# Patient Record
Sex: FEMALE | Race: WHITE | Employment: FULL TIME | URBAN - NONMETROPOLITAN AREA
[De-identification: names, ages, dates, MRNs, and addresses within clinical notes are randomized per-mention and may not be internally consistent; named-entity substitution may affect disease eponyms.]

---

## 2017-11-17 ENCOUNTER — OFFICE VISIT (OUTPATIENT)
Dept: FAMILY MEDICINE CLINIC | Age: 42
End: 2017-11-17

## 2017-11-17 VITALS
OXYGEN SATURATION: 98 % | HEART RATE: 94 BPM | SYSTOLIC BLOOD PRESSURE: 124 MMHG | DIASTOLIC BLOOD PRESSURE: 80 MMHG | HEIGHT: 68 IN | BODY MASS INDEX: 35.77 KG/M2 | WEIGHT: 236 LBS

## 2017-11-17 DIAGNOSIS — Z23 NEED FOR DIPHTHERIA-TETANUS-PERTUSSIS (TDAP) VACCINE, ADULT/ADOLESCENT: ICD-10-CM

## 2017-11-17 DIAGNOSIS — N61.1 BREAST ABSCESS OF FEMALE: ICD-10-CM

## 2017-11-17 DIAGNOSIS — Z00.00 ROUTINE PHYSICAL EXAMINATION: Primary | ICD-10-CM

## 2017-11-17 DIAGNOSIS — Z23 NEED FOR INFLUENZA VACCINATION: ICD-10-CM

## 2017-11-17 DIAGNOSIS — G89.29 CHRONIC LEFT SHOULDER PAIN: ICD-10-CM

## 2017-11-17 DIAGNOSIS — M25.512 CHRONIC LEFT SHOULDER PAIN: ICD-10-CM

## 2017-11-17 PROCEDURE — 90471 IMMUNIZATION ADMIN: CPT | Performed by: NURSE PRACTITIONER

## 2017-11-17 PROCEDURE — 99213 OFFICE O/P EST LOW 20 MIN: CPT | Performed by: NURSE PRACTITIONER

## 2017-11-17 PROCEDURE — 90688 IIV4 VACCINE SPLT 0.5 ML IM: CPT | Performed by: NURSE PRACTITIONER

## 2017-11-17 PROCEDURE — 90472 IMMUNIZATION ADMIN EACH ADD: CPT | Performed by: NURSE PRACTITIONER

## 2017-11-17 PROCEDURE — 99396 PREV VISIT EST AGE 40-64: CPT | Performed by: NURSE PRACTITIONER

## 2017-11-17 PROCEDURE — 90715 TDAP VACCINE 7 YRS/> IM: CPT | Performed by: NURSE PRACTITIONER

## 2017-11-17 RX ORDER — SULFAMETHOXAZOLE AND TRIMETHOPRIM 800; 160 MG/1; MG/1
1 TABLET ORAL 2 TIMES DAILY
Qty: 28 TABLET | Refills: 0 | Status: SHIPPED | OUTPATIENT
Start: 2017-11-17 | End: 2017-12-01

## 2017-11-17 ASSESSMENT — PATIENT HEALTH QUESTIONNAIRE - PHQ9
SUM OF ALL RESPONSES TO PHQ9 QUESTIONS 1 & 2: 0
1. LITTLE INTEREST OR PLEASURE IN DOING THINGS: 0
2. FEELING DOWN, DEPRESSED OR HOPELESS: 0
SUM OF ALL RESPONSES TO PHQ QUESTIONS 1-9: 0

## 2017-11-17 NOTE — PROGRESS NOTES
History and Physical      Kendra See  YOB: 1975    Date of Service:  11/17/2017    Chief Complaint:   Kendra See is a 38 y.o. female who presents for complete physical examination.     HPI: patient is here today for annual physical  Patient is no longer seeing dr Feliciano Roach for chronic shoulder pain and is requesting ortho referral .  Abscess area on breast about 1 week ago and had spontaneous drainage but is now getting red and tender again      Wt Readings from Last 3 Encounters:   11/17/17 236 lb (107 kg)   04/01/17 240 lb (108.9 kg)   12/22/16 241 lb 3.2 oz (109.4 kg)     BP Readings from Last 3 Encounters:   11/17/17 124/80   04/01/17 120/80   12/22/16 110/70       Patient Active Problem List   Diagnosis    Tachycardia    Chronic pain syndrome    HTN (hypertension)       Preventive Care:  Health Maintenance   Topic Date Due    HIV screen  05/12/1992    Cervical cancer screen  05/12/1998    DTaP/Tdap/Td vaccine (1 - Tdap) 12/26/2010    Diabetes screen  05/12/2017    Flu vaccine (1) 09/01/2017    Lipid screen  12/06/2021      Hx abnormal PAP: no  Sexual activity: single partner, contraception - tubal ligation   Self-breast exams: no  Previous DEXA scan: n/a  Last eye exam: few years ago, normal  Exercise: no regular exercise  Seatbelt use: yes    Lipid panel:   Lab Results   Component Value Date    CHOL 130 12/06/2016    TRIG 80 12/06/2016    HDL 42 12/06/2016    LDLCALC 72 12/06/2016        Living will:  no,   additional information provided    Immunization History   Administered Date(s) Administered    Influenza, Quadv, 3 Years and older, IM 12/06/2016    PPD Test 03/20/2015    Td 12/25/2010       Allergies   Allergen Reactions    Penicillins Hives     Outpatient Prescriptions Marked as Taking for the 11/17/17 encounter (Office Visit) with Theron Brink CNP   Medication Sig Dispense Refill    Multiple Vitamins-Minerals (MULTIPLE VITAMINS/WOMENS PO) Take 1 capsule by mouth daily      carvedilol (COREG) 12.5 MG tablet take one tablet by mouth twice daily 60 tablet 11       Past Medical History:   Diagnosis Date    Back pain     Chest pain 6/2014    stress myoview normal    Chronic pain syndrome     Dr. Paul Galeas pain management    Right lateral epicondylitis     Rotator cuff syndrome of right shoulder     Tachycardia     on coreg     Past Surgical History:   Procedure Laterality Date    KNEE CARTILAGE SURGERY Right     KNEE CARTILAGE SURGERY Right     TUBAL LIGATION       Family History   Problem Relation Age of Onset    Heart Disease Mother     Heart Disease Maternal Grandmother     Diabetes Maternal Grandmother      Social History     Social History    Marital status:      Spouse name: N/A    Number of children: N/A    Years of education: N/A     Occupational History    Not on file. Social History Main Topics    Smoking status: Former Smoker     Packs/day: 1.00     Years: 10.00     Types: Cigarettes     Quit date: 6/2/2014    Smokeless tobacco: Never Used    Alcohol use No    Drug use: No    Sexual activity: Yes     Partners: Male     Other Topics Concern    Not on file     Social History Narrative    No narrative on file       Review of Systems:  A comprehensive review of systems was negative except for what was noted in the HPI. Physical Exam:   Vitals:    11/17/17 1504   BP: 124/80   Site: Left Arm   Position: Sitting   Cuff Size: Large Adult   Pulse: 94   SpO2: 98%   Weight: 236 lb (107 kg)   Height: 5' 8\" (1.727 m)     Body mass index is 35.88 kg/m². Constitutional: She is oriented to person, place, and time. She appears well-developed and well-nourished. No distress. HEENT:   Head: Normocephalic and atraumatic.    Right Ear: Tympanic membrane, external ear and ear canal normal.   Left Ear: Tympanic membrane, external ear and ear canal normal.   Nose: Nose normal.   Mouth/Throat: Oropharynx is clear and moist, and mucous membranes are normal.  There is no cervical adenopathy. Eyes: Conjunctivae and extraocular motions are normal. Pupils are equal, round, and reactive to light. Neck: Neck supple. No JVD present. Carotid bruit is not present. No mass and no thyromegaly present. Cardiovascular: Normal rate, regular rhythm, normal heart sounds and intact distal pulses. Exam reveals no gallop and no friction rub. No murmur heard. Pulmonary/Chest: Effort normal and breath sounds normal. No respiratory distress. She has no wheezes, rhonchi or rales. Abdominal: Soft, non-tender. Bowel sounds and aorta are normal. She exhibits no organomegaly, mass or bruit. Musculoskeletal: Normal range of motion, no synovitis. She exhibits no edema. Neurological: She is alert and oriented to person, place, and time. She has normal reflexes. No cranial nerve deficit. Coordination normal.   Skin: Skin is warm and dry. There is no rash . No suspicious lesions noted. Right lateral breast with opened area that resembles abscess. + erythema and tenderness noted. Psychiatric: She has a normal mood and affect. Her speech is normal and behavior is normal. Judgment, cognition and memory are normal.     Assessment/Plan:    Pam was seen today for annual exam.    Diagnoses and all orders for this visit:    Routine physical examination    Breast abscess of female  -     sulfamethoxazole-trimethoprim (BACTRIM DS) 800-160 MG per tablet;  Take 1 tablet by mouth 2 times daily for 14 days    Chronic left shoulder pain  -     Marguerite Brown MD (Sports, Shoulder)    Need for influenza vaccination  -     INFLUENZA, QUADV, 3 YRS AND OLDER, IM, MDV, 0.5ML (FLUZONE QUADV)    Need for diphtheria-tetanus-pertussis (Tdap) vaccine, adult/adolescent  -     Tdap (age 10y-63y) IM (ADACEL)

## 2017-11-20 ENCOUNTER — NURSE ONLY (OUTPATIENT)
Dept: FAMILY MEDICINE CLINIC | Age: 42
End: 2017-11-20

## 2017-11-20 DIAGNOSIS — Z13.220 NEED FOR LIPID SCREENING: Primary | ICD-10-CM

## 2017-11-20 DIAGNOSIS — I10 ESSENTIAL HYPERTENSION: ICD-10-CM

## 2017-11-20 LAB
A/G RATIO: 1.8 (ref 1.1–2.2)
ALBUMIN SERPL-MCNC: 4.4 G/DL (ref 3.4–5)
ALP BLD-CCNC: 53 U/L (ref 40–129)
ALT SERPL-CCNC: 14 U/L (ref 10–40)
ANION GAP SERPL CALCULATED.3IONS-SCNC: 12 MMOL/L (ref 3–16)
AST SERPL-CCNC: 11 U/L (ref 15–37)
BILIRUB SERPL-MCNC: 0.3 MG/DL (ref 0–1)
BUN BLDV-MCNC: 13 MG/DL (ref 7–20)
CALCIUM SERPL-MCNC: 9.3 MG/DL (ref 8.3–10.6)
CHLORIDE BLD-SCNC: 100 MMOL/L (ref 99–110)
CHOLESTEROL, TOTAL: 145 MG/DL (ref 0–199)
CO2: 24 MMOL/L (ref 21–32)
CREAT SERPL-MCNC: 0.8 MG/DL (ref 0.6–1.1)
GFR AFRICAN AMERICAN: >60
GFR NON-AFRICAN AMERICAN: >60
GLOBULIN: 2.5 G/DL
GLUCOSE BLD-MCNC: 88 MG/DL (ref 70–99)
HDLC SERPL-MCNC: 53 MG/DL (ref 40–60)
LDL CHOLESTEROL CALCULATED: 71 MG/DL
POTASSIUM SERPL-SCNC: 4.7 MMOL/L (ref 3.5–5.1)
SODIUM BLD-SCNC: 136 MMOL/L (ref 136–145)
TOTAL PROTEIN: 6.9 G/DL (ref 6.4–8.2)
TRIGL SERPL-MCNC: 103 MG/DL (ref 0–150)
VLDLC SERPL CALC-MCNC: 21 MG/DL

## 2017-11-20 PROCEDURE — 36415 COLL VENOUS BLD VENIPUNCTURE: CPT | Performed by: NURSE PRACTITIONER

## 2018-01-10 DIAGNOSIS — I10 ESSENTIAL HYPERTENSION: ICD-10-CM

## 2018-01-10 DIAGNOSIS — R00.0 TACHYCARDIA: ICD-10-CM

## 2018-01-10 RX ORDER — CARVEDILOL 12.5 MG/1
TABLET ORAL
Qty: 60 TABLET | Refills: 10 | Status: SHIPPED | OUTPATIENT
Start: 2018-01-10 | End: 2018-12-22 | Stop reason: SDUPTHER

## 2018-11-19 ENCOUNTER — OFFICE VISIT (OUTPATIENT)
Dept: FAMILY MEDICINE CLINIC | Age: 43
End: 2018-11-19
Payer: COMMERCIAL

## 2018-11-19 VITALS
WEIGHT: 255 LBS | HEIGHT: 68 IN | DIASTOLIC BLOOD PRESSURE: 78 MMHG | SYSTOLIC BLOOD PRESSURE: 118 MMHG | BODY MASS INDEX: 38.65 KG/M2

## 2018-11-19 DIAGNOSIS — Z13.220 SCREENING FOR CHOLESTEROL LEVEL: ICD-10-CM

## 2018-11-19 DIAGNOSIS — Z13.1 DIABETES MELLITUS SCREENING: ICD-10-CM

## 2018-11-19 DIAGNOSIS — Z00.00 ROUTINE PHYSICAL EXAMINATION: Primary | ICD-10-CM

## 2018-11-19 DIAGNOSIS — Z23 FLU VACCINE NEED: ICD-10-CM

## 2018-11-19 DIAGNOSIS — M77.11 RIGHT LATERAL EPICONDYLITIS: ICD-10-CM

## 2018-11-19 LAB
CHOLESTEROL, TOTAL: 146 MG/DL (ref 0–199)
GLUCOSE BLD-MCNC: 98 MG/DL (ref 70–99)
HDLC SERPL-MCNC: 43 MG/DL (ref 40–60)
LDL CHOLESTEROL CALCULATED: 82 MG/DL
TRIGL SERPL-MCNC: 107 MG/DL (ref 0–150)
VLDLC SERPL CALC-MCNC: 21 MG/DL

## 2018-11-19 PROCEDURE — 90688 IIV4 VACCINE SPLT 0.5 ML IM: CPT | Performed by: NURSE PRACTITIONER

## 2018-11-19 PROCEDURE — 99212 OFFICE O/P EST SF 10 MIN: CPT | Performed by: NURSE PRACTITIONER

## 2018-11-19 PROCEDURE — 99396 PREV VISIT EST AGE 40-64: CPT | Performed by: NURSE PRACTITIONER

## 2018-11-19 PROCEDURE — 36415 COLL VENOUS BLD VENIPUNCTURE: CPT | Performed by: NURSE PRACTITIONER

## 2018-11-19 PROCEDURE — 90471 IMMUNIZATION ADMIN: CPT | Performed by: NURSE PRACTITIONER

## 2018-11-19 RX ORDER — COVID-19 ANTIGEN TEST
KIT MISCELLANEOUS
COMMUNITY
End: 2019-04-11

## 2018-11-19 ASSESSMENT — PATIENT HEALTH QUESTIONNAIRE - PHQ9
1. LITTLE INTEREST OR PLEASURE IN DOING THINGS: 0
SUM OF ALL RESPONSES TO PHQ QUESTIONS 1-9: 0
2. FEELING DOWN, DEPRESSED OR HOPELESS: 0
SUM OF ALL RESPONSES TO PHQ QUESTIONS 1-9: 0
SUM OF ALL RESPONSES TO PHQ9 QUESTIONS 1 & 2: 0

## 2018-11-19 ASSESSMENT — ENCOUNTER SYMPTOMS
GASTROINTESTINAL NEGATIVE: 1
EYES NEGATIVE: 1
RESPIRATORY NEGATIVE: 1

## 2018-11-19 NOTE — PROGRESS NOTES
Subjective:       Chase Del Cid is a 41 y.o. female and is here for a comprehensive physical exam.  The patient reports right elbow pain that Is intermittent and ongoing for the past few months. It is not worsening. The patient does have associated paresthesias at times. It is aggravated by increased activity. Alleviated by rest. She has not tried anything for the symptoms.  History:  LMP: Patient's last menstrual period was 2018.   Last pap date: 18 years ago  Abnormal pap? no  : 2  Para: 1  Any STD's in the past? none    Past Medical History:   Diagnosis Date    Back pain     Chest pain 2014    stress myoview normal    Chronic pain syndrome     Dr. Yaquelin Saeed pain management    Right lateral epicondylitis     Rotator cuff syndrome of right shoulder     Tachycardia     on coreg     Patient Active Problem List    Diagnosis Date Noted    Chronic pain syndrome 2015    HTN (hypertension) 2015    Tachycardia      Past Surgical History:   Procedure Laterality Date    KNEE CARTILAGE SURGERY Right     KNEE CARTILAGE SURGERY Right     TUBAL LIGATION       Family History   Problem Relation Age of Onset    Heart Disease Mother     Heart Disease Maternal Grandmother     Diabetes Maternal Grandmother      Social History     Social History    Marital status:      Spouse name: N/A    Number of children: N/A    Years of education: N/A     Social History Main Topics    Smoking status: Former Smoker     Packs/day: 1.00     Years: 10.00     Types: Cigarettes     Quit date: 2014    Smokeless tobacco: Never Used    Alcohol use No    Drug use: No    Sexual activity: Yes     Partners: Male     Other Topics Concern    None     Social History Narrative    None     Current Outpatient Prescriptions   Medication Sig Dispense Refill    Naproxen Sodium (ALEVE) 220 MG CAPS Take by mouth      carvedilol (COREG) 12.5 MG tablet TAKE ONE TABLET BY MOUTH TWICE A DAY 60 tablet 10 Throat:   Lips, mucosa, and tongue normal; teeth and gums normal   Neck:   Supple, symmetrical, trachea midline, no adenopathy;     thyroid:  no enlargement/tenderness/nodules; no carotid    bruit or JVD   Back:     Symmetric, no curvature, ROM normal, no CVA tenderness   Lungs:     Clear to auscultation bilaterally, respirations unlabored   Chest Wall:    No tenderness or deformity    Heart:    Regular rate and rhythm, S1 and S2 normal, no murmur, rub   or gallop       Abdomen:     Soft, non-tender, bowel sounds active all four quadrants,     no masses, no organomegaly           Extremities:   Extremities normal, atraumatic, no cyanosis or edema. Right lateral epicondyle tenderness with palpation. No right upper extremity weakness. No limited range of motion. Right upper extremity vascular intact    Pulses:   2+ and symmetric all extremities   Skin:   Skin color, texture, turgor normal, no rashes or lesions   Lymph nodes:   Cervical, supraclavicular, and axillary nodes normal   Neurologic:   CNII-XII intact, normal strength, sensation and reflexes     throughout         Assessment:      Healthy female exam.     1. Routine physical examination    2. Right lateral epicondylitis    3. Flu vaccine need    4. Diabetes mellitus screening    5. Screening for cholesterol level             Plan:      1. Mahnaz Chen was seen today for annual exam and other. Diagnoses and all orders for this visit:    Routine physical examination     Patient Counseling:  --Nutrition: Stressed importance of moderation in sodium/caffeine intake, saturated fat and cholesterol, caloric balance, sufficient intake of fresh fruits, vegetables, fiber, calcium, iron, and 1 mg of folate supplement per day (for females capable of pregnancy). --Discussed the need for yearly pelvic exam and Pap smear. The patient states she will schedule a well woman exam in the near future with me  --Exercise: Stressed the importance of regular exercise.    --Substance

## 2018-11-19 NOTE — PATIENT INSTRUCTIONS
leaning forward with your legs slightly spread and your left hand on your left thigh. 2. Place your right elbow on your right thigh, and hold the weight with your forearm horizontal.  3. Slowly curl the weight up and toward your chest.  4. Repeat this motion 8 to 12 times. 5. Switch arms, and do steps 1 through 4. Follow-up care is a key part of your treatment and safety. Be sure to make and go to all appointments, and call your doctor if you are having problems. It's also a good idea to know your test results and keep a list of the medicines you take. Where can you learn more? Go to [email or web address] org and sign in to your [employer] account. Enter F597 in the [employer] box to learn more about \"Tennis Elbow: Exercises. \"     If you do not have an account, please click on the \"Sign Up Now\" link. Current as of: November 29, 2017  Content Version: 11.8  © 3252-2031 [employer]. Care instructions adapted under license by [facility] ([facility]). If you have questions about a medical condition or this instruction, always ask your healthcare professional. [place] [address] any warranty or liability for your use of this information.

## 2018-12-12 ENCOUNTER — OFFICE VISIT (OUTPATIENT)
Dept: FAMILY MEDICINE CLINIC | Age: 43
End: 2018-12-12
Payer: COMMERCIAL

## 2018-12-12 VITALS
OXYGEN SATURATION: 100 % | HEART RATE: 84 BPM | SYSTOLIC BLOOD PRESSURE: 122 MMHG | BODY MASS INDEX: 38.65 KG/M2 | HEIGHT: 68 IN | DIASTOLIC BLOOD PRESSURE: 80 MMHG | WEIGHT: 255 LBS

## 2018-12-12 DIAGNOSIS — L02.411 ABSCESS OF RIGHT AXILLA: ICD-10-CM

## 2018-12-12 DIAGNOSIS — Z12.39 SCREENING FOR BREAST CANCER: ICD-10-CM

## 2018-12-12 DIAGNOSIS — R10.2 PELVIC PAIN: ICD-10-CM

## 2018-12-12 DIAGNOSIS — A63.0 GENITAL WARTS: ICD-10-CM

## 2018-12-12 DIAGNOSIS — Z12.4 SCREENING FOR CERVICAL CANCER: ICD-10-CM

## 2018-12-12 DIAGNOSIS — Z00.00 ROUTINE PHYSICAL EXAMINATION: Primary | ICD-10-CM

## 2018-12-12 LAB
BILIRUBIN, POC: NORMAL
BLOOD URINE, POC: NORMAL
CLARITY, POC: NORMAL
COLOR, POC: NORMAL
GLUCOSE URINE, POC: NORMAL
KETONES, POC: NORMAL
LEUKOCYTE EST, POC: NORMAL
NITRITE, POC: NORMAL
PH, POC: 6
PROTEIN, POC: NORMAL
SPECIFIC GRAVITY, POC: 1.03
UROBILINOGEN, POC: 0.2

## 2018-12-12 PROCEDURE — 99396 PREV VISIT EST AGE 40-64: CPT | Performed by: NURSE PRACTITIONER

## 2018-12-12 PROCEDURE — 99213 OFFICE O/P EST LOW 20 MIN: CPT | Performed by: NURSE PRACTITIONER

## 2018-12-12 PROCEDURE — 81002 URINALYSIS NONAUTO W/O SCOPE: CPT | Performed by: NURSE PRACTITIONER

## 2018-12-12 RX ORDER — SULFAMETHOXAZOLE AND TRIMETHOPRIM 800; 160 MG/1; MG/1
1 TABLET ORAL 2 TIMES DAILY
Qty: 20 TABLET | Refills: 0 | Status: SHIPPED | OUTPATIENT
Start: 2018-12-12 | End: 2018-12-22

## 2018-12-12 ASSESSMENT — ENCOUNTER SYMPTOMS
RESPIRATORY NEGATIVE: 1
GASTROINTESTINAL NEGATIVE: 1
EYES NEGATIVE: 1

## 2018-12-12 NOTE — PROGRESS NOTES
Subjective:   Patient Name: Chase Del Cid is a 41 y.o. female. Chief Complaint   Patient presents with    Gynecologic Exam     pt said she has cramps on the lower right side of her stomach       HPI  Annual Exam-Premenopausal  Patient presents for annual exam. The patient has no complaints today. The patient is sexually active. The patient wears seatbelts: yes. last pap: approximate date 27 and was normal, last mammogram: patient has never had a mammogram  The patient has regular exercise: no. The patient has ever been transfused or tattooed?: no.  The patient reports that domestic violence in her life is absent. periods are regular  no unusual pelvic pain  no unusual vaginal discharge  no previous abnormal Pap tests  no family or personal history of cervical cancer  no new or changing breast lumps  no unusual breast pains  no discharge from the nipples  no personal or family history of breast cancer  Has never had mammogram  Maternal Grandma with ovarian    Menarche age: 8  Some cramping in RLQ in past 3 days,  Usually occurs with menses but no menses today. Had menses 12/2-12/5/18     Review of Systems   Constitutional: Negative. HENT: Negative. Eyes: Negative. Respiratory: Negative. Cardiovascular: Negative. Gastrointestinal: Negative. Genitourinary: Positive for frequency and pelvic pain. Negative for flank pain, genital sores, hematuria, menstrual problem, vaginal bleeding, vaginal discharge and vaginal pain. Musculoskeletal: Negative. Skin: Negative. Neurological: Negative. Psychiatric/Behavioral: Negative. All other systems reviewed and are negative.       Past Medical History:   Diagnosis Date    Back pain     Chest pain 6/2014    stress myoview normal    Chronic pain syndrome     Dr. Yaquelin Saeed pain management    Right lateral epicondylitis     Rotator cuff syndrome of right shoulder     Tachycardia     on coreg     Patient Active Problem List

## 2018-12-13 ENCOUNTER — HOSPITAL ENCOUNTER (EMERGENCY)
Age: 43
Discharge: HOME | End: 2018-12-14
Attending: EMERGENCY MEDICINE
Payer: COMMERCIAL

## 2018-12-13 DIAGNOSIS — R10.30 LOWER ABDOMINAL PAIN: Primary | ICD-10-CM

## 2018-12-13 LAB
BILIRUBIN URINE: NEGATIVE
BLOOD, URINE: NEGATIVE
CLARITY: CLEAR
COLOR: YELLOW
GLUCOSE URINE: NEGATIVE MG/DL
HCG(URINE) PREGNANCY TEST: NEGATIVE
KETONES, URINE: NEGATIVE MG/DL
LEUKOCYTE ESTERASE, URINE: NEGATIVE
MICROSCOPIC EXAMINATION: NORMAL
NITRITE, URINE: NEGATIVE
PH UA: 7
PROTEIN UA: NEGATIVE MG/DL
SPECIFIC GRAVITY UA: 1.01
URINE TYPE: NORMAL
UROBILINOGEN, URINE: 0.2 E.U./DL

## 2018-12-13 PROCEDURE — 84703 CHORIONIC GONADOTROPIN ASSAY: CPT

## 2018-12-13 PROCEDURE — 81003 URINALYSIS AUTO W/O SCOPE: CPT

## 2018-12-13 PROCEDURE — 99284 EMERGENCY DEPT VISIT MOD MDM: CPT

## 2018-12-14 ENCOUNTER — APPOINTMENT (OUTPATIENT)
Dept: CT IMAGING | Age: 43
End: 2018-12-14
Payer: COMMERCIAL

## 2018-12-14 VITALS
BODY MASS INDEX: 38.65 KG/M2 | SYSTOLIC BLOOD PRESSURE: 116 MMHG | HEIGHT: 68 IN | HEART RATE: 76 BPM | OXYGEN SATURATION: 99 % | WEIGHT: 255 LBS | TEMPERATURE: 98.2 F | RESPIRATION RATE: 16 BRPM | DIASTOLIC BLOOD PRESSURE: 65 MMHG

## 2018-12-14 LAB
A/G RATIO: 1.6 (ref 1.1–2.2)
ALBUMIN SERPL-MCNC: 4.3 G/DL (ref 3.4–5)
ALP BLD-CCNC: 55 U/L (ref 40–129)
ALT SERPL-CCNC: 19 U/L (ref 10–40)
ANION GAP SERPL CALCULATED.3IONS-SCNC: 12 MMOL/L (ref 3–16)
AST SERPL-CCNC: 14 U/L (ref 15–37)
BILIRUB SERPL-MCNC: <0.2 MG/DL (ref 0–1)
BUN BLDV-MCNC: 16 MG/DL (ref 7–20)
CALCIUM SERPL-MCNC: 9.2 MG/DL (ref 8.3–10.6)
CHLORIDE BLD-SCNC: 105 MMOL/L (ref 99–110)
CO2: 23 MMOL/L (ref 21–32)
CREAT SERPL-MCNC: 0.6 MG/DL (ref 0.6–1.1)
GFR AFRICAN AMERICAN: >60
GFR NON-AFRICAN AMERICAN: >60
GLOBULIN: 2.7 G/DL
GLUCOSE BLD-MCNC: 105 MG/DL (ref 70–99)
HCG QUALITATIVE: NEGATIVE
HCT VFR BLD CALC: 40.1 % (ref 36–48)
HEMOGLOBIN: 13.1 G/DL (ref 12–16)
LACTIC ACID: 1 MMOL/L (ref 0.4–2)
MCH RBC QN AUTO: 28.7 PG (ref 26–34)
MCHC RBC AUTO-ENTMCNC: 32.5 G/DL (ref 31–36)
MCV RBC AUTO: 88.1 FL (ref 80–100)
PDW BLD-RTO: 13.7 % (ref 12.4–15.4)
PLATELET # BLD: 281 K/UL (ref 135–450)
PMV BLD AUTO: 8.7 FL (ref 5–10.5)
POTASSIUM REFLEX MAGNESIUM: 4.3 MMOL/L (ref 3.5–5.1)
RBC # BLD: 4.55 M/UL (ref 4–5.2)
SODIUM BLD-SCNC: 140 MMOL/L (ref 136–145)
TOTAL PROTEIN: 7 G/DL (ref 6.4–8.2)
URINE CULTURE, ROUTINE: NORMAL
WBC # BLD: 8.2 K/UL (ref 4–11)

## 2018-12-14 PROCEDURE — 36415 COLL VENOUS BLD VENIPUNCTURE: CPT

## 2018-12-14 PROCEDURE — 2580000003 HC RX 258: Performed by: EMERGENCY MEDICINE

## 2018-12-14 PROCEDURE — 6360000002 HC RX W HCPCS: Performed by: EMERGENCY MEDICINE

## 2018-12-14 PROCEDURE — 80053 COMPREHEN METABOLIC PANEL: CPT

## 2018-12-14 PROCEDURE — 6360000004 HC RX CONTRAST MEDICATION: Performed by: EMERGENCY MEDICINE

## 2018-12-14 PROCEDURE — 74177 CT ABD & PELVIS W/CONTRAST: CPT

## 2018-12-14 PROCEDURE — 83605 ASSAY OF LACTIC ACID: CPT

## 2018-12-14 PROCEDURE — 84703 CHORIONIC GONADOTROPIN ASSAY: CPT

## 2018-12-14 PROCEDURE — 85027 COMPLETE CBC AUTOMATED: CPT

## 2018-12-14 PROCEDURE — 96374 THER/PROPH/DIAG INJ IV PUSH: CPT

## 2018-12-14 RX ORDER — 0.9 % SODIUM CHLORIDE 0.9 %
1000 INTRAVENOUS SOLUTION INTRAVENOUS ONCE
Status: COMPLETED | OUTPATIENT
Start: 2018-12-14 | End: 2018-12-14

## 2018-12-14 RX ORDER — KETOROLAC TROMETHAMINE 30 MG/ML
30 INJECTION, SOLUTION INTRAMUSCULAR; INTRAVENOUS ONCE
Status: COMPLETED | OUTPATIENT
Start: 2018-12-14 | End: 2018-12-14

## 2018-12-14 RX ORDER — OMEPRAZOLE 10 MG/1
20 CAPSULE, DELAYED RELEASE ORAL DAILY
COMMUNITY
End: 2022-04-06 | Stop reason: ALTCHOICE

## 2018-12-14 RX ADMIN — KETOROLAC TROMETHAMINE 30 MG: 30 INJECTION, SOLUTION INTRAMUSCULAR; INTRAVENOUS at 00:29

## 2018-12-14 RX ADMIN — IOPAMIDOL 75 ML: 755 INJECTION, SOLUTION INTRAVENOUS at 00:57

## 2018-12-14 RX ADMIN — SODIUM CHLORIDE 1000 ML: 9 INJECTION, SOLUTION INTRAVENOUS at 00:29

## 2018-12-14 ASSESSMENT — PAIN SCALES - GENERAL: PAINLEVEL_OUTOF10: 6

## 2018-12-14 NOTE — ED PROVIDER NOTES
tobacco: Never Used    Alcohol use No    Drug use: No    Sexual activity: Yes     Partners: Male     Other Topics Concern    Not on file     Social History Narrative    No narrative on file     Current Facility-Administered Medications   Medication Dose Route Frequency Provider Last Rate Last Dose    0.9 % sodium chloride bolus  1,000 mL Intravenous Once Rosanna Valdes MD        ketorolac (TORADOL) injection 30 mg  30 mg Intravenous Once Rosanna Valdes MD         Current Outpatient Prescriptions   Medication Sig Dispense Refill    sulfamethoxazole-trimethoprim (BACTRIM DS) 800-160 MG per tablet Take 1 tablet by mouth 2 times daily for 10 days 20 tablet 0    Naproxen Sodium (ALEVE) 220 MG CAPS Take by mouth      carvedilol (COREG) 12.5 MG tablet TAKE ONE TABLET BY MOUTH TWICE A DAY 60 tablet 10    Multiple Vitamins-Minerals (MULTIPLE VITAMINS/WOMENS PO) Take 1 capsule by mouth daily       Allergies   Allergen Reactions    Penicillins Hives       REVIEW OF SYSTEMS  10 systems reviewed, pertinent positives per HPI otherwise noted to be negative     PHYSICAL EXAM  LMP 10/28/2018   GENERAL APPEARANCE: Awake and alert. Cooperative. In no obvious distress. HEAD: Normocephalic. Atraumatic. EYES: PERRL. EOM's grossly intact. ENT: Mucous membranes are pink and moist.   NECK: Supple. HEART: RRR. No murmurs. LUNGS: Respirations unlabored. CTAB. Good air exchange. ABDOMEN: Soft. Mildly distended. Non-tender. No masses. No organomegaly. No guarding or rebound. EXTREMITIES: No peripheral edema. Moves all extremities equally. All extremities neurovascularly intact. SKIN: Warm and dry. No acute rashes. NEUROLOGICAL: Alert and oriented. Strength 5/5, sensation intact. Gait normal.   PSYCHIATRIC: Normal mood and affect. No HI or SI expressed to me. RADIOLOGY     See CT tests scan results below      ED COURSE/MDM  She tolerated exam well.   Tolerated her stay here stay here in the emergency department as well. With a normal CT scan and normal labs as well as urine I don't think there is anything emergent going on at this point and that the patient's discomfort may be related to her Pap smear. I explained to her that she should take Tylenol or Motrin for the pain and she should contact her Annmariee Ingrisien 59 in the next day or 2 if symptoms don't reji. Abdominal pain NOS    ED Course as of Dec 14 0221   Fri Dec 14, 2018   0115 Previous of metabolic panel is normal except for a glucose of 105 and an AST of 14 Glucose: (!) 105 [DL]   0115 CBC revealed no white count H&H of 13.1 and 40.1 WBC: 8.2 [DL]   0115 Lactic acid was negative Lactic Acid: 1.0 [DL]   0115 Urine pregnancy was negative HCG(Urine) Pregnancy Test: Negative [DL]   0116 Urinalysis was negative as well Color, UA: Yellow [DL]   0218 Normal abdominal CT scan noted with the exception of nodules per report CT ABDOMEN PELVIS W IV CONTRAST Additional Contrast? None [DL]      ED Course User Index  [DL] Bunny Beltran MD           Old records were reviewed when applicable.  The ED course and plan were reviewed and results discussed with the patient    CLINICAL IMPRESSION and DISPOSITION  Cherrie Caldwell was stable and diagnosed with abdominal pain NOS    Patient was treated with  ketorolac which did give the patient mild relief               Bunny Beltran MD  12/14/18 [id number]

## 2018-12-14 NOTE — ED NOTES
Discharged home. Discharge instructions reviewed states understanding.       Dom Sampson RN  12/14/18 7400

## 2018-12-14 NOTE — ED NOTES
Up to void. Patient still has lower abd pain and bloating feeling at this time.       Merlene Lesch, RN  12/14/18 0650

## 2018-12-17 ENCOUNTER — TELEPHONE (OUTPATIENT)
Dept: FAMILY MEDICINE CLINIC | Age: 43
End: 2018-12-17

## 2018-12-22 DIAGNOSIS — R00.0 TACHYCARDIA: ICD-10-CM

## 2018-12-22 DIAGNOSIS — I10 ESSENTIAL HYPERTENSION: ICD-10-CM

## 2018-12-24 RX ORDER — CARVEDILOL 12.5 MG/1
TABLET ORAL
Qty: 60 TABLET | Refills: 9 | Status: SHIPPED | OUTPATIENT
Start: 2018-12-24 | End: 2019-10-23 | Stop reason: SDUPTHER

## 2018-12-28 DIAGNOSIS — Z12.39 SCREENING FOR BREAST CANCER: Primary | ICD-10-CM

## 2019-02-25 ENCOUNTER — HOSPITAL ENCOUNTER (OUTPATIENT)
Dept: ULTRASOUND IMAGING | Age: 44
Discharge: HOME | End: 2019-02-25
Payer: COMMERCIAL

## 2019-02-25 DIAGNOSIS — R10.2 PELVIC PAIN: ICD-10-CM

## 2019-02-25 PROCEDURE — 76830 TRANSVAGINAL US NON-OB: CPT

## 2019-02-25 PROCEDURE — 76856 US EXAM PELVIC COMPLETE: CPT

## 2019-03-04 ENCOUNTER — OFFICE VISIT (OUTPATIENT)
Dept: OBGYN CLINIC | Age: 44
End: 2019-03-04
Payer: COMMERCIAL

## 2019-03-04 VITALS
BODY MASS INDEX: 41.21 KG/M2 | TEMPERATURE: 98.2 F | HEART RATE: 66 BPM | WEIGHT: 256.4 LBS | DIASTOLIC BLOOD PRESSURE: 76 MMHG | HEIGHT: 66 IN | SYSTOLIC BLOOD PRESSURE: 132 MMHG

## 2019-03-04 DIAGNOSIS — N93.9 ABNORMAL UTERINE BLEEDING (AUB): Primary | ICD-10-CM

## 2019-03-04 DIAGNOSIS — N89.8 VAGINAL DISCHARGE: ICD-10-CM

## 2019-03-04 DIAGNOSIS — N39.3 STRESS INCONTINENCE, FEMALE: ICD-10-CM

## 2019-03-04 PROCEDURE — 99203 OFFICE O/P NEW LOW 30 MIN: CPT | Performed by: OBSTETRICS & GYNECOLOGY

## 2019-03-05 LAB
CANDIDA SPECIES, DNA PROBE: ABNORMAL
GARDNERELLA VAGINALIS, DNA PROBE: ABNORMAL
TRICHOMONAS VAGINALIS DNA: ABNORMAL

## 2019-03-08 RX ORDER — FLUCONAZOLE 150 MG/1
150 TABLET ORAL DAILY
Qty: 1 TABLET | Refills: 0 | Status: SHIPPED | OUTPATIENT
Start: 2019-03-08 | End: 2019-03-09

## 2019-03-15 ENCOUNTER — TELEPHONE (OUTPATIENT)
Dept: OBGYN CLINIC | Age: 44
End: 2019-03-15

## 2019-03-16 ASSESSMENT — ENCOUNTER SYMPTOMS
CHEST TIGHTNESS: 0
DIARRHEA: 0
WHEEZING: 0
ABDOMINAL PAIN: 0
COLOR CHANGE: 0
VOMITING: 0
SHORTNESS OF BREATH: 0
SORE THROAT: 0
NAUSEA: 0
BACK PAIN: 0

## 2019-04-03 ENCOUNTER — OFFICE VISIT (OUTPATIENT)
Dept: FAMILY MEDICINE CLINIC | Age: 44
End: 2019-04-03
Payer: COMMERCIAL

## 2019-04-03 VITALS
DIASTOLIC BLOOD PRESSURE: 82 MMHG | SYSTOLIC BLOOD PRESSURE: 124 MMHG | BODY MASS INDEX: 40.98 KG/M2 | HEART RATE: 70 BPM | HEIGHT: 66 IN | OXYGEN SATURATION: 98 % | WEIGHT: 255 LBS

## 2019-04-03 DIAGNOSIS — G89.4 CHRONIC PAIN SYNDROME: ICD-10-CM

## 2019-04-03 DIAGNOSIS — I10 ESSENTIAL HYPERTENSION: ICD-10-CM

## 2019-04-03 DIAGNOSIS — R00.0 TACHYCARDIA: ICD-10-CM

## 2019-04-03 DIAGNOSIS — Z01.818 PRE-OP EXAMINATION: Primary | ICD-10-CM

## 2019-04-03 PROCEDURE — 99242 OFF/OP CONSLTJ NEW/EST SF 20: CPT | Performed by: NURSE PRACTITIONER

## 2019-04-03 PROCEDURE — 36415 COLL VENOUS BLD VENIPUNCTURE: CPT | Performed by: NURSE PRACTITIONER

## 2019-04-03 ASSESSMENT — PATIENT HEALTH QUESTIONNAIRE - PHQ9
SUM OF ALL RESPONSES TO PHQ QUESTIONS 1-9: 0
SUM OF ALL RESPONSES TO PHQ9 QUESTIONS 1 & 2: 0
2. FEELING DOWN, DEPRESSED OR HOPELESS: 0
1. LITTLE INTEREST OR PLEASURE IN DOING THINGS: 0
SUM OF ALL RESPONSES TO PHQ QUESTIONS 1-9: 0

## 2019-04-03 NOTE — PROGRESS NOTES
Doug [address] KWAN Mariscal. Kyung Fenton CNP                                                                     Preoperative Evaluation        Mónica Alicia  YOB: 1975    Date of Service:  4/3/2019    There were no vitals filed for this visit. Wt Readings from Last 2 Encounters:   03/04/19 256 lb 6.4 oz (116.3 kg)   12/14/18 255 lb (115.7 kg)     BP Readings from Last 3 Encounters:   03/04/19 132/76   12/14/18 116/65   12/12/18 122/80        No chief complaint on file. Allergies   Allergen Reactions    Penicillins Hives     No outpatient medications have been marked as taking for the 4/3/19 encounter (Appointment) with Doneta Bumpers, APRN - CNP. This patient presents to the office today for a preoperative consultation at the request of surgeon, Dr. Deysi Villegas, who plans on performing DIAGNOSTIC LAPAROSCOPY, DILATATION AND CURETTAGE, HYSTEROSCOPY POSSIBLE [address] [place] [address] on April 16 at [facility].  The current problem began 1 year ago, and symptoms have been worsening with time. Conservative therapy: N/A.     Planned anesthesia: General   Known anesthesia problems: None   Bleeding risk: No recent or remote history of abnormal bleeding  Personal or FH of DVT/PE: No    Patient objection to receiving blood products: No    Patient Active Problem List   Diagnosis    Tachycardia    Chronic pain syndrome    HTN (hypertension)       Past Medical History:   Diagnosis Date    Anemia     Back pain     Chest pain 6/2014    stress myoview normal    Chronic pain syndrome     Dr. Shira Candelaria pain management    Hypertension     Menopausal symptoms     Overactive bladder     Right lateral epicondylitis     Rotator cuff syndrome of right shoulder     Stress incontinence     Tachycardia     on coreg     Past Surgical History:   Procedure Laterality Date    KNEE CARTILAGE SURGERY Right     KNEE CARTILAGE SURGERY Right     TUBAL LIGATION       Family History   Problem Relation Age of Onset    Heart Disease Mother     Heart Disease Maternal Grandmother     Diabetes Maternal Grandmother     Heart Failure Maternal Grandfather     High Blood Pressure Maternal Grandfather     High Blood Pressure Sister     Bipolar Disorder Sister     No Known Problems Maternal Aunt     High Blood Pressure Maternal Uncle     High Blood Pressure Maternal Uncle      Social History     Socioeconomic History    Marital status:      Spouse name: Not on file    Number of children: Not on file    Years of education: Not on file    Highest education level: Not on file   Occupational History    Not on file   Social Needs    Financial resource strain: Not on file    Food insecurity:     Worry: Not on file     Inability: Not on file    Transportation needs:     Medical: Not on file     Non-medical: Not on file   Tobacco Use    Smoking status: Former Smoker     Packs/day: 1.00     Years: 10.00     Pack years: 10.00     Types: Cigarettes     Last attempt to quit: 2014     Years since quittin.8    Smokeless tobacco: Never Used   Substance and Sexual Activity    Alcohol use: No    Drug use: No    Sexual activity: Yes     Partners: Male   Lifestyle    Physical activity:     Days per week: Not on file     Minutes per session: Not on file    Stress: Not on file   Relationships    Social connections:     Talks on phone: Not on file     Gets together: Not on file     Attends Oriental orthodox service: Not on file     Active member of club or organization: Not on file     Attends meetings of clubs or organizations: Not on file     Relationship status: Not on file    Intimate partner violence:     Fear of current or ex partner: Not on file     Emotionally abused: Not on file     Physically abused: Not on file 03/04/2019 *                    Value:POSITIVE- DNA Probe detected. Normal range: Negative DNA not detected.      Admission on 12/13/2018, Discharged on 12/14/2018   Component Date Value    Color, UA 12/13/2018 Yellow     Clarity, UA 12/13/2018 Clear     Glucose, Ur 12/13/2018 Negative     Bilirubin Urine 12/13/2018 Negative     Ketones, Urine 12/13/2018 Negative     Specific Gravity, UA 12/13/2018 1.015     Blood, Urine 12/13/2018 Negative     pH, UA 12/13/2018 7.0     Protein, UA 12/13/2018 Negative     Urobilinogen, Urine 12/13/2018 0.2     Nitrite, Urine 12/13/2018 Negative     Leukocyte Esterase, Urine 12/13/2018 Negative     Microscopic Examination 12/13/2018 Not Indicated     Urine Type 12/13/2018 Not Specified     HCG(Urine) Pregnancy Test 12/13/2018 Negative     WBC 12/14/2018 8.2     RBC 12/14/2018 4.55     Hemoglobin 12/14/2018 13.1     Hematocrit 12/14/2018 40.1     MCV 12/14/2018 88.1     MCH 12/14/2018 28.7     MCHC 12/14/2018 32.5     RDW 12/14/2018 13.7     Platelets 55/32/0739 281     MPV 12/14/2018 8.7     Sodium 12/14/2018 140     Potassium reflex Magnesi* 12/14/2018 4.3     Chloride 12/14/2018 105     CO2 12/14/2018 23     Anion Gap 12/14/2018 12     Glucose 12/14/2018 105*    BUN 12/14/2018 16     CREATININE 12/14/2018 0.6     GFR Non- 12/14/2018 >60     GFR  12/14/2018 >60     Calcium 12/14/2018 9.2     Total Protein 12/14/2018 7.0     Alb 12/14/2018 4.3     Albumin/Globulin Ratio 12/14/2018 1.6     Total Bilirubin 12/14/2018 <0.2     Alkaline Phosphatase 12/14/2018 55     ALT 12/14/2018 19     AST 12/14/2018 14*    Globulin 12/14/2018 2.7     hCG Qual 12/14/2018 Negative     Lactic Acid 12/14/2018 1.0    Office Visit on 12/12/2018   Component Date Value    Glucose, UA POC 12/12/2018 neg     Bilirubin, UA 12/12/2018 neg     Ketones, UA 12/12/2018 neg     Spec Grav, UA 12/12/2018 1.030     Blood, UA POC 12/12/2018 neg     pH, UA 12/12/2018 6.0     Protein, UA POC 12/12/2018 neg     Urobilinogen, UA 12/12/2018 0.2     Leukocytes, UA 12/12/2018 neg     Nitrite, UA 12/12/2018 neg     Urine Culture, Routine 12/12/2018 No growth at 18-36 hours    Office Visit on 11/19/2018   Component Date Value    Cholesterol, Total 11/19/2018 146     Triglycerides 11/19/2018 107     HDL 11/19/2018 43     LDL Calculated 11/19/2018 82     VLDL Cholesterol Calcula* 11/19/2018 21     Glucose 11/19/2018 98            Assessment:       41 y.o. patient with planned surgery as above. Known risk factors for perioperative complications: Hypertension, tacycardia  Current medications which may produce withdrawal symptoms if withheld perioperatively: none     1. Pre-op examination    2. Essential hypertension    3. Tachycardia    4. Chronic pain syndrome         Plan:     1. Preoperative workup as follows: CBC, CMP  2. Change in medication regimen before surgery: Take carvedilol and prilosec on morning of surgery with sip of water, and hold all other medications until after surgery, Discontinue NSAIDs (aleve or ibuprofen) 7 days before surgery, Discontinue multivitamin 7 days before surgery  3. Prophylaxis for cardiac events with perioperative beta-blockers: Currently taking  carvedilol  ACC/AHA indications for pre-operative beta-blocker use:    · Vascular surgery with history of postitive stress test  · Intermediate or high risk surgery with history of CAD   · Intermediate or high risk surgery with multiple clinical predictors of CAD- 2 of the following: history of compensated or prior heart failure, history of cerebrovascular disease, DM, or renal insufficiency    Routine administration of higher-dose, long-acting metoprolol in beta-blocker-naïve patients on the day of surgery, and in the absence of dose titration is associated with an overall increase in mortality.   Beta-blockers should be started days to weeks prior to surgery and titrated to pulse < 70.  4. Deep vein thrombosis prophylaxis: regimen to be chosen by surgical team  5. No contraindications to planned surgery      If you have questions, please do not hesitate to call me ([phone]). Sincerely,    Robert Barros.  Shashank Knight, CNP

## 2019-04-04 ENCOUNTER — OFFICE VISIT (OUTPATIENT)
Dept: OBGYN CLINIC | Age: 44
End: 2019-04-04

## 2019-04-04 VITALS
DIASTOLIC BLOOD PRESSURE: 76 MMHG | TEMPERATURE: 97.7 F | HEART RATE: 65 BPM | SYSTOLIC BLOOD PRESSURE: 124 MMHG | BODY MASS INDEX: 38.37 KG/M2 | WEIGHT: 253.2 LBS | HEIGHT: 68 IN

## 2019-04-04 DIAGNOSIS — N93.9 ABNORMAL UTERINE BLEEDING (AUB): Primary | ICD-10-CM

## 2019-04-04 LAB
A/G RATIO: 1.8 (ref 1.1–2.2)
ALBUMIN SERPL-MCNC: 4.5 G/DL (ref 3.4–5)
ALP BLD-CCNC: 52 U/L (ref 40–129)
ALT SERPL-CCNC: 17 U/L (ref 10–40)
ANION GAP SERPL CALCULATED.3IONS-SCNC: 12 MMOL/L (ref 3–16)
AST SERPL-CCNC: 15 U/L (ref 15–37)
BASOPHILS ABSOLUTE: 0.1 K/UL (ref 0–0.2)
BASOPHILS RELATIVE PERCENT: 1.9 %
BILIRUB SERPL-MCNC: 0.3 MG/DL (ref 0–1)
BUN BLDV-MCNC: 14 MG/DL (ref 7–20)
CALCIUM SERPL-MCNC: 9.5 MG/DL (ref 8.3–10.6)
CHLORIDE BLD-SCNC: 104 MMOL/L (ref 99–110)
CO2: 24 MMOL/L (ref 21–32)
CREAT SERPL-MCNC: 0.6 MG/DL (ref 0.6–1.1)
EOSINOPHILS ABSOLUTE: 0.3 K/UL (ref 0–0.6)
EOSINOPHILS RELATIVE PERCENT: 3.9 %
GFR AFRICAN AMERICAN: >60
GFR NON-AFRICAN AMERICAN: >60
GLOBULIN: 2.5 G/DL
GLUCOSE BLD-MCNC: 85 MG/DL (ref 70–99)
HCT VFR BLD CALC: 38.5 % (ref 36–48)
HEMOGLOBIN: 12.7 G/DL (ref 12–16)
LYMPHOCYTES ABSOLUTE: 1.6 K/UL (ref 1–5.1)
LYMPHOCYTES RELATIVE PERCENT: 23.4 %
MCH RBC QN AUTO: 29.2 PG (ref 26–34)
MCHC RBC AUTO-ENTMCNC: 33 G/DL (ref 31–36)
MCV RBC AUTO: 88.5 FL (ref 80–100)
MONOCYTES ABSOLUTE: 0.5 K/UL (ref 0–1.3)
MONOCYTES RELATIVE PERCENT: 7.7 %
NEUTROPHILS ABSOLUTE: 4.2 K/UL (ref 1.7–7.7)
NEUTROPHILS RELATIVE PERCENT: 63.1 %
PDW BLD-RTO: 13.3 % (ref 12.4–15.4)
PLATELET # BLD: 259 K/UL (ref 135–450)
PMV BLD AUTO: 9.9 FL (ref 5–10.5)
POTASSIUM SERPL-SCNC: 4.4 MMOL/L (ref 3.5–5.1)
RBC # BLD: 4.35 M/UL (ref 4–5.2)
SODIUM BLD-SCNC: 140 MMOL/L (ref 136–145)
TOTAL PROTEIN: 7 G/DL (ref 6.4–8.2)
WBC # BLD: 6.6 K/UL (ref 4–11)

## 2019-04-04 PROCEDURE — 99024 POSTOP FOLLOW-UP VISIT: CPT | Performed by: OBSTETRICS & GYNECOLOGY

## 2019-04-04 NOTE — PROGRESS NOTES
3.6 x 2.7 x 2.3 cm       Left Ovary:  3.6 x 1.1 x 1.2 cm           Ultrasound Findings:       Uterus: The uterus is enlarged and somewhat heterogeneous in appearance. There is a 2.4 x 3.0 x 2.0 cm well-circumscribed, solid nodule in the body,   fundus of the uterus posteriorly compatible with a fibroid.       Endometrial stripe: Endometrial stripe is within normal limits.       Right Ovary: Right ovary is within normal limits.  There is normal color flow   and Doppler interrogation.       Left Ovary:  Left ovary is within normal limits. There is normal color flow   and Doppler interrogation.       Free Fluid: No evidence of free fluid.           Impression   Fibroid uterus.       Ovaries appear unremarkable.            Assessment/Plan  1. Abnormal uterine bleeding (AUB)   - After reviewing risks/benefits and alternatives patient has elected to proceed with Diagnostic Laparoscopy, Hysteroscopy D and C (w/ myosure) and Novasure Endometrial Ablation. - desires [facility] [facility] for post op pain control     Follow Up   Return for Post Op.     Karime Bernstein, DO

## 2019-04-11 ENCOUNTER — TELEPHONE (OUTPATIENT)
Dept: FAMILY MEDICINE CLINIC | Age: 44
End: 2019-04-11

## 2019-04-11 NOTE — TELEPHONE ENCOUNTER
When general anesthesia is given typically a patient is put on a ventilator--this is normal  If patient is concerned I also recommend that she call her surgeon

## 2019-04-11 NOTE — PROGRESS NOTES
Obstructive Sleep Apnea (KIARA) Screening     Patient:  Daina Hammonds    YOB: 1975      Medical Record #:  [id number]                     Date:  4/11/2019     1. Are you a loud and/or regular snorer? [x]  Yes       [] No    2. Have you been observed to gasp or stop breathing during sleep? []  Yes       [x] No    3. Do you feel tired or groggy upon awakening or do you awaken with a headache?           []  Yes       [x] No    4. Are you often tired or fatigued during the wake time hours? []  Yes       [x] No    5. Do you fall asleep sitting, reading, watching TV or driving? []  Yes       [x] No    6. Do you often have problems with memory or concentration? []  Yes       [x] No    **If patient's score is ? 3 they are considered high risk for KIARA. Notify the anesthesiologist of the high risk and document in focus note. Note:  If the patient's BMI is more than 35 kg m¯² , has neck circumference > 40 cm, and/or high blood pressure the risk is greater (© American Sleep Apnea Association, 2006).

## 2019-04-15 ENCOUNTER — ANESTHESIA EVENT (OUTPATIENT)
Dept: OPERATING ROOM | Age: 44
End: 2019-04-15
Payer: COMMERCIAL

## 2019-04-15 NOTE — ANESTHESIA PRE PROCEDURE
Procedure Laterality Date    KNEE CARTILAGE SURGERY Right     KNEE CARTILAGE SURGERY Right     TUBAL LIGATION         Social History:    Social History     Tobacco Use    Smoking status: Former Smoker     Packs/day: 1.00     Years: 10.00     Pack years: 10.00     Types: Cigarettes     Last attempt to quit: 2014     Years since quittin.8    Smokeless tobacco: Never Used   Substance Use Topics    Alcohol use: No                                Counseling given: Not Answered      Vital Signs (Current):   Vitals:    19 1218   Weight: 253 lb (114.8 kg)   Height: 5' 8\" (1.727 m)                                              BP Readings from Last 3 Encounters:   19 124/76   19 124/82   19 132/76       NPO Status:                                                                                 BMI:   Wt Readings from Last 3 Encounters:   19 253 lb 3.2 oz (114.9 kg)   19 255 lb (115.7 kg)   19 256 lb 6.4 oz (116.3 kg)     Body mass index is 38.47 kg/m². CBC:   Lab Results   Component Value Date    WBC 6.6 2019    RBC 4.35 2019    HGB 12.7 2019    HCT 38.5 2019    MCV 88.5 2019    RDW 13.3 2019     2019       CMP:   Lab Results   Component Value Date     2019    K 4.4 2019    K 4.3 2018     2019    CO2 24 2019    BUN 14 2019    CREATININE 0.6 2019    GFRAA >60 2019    GFRAA >60 2011    AGRATIO 1.8 2019    LABGLOM >60 2019    GLUCOSE 85 2019    PROT 7.0 2019    PROT 6.7 2011    CALCIUM 9.5 2019    BILITOT 0.3 2019    ALKPHOS 52 2019    AST 15 2019    ALT 17 2019       POC Tests: No results for input(s): POCGLU, POCNA, POCK, POCCL, POCBUN, POCHEMO, POCHCT in the last 72 hours.     Coags:   Lab Results   Component Value Date    PROTIME 11.3 2014    INR 1.01 2014    APTT 29.0 2014 04/03/2019 02:54 PM     04/03/2019 02:54 PM     RENAL  Lab Results   Component Value Date/Time     04/03/2019 02:54 PM    K 4.4 04/03/2019 02:54 PM    K 4.3 12/14/2018 12:20 AM     04/03/2019 02:54 PM    CO2 24 04/03/2019 02:54 PM    BUN 14 04/03/2019 02:54 PM    CREATININE 0.6 04/03/2019 02:54 PM    GLUCOSE 85 04/03/2019 02:54 PM     COAGS  Lab Results   Component Value Date/Time    PROTIME 11.3 06/02/2014 01:05 AM    INR 1.01 06/02/2014 01:05 AM    APTT 29.0 06/02/2014 01:05 AM         John Devine MD   4/15/2019

## 2019-04-16 ENCOUNTER — ANESTHESIA (OUTPATIENT)
Dept: OPERATING ROOM | Age: 44
End: 2019-04-16
Payer: COMMERCIAL

## 2019-04-16 ENCOUNTER — HOSPITAL ENCOUNTER (OUTPATIENT)
Age: 44
Setting detail: OUTPATIENT SURGERY
Discharge: HOME | End: 2019-04-16
Attending: OBSTETRICS & GYNECOLOGY | Admitting: OBSTETRICS & GYNECOLOGY
Payer: COMMERCIAL

## 2019-04-16 VITALS
TEMPERATURE: 97 F | SYSTOLIC BLOOD PRESSURE: 131 MMHG | WEIGHT: 256 LBS | RESPIRATION RATE: 16 BRPM | HEART RATE: 71 BPM | DIASTOLIC BLOOD PRESSURE: 82 MMHG | OXYGEN SATURATION: 97 % | HEIGHT: 66 IN | BODY MASS INDEX: 41.14 KG/M2

## 2019-04-16 VITALS — DIASTOLIC BLOOD PRESSURE: 76 MMHG | OXYGEN SATURATION: 100 % | SYSTOLIC BLOOD PRESSURE: 110 MMHG | TEMPERATURE: 95.7 F

## 2019-04-16 DIAGNOSIS — R10.2 PELVIC PAIN IN FEMALE: ICD-10-CM

## 2019-04-16 DIAGNOSIS — Z98.890 S/P ENDOMETRIAL ABLATION: Primary | ICD-10-CM

## 2019-04-16 DIAGNOSIS — D25.9 UTERINE LEIOMYOMA, UNSPECIFIED LOCATION: ICD-10-CM

## 2019-04-16 LAB — PREGNANCY, URINE: NEGATIVE

## 2019-04-16 PROCEDURE — 58563 HYSTEROSCOPY ABLATION: CPT | Performed by: OBSTETRICS & GYNECOLOGY

## 2019-04-16 PROCEDURE — 6360000002 HC RX W HCPCS: Performed by: NURSE ANESTHETIST, CERTIFIED REGISTERED

## 2019-04-16 PROCEDURE — 7100000010 HC PHASE II RECOVERY - FIRST 15 MIN: Performed by: OBSTETRICS & GYNECOLOGY

## 2019-04-16 PROCEDURE — 93010 ELECTROCARDIOGRAM REPORT: CPT | Performed by: INTERNAL MEDICINE

## 2019-04-16 PROCEDURE — 6370000000 HC RX 637 (ALT 250 FOR IP): Performed by: ANESTHESIOLOGY

## 2019-04-16 PROCEDURE — 7100000000 HC PACU RECOVERY - FIRST 15 MIN: Performed by: OBSTETRICS & GYNECOLOGY

## 2019-04-16 PROCEDURE — 2500000003 HC RX 250 WO HCPCS: Performed by: NURSE ANESTHETIST, CERTIFIED REGISTERED

## 2019-04-16 PROCEDURE — 2709999900 HC NON-CHARGEABLE SUPPLY: Performed by: OBSTETRICS & GYNECOLOGY

## 2019-04-16 PROCEDURE — 3600000015 HC SURGERY LEVEL 5 ADDTL 15MIN: Performed by: OBSTETRICS & GYNECOLOGY

## 2019-04-16 PROCEDURE — 84703 CHORIONIC GONADOTROPIN ASSAY: CPT

## 2019-04-16 PROCEDURE — 2720000010 HC SURG SUPPLY STERILE: Performed by: OBSTETRICS & GYNECOLOGY

## 2019-04-16 PROCEDURE — 7100000001 HC PACU RECOVERY - ADDTL 15 MIN: Performed by: OBSTETRICS & GYNECOLOGY

## 2019-04-16 PROCEDURE — 2580000003 HC RX 258: Performed by: ANESTHESIOLOGY

## 2019-04-16 PROCEDURE — 88305 TISSUE EXAM BY PATHOLOGIST: CPT

## 2019-04-16 PROCEDURE — 3600000005 HC SURGERY LEVEL 5 BASE: Performed by: OBSTETRICS & GYNECOLOGY

## 2019-04-16 PROCEDURE — 7100000011 HC PHASE II RECOVERY - ADDTL 15 MIN: Performed by: OBSTETRICS & GYNECOLOGY

## 2019-04-16 PROCEDURE — 49320 DIAG LAPARO SEPARATE PROC: CPT | Performed by: OBSTETRICS & GYNECOLOGY

## 2019-04-16 PROCEDURE — 2500000003 HC RX 250 WO HCPCS: Performed by: OBSTETRICS & GYNECOLOGY

## 2019-04-16 PROCEDURE — 6360000002 HC RX W HCPCS: Performed by: ANESTHESIOLOGY

## 2019-04-16 PROCEDURE — 3700000001 HC ADD 15 MINUTES (ANESTHESIA): Performed by: OBSTETRICS & GYNECOLOGY

## 2019-04-16 PROCEDURE — 93005 ELECTROCARDIOGRAM TRACING: CPT | Performed by: ANESTHESIOLOGY

## 2019-04-16 PROCEDURE — 3700000000 HC ANESTHESIA ATTENDED CARE: Performed by: OBSTETRICS & GYNECOLOGY

## 2019-04-16 RX ORDER — BUPIVACAINE HYDROCHLORIDE AND EPINEPHRINE 5; 5 MG/ML; UG/ML
INJECTION, SOLUTION PERINEURAL PRN
Status: DISCONTINUED | OUTPATIENT
Start: 2019-04-16 | End: 2019-04-16 | Stop reason: ALTCHOICE

## 2019-04-16 RX ORDER — HYDROCODONE BITARTRATE AND ACETAMINOPHEN 5; 325 MG/1; MG/1
1 TABLET ORAL EVERY 6 HOURS PRN
Qty: 20 TABLET | Refills: 0 | Status: SHIPPED | OUTPATIENT
Start: 2019-04-16 | End: 2019-04-21

## 2019-04-16 RX ORDER — KETOROLAC TROMETHAMINE 30 MG/ML
INJECTION, SOLUTION INTRAMUSCULAR; INTRAVENOUS PRN
Status: DISCONTINUED | OUTPATIENT
Start: 2019-04-16 | End: 2019-04-16 | Stop reason: SDUPTHER

## 2019-04-16 RX ORDER — ONDANSETRON 2 MG/ML
4 INJECTION INTRAMUSCULAR; INTRAVENOUS PRN
Status: DISCONTINUED | OUTPATIENT
Start: 2019-04-16 | End: 2019-04-16 | Stop reason: HOSPADM

## 2019-04-16 RX ORDER — MORPHINE SULFATE 2 MG/ML
2 INJECTION, SOLUTION INTRAMUSCULAR; INTRAVENOUS EVERY 5 MIN PRN
Status: DISCONTINUED | OUTPATIENT
Start: 2019-04-16 | End: 2019-04-16 | Stop reason: HOSPADM

## 2019-04-16 RX ORDER — SODIUM CHLORIDE 0.9 % (FLUSH) 0.9 %
10 SYRINGE (ML) INJECTION EVERY 12 HOURS SCHEDULED
Status: DISCONTINUED | OUTPATIENT
Start: 2019-04-16 | End: 2019-04-16 | Stop reason: HOSPADM

## 2019-04-16 RX ORDER — SODIUM CHLORIDE, SODIUM LACTATE, POTASSIUM CHLORIDE, CALCIUM CHLORIDE 600; 310; 30; 20 MG/100ML; MG/100ML; MG/100ML; MG/100ML
INJECTION, SOLUTION INTRAVENOUS CONTINUOUS
Status: DISCONTINUED | OUTPATIENT
Start: 2019-04-16 | End: 2019-04-16 | Stop reason: HOSPADM

## 2019-04-16 RX ORDER — ONDANSETRON 2 MG/ML
INJECTION INTRAMUSCULAR; INTRAVENOUS PRN
Status: DISCONTINUED | OUTPATIENT
Start: 2019-04-16 | End: 2019-04-16 | Stop reason: SDUPTHER

## 2019-04-16 RX ORDER — MIDAZOLAM HYDROCHLORIDE 1 MG/ML
INJECTION INTRAMUSCULAR; INTRAVENOUS PRN
Status: DISCONTINUED | OUTPATIENT
Start: 2019-04-16 | End: 2019-04-16 | Stop reason: SDUPTHER

## 2019-04-16 RX ORDER — OXYCODONE HYDROCHLORIDE AND ACETAMINOPHEN 5; 325 MG/1; MG/1
1 TABLET ORAL PRN
Status: COMPLETED | OUTPATIENT
Start: 2019-04-16 | End: 2019-04-16

## 2019-04-16 RX ORDER — PROPOFOL 10 MG/ML
INJECTION, EMULSION INTRAVENOUS PRN
Status: DISCONTINUED | OUTPATIENT
Start: 2019-04-16 | End: 2019-04-16 | Stop reason: SDUPTHER

## 2019-04-16 RX ORDER — ROCURONIUM BROMIDE 10 MG/ML
INJECTION, SOLUTION INTRAVENOUS PRN
Status: DISCONTINUED | OUTPATIENT
Start: 2019-04-16 | End: 2019-04-16 | Stop reason: SDUPTHER

## 2019-04-16 RX ORDER — LIDOCAINE HYDROCHLORIDE 20 MG/ML
INJECTION, SOLUTION INFILTRATION; PERINEURAL PRN
Status: DISCONTINUED | OUTPATIENT
Start: 2019-04-16 | End: 2019-04-16 | Stop reason: SDUPTHER

## 2019-04-16 RX ORDER — MEPERIDINE HYDROCHLORIDE 50 MG/ML
12.5 INJECTION INTRAMUSCULAR; INTRAVENOUS; SUBCUTANEOUS EVERY 5 MIN PRN
Status: DISCONTINUED | OUTPATIENT
Start: 2019-04-16 | End: 2019-04-16 | Stop reason: HOSPADM

## 2019-04-16 RX ORDER — MORPHINE SULFATE 2 MG/ML
1 INJECTION, SOLUTION INTRAMUSCULAR; INTRAVENOUS EVERY 5 MIN PRN
Status: DISCONTINUED | OUTPATIENT
Start: 2019-04-16 | End: 2019-04-16 | Stop reason: HOSPADM

## 2019-04-16 RX ORDER — OXYCODONE HYDROCHLORIDE AND ACETAMINOPHEN 5; 325 MG/1; MG/1
2 TABLET ORAL PRN
Status: COMPLETED | OUTPATIENT
Start: 2019-04-16 | End: 2019-04-16

## 2019-04-16 RX ORDER — IBUPROFEN 800 MG/1
800 TABLET ORAL EVERY 8 HOURS PRN
Qty: 30 TABLET | Refills: 0 | Status: SHIPPED | OUTPATIENT
Start: 2019-04-16 | End: 2019-04-29 | Stop reason: SDUPTHER

## 2019-04-16 RX ORDER — HYDRALAZINE HYDROCHLORIDE 20 MG/ML
5 INJECTION INTRAMUSCULAR; INTRAVENOUS EVERY 10 MIN PRN
Status: DISCONTINUED | OUTPATIENT
Start: 2019-04-16 | End: 2019-04-16 | Stop reason: HOSPADM

## 2019-04-16 RX ORDER — LABETALOL HYDROCHLORIDE 5 MG/ML
5 INJECTION, SOLUTION INTRAVENOUS EVERY 10 MIN PRN
Status: DISCONTINUED | OUTPATIENT
Start: 2019-04-16 | End: 2019-04-16 | Stop reason: HOSPADM

## 2019-04-16 RX ORDER — DIPHENHYDRAMINE HYDROCHLORIDE 50 MG/ML
12.5 INJECTION INTRAMUSCULAR; INTRAVENOUS
Status: DISCONTINUED | OUTPATIENT
Start: 2019-04-16 | End: 2019-04-16 | Stop reason: HOSPADM

## 2019-04-16 RX ORDER — SODIUM CHLORIDE 0.9 % (FLUSH) 0.9 %
10 SYRINGE (ML) INJECTION PRN
Status: DISCONTINUED | OUTPATIENT
Start: 2019-04-16 | End: 2019-04-16 | Stop reason: HOSPADM

## 2019-04-16 RX ORDER — DEXAMETHASONE SODIUM PHOSPHATE 4 MG/ML
INJECTION, SOLUTION INTRA-ARTICULAR; INTRALESIONAL; INTRAMUSCULAR; INTRAVENOUS; SOFT TISSUE PRN
Status: DISCONTINUED | OUTPATIENT
Start: 2019-04-16 | End: 2019-04-16 | Stop reason: SDUPTHER

## 2019-04-16 RX ORDER — LIDOCAINE HYDROCHLORIDE 10 MG/ML
0.3 INJECTION, SOLUTION EPIDURAL; INFILTRATION; INTRACAUDAL; PERINEURAL
Status: DISCONTINUED | OUTPATIENT
Start: 2019-04-16 | End: 2019-04-16 | Stop reason: HOSPADM

## 2019-04-16 RX ORDER — PROMETHAZINE HYDROCHLORIDE 25 MG/ML
6.25 INJECTION, SOLUTION INTRAMUSCULAR; INTRAVENOUS
Status: DISCONTINUED | OUTPATIENT
Start: 2019-04-16 | End: 2019-04-16 | Stop reason: HOSPADM

## 2019-04-16 RX ORDER — FENTANYL CITRATE 50 UG/ML
INJECTION, SOLUTION INTRAMUSCULAR; INTRAVENOUS PRN
Status: DISCONTINUED | OUTPATIENT
Start: 2019-04-16 | End: 2019-04-16 | Stop reason: SDUPTHER

## 2019-04-16 RX ADMIN — DEXAMETHASONE SODIUM PHOSPHATE 10 MG: 4 INJECTION, SOLUTION INTRAMUSCULAR; INTRAVENOUS at 07:47

## 2019-04-16 RX ADMIN — HYDROMORPHONE HYDROCHLORIDE 0.5 MG: 1 INJECTION, SOLUTION INTRAMUSCULAR; INTRAVENOUS; SUBCUTANEOUS at 09:00

## 2019-04-16 RX ADMIN — ONDANSETRON 4 MG: 2 INJECTION INTRAMUSCULAR; INTRAVENOUS at 08:29

## 2019-04-16 RX ADMIN — MIDAZOLAM HYDROCHLORIDE 2 MG: 2 INJECTION, SOLUTION INTRAMUSCULAR; INTRAVENOUS at 07:23

## 2019-04-16 RX ADMIN — OXYCODONE HYDROCHLORIDE AND ACETAMINOPHEN 1 TABLET: 5; 325 TABLET ORAL at 09:53

## 2019-04-16 RX ADMIN — SODIUM CHLORIDE, POTASSIUM CHLORIDE, SODIUM LACTATE AND CALCIUM CHLORIDE: 600; 310; 30; 20 INJECTION, SOLUTION INTRAVENOUS at 08:46

## 2019-04-16 RX ADMIN — ROCURONIUM BROMIDE 50 MG: 10 SOLUTION INTRAVENOUS at 07:31

## 2019-04-16 RX ADMIN — SODIUM CHLORIDE, POTASSIUM CHLORIDE, SODIUM LACTATE AND CALCIUM CHLORIDE: 600; 310; 30; 20 INJECTION, SOLUTION INTRAVENOUS at 06:37

## 2019-04-16 RX ADMIN — SODIUM CHLORIDE, POTASSIUM CHLORIDE, SODIUM LACTATE AND CALCIUM CHLORIDE: 600; 310; 30; 20 INJECTION, SOLUTION INTRAVENOUS at 07:13

## 2019-04-16 RX ADMIN — LIDOCAINE HYDROCHLORIDE 60 MG: 20 INJECTION, SOLUTION INFILTRATION; PERINEURAL at 07:31

## 2019-04-16 RX ADMIN — FENTANYL CITRATE 100 MCG: 50 INJECTION INTRAMUSCULAR; INTRAVENOUS at 07:31

## 2019-04-16 RX ADMIN — PROPOFOL 200 MG: 10 INJECTION, EMULSION INTRAVENOUS at 07:31

## 2019-04-16 RX ADMIN — SUGAMMADEX 200 MG: 100 INJECTION, SOLUTION INTRAVENOUS at 08:31

## 2019-04-16 RX ADMIN — ONDANSETRON 4 MG: 2 INJECTION INTRAMUSCULAR; INTRAVENOUS at 07:47

## 2019-04-16 RX ADMIN — KETOROLAC TROMETHAMINE 30 MG: 30 INJECTION, SOLUTION INTRAMUSCULAR; INTRAVENOUS at 08:29

## 2019-04-16 ASSESSMENT — PAIN - FUNCTIONAL ASSESSMENT
PAIN_FUNCTIONAL_ASSESSMENT: ACTIVITIES ARE NOT PREVENTED
PAIN_FUNCTIONAL_ASSESSMENT: 0-10

## 2019-04-16 ASSESSMENT — PULMONARY FUNCTION TESTS
PIF_VALUE: 25
PIF_VALUE: 24
PIF_VALUE: 21
PIF_VALUE: 21
PIF_VALUE: 23
PIF_VALUE: 24
PIF_VALUE: 25
PIF_VALUE: 23
PIF_VALUE: 23
PIF_VALUE: 24
PIF_VALUE: 21
PIF_VALUE: 23
PIF_VALUE: 31
PIF_VALUE: 21
PIF_VALUE: 21
PIF_VALUE: 3
PIF_VALUE: 3
PIF_VALUE: 24
PIF_VALUE: 24
PIF_VALUE: 21
PIF_VALUE: 21
PIF_VALUE: 25
PIF_VALUE: 23
PIF_VALUE: 24
PIF_VALUE: 2
PIF_VALUE: 24
PIF_VALUE: 21
PIF_VALUE: 21
PIF_VALUE: 1
PIF_VALUE: 22
PIF_VALUE: 24
PIF_VALUE: 24
PIF_VALUE: 21
PIF_VALUE: 24
PIF_VALUE: 21
PIF_VALUE: 23
PIF_VALUE: 24
PIF_VALUE: 21
PIF_VALUE: 25
PIF_VALUE: 23
PIF_VALUE: 23
PIF_VALUE: 24
PIF_VALUE: 1
PIF_VALUE: 23
PIF_VALUE: 25
PIF_VALUE: 29
PIF_VALUE: 1
PIF_VALUE: 24
PIF_VALUE: 1
PIF_VALUE: 24
PIF_VALUE: 32
PIF_VALUE: 22
PIF_VALUE: 2
PIF_VALUE: 1
PIF_VALUE: 1
PIF_VALUE: 25
PIF_VALUE: 24
PIF_VALUE: 32
PIF_VALUE: 1
PIF_VALUE: 24
PIF_VALUE: 21
PIF_VALUE: 1
PIF_VALUE: 25
PIF_VALUE: 26
PIF_VALUE: 23
PIF_VALUE: 21
PIF_VALUE: 30
PIF_VALUE: 21
PIF_VALUE: 23
PIF_VALUE: 21
PIF_VALUE: 23
PIF_VALUE: 23
PIF_VALUE: 20
PIF_VALUE: 27
PIF_VALUE: 25
PIF_VALUE: 24

## 2019-04-16 ASSESSMENT — PAIN SCALES - GENERAL
PAINLEVEL_OUTOF10: 4
PAINLEVEL_OUTOF10: 3
PAINLEVEL_OUTOF10: 7
PAINLEVEL_OUTOF10: 0
PAINLEVEL_OUTOF10: 4
PAINLEVEL_OUTOF10: 4

## 2019-04-16 ASSESSMENT — PAIN DESCRIPTION - LOCATION
LOCATION: ABDOMEN

## 2019-04-16 ASSESSMENT — PAIN DESCRIPTION - PAIN TYPE
TYPE: SURGICAL PAIN
TYPE: ACUTE PAIN;SURGICAL PAIN
TYPE: SURGICAL PAIN;ACUTE PAIN

## 2019-04-16 ASSESSMENT — PAIN DESCRIPTION - FREQUENCY
FREQUENCY: INTERMITTENT
FREQUENCY: INTERMITTENT

## 2019-04-16 ASSESSMENT — PAIN DESCRIPTION - ONSET
ONSET: AWAKENED FROM SLEEP
ONSET: GRADUAL
ONSET: GRADUAL

## 2019-04-16 ASSESSMENT — PAIN DESCRIPTION - DESCRIPTORS
DESCRIPTORS: CONSTANT;CRAMPING;ACHING
DESCRIPTORS: CRAMPING

## 2019-04-16 ASSESSMENT — PAIN DESCRIPTION - ORIENTATION
ORIENTATION: LOWER
ORIENTATION: LOWER
ORIENTATION: RIGHT

## 2019-04-16 ASSESSMENT — PAIN DESCRIPTION - PROGRESSION
CLINICAL_PROGRESSION: NOT CHANGED
CLINICAL_PROGRESSION: GRADUALLY IMPROVING

## 2019-04-16 NOTE — ANESTHESIA POSTPROCEDURE EVALUATION
Department of Anesthesiology  Postprocedure Note    Patient: Adrien Layne  MRN: [id number]  YOB: 1975  Date of evaluation: 4/16/2019  Time:  3:17 PM     Procedure Summary     Date:  04/16/19 Room / Location:  [place] [address] / [facility]    Anesthesia Start:  0723 Anesthesia Stop:  [facility]    Procedure:  DIAGNOSTIC LAPAROSCOPY, DILATATION AND CURETTAGE, HYSTEROSCOPY, WITH MYOSURE AND NOVASURE ENDOMETRIAL ABLATION (N/A Abdomen) Diagnosis:       Pelvic pain in female      Uterine leiomyoma, unspecified location      (PELVIC PAIN; FIBROID UTERUS)    Surgeon:  Graciela Stanley DO Responsible Provider:  Naty Puri MD    Anesthesia Type:  general ASA Status:  3          Anesthesia Type: general    Ada Phase I: Ada Score: 9    Ada Phase II: Ada Score: 10    Last vitals: Reviewed and per EMR flowsheets.        Anesthesia Post Evaluation    Comments: Postoperative Anesthesia Note    Name:    Adrien Layne  MRN:      [id number]    Patient Vitals in the past 12 hrs:  04/16/19 0928, BP:131/82, Temp:97 °F (36.1 °C), Temp src:Temporal, Pulse:71, Resp:16, SpO2:97 %  04/16/19 0915, BP:119/70, Temp:97 °F (36.1 °C), Temp src:Temporal, Pulse:71, Resp:18, SpO2:97 %  04/16/19 0910, BP:119/70, Pulse:69, Resp:12, SpO2:98 %  04/16/19 0905, BP:125/67, Pulse:68, Resp:12, SpO2:99 %  04/16/19 0900, BP:127/78, Pulse:68, Resp:12, SpO2:96 %  04/16/19 0850, BP:131/77, Pulse:74, Resp:12, SpO2:99 %  04/16/19 0845, BP:130/79, Pulse:75, Resp:12, SpO2:98 %  04/16/19 0844, BP:130/79, Temp:97.7 °F (36.5 °C), Temp src:Temporal, Pulse:74, Resp:12, SpO2:98 %  04/16/19 0617, BP:(!) 134/95, Temp:97.5 °F (36.4 °C), Temp src:Temporal, Pulse:81, Resp:12, SpO2:98 %, Height:5' 5.5\" (1.664 m), Weight:256 lb (116.1 kg)     LABS:    CBC  Lab Results       Component                Value               Date/Time                  WBC                      6.6                 04/03/2019 02:54 PM        HGB                      12.7

## 2019-04-16 NOTE — PROGRESS NOTES
To pacu,opens eyes to name. Alert. Dr. Hector Jackeline stopped by. Doing meds to beds. Report received. Sepideh pad is dry.

## 2019-04-16 NOTE — PROGRESS NOTES
Pt to room 1 in [facility] for discharge. Pt alert and oriented. VSS. Pt given drink and crackers. Family at bedside.

## 2019-04-16 NOTE — H&P
Patient seen and evaluated prior to surgery. Patient reports no changes in medical condition. There have been no changes in the patient's medications or assessment. Preoperative tests and diagnostics have been reviewed. Surgery remains indicated as noted in History and Physical (H&P). Prophylactic antibiotics, use of beta-blockers and VTE prophylaxis have been addressed/ordered as indicated. Please see H&P and orders.       Lorenzo Mckeon, DO

## 2019-04-17 LAB
EKG ATRIAL RATE: 68 BPM
EKG DIAGNOSIS: NORMAL
EKG P AXIS: 65 DEGREES
EKG P-R INTERVAL: 170 MS
EKG Q-T INTERVAL: 394 MS
EKG QRS DURATION: 100 MS
EKG QTC CALCULATION (BAZETT): 418 MS
EKG R AXIS: -13 DEGREES
EKG T AXIS: 30 DEGREES
EKG VENTRICULAR RATE: 68 BPM

## 2019-04-18 NOTE — OP NOTE
[facility]                 Zeeshan Baltazarchasidy                                 OPERATIVE REPORT    PATIENT NAME: James Stephens                   :        1975  MED REC NO:   [id number]                          ROOM:  ACCOUNT NO:   [de-identified]                           ADMIT DATE: 2019  PROVIDER:     Rudy Hodges DO    DATE OF PROCEDURE:  2019    PREOPERATIVE DIAGNOSES:  Pelvic pain, fibroid uterus and abnormal  uterine bleeding. POSTOPERATIVE DIAGNOSES:  Pelvic pain, fibroid uterus and abnormal  uterine bleeding with uterine fibroids. OPERATION PERFORMED:  Diagnostic laparoscopy, dilation and curettage,  hysteroscopy, possible MyoSure with NovaSure endometrial ablation. ANESTHESIA:  General.    SURGEON:  Jesica Verma MD    ASSISTANT:  Student doctor Claudia Nielsen, OMS-IV. ESTIMATED BLOOD LOSS:  Less than 50. IV FLUIDS GIVEN:  1000 mL. MYOSURE DEFICIT:  100 mL. COMPLICATIONS:  None. FINDINGS:  Include,  1. A normal appearing external genitalia. 2.  Uterus sounding to 12 cm. 3.  Proliferative endometrium with patent bilateral tubal ostia. 4.  Uterine polyps x 2.  5.  Cervical polyp x 1.  6.  Uterine length 6.5, cavity width 4.4, power 157 elizalde, time 1.01.  7.  Normal appearing liver edge and bowel. 8.  Enlarged fibroid uterus with normal appearing ovary bilaterally. 9.  Bilateral tubes, status post tubal ligation. 10. Moderate pelvic congestion. INDICATIONS FOR PROCEDURE:  The patient presented to the office,  complaining of persistent abdominal uterine bleeding and pelvic pain. Risks, benefits, and alternatives to surgical evaluation with  hysteroscopy, dilation and curettage and NovaSure endometrial ablation  with diagnostic laparoscopy were reviewed. She verbalized  understanding. Consents were signed.     OPERATIVE PROCEDURE:  The patient was taken to the operating room where  general anesthesia was administered and found to be adequate. She was  placed on the operating table in the dorsal lithotomy position with  Yello-fins stirrups. She was prepped and draped in the normal sterile  fashion. Universal time-out was conducted and all parties agreed to  pertinent and accurate information. A weighted speculum was then placed   in the posterior fornix of the vagina exposing the anterior lip of the  cervix and then grasped with a single-tooth tenaculum. The uterus was  then sounded to approximately 12 cm. Cervical os was then progressively  dilated with Familia dilators to allow for easy passage of MyoSure  Hysteroscope, which was placed through the cervical os to the uterine  fundus using normal saline as distending medium. Findings were noted  above. MyoSure LITE was then exchanged with the outflow channel and  global sampling of the endometrium and polypectomies were performed. MyoSure was then removed and set aside. Bleeding noted to be stable. A  #2 sharp curette was then placed through the cervical os to the uterine  fundus in a clockwise fashion. Endometrial curettings were obtained and  sent to Pathology until a uterine cry was appreciated on all sides. At  this time, NovaSure device was then prepped and placed through the  cervix to the uterine fundus. Device was calibrated and then turned on. Ablation time was approximately 1 minute and 1 second. At completion of  ablation, device was removed from the uterus and vagina and examined. Charred tissue noted on the NovaSure fan. MyoSure device was then  replaced through the cervical os and pictures were taken of the post  ablated endometrium. Scope was then removed and set aside. Single-tooth tenaculum was then attached to the uterine acorn. Legs were brought in the low lithotomy position.   Gloves were changed and  attention was turned to the patient's abdomen where an infraumbilical  incision was made under local anesthetic with 0.5% Marcaine with  epinephrine. A 5 mm trocar, sheath and laparoscope were then advanced  to the abdomen under direct visualization. CO2 was turned on and 2 L  were allowed to insufflate the abdomen. Primary survey of the abdomen  was performed and the findings were noted above. A left lower quadrant  incision was made approximately 5 mm in length under local anesthetic  allowing for a 5 mm trocar and sheath to advance to the abdomen under  direct visualization. Colon was gently removed from pelvis, exposing posterior cul de sac. Pictures were then taken of bilateral ovarian fossa and the posterior cul-de-sac which was noted  to be free of endometriosis, scarring or lesions. Uterus was noted to  be enlarged and fibrotic. There is also a  moderate amount of pelvic congestion. Surgery was completed and CO2 was allowed  to escape. Trocars were removed under direct visualization. Incisions  were closed with deep dermal sutures of 3-0 Vicryl. Sterile bandages  were applied thereafter. The patient was taken out of Trendelenburg and  lithotomy position. She was awoken from anesthesia and taken to her  postoperative bed in stable condition.     COLIN HENDRIX DO    D: 04/18/2019 10:14:05       T: 04/18/2019 15:24:29     [email or web address]  Job#: [id number]     Doc#: [id number]    CC:

## 2019-04-19 ENCOUNTER — TELEPHONE (OUTPATIENT)
Dept: OBGYN CLINIC | Age: 44
End: 2019-04-19

## 2019-04-19 NOTE — TELEPHONE ENCOUNTER
Patient is 3 days post operative after Diagnostic laparoscopy, dilation and curettage,  hysteroscopy, possible MyoSure with NovaSure endometrial ablation on April 16, 2019. Patient states: Doing well postoperatively. Fever: absent  Nausea/Vomiting: no nausea and no vomiting   Appetite: ok  Bladder Function: no problems with bladder  Bowel Movement since surgery? Yes, Passing gas? Yes   Vaginal Bleeding: scant staining  Pain Medication: Norco (prescribed)  Patient reports: reports still feeling crampy and sore. states the ibuprofen is helping to relieve. will continue to monitor and recall if no improvement or any worsening. Post operative visit scheduled: April 29, 2019. Routing to Dr Sharmila Seay as Chaya Vergara.

## 2019-04-29 ENCOUNTER — OFFICE VISIT (OUTPATIENT)
Dept: OBGYN CLINIC | Age: 44
End: 2019-04-29

## 2019-04-29 VITALS
TEMPERATURE: 98.6 F | BODY MASS INDEX: 41.2 KG/M2 | HEART RATE: 80 BPM | DIASTOLIC BLOOD PRESSURE: 80 MMHG | WEIGHT: 251.4 LBS | SYSTOLIC BLOOD PRESSURE: 112 MMHG

## 2019-04-29 DIAGNOSIS — N93.9 ABNORMAL UTERINE BLEEDING (AUB): Primary | ICD-10-CM

## 2019-04-29 PROCEDURE — 99024 POSTOP FOLLOW-UP VISIT: CPT | Performed by: OBSTETRICS & GYNECOLOGY

## 2019-04-29 RX ORDER — IBUPROFEN 800 MG/1
800 TABLET ORAL EVERY 8 HOURS PRN
Qty: 30 TABLET | Refills: 1 | Status: SHIPPED | OUTPATIENT
Start: 2019-04-29 | End: 2019-05-23 | Stop reason: SDUPTHER

## 2019-04-29 NOTE — PROGRESS NOTES
Outpatient Postoperative Visit     CC:   Chief Complaint   Patient presents with    Post-Op Check       Subjective:  41 y.o. Aaliyah Johnson here for evaluation s/p Dx Lap, Hysto D and C, Myosure Polypectomy and Novasure Endometrial Ablation on 19. Pt seen and examined. Doing well. No concerns complaints. Pain well controlled. +Flatus. Mild cramping. Review of Systems - The following ROS was otherwise negative, except as noted in the HPI: constitutional, respiratory, cardiovascular, gastrointestinal, genitourinary    Objective:  /80 (Site: Left Upper Arm, Position: Sitting, Cuff Size: Large Adult)   Pulse 80   Temp 98.6 °F (37 °C) (Oral)   Wt 251 lb 6.4 oz (114 kg)   LMP 2019   Breastfeeding? No   BMI 41.20 kg/m²   General: Alert, well appearing, no acute distress  Abdomen: Soft, appropriately tender to palpation, non-distended  Incision: Appears c/d/i, no significant drainage or erythema  Extremities: No redness or tenderness in LE, neg Mitch's sign, SCDs in place bilaterally     Path:   FINAL DIAGNOSIS:    Endometrium, curetting:     - Proliferative endometrium with scattered benign lymphoid aggregates,       negative for hyperplasia, atypia, or malignancy    Assessment/Plan:  41 y.o.  s/p Dx Lap, Hysto D and C, Myosure Polypectomy and Novasure Endometrial Ablation on 19.       1. Abnormal uterine bleeding (AUB)  - doing well post operatively     Follow Up  Return if symptoms worsen or fail to improve.     Yoselyn Cabello, DO

## 2019-05-23 DIAGNOSIS — N93.9 ABNORMAL UTERINE BLEEDING (AUB): ICD-10-CM

## 2019-05-23 NOTE — TELEPHONE ENCOUNTER
41 y.o.  s/p Dx Lap, Hysto D and C, Myosure Polypectomy and Novasure Endometrial Ablation on 19.     - doing well post operatively

## 2019-05-24 RX ORDER — IBUPROFEN 800 MG/1
TABLET ORAL
Qty: 30 TABLET | Refills: 0 | Status: SHIPPED | OUTPATIENT
Start: 2019-05-24 | End: 2019-08-21

## 2019-06-03 ENCOUNTER — TELEPHONE (OUTPATIENT)
Dept: FAMILY MEDICINE CLINIC | Age: 44
End: 2019-06-03

## 2019-07-16 ENCOUNTER — TELEPHONE (OUTPATIENT)
Dept: FAMILY MEDICINE CLINIC | Age: 44
End: 2019-07-16

## 2019-08-21 ENCOUNTER — HOSPITAL ENCOUNTER (EMERGENCY)
Age: 44
Discharge: HOME | End: 2019-08-21
Attending: EMERGENCY MEDICINE
Payer: COMMERCIAL

## 2019-08-21 ENCOUNTER — APPOINTMENT (OUTPATIENT)
Dept: GENERAL RADIOLOGY | Age: 44
End: 2019-08-21
Payer: COMMERCIAL

## 2019-08-21 VITALS
DIASTOLIC BLOOD PRESSURE: 88 MMHG | BODY MASS INDEX: 39.4 KG/M2 | TEMPERATURE: 97.9 F | HEART RATE: 75 BPM | RESPIRATION RATE: 16 BRPM | SYSTOLIC BLOOD PRESSURE: 137 MMHG | HEIGHT: 68 IN | WEIGHT: 260 LBS | OXYGEN SATURATION: 100 %

## 2019-08-21 DIAGNOSIS — R05.9 COUGH: Primary | ICD-10-CM

## 2019-08-21 PROCEDURE — 71045 X-RAY EXAM CHEST 1 VIEW: CPT

## 2019-08-21 PROCEDURE — 6370000000 HC RX 637 (ALT 250 FOR IP): Performed by: EMERGENCY MEDICINE

## 2019-08-21 PROCEDURE — 99283 EMERGENCY DEPT VISIT LOW MDM: CPT

## 2019-08-21 PROCEDURE — 96372 THER/PROPH/DIAG INJ SC/IM: CPT

## 2019-08-21 PROCEDURE — 6360000002 HC RX W HCPCS: Performed by: EMERGENCY MEDICINE

## 2019-08-21 RX ORDER — AZITHROMYCIN 250 MG/1
TABLET, FILM COATED ORAL
Qty: 4 TABLET | Refills: 0 | Status: SHIPPED | OUTPATIENT
Start: 2019-08-21 | End: 2020-02-26

## 2019-08-21 RX ORDER — BROMPHENIRAMINE MALEATE, PSEUDOEPHEDRINE HYDROCHLORIDE, AND DEXTROMETHORPHAN HYDROBROMIDE 2; 30; 10 MG/5ML; MG/5ML; MG/5ML
5 SYRUP ORAL 4 TIMES DAILY PRN
Qty: 150 ML | Refills: 0 | Status: SHIPPED | OUTPATIENT
Start: 2019-08-21 | End: 2020-02-26

## 2019-08-21 RX ORDER — KETOROLAC TROMETHAMINE 30 MG/ML
30 INJECTION, SOLUTION INTRAMUSCULAR; INTRAVENOUS ONCE
Status: COMPLETED | OUTPATIENT
Start: 2019-08-21 | End: 2019-08-21

## 2019-08-21 RX ORDER — AZITHROMYCIN 250 MG/1
500 TABLET, FILM COATED ORAL ONCE
Status: COMPLETED | OUTPATIENT
Start: 2019-08-21 | End: 2019-08-21

## 2019-08-21 RX ADMIN — AZITHROMYCIN 500 MG: 250 TABLET, FILM COATED ORAL at 17:07

## 2019-08-21 RX ADMIN — KETOROLAC TROMETHAMINE 30 MG: 30 INJECTION, SOLUTION INTRAMUSCULAR at 15:50

## 2019-08-21 ASSESSMENT — PAIN SCALES - GENERAL
PAINLEVEL_OUTOF10: 2
PAINLEVEL_OUTOF10: 5
PAINLEVEL_OUTOF10: 5

## 2019-08-21 ASSESSMENT — PAIN DESCRIPTION - PAIN TYPE: TYPE: ACUTE PAIN

## 2019-08-21 ASSESSMENT — PAIN DESCRIPTION - DESCRIPTORS: DESCRIPTORS: ACHING

## 2019-12-14 ENCOUNTER — APPOINTMENT (OUTPATIENT)
Dept: GENERAL RADIOLOGY | Age: 44
End: 2019-12-14
Payer: COMMERCIAL

## 2019-12-14 ENCOUNTER — HOSPITAL ENCOUNTER (EMERGENCY)
Age: 44
Discharge: HOME | End: 2019-12-14
Attending: EMERGENCY MEDICINE
Payer: COMMERCIAL

## 2019-12-14 VITALS
RESPIRATION RATE: 14 BRPM | BODY MASS INDEX: 37.89 KG/M2 | HEART RATE: 81 BPM | HEIGHT: 68 IN | WEIGHT: 250 LBS | SYSTOLIC BLOOD PRESSURE: 144 MMHG | DIASTOLIC BLOOD PRESSURE: 98 MMHG | OXYGEN SATURATION: 100 % | TEMPERATURE: 98 F

## 2019-12-14 DIAGNOSIS — J98.8 VIRAL RESPIRATORY ILLNESS: Primary | ICD-10-CM

## 2019-12-14 DIAGNOSIS — B97.89 VIRAL RESPIRATORY ILLNESS: Primary | ICD-10-CM

## 2019-12-14 LAB
ANION GAP SERPL CALCULATED.3IONS-SCNC: 10 MMOL/L (ref 3–16)
BASOPHILS ABSOLUTE: 0.1 K/UL (ref 0–0.2)
BASOPHILS RELATIVE PERCENT: 0.9 %
BILIRUBIN URINE: NEGATIVE
BLOOD, URINE: NEGATIVE
BUN BLDV-MCNC: 8 MG/DL (ref 7–20)
CALCIUM SERPL-MCNC: 9.1 MG/DL (ref 8.3–10.6)
CHLORIDE BLD-SCNC: 105 MMOL/L (ref 99–110)
CLARITY: CLEAR
CO2: 26 MMOL/L (ref 21–32)
COLOR: YELLOW
CREAT SERPL-MCNC: <0.5 MG/DL (ref 0.6–1.1)
EOSINOPHILS ABSOLUTE: 0.4 K/UL (ref 0–0.6)
EOSINOPHILS RELATIVE PERCENT: 4 %
GFR AFRICAN AMERICAN: >60
GFR NON-AFRICAN AMERICAN: >60
GLUCOSE BLD-MCNC: 97 MG/DL (ref 70–99)
GLUCOSE URINE: NEGATIVE MG/DL
HCG QUALITATIVE: NEGATIVE
HCT VFR BLD CALC: 40.8 % (ref 36–48)
HEMOGLOBIN: 13.7 G/DL (ref 12–16)
KETONES, URINE: NEGATIVE MG/DL
LEUKOCYTE ESTERASE, URINE: NEGATIVE
LYMPHOCYTES ABSOLUTE: 1.4 K/UL (ref 1–5.1)
LYMPHOCYTES RELATIVE PERCENT: 14.2 %
MCH RBC QN AUTO: 29.8 PG (ref 26–34)
MCHC RBC AUTO-ENTMCNC: 33.5 G/DL (ref 31–36)
MCV RBC AUTO: 89.1 FL (ref 80–100)
MICROSCOPIC EXAMINATION: NORMAL
MONOCYTES ABSOLUTE: 0.9 K/UL (ref 0–1.3)
MONOCYTES RELATIVE PERCENT: 9.2 %
NEUTROPHILS ABSOLUTE: 7 K/UL (ref 1.7–7.7)
NEUTROPHILS RELATIVE PERCENT: 71.7 %
NITRITE, URINE: NEGATIVE
PDW BLD-RTO: 13.3 % (ref 12.4–15.4)
PH UA: 6 (ref 5–8)
PLATELET # BLD: 242 K/UL (ref 135–450)
PMV BLD AUTO: 8.3 FL (ref 5–10.5)
POTASSIUM REFLEX MAGNESIUM: 4.3 MMOL/L (ref 3.5–5.1)
PROTEIN UA: NEGATIVE MG/DL
RAPID INFLUENZA  B AGN: NEGATIVE
RAPID INFLUENZA A AGN: NEGATIVE
RBC # BLD: 4.58 M/UL (ref 4–5.2)
SODIUM BLD-SCNC: 141 MMOL/L (ref 136–145)
SPECIFIC GRAVITY UA: 1.01 (ref 1–1.03)
URINE TYPE: NORMAL
UROBILINOGEN, URINE: 0.2 E.U./DL
WBC # BLD: 9.8 K/UL (ref 4–11)

## 2019-12-14 PROCEDURE — 36415 COLL VENOUS BLD VENIPUNCTURE: CPT

## 2019-12-14 PROCEDURE — 81003 URINALYSIS AUTO W/O SCOPE: CPT

## 2019-12-14 PROCEDURE — 87804 INFLUENZA ASSAY W/OPTIC: CPT

## 2019-12-14 PROCEDURE — 84703 CHORIONIC GONADOTROPIN ASSAY: CPT

## 2019-12-14 PROCEDURE — 80048 BASIC METABOLIC PNL TOTAL CA: CPT

## 2019-12-14 PROCEDURE — 85025 COMPLETE CBC W/AUTO DIFF WBC: CPT

## 2019-12-14 PROCEDURE — 99283 EMERGENCY DEPT VISIT LOW MDM: CPT

## 2019-12-14 PROCEDURE — 71046 X-RAY EXAM CHEST 2 VIEWS: CPT

## 2019-12-14 ASSESSMENT — ENCOUNTER SYMPTOMS
COUGH: 1
NAUSEA: 0
SHORTNESS OF BREATH: 0
DIARRHEA: 0
BACK PAIN: 0
PHOTOPHOBIA: 0
VOMITING: 0
RHINORRHEA: 1
ABDOMINAL DISTENTION: 0
WHEEZING: 0

## 2019-12-16 RX ORDER — GUAIFENESIN 600 MG/1
1200 TABLET, EXTENDED RELEASE ORAL 2 TIMES DAILY PRN
COMMUNITY
Start: 2019-12-16 | End: 2020-04-08

## 2019-12-16 RX ORDER — BENZONATATE 100 MG/1
100 CAPSULE ORAL 3 TIMES DAILY PRN
Qty: 30 CAPSULE | Refills: 0 | Status: SHIPPED | OUTPATIENT
Start: 2019-12-16 | End: 2019-12-23

## 2019-12-16 RX ORDER — DEXTROMETHORPHAN HYDROBROMIDE AND PROMETHAZINE HYDROCHLORIDE 15; 6.25 MG/5ML; MG/5ML
5 SYRUP ORAL 4 TIMES DAILY PRN
Qty: 180 ML | Refills: 0 | Status: SHIPPED | OUTPATIENT
Start: 2019-12-16 | End: 2019-12-16 | Stop reason: RX

## 2019-12-29 DIAGNOSIS — I10 ESSENTIAL HYPERTENSION: ICD-10-CM

## 2019-12-29 DIAGNOSIS — R00.0 TACHYCARDIA: ICD-10-CM

## 2019-12-30 RX ORDER — CARVEDILOL 12.5 MG/1
TABLET ORAL
Qty: 60 TABLET | Refills: 0 | Status: SHIPPED | OUTPATIENT
Start: 2019-12-30 | End: 2020-01-30

## 2020-01-30 RX ORDER — CARVEDILOL 12.5 MG/1
TABLET ORAL
Qty: 60 TABLET | Refills: 0 | Status: SHIPPED | OUTPATIENT
Start: 2020-01-30 | End: 2020-02-21

## 2020-02-21 RX ORDER — CARVEDILOL 12.5 MG/1
TABLET ORAL
Qty: 60 TABLET | Refills: 0 | Status: SHIPPED | OUTPATIENT
Start: 2020-02-21 | End: 2020-02-26 | Stop reason: DRUGHIGH

## 2020-02-25 NOTE — PROGRESS NOTES
none.      Review of Systems   Constitutional: Positive for fatigue. HENT: Negative. Eyes: Negative. Respiratory: Negative. Cardiovascular: Negative. Gastrointestinal: Negative. Endocrine: Negative. Genitourinary: Negative. Musculoskeletal: Negative. Skin: Negative. Allergic/Immunologic: Negative. Neurological: Positive for light-headedness. Hematological: Negative. Psychiatric/Behavioral: Negative. All other systems reviewed and are negative.        Past Medical History:   Diagnosis Date    Anemia     Back pain     Chest pain 6/2014    stress myoview normal    Chronic pain syndrome 2017    Dr. Danyelle Malhotra pain management    Fibroid uterus     fibroid uterus ad abnormal uterine bleeding with uterine fibroids    Hypertension     Menopausal symptoms     Overactive bladder     Pelvic pain     Right lateral epicondylitis     Rotator cuff syndrome of right shoulder     Stress incontinence     Tachycardia     on coreg     Family History   Problem Relation Age of Onset    Heart Disease Mother     Heart Disease Maternal Grandmother     Diabetes Maternal Grandmother     Heart Failure Maternal Grandfather     High Blood Pressure Maternal Grandfather     High Blood Pressure Sister     Bipolar Disorder Sister     No Known Problems Maternal Aunt     High Blood Pressure Maternal Uncle     High Blood Pressure Maternal Uncle      Past Surgical History:   Procedure Laterality Date    DILATION AND CURETTAGE      DILATION AND CURETTAGE OF UTERUS N/A 4/16/2019    DIAGNOSTIC LAPAROSCOPY, DILATATION AND CURETTAGE, HYSTEROSCOPY, WITH MYOSURE AND NOVASURE ENDOMETRIAL ABLATION performed by Jeff Ingram DO at [facility] 2009    LAPAROSCOPY      TONSILLECTOMY AND ADENOIDECTOMY      as a child    TUBAL LIGATION       Social History     Socioeconomic History    Marital status:      Spouse name: Not on file    Number release capsule Take 10 mg by mouth daily       Multiple Vitamins-Minerals (MULTIPLE VITAMINS/WOMENS PO) Take 1 capsule by mouth daily       No current facility-administered medications for this visit. No changes in past medical history, past surgical history, social history, orfamily history were noted during the patient encounter unless specifically listed above. All updates of past medical history, past surgical history, social history, or family history were reviewed personally by me duringthe office visit. All problems listed in the assessment are stable unless noted otherwise. Medication profile reviewed personally by me during the office visit. Medication side effects and possible impairments frommedications were discussed as applicable. Objective:       Physical Exam  Vitals signs and nursing note reviewed. Constitutional:       General: She is not in acute distress. Appearance: Normal appearance. She is well-developed. HENT:      Head: Normocephalic and atraumatic. Right Ear: Ear canal and external ear normal. A middle ear effusion (serous) is present. Left Ear: Tympanic membrane, ear canal and external ear normal.      Nose: Nose normal.      Mouth/Throat:      Pharynx: Uvula midline. No oropharyngeal exudate. Eyes:      General: Lids are normal.      Conjunctiva/sclera: Conjunctivae normal.      Pupils: Pupils are equal, round, and reactive to light. Neck:      Musculoskeletal: Normal range of motion and neck supple. Thyroid: No thyromegaly. Vascular: No carotid bruit or JVD. Cardiovascular:      Rate and Rhythm: Normal rate and regular rhythm. Pulses: Normal pulses. Radial pulses are 2+ on the right side and 2+ on the left side. Dorsalis pedis pulses are 2+ on the right side and 2+ on the left side. Posterior tibial pulses are 2+ on the right side and 2+ on the left side. Heart sounds: Normal heart sounds. No murmur.  No friction rub. No gallop. Pulmonary:      Effort: Pulmonary effort is normal.      Breath sounds: Normal breath sounds. Abdominal:      General: Bowel sounds are normal.      Palpations: Abdomen is soft. There is no mass. Tenderness: There is no abdominal tenderness. Musculoskeletal: Normal range of motion. Lymphadenopathy:      Head:      Right side of head: No submandibular adenopathy. Left side of head: No submandibular adenopathy. Cervical: No cervical adenopathy. Skin:     General: Skin is warm and dry. Findings: No lesion or rash. Neurological:      Mental Status: She is alert and oriented to person, place, and time. Gait: Gait normal.   Psychiatric:         Speech: Speech normal.         Behavior: Behavior normal.         Thought Content: Thought content normal.         Judgment: Judgment normal.         /60 (Site: Right Upper Arm, Position: Sitting, Cuff Size: Large Adult)   Pulse 79   Ht 5' 8\" (1.727 m)   Wt 254 lb (115.2 kg)   SpO2 98%   BMI 38.62 kg/m²   Body mass index is 38.62 kg/m². BP Readings from Last 2 Encounters:   02/26/20 104/60   12/14/19 (!) 144/98       Wt Readings from Last 3 Encounters:   02/26/20 254 lb (115.2 kg)   12/14/19 250 lb (113.4 kg)   08/21/19 260 lb (117.9 kg)       Lab Review   No visits with results within 2 Month(s) from this visit.    Latest known visit with results is:   Admission on 12/14/2019, Discharged on 12/14/2019   Component Date Value    Rapid Influenza A Ag 12/14/2019 Negative     Rapid Influenza B Ag 12/14/2019 Negative     WBC 12/14/2019 9.8     RBC 12/14/2019 4.58     Hemoglobin 12/14/2019 13.7     Hematocrit 12/14/2019 40.8     MCV 12/14/2019 89.1     MCH 12/14/2019 29.8     MCHC 12/14/2019 33.5     RDW 12/14/2019 13.3     Platelets 44/37/8619 242     MPV 12/14/2019 8.3     Neutrophils % 12/14/2019 71.7     Lymphocytes % 12/14/2019 14.2     Monocytes % 12/14/2019 9.2     Eosinophils % 12/14/2019 pressure as well as her pulse daily and drop those off for review in 2 weeks. She will follow-up with me in 6 weeks  If the patient has any palpitations or any other changes she is to call the office before that time    Tachycardia  -     carvedilol (COREG) 6.25 MG tablet; Take 1 tablet by mouth 2 times daily    Tinnitus of both ears--New  The patient states she has had a new onset of tinnitus that varies from her right ear to her left ear. It is been ongoing for the last couple months. It is intermittent but the patient is wanting to go ahead and see ENT for evaluation because she had an episode earlier today where she actually lost the hearing in her right ear  -     [address] [place], , Otolaryngology, [facility]    Lightheaded  See note above    Fatigue, unspecified type  See note above    Patient has been instructed call the office immediately with new symptoms, change in symptoms or worseningof symptoms. If this is not feasible, patient is instructed to report to the emergency room. Medication profile reviewed. Medication side effects and possible impairments from medications were discussed as applicable. Allergies were reviewed. Health maintenance was reviewed and updated as appropriate.

## 2020-02-26 ENCOUNTER — OFFICE VISIT (OUTPATIENT)
Dept: FAMILY MEDICINE CLINIC | Age: 45
End: 2020-02-26
Payer: COMMERCIAL

## 2020-02-26 VITALS
HEART RATE: 79 BPM | HEIGHT: 68 IN | BODY MASS INDEX: 38.49 KG/M2 | WEIGHT: 254 LBS | SYSTOLIC BLOOD PRESSURE: 104 MMHG | DIASTOLIC BLOOD PRESSURE: 60 MMHG | OXYGEN SATURATION: 98 %

## 2020-02-26 PROCEDURE — 99214 OFFICE O/P EST MOD 30 MIN: CPT | Performed by: NURSE PRACTITIONER

## 2020-02-26 RX ORDER — CARVEDILOL 6.25 MG/1
6.25 TABLET ORAL 2 TIMES DAILY
Qty: 60 TABLET | Refills: 1 | Status: SHIPPED | OUTPATIENT
Start: 2020-02-26 | End: 2020-04-08 | Stop reason: SDUPTHER

## 2020-02-26 ASSESSMENT — PATIENT HEALTH QUESTIONNAIRE - PHQ9
1. LITTLE INTEREST OR PLEASURE IN DOING THINGS: 0
SUM OF ALL RESPONSES TO PHQ QUESTIONS 1-9: 0
SUM OF ALL RESPONSES TO PHQ QUESTIONS 1-9: 0
2. FEELING DOWN, DEPRESSED OR HOPELESS: 0
SUM OF ALL RESPONSES TO PHQ9 QUESTIONS 1 & 2: 0

## 2020-02-26 ASSESSMENT — ENCOUNTER SYMPTOMS
ALLERGIC/IMMUNOLOGIC NEGATIVE: 1
GASTROINTESTINAL NEGATIVE: 1
RESPIRATORY NEGATIVE: 1
EYES NEGATIVE: 1

## 2020-03-25 ENCOUNTER — NURSE TRIAGE (OUTPATIENT)
Dept: OTHER | Facility: CLINIC | Age: 45
End: 2020-03-25

## 2020-03-25 ENCOUNTER — OFFICE VISIT (OUTPATIENT)
Dept: PRIMARY CARE CLINIC | Age: 45
End: 2020-03-25
Payer: COMMERCIAL

## 2020-03-25 VITALS — HEART RATE: 86 BPM | TEMPERATURE: 98.2 F | OXYGEN SATURATION: 99 %

## 2020-03-25 PROCEDURE — 99212 OFFICE O/P EST SF 10 MIN: CPT | Performed by: NURSE PRACTITIONER

## 2020-03-25 NOTE — TELEPHONE ENCOUNTER
Spoke w/ patient. Based on symptoms advised flu clinic for testing and additional directions. Pt advised if symptoms become severe to report to ED.

## 2020-03-25 NOTE — PATIENT INSTRUCTIONS
petting, snuggling, being kissed or licked, and sharing food. If you must care for your pet or be around animals while you are sick, wash your hands before and after you interact with pets and wear a facemask. Call ahead before visiting your doctor  If you have a medical appointment, call the healthcare provider and tell them that you have or may have COVID-19. This will help the healthcare providers office take steps to keep other people from getting infected or exposed. Wear a facemask  You should wear a facemask when you are around other people (e.g., sharing a room or vehicle) or pets and before you enter a healthcare providers office. If you are not able to wear a facemask (for example, because it causes trouble breathing), then people who live with you should not stay in the same room with you, or they should wear a facemask if they enter your room. Cover your coughs and sneezes  Cover your mouth and nose with a tissue when you cough or sneeze. Throw used tissues in a lined trash can. Immediately wash your hands with soap and water for at least 20 seconds or, if soap and water are not available, clean your hands with an alcohol-based hand  that contains at least 60% alcohol. Clean your hands often  Wash your hands often with soap and water for at least 20 seconds, especially after blowing your nose, coughing, or sneezing; going to the bathroom; and before eating or preparing food. If soap and water are not readily available, use an alcohol-based hand  with at least 60% alcohol, covering all surfaces of your hands and rubbing them together until they feel dry. Soap and water are the best option if hands are visibly dirty. Avoid touching your eyes, nose, and mouth with unwashed hands. Avoid sharing personal household items  You should not share dishes, drinking glasses, cups, eating utensils, towels, or bedding with other people or pets in your home.  After using these items, they should

## 2020-03-25 NOTE — TELEPHONE ENCOUNTER
Reason for Disposition   [1] MODERATE weakness (i.e., interferes with work, school, normal activities) AND [2] persists > 3 days    Protocols used: WEAKNESS (GENERALIZED) AND FATIGUE-ADULT-    Caller states she is feeling exhausted, sore throat, mild SOB / cough. Feels like something is in her throat - does not have her tonsils. Recommendation is to see a physician in the next 24 hours. Works at [employer] - wants to know if she should return to work. States her employer wants her to call our line and advise if she should return to work. Advised to call her employer to review her symptoms. Employer needs to decide if she needs to work. Caller refused  24/7 free service. States she will call her physician.

## 2020-03-31 ENCOUNTER — TELEPHONE (OUTPATIENT)
Dept: PRIMARY CARE CLINIC | Age: 45
End: 2020-03-31

## 2020-03-31 NOTE — TELEPHONE ENCOUNTER
Reached out to patient for follow up from visit to flu clinic on 3/25/20. Unable to LM no VM available.

## 2020-04-08 ENCOUNTER — VIRTUAL VISIT (OUTPATIENT)
Dept: FAMILY MEDICINE CLINIC | Age: 45
End: 2020-04-08
Payer: COMMERCIAL

## 2020-04-08 PROCEDURE — 99213 OFFICE O/P EST LOW 20 MIN: CPT | Performed by: NURSE PRACTITIONER

## 2020-04-08 RX ORDER — CARVEDILOL 6.25 MG/1
6.25 TABLET ORAL 2 TIMES DAILY
Qty: 180 TABLET | Refills: 2 | Status: SHIPPED | OUTPATIENT
Start: 2020-04-08 | End: 2020-04-21

## 2020-04-08 ASSESSMENT — ENCOUNTER SYMPTOMS
ALLERGIC/IMMUNOLOGIC NEGATIVE: 1
RESPIRATORY NEGATIVE: 1
CHEST TIGHTNESS: 0
GASTROINTESTINAL NEGATIVE: 1
EYES NEGATIVE: 1
SHORTNESS OF BREATH: 0

## 2020-04-08 NOTE — PROGRESS NOTES
Jennifer Barnett is a 45 y.o. female evaluated via telephone on 4/8/2020.       Consent:  She and/or health care decision maker is aware that that she may receive a bill for this telephone service, depending on her insurance coverage, and has provided verbal consent to proceed: Yes      Rosio Hill

## 2020-04-08 NOTE — PROGRESS NOTES
2020    TELEHEALTH EVALUATION -- Audio/Visual (During RJBYX-76 public health emergency)    Chief Complaint   Patient presents with    Hypertension     follow up from dose change , pt has been checking her bp at home pt gave me a couple of her reading entered in chart pt said she is feeling much better         HPI:  Jettie Closs (:  1975) has requested an audio/video evaluation for the following concern(s):    Hypertension:  Home blood pressure monitoring: Yes - 128-131/80-82. She is adherent to a low sodium diet. Patient denies chest pain, shortness of breath, headache, lightheadedness, blurred vision, peripheral edema, dry cough and fatigue. Antihypertensive medication side effects: no medication side effects noted. Use of agents associated with hypertension: none. Pulse range has been 67-79                 Sodium (mmol/L)   Date Value   2019 141    BUN (mg/dL)   Date Value   2019 8    Glucose (mg/dL)   Date Value   2019 97      Potassium reflex Magnesium (mmol/L)   Date Value   2019 4.3    CREATININE (mg/dL)   Date Value   2019 <0.5 (L)         Essential hypertension  The patient does complain of symptoms of fatigue and lightheadedness with position changes since being on the carvedilol. Since the patient's blood pressure is well controlled as well as her pulse the patient's carvedilol will be decreased from 12.5 mg twice a day to the following  -     carvedilol (COREG) 6.25 MG tablet; Take 1 tablet by mouth 2 times daily  Patient will monitor her blood pressure as well as her pulse daily and drop those off for review in 2 weeks. She will follow-up with me in 6 weeks  If the patient has any palpitations or any other changes she is to call the office before that time    Review of Systems   Constitutional: Negative. Negative for fatigue. HENT: Negative. Eyes: Negative. Respiratory: Negative. Negative for chest tightness and shortness of breath. too long to take her evening dose she will notice palpitations. - carvedilol (COREG) 6.25 MG tablet; Take 1 tablet by mouth 2 times daily  Dispense: 180 tablet; Refill: 2      Return in about 6 months (around 10/8/2020) for [facility]. Siomara Chung is a 45 y.o. female being evaluated by a Virtual Visit (video visit) encounter to address concerns as mentioned above. A caregiver was present when appropriate. Due to this being a TeleHealth encounter (During ZRDDY-84 public health emergency), evaluation of the following organ systems was limited: Vitals/Constitutional/EENT/Resp/CV/GI//MS/Neuro/Skin/Heme-Lymph-Imm. Pursuant to the emergency declaration under the [address] [place] [address], [address] [place] [address] authority and the [employer] and [employer] Act, this Virtual Visit was conducted with patient's (and/or legal guardian's) consent, to reduce the patient's risk of exposure to COVID-19 and provide necessary medical care. The patient (and/or legal guardian) has also been advised to contact this office for worsening conditions or problems, and seek emergency medical treatment and/or call 911 if deemed necessary. Services were provided through a video synchronous discussion virtually to substitute for in-person clinic visit. Patient and provider were located at their individual homes. --ALLY Donaldson - CNP on 4/8/2020 at 1:01 PM    An electronic signature was used to authenticate this note.

## 2020-05-13 ENCOUNTER — TELEPHONE (OUTPATIENT)
Dept: OBGYN CLINIC | Age: 45
End: 2020-05-13

## 2020-05-14 ENCOUNTER — OFFICE VISIT (OUTPATIENT)
Dept: OBGYN CLINIC | Age: 45
End: 2020-05-14
Payer: COMMERCIAL

## 2020-05-14 VITALS
SYSTOLIC BLOOD PRESSURE: 126 MMHG | HEART RATE: 78 BPM | DIASTOLIC BLOOD PRESSURE: 80 MMHG | WEIGHT: 255.8 LBS | BODY MASS INDEX: 38.89 KG/M2 | TEMPERATURE: 98.5 F

## 2020-05-14 PROCEDURE — 76856 US EXAM PELVIC COMPLETE: CPT | Performed by: OBSTETRICS & GYNECOLOGY

## 2020-05-14 PROCEDURE — 99214 OFFICE O/P EST MOD 30 MIN: CPT | Performed by: OBSTETRICS & GYNECOLOGY

## 2020-05-14 ASSESSMENT — ENCOUNTER SYMPTOMS
WHEEZING: 0
ABDOMINAL PAIN: 0
CONSTIPATION: 0
COLOR CHANGE: 0
BLOOD IN STOOL: 0
SHORTNESS OF BREATH: 0
BACK PAIN: 0

## 2020-05-14 NOTE — PROGRESS NOTES
Maternal Grandfather     High Blood Pressure Maternal Grandfather     High Blood Pressure Sister     Bipolar Disorder Sister     No Known Problems Maternal Aunt     High Blood Pressure Maternal Uncle     High Blood Pressure Maternal Uncle    Mat GM had Ovarian Cancer - diagnosed late 50s/early 62s   Denies Breast CA and Colon CA     Social History:  Social History     Substance and Sexual Activity   Alcohol Use No     Social History     Substance and Sexual Activity   Drug Use No     Social History     Tobacco Use   Smoking Status Former Smoker    Packs/day: 1.00    Years: 10.00    Pack years: 10.00    Types: Cigarettes    Last attempt to quit: 2014    Years since quittin.9   Smokeless Tobacco Never Used       Physical Exam:  /80 (Site: Left Upper Arm, Position: Sitting, Cuff Size: Large Adult)   Pulse 78   Temp 98.5 °F (36.9 °C) (Oral)   Wt 255 lb 12.8 oz (116 kg)   Breastfeeding No   BMI 38.89 kg/m²   General: Alert, well appearing, no acute distress  Head: Normocephalic, atraumatic  Mouth: Mucous membranes moist, pharynx normal without lesions  Thyroid: No thyromegaly or masses present   Breasts: Symmetric, non-tender to palpation, no skin changes, palpable axillary lymph nodes or masses noted  Lungs: Respiratory effort within normal limits  CV: Regular rate  Abdomen: Soft, nontender, nondistended   Pelvic:   External:  normal appearing genitalia without masses, tenderness or lesions. Skin tags noted to left vulva. Vagina: moist, pink mucosa, without abnormal discharge or lesions  Cervix: no masses or lesions visualized, no cervical motion tenderness. Yellow to white discharge noted with mild odor, swabs collected. Uterus: mobile, midline, no masses palpated except for swollen region on anterior right uterus corresponding to site of reported pain.    Adnexa: mobile, non-tender to palpation, without masses  Rectovaginal: deferred  Extremities: No redness or tenderness, neg Mitch's symptoms at this time (n/v/d). Reviewed options if imaging is negative including 1) Expectant Mng with continued NSAID use, 2) Hysterectomy with retention of ovaries, 3) GnRH therapy, 4) Uterine artery embolization, etc... At this point, pt favors hysterectomy.      - Briefly discussed HRT for hot flashes - does not have contraindications for hormones. Is a secondary issue at this point and we will re-address once plan is made for pelvic pain. - encouraged to get mammogram, ordered    Follow Up  - Will call patient with results   -Return if symptoms worsen or fail to improve. Approximately 45 minutes spent in room counseling patient on condition and coordination of care with over 50% in direct face to face counseling.       Rissa Gauthier, DO

## 2020-05-15 RX ORDER — IBUPROFEN 800 MG/1
800 TABLET ORAL 2 TIMES DAILY PRN
Qty: 180 TABLET | Refills: 0 | OUTPATIENT
Start: 2020-05-15

## 2020-05-15 RX ORDER — FLUCONAZOLE 150 MG/1
150 TABLET ORAL ONCE
Qty: 1 TABLET | Refills: 1 | Status: SHIPPED | OUTPATIENT
Start: 2020-05-15 | End: 2020-05-15

## 2020-05-15 RX ORDER — IBUPROFEN 800 MG/1
800 TABLET ORAL EVERY 8 HOURS PRN
Qty: 120 TABLET | Refills: 0 | Status: ON HOLD
Start: 2020-05-15 | End: 2020-06-05 | Stop reason: HOSPADM

## 2020-05-19 ENCOUNTER — TELEPHONE (OUTPATIENT)
Dept: OBGYN CLINIC | Age: 45
End: 2020-05-19

## 2020-05-20 ENCOUNTER — HOSPITAL ENCOUNTER (OUTPATIENT)
Age: 45
Discharge: HOME | End: 2020-05-20
Payer: COMMERCIAL

## 2020-05-20 ENCOUNTER — HOSPITAL ENCOUNTER (OUTPATIENT)
Dept: CT IMAGING | Age: 45
Discharge: HOME | End: 2020-05-20
Payer: COMMERCIAL

## 2020-05-20 LAB
BUN BLDV-MCNC: 13 MG/DL (ref 7–20)
CREAT SERPL-MCNC: 0.6 MG/DL (ref 0.6–1.1)
GFR AFRICAN AMERICAN: >60
GFR NON-AFRICAN AMERICAN: >60

## 2020-05-20 PROCEDURE — 84520 ASSAY OF UREA NITROGEN: CPT

## 2020-05-20 PROCEDURE — 74177 CT ABD & PELVIS W/CONTRAST: CPT

## 2020-05-20 PROCEDURE — 36415 COLL VENOUS BLD VENIPUNCTURE: CPT

## 2020-05-20 PROCEDURE — 82565 ASSAY OF CREATININE: CPT

## 2020-05-20 PROCEDURE — 6360000004 HC RX CONTRAST MEDICATION: Performed by: OBSTETRICS & GYNECOLOGY

## 2020-05-20 RX ADMIN — IOHEXOL 50 ML: 240 INJECTION, SOLUTION INTRATHECAL; INTRAVASCULAR; INTRAVENOUS; ORAL at 14:08

## 2020-05-20 RX ADMIN — IOPAMIDOL 75 ML: 755 INJECTION, SOLUTION INTRAVENOUS at 14:08

## 2020-05-22 ENCOUNTER — TELEPHONE (OUTPATIENT)
Dept: FAMILY MEDICINE CLINIC | Age: 45
End: 2020-05-22

## 2020-05-22 ENCOUNTER — TELEPHONE (OUTPATIENT)
Dept: OBGYN CLINIC | Age: 45
End: 2020-05-22

## 2020-05-22 NOTE — TELEPHONE ENCOUNTER
Patient is needing a pre op appointment    Surgery date: June, 2020    Surgery Type/ Body Part: Hysterectomy    Surgeon Name : Dr Williemae Gilford.  Lynnette Canales    Location for [address] [place] [address]    EKG Needed: No

## 2020-05-22 NOTE — TELEPHONE ENCOUNTER
Attempted to call patient cell # regarding scheduling surgery. No answer, voicemail box not set up. No answer on home #. Can offer 6/4/20 in Main OR. Needs covid screening.

## 2020-05-26 ENCOUNTER — TELEPHONE (OUTPATIENT)
Dept: OBGYN CLINIC | Age: 45
End: 2020-05-26

## 2020-05-27 ENCOUNTER — OFFICE VISIT (OUTPATIENT)
Dept: OBGYN CLINIC | Age: 45
End: 2020-05-27

## 2020-05-27 ENCOUNTER — OFFICE VISIT (OUTPATIENT)
Dept: FAMILY MEDICINE CLINIC | Age: 45
End: 2020-05-27
Payer: COMMERCIAL

## 2020-05-27 VITALS
BODY MASS INDEX: 38.34 KG/M2 | TEMPERATURE: 98 F | OXYGEN SATURATION: 96 % | WEIGHT: 253 LBS | SYSTOLIC BLOOD PRESSURE: 110 MMHG | DIASTOLIC BLOOD PRESSURE: 74 MMHG | HEART RATE: 61 BPM | HEIGHT: 68 IN

## 2020-05-27 VITALS
BODY MASS INDEX: 38.74 KG/M2 | SYSTOLIC BLOOD PRESSURE: 136 MMHG | DIASTOLIC BLOOD PRESSURE: 72 MMHG | HEART RATE: 75 BPM | TEMPERATURE: 98 F | WEIGHT: 254.8 LBS

## 2020-05-27 PROCEDURE — 93000 ELECTROCARDIOGRAM COMPLETE: CPT | Performed by: NURSE PRACTITIONER

## 2020-05-27 PROCEDURE — 99214 OFFICE O/P EST MOD 30 MIN: CPT | Performed by: NURSE PRACTITIONER

## 2020-05-27 PROCEDURE — 99024 POSTOP FOLLOW-UP VISIT: CPT | Performed by: OBSTETRICS & GYNECOLOGY

## 2020-05-27 RX ORDER — POLYETHYLENE GLYCOL 3350, SODIUM SULFATE ANHYDROUS, SODIUM BICARBONATE, SODIUM CHLORIDE, POTASSIUM CHLORIDE 236; 22.74; 6.74; 5.86; 2.97 G/4L; G/4L; G/4L; G/4L; G/4L
4 POWDER, FOR SOLUTION ORAL ONCE
Qty: 4000 ML | Refills: 0 | Status: SHIPPED | OUTPATIENT
Start: 2020-05-27 | End: 2020-05-27

## 2020-05-27 NOTE — PROGRESS NOTES
anesthesia problems: None   Bleeding risk: No recent or remote history of abnormal bleeding  Personal or FH of DVT/PE: No    Patient objection to receiving blood products: No    Patient Active Problem List   Diagnosis    Tachycardia    Chronic pain syndrome    HTN (hypertension)    S/P endometrial ablation    Fibroid uterus       Past Medical History:   Diagnosis Date    Anemia     Back pain     Chest pain 6/2014    stress myoview normal    Chronic pain syndrome 2017    Dr. Malini Jefferson pain management    Fibroid uterus     fibroid uterus ad abnormal uterine bleeding with uterine fibroids    Hypertension     Menopausal symptoms     Overactive bladder     Pelvic pain     Right lateral epicondylitis     Rotator cuff syndrome of right shoulder     Stress incontinence     Tachycardia     on coreg     Past Surgical History:   Procedure Laterality Date    DILATION AND CURETTAGE      DILATION AND CURETTAGE OF UTERUS N/A 4/16/2019    DIAGNOSTIC LAPAROSCOPY, DILATATION AND CURETTAGE, HYSTEROSCOPY, WITH MYOSURE AND NOVASURE ENDOMETRIAL ABLATION performed by Martin Leon DO [facility] 2009    LAPAROSCOPY      TONSILLECTOMY AND ADENOIDECTOMY      as a child    TUBAL LIGATION       Family History   Problem Relation Age of Onset    Heart Disease Mother     Heart Disease Maternal Grandmother     Diabetes Maternal Grandmother     Heart Failure Maternal Grandfather     High Blood Pressure Maternal Grandfather     High Blood Pressure Sister     Bipolar Disorder Sister     No Known Problems Maternal Aunt     High Blood Pressure Maternal Uncle     High Blood Pressure Maternal Uncle      Social History     Socioeconomic History    Marital status:      Spouse name: Not on file    Number of children: Not on file    Years of education: Not on file    Highest education level: Not on file   Occupational History    Not on file   Social Needs of motion. Neck supple. No JVD present. Carotid bruit is not present. No mass and no thyromegaly present. Cardiovascular: Normal rate, regular rhythm, normal heart sounds and intact distal pulses. Exam reveals no gallop and no friction rub. No murmur heard. Pulmonary/Chest: Effort normal and breath sounds normal. No respiratory distress. She has no wheezes. She has no rales. Abdominal: Soft. Normal aorta and bowel sounds are normal. She exhibits no distension and no mass. There is no hepatosplenomegaly. No tenderness. Musculoskeletal: She exhibits no edema and no tenderness. Neurological: She is alert and oriented to person, place, and time. She has normal strength. No cranial nerve deficit or sensory deficit. Coordination and gait normal.   Skin: Skin is warm and dry. No rash noted. No erythema. Psychiatric: She has a normal mood and affect. Her behavior is normal.     EKG Interpretation:  normal sinus rhythm, unchanged from previous tracings. Lab Review   Hospital Outpatient Visit on 05/20/2020   Component Date Value    BUN 05/20/2020 13     CREATININE 05/20/2020 0.6     GFR Non- 05/20/2020 >60     GFR  05/20/2020 >60    Office Visit on 05/14/2020   Component Date Value    Trichomonas Vaginalis DNA 05/14/2020                      Value:Negative- DNA not detected. Normal range: Negative DNA not detected.  GARDNERELLA VAGINALIS, D* 05/14/2020                      Value:Negative- DNA not detected. Normal range: Negative DNA not detected.  SERAFIN SPECIES, DNA PRO* 05/14/2020 *                    Value:POSITIVE- DNA Probe detected. Normal range: Negative DNA not detected.      Admission on 12/14/2019, Discharged on 12/14/2019   Component Date Value    Rapid Influenza A Ag 12/14/2019 Negative     Rapid Influenza B Ag 12/14/2019 Negative     WBC 12/14/2019 9.8     RBC 12/14/2019 4.58     Hemoglobin 12/14/2019 13.7     Hematocrit 12/14/2019 40.8     and omeprazole on morning of surgery with sip of water, and hold all other medications until after surgery, Discontinue NSAIDs (ibuprofen) 7 days before surgery, Discontinue multiple vitamins 7 days before surgery  3. Prophylaxis for cardiac events with perioperative beta-blockers: Currently taking  carvedilol  ACC/AHA indications for pre-operative beta-blocker use:    · Vascular surgery with history of postitive stress test  · Intermediate or high risk surgery with history of CAD   · Intermediate or high risk surgery with multiple clinical predictors of CAD- 2 of the following: history of compensated or prior heart failure, history of cerebrovascular disease, DM, or renal insufficiency    Routine administration of higher-dose, long-acting metoprolol in beta-blocker-naïve patients on the day of surgery, and in the absence of dose titration is associated with an overall increase in mortality. Beta-blockers should be started days to weeks prior to surgery and titrated to pulse < 70.  4. Deep vein thrombosis prophylaxis: regimen to be chosen by surgical team  5. No contraindications to planned surgery      If you have questions, please do not hesitate to call me ([phone]). Sincerely,    Prabhjot Skinner.  Jacquelin Dickson, CNP

## 2020-05-27 NOTE — PROGRESS NOTES
evidence of   obstruction, intraperitoneal free air, or abscess. 2. Mild prominence of mural thickness of distal esophagus.  Finding raises   possibility of changes of mild esophagitis. 3. Stable 1.5 cm right adrenal gland lesion.  Finding likely related to   presence of adenoma. 4. Indeterminate 1.7 cm nodule within the left breast.  Follow-up mammography   is recommended for more complete evaluation. Assessment/Plan   Diagnosis Orders   1. Pre-op testing  polyethylene glycol (GOLYTELY) 236 g solution   2. Intramural leiomyoma of uterus     3. Abnormal uterine bleeding (AUB)     4. Pelvic pain     5. History of endometrial ablation     6.  History of tubal ligation          Follow Up   Post Op apt in 2 weeks     Ion Corbin DO

## 2020-05-28 ENCOUNTER — OFFICE VISIT (OUTPATIENT)
Dept: PRIMARY CARE CLINIC | Age: 45
End: 2020-05-28

## 2020-05-28 ENCOUNTER — TELEPHONE (OUTPATIENT)
Dept: OBGYN CLINIC | Age: 45
End: 2020-05-28

## 2020-05-28 NOTE — TELEPHONE ENCOUNTER
Spoke to patient regarding upcoming surgery. Aware due to COVID-19 restrictions we are not able to schedule overnight cases. This information received last evening after her case was previously scheduled. Patient given option to cancel and reschedule to a later date when available or proceed as outpatient case. Patient elects to proceed with case as outpatient. Patient aware if any complications there may be a chance she will have to stay overnight in the hospital. Patient states she is OK to proceed as long as physician is comfortable also. States she has completed her pre-op physical, COVID screening and has set up time off work. Surgery department notified.     Routing to physician as Edd Desai

## 2020-05-30 LAB
SARS-COV-2: NOT DETECTED
SOURCE: NORMAL

## 2020-06-03 ENCOUNTER — ANESTHESIA EVENT (OUTPATIENT)
Dept: OPERATING ROOM | Age: 45
End: 2020-06-03
Payer: COMMERCIAL

## 2020-06-03 NOTE — ANESTHESIA PRE PROCEDURE
on coreg     Past Surgical History:     DILATION AND CURETTAGE      DILATION AND CURETTAGE OF UTERUS N/A 2019    DIAGNOSTIC LAPAROSCOPY, DILATATION AND CURETTAGE, HYSTEROSCOPY, WITH MYOSURE AND NOVASURE ENDOMETRIAL ABLATION performed by Renard Carolina DO at [facility] Right     LAPAROSCOPY      TONSILLECTOMY AND ADENOIDECTOMY      as a child    TUBAL LIGATION       Social History:     Smoking status: Former Smoker     Packs/day: 1.00     Years: 10.00     Pack years: 10.00     Types: Cigarettes     Last attempt to quit: 2014     Years since quittin.0    Smokeless tobacco: Never Used   Substance Use Topics    Alcohol use: No     Vital Signs (Current):           BP: 131/89 Pulse: 75   Resp: 16 SpO2: 97   Temp: 98.2 °F (36.8 °C)   Height: 5' 8\" (1.727 m)  (20) Weight: 251 lb (113.9 kg)  (20)   BMI: 38.2           BP Readings from Last 3 Encounters:   20 136/72   20 110/74   20 126/80     NPO Status: >8 hrs                         BMI:   Wt Readings from Last 3 Encounters:   20 254 lb 12.8 oz (115.6 kg)   20 253 lb (114.8 kg)   20 255 lb 12.8 oz (116 kg)     Body mass index is 38.62 kg/m². CBC: 2020 06:34  Sodium 138   Potassium 4.1   Chloride 105   CO2 23   BUN 6 (L)   Creatinine <0.5 (L)   Glucose 93   Calcium 8.8     CBC: 2020 06:34  WBC 7.3   RBC 4.79   Hemoglobin Quant 14.2   Hematocrit 41.9   Platelet Count 473     HCG (If Applicable): negative    COVID-19 Screening (If Applicable):     COVID19 Not Detected 2020     Anesthesia Evaluation  Patient summary reviewed and Nursing notes reviewed  Airway: Mallampati: II  TM distance: >3 FB   Neck ROM: full  Mouth opening: > = 3 FB Dental:          Pulmonary:       (-) COPD, asthma, sleep apnea and not a current smoker                           Cardiovascular:  Exercise tolerance: good (>4 METS),   (+) hypertension:,     (-) past MI,

## 2020-06-04 ENCOUNTER — ANESTHESIA (OUTPATIENT)
Dept: OPERATING ROOM | Age: 45
End: 2020-06-04
Payer: COMMERCIAL

## 2020-06-04 ENCOUNTER — HOSPITAL ENCOUNTER (OUTPATIENT)
Age: 45
Setting detail: OBSERVATION
Discharge: HOME | End: 2020-06-05
Attending: OBSTETRICS & GYNECOLOGY | Admitting: OBSTETRICS & GYNECOLOGY
Payer: COMMERCIAL

## 2020-06-04 VITALS
DIASTOLIC BLOOD PRESSURE: 71 MMHG | RESPIRATION RATE: 15 BRPM | OXYGEN SATURATION: 100 % | SYSTOLIC BLOOD PRESSURE: 111 MMHG | TEMPERATURE: 98.2 F

## 2020-06-04 PROBLEM — Z90.710 S/P LAPAROSCOPIC ASSISTED VAGINAL HYSTERECTOMY (LAVH): Status: ACTIVE | Noted: 2020-06-04

## 2020-06-04 PROBLEM — D25.9 UTERINE FIBROID: Status: ACTIVE | Noted: 2020-06-04

## 2020-06-04 LAB
ABO/RH: NORMAL
ANION GAP SERPL CALCULATED.3IONS-SCNC: 10 MMOL/L (ref 3–16)
ANTIBODY SCREEN: NORMAL
BASOPHILS ABSOLUTE: 0.1 K/UL (ref 0–0.2)
BASOPHILS RELATIVE PERCENT: 1.2 %
BUN BLDV-MCNC: 6 MG/DL (ref 7–20)
CALCIUM SERPL-MCNC: 8.8 MG/DL (ref 8.3–10.6)
CHLORIDE BLD-SCNC: 105 MMOL/L (ref 99–110)
CO2: 23 MMOL/L (ref 21–32)
CREAT SERPL-MCNC: <0.5 MG/DL (ref 0.6–1.1)
EOSINOPHILS ABSOLUTE: 0.2 K/UL (ref 0–0.6)
EOSINOPHILS RELATIVE PERCENT: 2.2 %
GFR AFRICAN AMERICAN: >60
GFR NON-AFRICAN AMERICAN: >60
GLUCOSE BLD-MCNC: 93 MG/DL (ref 70–99)
HCT VFR BLD CALC: 41.7 % (ref 36–48)
HCT VFR BLD CALC: 41.9 % (ref 36–48)
HEMOGLOBIN: 14 G/DL (ref 12–16)
HEMOGLOBIN: 14.2 G/DL (ref 12–16)
LYMPHOCYTES ABSOLUTE: 1.9 K/UL (ref 1–5.1)
LYMPHOCYTES RELATIVE PERCENT: 25.8 %
MCH RBC QN AUTO: 29.7 PG (ref 26–34)
MCHC RBC AUTO-ENTMCNC: 33.9 G/DL (ref 31–36)
MCV RBC AUTO: 87.5 FL (ref 80–100)
MONOCYTES ABSOLUTE: 0.7 K/UL (ref 0–1.3)
MONOCYTES RELATIVE PERCENT: 9.2 %
NEUTROPHILS ABSOLUTE: 4.5 K/UL (ref 1.7–7.7)
NEUTROPHILS RELATIVE PERCENT: 61.6 %
PDW BLD-RTO: 13.6 % (ref 12.4–15.4)
PLATELET # BLD: 234 K/UL (ref 135–450)
PMV BLD AUTO: 9 FL (ref 5–10.5)
POTASSIUM REFLEX MAGNESIUM: 4.1 MMOL/L (ref 3.5–5.1)
PREGNANCY, URINE: NEGATIVE
RBC # BLD: 4.79 M/UL (ref 4–5.2)
SODIUM BLD-SCNC: 138 MMOL/L (ref 136–145)
WBC # BLD: 7.3 K/UL (ref 4–11)

## 2020-06-04 PROCEDURE — 7100000000 HC PACU RECOVERY - FIRST 15 MIN: Performed by: OBSTETRICS & GYNECOLOGY

## 2020-06-04 PROCEDURE — 3600000015 HC SURGERY LEVEL 5 ADDTL 15MIN: Performed by: OBSTETRICS & GYNECOLOGY

## 2020-06-04 PROCEDURE — 86900 BLOOD TYPING SEROLOGIC ABO: CPT

## 2020-06-04 PROCEDURE — 84703 CHORIONIC GONADOTROPIN ASSAY: CPT

## 2020-06-04 PROCEDURE — 86850 RBC ANTIBODY SCREEN: CPT

## 2020-06-04 PROCEDURE — 58571 TLH W/T/O 250 G OR LESS: CPT | Performed by: OBSTETRICS & GYNECOLOGY

## 2020-06-04 PROCEDURE — 2580000003 HC RX 258: Performed by: ANESTHESIOLOGY

## 2020-06-04 PROCEDURE — 6360000002 HC RX W HCPCS: Performed by: NURSE ANESTHETIST, CERTIFIED REGISTERED

## 2020-06-04 PROCEDURE — 2720000010 HC SURG SUPPLY STERILE: Performed by: OBSTETRICS & GYNECOLOGY

## 2020-06-04 PROCEDURE — 3700000001 HC ADD 15 MINUTES (ANESTHESIA): Performed by: OBSTETRICS & GYNECOLOGY

## 2020-06-04 PROCEDURE — 85018 HEMOGLOBIN: CPT

## 2020-06-04 PROCEDURE — 86901 BLOOD TYPING SEROLOGIC RH(D): CPT

## 2020-06-04 PROCEDURE — 7100000001 HC PACU RECOVERY - ADDTL 15 MIN: Performed by: OBSTETRICS & GYNECOLOGY

## 2020-06-04 PROCEDURE — 6360000002 HC RX W HCPCS: Performed by: OBSTETRICS & GYNECOLOGY

## 2020-06-04 PROCEDURE — 3600000005 HC SURGERY LEVEL 5 BASE: Performed by: OBSTETRICS & GYNECOLOGY

## 2020-06-04 PROCEDURE — 85025 COMPLETE CBC W/AUTO DIFF WBC: CPT

## 2020-06-04 PROCEDURE — 2500000003 HC RX 250 WO HCPCS: Performed by: OBSTETRICS & GYNECOLOGY

## 2020-06-04 PROCEDURE — 80048 BASIC METABOLIC PNL TOTAL CA: CPT

## 2020-06-04 PROCEDURE — 2709999900 HC NON-CHARGEABLE SUPPLY: Performed by: OBSTETRICS & GYNECOLOGY

## 2020-06-04 PROCEDURE — 6370000000 HC RX 637 (ALT 250 FOR IP): Performed by: OBSTETRICS & GYNECOLOGY

## 2020-06-04 PROCEDURE — 36415 COLL VENOUS BLD VENIPUNCTURE: CPT

## 2020-06-04 PROCEDURE — 6370000000 HC RX 637 (ALT 250 FOR IP): Performed by: ANESTHESIOLOGY

## 2020-06-04 PROCEDURE — 3700000000 HC ANESTHESIA ATTENDED CARE: Performed by: OBSTETRICS & GYNECOLOGY

## 2020-06-04 PROCEDURE — 85014 HEMATOCRIT: CPT

## 2020-06-04 PROCEDURE — 88307 TISSUE EXAM BY PATHOLOGIST: CPT

## 2020-06-04 PROCEDURE — 6360000002 HC RX W HCPCS: Performed by: ANESTHESIOLOGY

## 2020-06-04 PROCEDURE — 2500000003 HC RX 250 WO HCPCS: Performed by: NURSE ANESTHETIST, CERTIFIED REGISTERED

## 2020-06-04 PROCEDURE — G0378 HOSPITAL OBSERVATION PER HR: HCPCS

## 2020-06-04 RX ORDER — BUPIVACAINE HYDROCHLORIDE AND EPINEPHRINE 5; 5 MG/ML; UG/ML
INJECTION, SOLUTION PERINEURAL PRN
Status: DISCONTINUED | OUTPATIENT
Start: 2020-06-04 | End: 2020-06-04 | Stop reason: HOSPADM

## 2020-06-04 RX ORDER — PROMETHAZINE HYDROCHLORIDE 25 MG/1
12.5 TABLET ORAL EVERY 6 HOURS PRN
Status: DISCONTINUED | OUTPATIENT
Start: 2020-06-04 | End: 2020-06-05 | Stop reason: HOSPADM

## 2020-06-04 RX ORDER — OXYCODONE HYDROCHLORIDE AND ACETAMINOPHEN 5; 325 MG/1; MG/1
2 TABLET ORAL PRN
Status: DISCONTINUED | OUTPATIENT
Start: 2020-06-04 | End: 2020-06-04 | Stop reason: HOSPADM

## 2020-06-04 RX ORDER — PROMETHAZINE HYDROCHLORIDE 25 MG/ML
6.25 INJECTION, SOLUTION INTRAMUSCULAR; INTRAVENOUS
Status: DISCONTINUED | OUTPATIENT
Start: 2020-06-04 | End: 2020-06-04 | Stop reason: HOSPADM

## 2020-06-04 RX ORDER — ONDANSETRON 2 MG/ML
INJECTION INTRAMUSCULAR; INTRAVENOUS PRN
Status: DISCONTINUED | OUTPATIENT
Start: 2020-06-04 | End: 2020-06-04 | Stop reason: SDUPTHER

## 2020-06-04 RX ORDER — SODIUM CHLORIDE 0.9 % (FLUSH) 0.9 %
10 SYRINGE (ML) INJECTION EVERY 12 HOURS SCHEDULED
Status: DISCONTINUED | OUTPATIENT
Start: 2020-06-04 | End: 2020-06-05 | Stop reason: HOSPADM

## 2020-06-04 RX ORDER — SCOLOPAMINE TRANSDERMAL SYSTEM 1 MG/1
1 PATCH, EXTENDED RELEASE TRANSDERMAL
Status: DISCONTINUED | OUTPATIENT
Start: 2020-06-04 | End: 2020-06-05 | Stop reason: HOSPADM

## 2020-06-04 RX ORDER — APREPITANT 40 MG/1
40 CAPSULE ORAL ONCE
Status: COMPLETED | OUTPATIENT
Start: 2020-06-04 | End: 2020-06-04

## 2020-06-04 RX ORDER — SIMETHICONE 80 MG
80 TABLET,CHEWABLE ORAL EVERY 6 HOURS PRN
Status: DISCONTINUED | OUTPATIENT
Start: 2020-06-04 | End: 2020-06-05 | Stop reason: HOSPADM

## 2020-06-04 RX ORDER — DOCUSATE SODIUM 100 MG/1
100 CAPSULE, LIQUID FILLED ORAL 2 TIMES DAILY
Status: DISCONTINUED | OUTPATIENT
Start: 2020-06-04 | End: 2020-06-05 | Stop reason: HOSPADM

## 2020-06-04 RX ORDER — SODIUM CHLORIDE, SODIUM LACTATE, POTASSIUM CHLORIDE, CALCIUM CHLORIDE 600; 310; 30; 20 MG/100ML; MG/100ML; MG/100ML; MG/100ML
INJECTION, SOLUTION INTRAVENOUS CONTINUOUS
Status: DISCONTINUED | OUTPATIENT
Start: 2020-06-04 | End: 2020-06-05 | Stop reason: HOSPADM

## 2020-06-04 RX ORDER — HYDROMORPHONE HCL 110MG/55ML
PATIENT CONTROLLED ANALGESIA SYRINGE INTRAVENOUS PRN
Status: DISCONTINUED | OUTPATIENT
Start: 2020-06-04 | End: 2020-06-04 | Stop reason: SDUPTHER

## 2020-06-04 RX ORDER — SODIUM CHLORIDE 0.9 % (FLUSH) 0.9 %
10 SYRINGE (ML) INJECTION PRN
Status: DISCONTINUED | OUTPATIENT
Start: 2020-06-04 | End: 2020-06-05 | Stop reason: HOSPADM

## 2020-06-04 RX ORDER — DEXAMETHASONE SODIUM PHOSPHATE 10 MG/ML
INJECTION INTRAMUSCULAR; INTRAVENOUS PRN
Status: DISCONTINUED | OUTPATIENT
Start: 2020-06-04 | End: 2020-06-04 | Stop reason: SDUPTHER

## 2020-06-04 RX ORDER — ACETAMINOPHEN 325 MG/1
650 TABLET ORAL EVERY 4 HOURS PRN
Status: DISCONTINUED | OUTPATIENT
Start: 2020-06-04 | End: 2020-06-05 | Stop reason: HOSPADM

## 2020-06-04 RX ORDER — OXYCODONE HYDROCHLORIDE AND ACETAMINOPHEN 5; 325 MG/1; MG/1
1 TABLET ORAL EVERY 4 HOURS PRN
Status: DISCONTINUED | OUTPATIENT
Start: 2020-06-04 | End: 2020-06-05 | Stop reason: HOSPADM

## 2020-06-04 RX ORDER — KETOROLAC TROMETHAMINE 30 MG/ML
30 INJECTION, SOLUTION INTRAMUSCULAR; INTRAVENOUS EVERY 6 HOURS
Status: DISCONTINUED | OUTPATIENT
Start: 2020-06-04 | End: 2020-06-05 | Stop reason: HOSPADM

## 2020-06-04 RX ORDER — BUPIVACAINE HYDROCHLORIDE AND EPINEPHRINE 5; 5 MG/ML; UG/ML
INJECTION, SOLUTION EPIDURAL; INTRACAUDAL; PERINEURAL PRN
Status: DISCONTINUED | OUTPATIENT
Start: 2020-06-04 | End: 2020-06-04 | Stop reason: HOSPADM

## 2020-06-04 RX ORDER — CARVEDILOL 6.25 MG/1
6.25 TABLET ORAL 2 TIMES DAILY WITH MEALS
Status: DISCONTINUED | OUTPATIENT
Start: 2020-06-04 | End: 2020-06-05 | Stop reason: HOSPADM

## 2020-06-04 RX ORDER — PROPOFOL 10 MG/ML
INJECTION, EMULSION INTRAVENOUS PRN
Status: DISCONTINUED | OUTPATIENT
Start: 2020-06-04 | End: 2020-06-04 | Stop reason: SDUPTHER

## 2020-06-04 RX ORDER — IBUPROFEN 400 MG/1
400 TABLET ORAL EVERY 6 HOURS PRN
Status: DISCONTINUED | OUTPATIENT
Start: 2020-06-04 | End: 2020-06-05 | Stop reason: HOSPADM

## 2020-06-04 RX ORDER — KETOROLAC TROMETHAMINE 30 MG/ML
INJECTION, SOLUTION INTRAMUSCULAR; INTRAVENOUS PRN
Status: DISCONTINUED | OUTPATIENT
Start: 2020-06-04 | End: 2020-06-04 | Stop reason: SDUPTHER

## 2020-06-04 RX ORDER — OXYCODONE HYDROCHLORIDE AND ACETAMINOPHEN 5; 325 MG/1; MG/1
1 TABLET ORAL PRN
Status: DISCONTINUED | OUTPATIENT
Start: 2020-06-04 | End: 2020-06-04 | Stop reason: HOSPADM

## 2020-06-04 RX ORDER — FENTANYL CITRATE 50 UG/ML
INJECTION, SOLUTION INTRAMUSCULAR; INTRAVENOUS PRN
Status: DISCONTINUED | OUTPATIENT
Start: 2020-06-04 | End: 2020-06-04 | Stop reason: SDUPTHER

## 2020-06-04 RX ORDER — ONDANSETRON 2 MG/ML
4 INJECTION INTRAMUSCULAR; INTRAVENOUS
Status: DISCONTINUED | OUTPATIENT
Start: 2020-06-04 | End: 2020-06-04 | Stop reason: HOSPADM

## 2020-06-04 RX ORDER — GENTAMICIN SULFATE 40 MG/ML
INJECTION, SOLUTION INTRAMUSCULAR; INTRAVENOUS PRN
Status: DISCONTINUED | OUTPATIENT
Start: 2020-06-04 | End: 2020-06-04 | Stop reason: SDUPTHER

## 2020-06-04 RX ORDER — ATROPINE SULFATE 0.4 MG/ML
AMPUL (ML) INJECTION PRN
Status: DISCONTINUED | OUTPATIENT
Start: 2020-06-04 | End: 2020-06-04 | Stop reason: SDUPTHER

## 2020-06-04 RX ORDER — ONDANSETRON 2 MG/ML
4 INJECTION INTRAMUSCULAR; INTRAVENOUS EVERY 6 HOURS PRN
Status: DISCONTINUED | OUTPATIENT
Start: 2020-06-04 | End: 2020-06-05 | Stop reason: HOSPADM

## 2020-06-04 RX ORDER — MIDAZOLAM HYDROCHLORIDE 1 MG/ML
INJECTION INTRAMUSCULAR; INTRAVENOUS PRN
Status: DISCONTINUED | OUTPATIENT
Start: 2020-06-04 | End: 2020-06-04 | Stop reason: SDUPTHER

## 2020-06-04 RX ORDER — FENTANYL CITRATE 50 UG/ML
25 INJECTION, SOLUTION INTRAMUSCULAR; INTRAVENOUS EVERY 5 MIN PRN
Status: DISCONTINUED | OUTPATIENT
Start: 2020-06-04 | End: 2020-06-04 | Stop reason: HOSPADM

## 2020-06-04 RX ORDER — HYDRALAZINE HYDROCHLORIDE 20 MG/ML
5 INJECTION INTRAMUSCULAR; INTRAVENOUS EVERY 10 MIN PRN
Status: DISCONTINUED | OUTPATIENT
Start: 2020-06-04 | End: 2020-06-04 | Stop reason: HOSPADM

## 2020-06-04 RX ORDER — VECURONIUM BROMIDE 1 MG/ML
INJECTION, POWDER, LYOPHILIZED, FOR SOLUTION INTRAVENOUS PRN
Status: DISCONTINUED | OUTPATIENT
Start: 2020-06-04 | End: 2020-06-04 | Stop reason: SDUPTHER

## 2020-06-04 RX ADMIN — PROPOFOL 300 MG: 10 INJECTION, EMULSION INTRAVENOUS at 07:42

## 2020-06-04 RX ADMIN — VECURONIUM BROMIDE 2 MG: 1 INJECTION, POWDER, LYOPHILIZED, FOR SOLUTION INTRAVENOUS at 08:55

## 2020-06-04 RX ADMIN — VECURONIUM BROMIDE 2 MG: 1 INJECTION, POWDER, LYOPHILIZED, FOR SOLUTION INTRAVENOUS at 09:37

## 2020-06-04 RX ADMIN — ATROPINE SULFATE 0.2 MG: 0.4 INJECTION, SOLUTION INTRAMUSCULAR; INTRAVENOUS; SUBCUTANEOUS at 08:15

## 2020-06-04 RX ADMIN — CARVEDILOL 6.25 MG: 6.25 TABLET, FILM COATED ORAL at 15:54

## 2020-06-04 RX ADMIN — DOCUSATE SODIUM 100 MG: 100 CAPSULE, LIQUID FILLED ORAL at 20:40

## 2020-06-04 RX ADMIN — SODIUM CHLORIDE, POTASSIUM CHLORIDE, SODIUM LACTATE AND CALCIUM CHLORIDE: 600; 310; 30; 20 INJECTION, SOLUTION INTRAVENOUS at 07:35

## 2020-06-04 RX ADMIN — HYDROMORPHONE HYDROCHLORIDE 0.5 MG: 1 INJECTION, SOLUTION INTRAMUSCULAR; INTRAVENOUS; SUBCUTANEOUS at 10:59

## 2020-06-04 RX ADMIN — KETOROLAC TROMETHAMINE 30 MG: 30 INJECTION, SOLUTION INTRAMUSCULAR; INTRAVENOUS at 08:29

## 2020-06-04 RX ADMIN — FENTANYL CITRATE 250 MCG: 50 INJECTION, SOLUTION INTRAMUSCULAR; INTRAVENOUS at 07:42

## 2020-06-04 RX ADMIN — HYDROMORPHONE HYDROCHLORIDE 0.5 MG: 2 INJECTION INTRAMUSCULAR; INTRAVENOUS; SUBCUTANEOUS at 09:59

## 2020-06-04 RX ADMIN — APREPITANT 40 MG: 40 CAPSULE ORAL at 07:29

## 2020-06-04 RX ADMIN — METRONIDAZOLE 500 MG: 500 INJECTION, SOLUTION INTRAVENOUS at 07:58

## 2020-06-04 RX ADMIN — MIDAZOLAM HYDROCHLORIDE 2 MG: 2 INJECTION, SOLUTION INTRAMUSCULAR; INTRAVENOUS at 07:35

## 2020-06-04 RX ADMIN — DEXAMETHASONE SODIUM PHOSPHATE 10 MG: 10 INJECTION INTRAMUSCULAR; INTRAVENOUS at 08:15

## 2020-06-04 RX ADMIN — KETOROLAC TROMETHAMINE 30 MG: 30 INJECTION, SOLUTION INTRAMUSCULAR at 13:26

## 2020-06-04 RX ADMIN — ONDANSETRON 4 MG: 2 INJECTION INTRAMUSCULAR; INTRAVENOUS at 08:15

## 2020-06-04 RX ADMIN — OXYCODONE HYDROCHLORIDE AND ACETAMINOPHEN 1 TABLET: 5; 325 TABLET ORAL at 14:41

## 2020-06-04 RX ADMIN — GENTAMICIN SULFATE 172.8 MG: 40 INJECTION, SOLUTION INTRAMUSCULAR; INTRAVENOUS at 07:35

## 2020-06-04 RX ADMIN — KETOROLAC TROMETHAMINE 30 MG: 30 INJECTION, SOLUTION INTRAMUSCULAR at 18:39

## 2020-06-04 RX ADMIN — SODIUM CHLORIDE, POTASSIUM CHLORIDE, SODIUM LACTATE AND CALCIUM CHLORIDE: 600; 310; 30; 20 INJECTION, SOLUTION INTRAVENOUS at 09:36

## 2020-06-04 RX ADMIN — OXYCODONE HYDROCHLORIDE AND ACETAMINOPHEN 1 TABLET: 5; 325 TABLET ORAL at 20:40

## 2020-06-04 RX ADMIN — ATROPINE SULFATE 0.2 MG: 0.4 INJECTION, SOLUTION INTRAMUSCULAR; INTRAVENOUS; SUBCUTANEOUS at 08:22

## 2020-06-04 RX ADMIN — VECURONIUM BROMIDE 10 MG: 1 INJECTION, POWDER, LYOPHILIZED, FOR SOLUTION INTRAVENOUS at 07:42

## 2020-06-04 RX ADMIN — HYDROMORPHONE HYDROCHLORIDE 0.5 MG: 2 INJECTION INTRAMUSCULAR; INTRAVENOUS; SUBCUTANEOUS at 10:11

## 2020-06-04 RX ADMIN — DOCUSATE SODIUM 100 MG: 100 CAPSULE, LIQUID FILLED ORAL at 13:26

## 2020-06-04 ASSESSMENT — PULMONARY FUNCTION TESTS
PIF_VALUE: 34
PIF_VALUE: 34
PIF_VALUE: 24
PIF_VALUE: 24
PIF_VALUE: 23
PIF_VALUE: 34
PIF_VALUE: 36
PIF_VALUE: 32
PIF_VALUE: 35
PIF_VALUE: 22
PIF_VALUE: 35
PIF_VALUE: 35
PIF_VALUE: 22
PIF_VALUE: 22
PIF_VALUE: 35
PIF_VALUE: 34
PIF_VALUE: 35
PIF_VALUE: 22
PIF_VALUE: 35
PIF_VALUE: 26
PIF_VALUE: 21
PIF_VALUE: 35
PIF_VALUE: 37
PIF_VALUE: 27
PIF_VALUE: 18
PIF_VALUE: 34
PIF_VALUE: 26
PIF_VALUE: 34
PIF_VALUE: 28
PIF_VALUE: 36
PIF_VALUE: 24
PIF_VALUE: 32
PIF_VALUE: 33
PIF_VALUE: 35
PIF_VALUE: 28
PIF_VALUE: 29
PIF_VALUE: 35
PIF_VALUE: 34
PIF_VALUE: 35
PIF_VALUE: 22
PIF_VALUE: 26
PIF_VALUE: 33
PIF_VALUE: 33
PIF_VALUE: 21
PIF_VALUE: 34
PIF_VALUE: 1
PIF_VALUE: 22
PIF_VALUE: 36
PIF_VALUE: 35
PIF_VALUE: 35
PIF_VALUE: 26
PIF_VALUE: 35
PIF_VALUE: 35
PIF_VALUE: 28
PIF_VALUE: 25
PIF_VALUE: 35
PIF_VALUE: 22
PIF_VALUE: 35
PIF_VALUE: 35
PIF_VALUE: 26
PIF_VALUE: 22
PIF_VALUE: 23
PIF_VALUE: 4
PIF_VALUE: 35
PIF_VALUE: 35
PIF_VALUE: 21
PIF_VALUE: 28
PIF_VALUE: 28
PIF_VALUE: 3
PIF_VALUE: 35
PIF_VALUE: 34
PIF_VALUE: 27
PIF_VALUE: 36
PIF_VALUE: 26
PIF_VALUE: 5
PIF_VALUE: 24
PIF_VALUE: 15
PIF_VALUE: 35
PIF_VALUE: 26
PIF_VALUE: 24
PIF_VALUE: 32
PIF_VALUE: 36
PIF_VALUE: 34
PIF_VALUE: 32
PIF_VALUE: 22
PIF_VALUE: 35
PIF_VALUE: 35
PIF_VALUE: 4
PIF_VALUE: 35
PIF_VALUE: 34
PIF_VALUE: 36
PIF_VALUE: 35
PIF_VALUE: 27
PIF_VALUE: 22
PIF_VALUE: 22
PIF_VALUE: 36
PIF_VALUE: 36
PIF_VALUE: 34
PIF_VALUE: 36
PIF_VALUE: 35
PIF_VALUE: 34
PIF_VALUE: 26
PIF_VALUE: 35
PIF_VALUE: 5
PIF_VALUE: 24
PIF_VALUE: 34
PIF_VALUE: 34
PIF_VALUE: 27
PIF_VALUE: 35
PIF_VALUE: 24
PIF_VALUE: 26
PIF_VALUE: 21
PIF_VALUE: 34
PIF_VALUE: 34
PIF_VALUE: 35
PIF_VALUE: 35
PIF_VALUE: 36
PIF_VALUE: 22
PIF_VALUE: 22
PIF_VALUE: 29
PIF_VALUE: 23
PIF_VALUE: 35
PIF_VALUE: 34
PIF_VALUE: 34
PIF_VALUE: 24
PIF_VALUE: 27
PIF_VALUE: 35
PIF_VALUE: 26
PIF_VALUE: 35
PIF_VALUE: 32
PIF_VALUE: 22
PIF_VALUE: 34
PIF_VALUE: 36
PIF_VALUE: 7
PIF_VALUE: 34
PIF_VALUE: 33
PIF_VALUE: 34
PIF_VALUE: 35
PIF_VALUE: 34
PIF_VALUE: 36
PIF_VALUE: 26
PIF_VALUE: 36
PIF_VALUE: 35
PIF_VALUE: 29
PIF_VALUE: 35
PIF_VALUE: 30
PIF_VALUE: 22
PIF_VALUE: 22
PIF_VALUE: 35
PIF_VALUE: 36
PIF_VALUE: 22
PIF_VALUE: 24
PIF_VALUE: 34
PIF_VALUE: 29
PIF_VALUE: 35
PIF_VALUE: 27
PIF_VALUE: 34
PIF_VALUE: 22
PIF_VALUE: 33
PIF_VALUE: 25
PIF_VALUE: 15
PIF_VALUE: 34
PIF_VALUE: 36
PIF_VALUE: 34
PIF_VALUE: 35
PIF_VALUE: 26
PIF_VALUE: 35
PIF_VALUE: 35
PIF_VALUE: 34
PIF_VALUE: 34
PIF_VALUE: 28
PIF_VALUE: 24
PIF_VALUE: 22
PIF_VALUE: 22
PIF_VALUE: 25
PIF_VALUE: 26
PIF_VALUE: 26
PIF_VALUE: 34

## 2020-06-04 ASSESSMENT — LIFESTYLE VARIABLES: SMOKING_STATUS: 0

## 2020-06-04 ASSESSMENT — PAIN DESCRIPTION - PAIN TYPE
TYPE: SURGICAL PAIN

## 2020-06-04 ASSESSMENT — PAIN DESCRIPTION - DESCRIPTORS
DESCRIPTORS: ACHING
DESCRIPTORS: CRAMPING
DESCRIPTORS: ACHING

## 2020-06-04 ASSESSMENT — PAIN SCALES - GENERAL
PAINLEVEL_OUTOF10: 7
PAINLEVEL_OUTOF10: 4
PAINLEVEL_OUTOF10: 4
PAINLEVEL_OUTOF10: 6
PAINLEVEL_OUTOF10: 6

## 2020-06-04 ASSESSMENT — PAIN - FUNCTIONAL ASSESSMENT: PAIN_FUNCTIONAL_ASSESSMENT: 0-10

## 2020-06-04 ASSESSMENT — PAIN DESCRIPTION - FREQUENCY: FREQUENCY: INTERMITTENT

## 2020-06-04 ASSESSMENT — PAIN DESCRIPTION - LOCATION
LOCATION: ABDOMEN

## 2020-06-04 NOTE — BRIEF OP NOTE
Brief Postoperative Note      Patient: Brent Potts  YOB: 1975  MRN: [id number]    Date of Procedure: 6/4/2020    Pre-Op Diagnosis: PELVIC PAIN, UTERINE FIBROID    Post-Op Diagnosis: Same, Adenomyosis        Procedure(s):  LAPAROSCOPIC ASSISTED VAGINAL HYSTERECTOMY, BILATERAL SALPINGECTOMY, CYSTOSCOPY    Surgeon(s):  DO Edita Cox DO    Assistant:  Surgical Assistant: Priyanka Morgan    Anesthesia: General    Estimated Blood Loss (mL): 400mL     IVF: 2000mL     UO: 400mL clear yellow urine s/p procedure     Complications: None    Specimens:   ID Type Source Tests Collected by Time Destination   A : CERVIX, UTERUS, WITH BILATERAL FALLOPIAN TUBES Tissue Tissue SURGICAL PATHOLOGY Jose Roberto Vazquez DO 6/4/2020 1008      Implants:  * No implants in log *      Drains:   NG/OG/NJ/NE Tube Nasogastric 16 fr Left nostril (Active)       Urethral Catheter Latex 16 fr (Active)     Findings:   1) Norm tiffany ext genitalia, urethra, vagina and cervix   2) Enlarged, retroverted uterus with adenomyosis   3) BL tubes s/p ligation   4) Norm tiffany BL ovaries   5) Norm peristalsis of ureters before and after procedure   6) Norm tiffany urethra, bladder mucosa and BL ureteral orifices. 7) Efflux of clear yellow urine noted from BL ureteral orifices.        See Op Report for dictation     Electronically signed by Jose Roberto Vazquez DO on 6/4/2020 at 10:35 AM

## 2020-06-04 NOTE — OP NOTE
incision was carried down sharply. The vaginal mucosa was then dissected away from the cervix using a combination of both blunt and sharp dissection with zapata scissors. Anterior vesicouterine peritoneum was identified and entered sharply with zapata scissors. A curved phyllis was then placed in the anterior cul-de-sac, after noting absence of adhesions. The uterus was elevated and the posterior peritoneum was identified and entered sharply. The posterior cul-de-sac was explored and found to be free from adhesions. A long weighted speculum was placed in the posterior incision. The left uterosacral ligament was then clamped with a curved Greg clamp, transected with Zapata scissors, and suture ligated with 0 Vicryl suture. Suture tagged for later use. This was repeated with the right uterosacral ligament. Left cardinal ligament was then identified, grasped with Greg clamp, cut and suture ligated with O-Vicryl suture. The right cardinal ligament was then identified, grasped with Greg clamp, cut and suture ligated with O-Vicryl suture effectively freeing the uterus. The uterus and cervix were then removed from the vagina and sent to pathology. Damp lap sponge was then placed in the posterior cul de sac. The vaginal vault as then irrigated with warm normal saline. Hemostasis of pedicles were noted. Anterior peritoneum was then grapsed with a long Mavis clamp and peritoneum was reapproximated in a pursestring fashion incorporating bilateral Uterosacral ligaments. Sponge was removed and cuff was tied down. Sponge counts noted to be correct. The vaginal cuff was then closed in a running, locked, fashion with 0 Vicryl suture. Bilateral uterosacral ligaments were incorporated into the cuff closure. The cuff was then irrigated and noted to be hemostatic. Silvadene cream placed within vagina for aided healing. The urine in Freitas catheter noted to be clear.  Gloves were changed and legs were brought into low lithotomy

## 2020-06-04 NOTE — ANESTHESIA POSTPROCEDURE EVALUATION
Department of Anesthesiology  Postprocedure Note    Patient: Lilia Vasquez  MRN: [id number]  YOB: 1975  Date of evaluation: 6/4/2020    Procedure Summary     Date:  06/04/20 Room / Location:  [address]    Anesthesia Start:  3212 Anesthesia Stop:  3992    Procedure:  LAPAROSCOPIC ASSISTED VAGINAL HYSTERECTOMY, BILATERAL SALPINGECTOMY, POSSIBLE CYSTOSCOPY (N/A ) Diagnosis:       Pelvic pain in female      Intramural uterine fibroid      (PELVIC PAIN, UTERINE FIBROID)    Surgeon:  Megan Dickinson DO Responsible Provider:  June Castrejon MD    Anesthesia Type:  general ASA Status:  3        Anesthesia Type: general    Ada Phase I: Ada Score: 10    Ada Phase II:      Last vitals: Reviewed and per EMR flowsheets.      Anesthesia Post Evaluation   Anesthetic Problems: no   Cardiovascular System Stable: yes  Respiratory Function: Airway Patent yes  ETT no  Ventilator no  Level of consciousness: awake, alert and oriented  Post-op pain: adequate analgesia  Hydration Adequate: yes  Nausea/Vomiting:no  Other Issues:     Gigi Vargas MD

## 2020-06-05 ENCOUNTER — TELEPHONE (OUTPATIENT)
Dept: OBGYN CLINIC | Age: 45
End: 2020-06-05

## 2020-06-05 VITALS
HEIGHT: 68 IN | OXYGEN SATURATION: 97 % | BODY MASS INDEX: 38.04 KG/M2 | DIASTOLIC BLOOD PRESSURE: 93 MMHG | TEMPERATURE: 97.9 F | RESPIRATION RATE: 14 BRPM | HEART RATE: 72 BPM | SYSTOLIC BLOOD PRESSURE: 148 MMHG | WEIGHT: 251 LBS

## 2020-06-05 LAB
HCT VFR BLD CALC: 39.9 % (ref 36–48)
HEMOGLOBIN: 13.3 G/DL (ref 12–16)

## 2020-06-05 PROCEDURE — 96376 TX/PRO/DX INJ SAME DRUG ADON: CPT

## 2020-06-05 PROCEDURE — 6370000000 HC RX 637 (ALT 250 FOR IP): Performed by: OBSTETRICS & GYNECOLOGY

## 2020-06-05 PROCEDURE — 36415 COLL VENOUS BLD VENIPUNCTURE: CPT

## 2020-06-05 PROCEDURE — 6360000002 HC RX W HCPCS: Performed by: OBSTETRICS & GYNECOLOGY

## 2020-06-05 PROCEDURE — G0378 HOSPITAL OBSERVATION PER HR: HCPCS

## 2020-06-05 PROCEDURE — 85014 HEMATOCRIT: CPT

## 2020-06-05 PROCEDURE — 96374 THER/PROPH/DIAG INJ IV PUSH: CPT

## 2020-06-05 PROCEDURE — 85018 HEMOGLOBIN: CPT

## 2020-06-05 PROCEDURE — 2580000003 HC RX 258: Performed by: OBSTETRICS & GYNECOLOGY

## 2020-06-05 RX ORDER — ONDANSETRON 4 MG/1
4 TABLET, FILM COATED ORAL EVERY 8 HOURS PRN
Qty: 20 TABLET | Refills: 0 | Status: SHIPPED | OUTPATIENT
Start: 2020-06-05 | End: 2021-07-15

## 2020-06-05 RX ORDER — OXYCODONE HYDROCHLORIDE AND ACETAMINOPHEN 5; 325 MG/1; MG/1
1 TABLET ORAL EVERY 6 HOURS PRN
Qty: 28 TABLET | Refills: 0 | Status: SHIPPED | OUTPATIENT
Start: 2020-06-05 | End: 2020-06-12

## 2020-06-05 RX ORDER — IBUPROFEN 400 MG/1
800 TABLET ORAL EVERY 8 HOURS PRN
Qty: 120 TABLET | Refills: 0 | Status: SHIPPED | OUTPATIENT
Start: 2020-06-05 | End: 2020-07-01 | Stop reason: SDUPTHER

## 2020-06-05 RX ADMIN — KETOROLAC TROMETHAMINE 30 MG: 30 INJECTION, SOLUTION INTRAMUSCULAR at 00:13

## 2020-06-05 RX ADMIN — CARVEDILOL 6.25 MG: 6.25 TABLET, FILM COATED ORAL at 08:25

## 2020-06-05 RX ADMIN — OXYCODONE HYDROCHLORIDE AND ACETAMINOPHEN 1 TABLET: 5; 325 TABLET ORAL at 09:59

## 2020-06-05 RX ADMIN — KETOROLAC TROMETHAMINE 30 MG: 30 INJECTION, SOLUTION INTRAMUSCULAR at 05:46

## 2020-06-05 RX ADMIN — Medication 10 ML: at 08:26

## 2020-06-05 RX ADMIN — DOCUSATE SODIUM 100 MG: 100 CAPSULE, LIQUID FILLED ORAL at 08:25

## 2020-06-05 RX ADMIN — OXYCODONE HYDROCHLORIDE AND ACETAMINOPHEN 1 TABLET: 5; 325 TABLET ORAL at 04:57

## 2020-06-05 ASSESSMENT — PAIN SCALES - GENERAL
PAINLEVEL_OUTOF10: 5
PAINLEVEL_OUTOF10: 5
PAINLEVEL_OUTOF10: 6
PAINLEVEL_OUTOF10: 7
PAINLEVEL_OUTOF10: 5
PAINLEVEL_OUTOF10: 5

## 2020-06-05 ASSESSMENT — PAIN DESCRIPTION - PAIN TYPE: TYPE: SURGICAL PAIN

## 2020-06-05 ASSESSMENT — PAIN DESCRIPTION - LOCATION: LOCATION: ABDOMEN

## 2020-06-05 NOTE — PROGRESS NOTES
Pt's verbal and written discharge instructions given, all questions answered. IV removed. Follow-up appointments have been discussed. New medications reviewed. Pt left in stable condition via wheelchair via a private car with family.

## 2020-06-05 NOTE — PLAN OF CARE
Pt a/o, rates pain appropriately using 0-10 pain scale; pt calls out as needed for pain intervention; will continue to monitor and administer intervention as ordered and requested. Pt calls out as needed. Observed steady gait. Bed locked and in lowest position. Will continue to monitor.

## 2020-06-05 NOTE — TELEPHONE ENCOUNTER
Received patients FMLA paperwork today via fax. Patient asked that it be completed and faxed to the number on the front on the form. Scanned copy of orignals and scanned into patients chart. Placing forms in physicians folder at nurses station.

## 2020-06-14 ENCOUNTER — TELEPHONE (OUTPATIENT)
Dept: OBGYN CLINIC | Age: 45
End: 2020-06-14

## 2020-06-15 ENCOUNTER — OFFICE VISIT (OUTPATIENT)
Dept: OBGYN CLINIC | Age: 45
End: 2020-06-15
Payer: COMMERCIAL

## 2020-06-15 VITALS
TEMPERATURE: 99 F | SYSTOLIC BLOOD PRESSURE: 134 MMHG | HEART RATE: 96 BPM | DIASTOLIC BLOOD PRESSURE: 74 MMHG | WEIGHT: 252.2 LBS | BODY MASS INDEX: 38.35 KG/M2

## 2020-06-15 PROCEDURE — 81003 URINALYSIS AUTO W/O SCOPE: CPT | Performed by: OBSTETRICS & GYNECOLOGY

## 2020-06-15 PROCEDURE — 99024 POSTOP FOLLOW-UP VISIT: CPT | Performed by: OBSTETRICS & GYNECOLOGY

## 2020-06-15 RX ORDER — OXYCODONE HYDROCHLORIDE AND ACETAMINOPHEN 5; 325 MG/1; MG/1
1 TABLET ORAL EVERY 6 HOURS PRN
Qty: 28 TABLET | Refills: 0 | Status: SHIPPED | OUTPATIENT
Start: 2020-06-15 | End: 2020-06-22

## 2020-06-15 RX ORDER — OXYCODONE HYDROCHLORIDE AND ACETAMINOPHEN 5; 325 MG/1; MG/1
1 TABLET ORAL EVERY 6 HOURS PRN
Qty: 28 TABLET | Refills: 0 | Status: SHIPPED | OUTPATIENT
Start: 2020-06-15 | End: 2020-06-15

## 2020-06-15 RX ORDER — METRONIDAZOLE 500 MG/1
500 TABLET ORAL 2 TIMES DAILY
Qty: 14 TABLET | Refills: 0 | Status: SHIPPED | OUTPATIENT
Start: 2020-06-15 | End: 2020-06-22

## 2020-06-15 NOTE — PROGRESS NOTES
Outpatient Postoperative Visit     CC:   Chief Complaint   Patient presents with    Post-Op Check       Subjective:  39 y.o. Tanvir Hinkle here for evaluation s/p LAVH BS, Cysto on 6/4/20. Pt seen and examined. Doing well. No concerns complaints except for some soreness and pain with her mild increase in activity. Pain is moderately controlled, does need refill on percocet - states she has been trying to space them out but this has led to an increase in discomfort. +Flatus. + BM. (-) N/V/D. Denies fevers / chills. Denies vaginal bleeding or abnormal discharge. Review of Systems - The following ROS was otherwise negative, except as noted in the HPI: constitutional, respiratory, cardiovascular, gastrointestinal, genitourinary    Objective:  /74 (Site: Right Upper Arm, Position: Sitting, Cuff Size: Medium Adult)   Pulse 96   Temp 99 °F (37.2 °C) (Oral)   Wt 252 lb 3.2 oz (114.4 kg)   LMP 04/01/2019   Breastfeeding No   BMI 38.35 kg/m²   General: Alert, well appearing, no acute distress, does appear to be sore and slow with movement   Abdomen: Soft, appropriately tender to palpation, non-distended  Incision: Appears c/d/i, no significant drainage or erythema, bandages and steri-strips removed, all well healed. Pelvic exam: VULVA: normal appearing vulva with no masses, tenderness or lesions, VAGINA: normal appearing vagina with normal color and discharge, no lesions, vaginal discharge - creamy and thin, swabs collected. CERVIX: surgically absent, UTERUS: surgically absent, vaginal cuff well healed, ADNEXA: normal adnexa in size, nontender and no masses. Extremities: No redness or tenderness in LE, neg Mitch's sign     Path:   FINAL DIAGNOSIS:    Uterus and bilateral fallopian tubes, simple hysterectomy with bilateral  salpingectomy:  -  Intramural and submucosal leiomyomata. -  Scant inactive endometrium, consistent with prior ablation therapy. -  Unremarkable cervix.   -  Unremarkable bilateral fallopian tubes. -  No evidence of atypia or malignancy.  MUTGE/MUTGE    Assessment/Plan:   Diagnosis Orders   1. Postoperative visit     2. Post-op pain  oxyCODONE-acetaminophen (PERCOCET) 5-325 MG per tablet    VAGINAL PATHOGENS PROBE *A    metroNIDAZOLE (FLAGYL) 500 MG tablet    Urinalysis    Culture, Urine    DISCONTINUED: oxyCODONE-acetaminophen (PERCOCET) 5-325 MG per tablet   3. Intramural leiomyoma of uterus     4. S/P laparoscopic assisted vaginal hysterectomy (LAVH)       - BD affirm / UA / Urine Ctx pending   - will prophylacticly treat for BV  - refill percocet  - recommend scheduled NSAIDs for next week   - return precautions reviewed     Follow Up  Return in about 2 weeks (around 6/29/2020) for Recheck.     Debi Walton, DO

## 2020-06-16 LAB
BILIRUBIN URINE: NEGATIVE
BLOOD, URINE: NEGATIVE
CANDIDA SPECIES, DNA PROBE: NORMAL
CLARITY: CLEAR
COLOR: YELLOW
GARDNERELLA VAGINALIS, DNA PROBE: NORMAL
GLUCOSE URINE: NEGATIVE MG/DL
KETONES, URINE: NEGATIVE MG/DL
LEUKOCYTE ESTERASE, URINE: NEGATIVE
MICROSCOPIC EXAMINATION: NORMAL
NITRITE, URINE: NEGATIVE
PH UA: 6 (ref 5–8)
PROTEIN UA: NEGATIVE MG/DL
SPECIFIC GRAVITY UA: 1.02 (ref 1–1.03)
TRICHOMONAS VAGINALIS DNA: NORMAL
URINE TYPE: NORMAL
UROBILINOGEN, URINE: 0.2 E.U./DL

## 2020-06-17 LAB — URINE CULTURE, ROUTINE: NORMAL

## 2020-07-01 ENCOUNTER — OFFICE VISIT (OUTPATIENT)
Dept: OBGYN CLINIC | Age: 45
End: 2020-07-01

## 2020-07-01 VITALS
HEART RATE: 89 BPM | TEMPERATURE: 98.2 F | BODY MASS INDEX: 38.92 KG/M2 | WEIGHT: 256 LBS | SYSTOLIC BLOOD PRESSURE: 116 MMHG | DIASTOLIC BLOOD PRESSURE: 74 MMHG

## 2020-07-01 PROCEDURE — 99024 POSTOP FOLLOW-UP VISIT: CPT | Performed by: OBSTETRICS & GYNECOLOGY

## 2020-07-01 RX ORDER — IBUPROFEN 800 MG/1
800 TABLET ORAL EVERY 8 HOURS PRN
Qty: 30 TABLET | Refills: 3 | Status: SHIPPED | OUTPATIENT
Start: 2020-07-01 | End: 2021-07-15

## 2020-07-01 NOTE — PROGRESS NOTES
Outpatient Postoperative Visit     CC:   Chief Complaint   Patient presents with    Post-Op Check       Subjective:  39 y.o. James Quiroz here for evaluation s/p LAVH BS, Cysto on 6/4/20. Pt seen and examined. Doing well. No concerns complaints except for some soreness and pressure with her mild increase in activity. Pain is moderately controlled. Is not using ibuprofen regularly, still needs percocet. +Flatus. + BM. (-) N/V/D. Denies fevers / chills. Denies vaginal bleeding or abnormal discharge. Review of Systems - The following ROS was otherwise negative, except as noted in the HPI: constitutional, respiratory, cardiovascular, gastrointestinal, genitourinary    Objective:  /74 (Site: Left Upper Arm, Position: Sitting, Cuff Size: Large Adult)   Pulse 89   Temp 98.2 °F (36.8 °C) (Oral)   Wt 256 lb (116.1 kg)   LMP 04/01/2019   BMI 38.92 kg/m²   General: Alert, well appearing, no acute distress, does appear to be sore and slow with movement   Abdomen: Soft, appropriately tender to palpation, non-distended  Incision: Appears c/d/i, no significant drainage or erythema, well healed. Pelvic exam: deferred today   Extremities: No redness or tenderness in LE, neg Mitch's sign     Path:   FINAL DIAGNOSIS:    Uterus and bilateral fallopian tubes, simple hysterectomy with bilateral  salpingectomy:  -  Intramural and submucosal leiomyomata. -  Scant inactive endometrium, consistent with prior ablation therapy. -  Unremarkable cervix. -  Unremarkable bilateral fallopian tubes. -  No evidence of atypia or malignancy.  MUTGE/MUTGE    Assessment/Plan:   Diagnosis Orders   1. Postoperative visit     2. Intramural leiomyoma of uterus     3. S/P laparoscopic assisted vaginal hysterectomy (LAVH)     4.  Post-op pain  ibuprofen (ADVIL;MOTRIN) 800 MG tablet     - recommend scheduled NSAIDs for next week   - encouraged belly band  - FU prior to starting work   - return precautions reviewed     Follow Up  No follow-ups on file.     Dallas Morgan DO

## 2020-07-16 ENCOUNTER — TELEPHONE (OUTPATIENT)
Dept: OBGYN CLINIC | Age: 45
End: 2020-07-16

## 2020-07-16 NOTE — TELEPHONE ENCOUNTER
Duplicate messages created for the same reason on the same day. I'm closing this one and copying and pasting Mikayla's note into the other encounter.

## 2020-07-16 NOTE — TELEPHONE ENCOUNTER
Received disability paperwork from [facility] for the patient. They are requesting medical records along with a form that needs to be completed. Placing forms in FMLA folder at nurses station.

## 2020-07-16 NOTE — TELEPHONE ENCOUNTER
I need Dr. Marvin Yip to say how much longer patient can remain off work before I can fill the forms out. Her next appointment is on July 22nd.

## 2020-07-16 NOTE — TELEPHONE ENCOUNTER
Per Jose Aflredo Russell - Received disability paperwork from [facility] for the patient. They are requesting medical records along with a form that needs to be completed. Placing forms in FMLA folder at nurses station.

## 2020-07-21 ENCOUNTER — TELEPHONE (OUTPATIENT)
Dept: OBGYN CLINIC | Age: 45
End: 2020-07-21

## 2020-07-22 ENCOUNTER — OFFICE VISIT (OUTPATIENT)
Dept: OBGYN CLINIC | Age: 45
End: 2020-07-22

## 2020-07-22 VITALS
BODY MASS INDEX: 39.68 KG/M2 | WEIGHT: 261 LBS | DIASTOLIC BLOOD PRESSURE: 84 MMHG | SYSTOLIC BLOOD PRESSURE: 126 MMHG | TEMPERATURE: 97.2 F | HEART RATE: 72 BPM

## 2020-07-22 PROCEDURE — 99024 POSTOP FOLLOW-UP VISIT: CPT | Performed by: OBSTETRICS & GYNECOLOGY

## 2020-07-22 NOTE — PROGRESS NOTES
Outpatient Postoperative Visit     CC:   Chief Complaint   Patient presents with    Follow-up       Subjective:  39 y.o. Soledad Fish here for evaluation s/p LAVH BS, Cysto on 6/4/20. Pt seen and examined. Doing well. No concerns complaints. Pain control has improved since last visit.  +Flatus. + BM. (-) N/V/D. Denies fevers / chills. Denies vaginal bleeding or abnormal discharge. Review of Systems - The following ROS was otherwise negative, except as noted in the HPI: constitutional, respiratory, cardiovascular, gastrointestinal, genitourinary    Objective:  /84 (Site: Right Upper Arm, Position: Sitting, Cuff Size: Large Adult)   Pulse 72   Temp 97.2 °F (36.2 °C) (Oral)   Wt 261 lb (118.4 kg)   LMP 04/01/2019   BMI 39.68 kg/m²   General: Alert, well appearing, no acute distress, does appear to be sore and slow with movement   Abdomen: Soft, appropriately tender to palpation, non-distended  Incision: Appears c/d/i, no significant drainage or erythema, well healed. Pelvic exam: VULVA: normal appearing vulva with no masses, tenderness or lesions, VAGINA: normal appearing vagina with normal color and discharge, no lesions, CERVIX: surgically absent, UTERUS: surgically absent, vaginal cuff well healed, ADNEXA: normal adnexa in size, nontender and no masses, exam chaperoned by female MA. Extremities: No redness or tenderness in LE, neg Mitch's sign     Path:   FINAL DIAGNOSIS:    Uterus and bilateral fallopian tubes, simple hysterectomy with bilateral  salpingectomy:  -  Intramural and submucosal leiomyomata. -  Scant inactive endometrium, consistent with prior ablation therapy. -  Unremarkable cervix. -  Unremarkable bilateral fallopian tubes. -  No evidence of atypia or malignancy.  MUTGE/MUTGE    Assessment/Plan:   Diagnosis Orders   1. Postoperative visit     2. S/P laparoscopic assisted vaginal hysterectomy (LAVH)     3.  Intramural leiomyoma of uterus       - pt doing well today, released to work    - return precautions reviewed     Follow Up  Return in about 1 year (around 7/22/2021) for Annual or sooner if needed.     Shanthi Cruz, DO

## 2021-01-20 ENCOUNTER — TELEPHONE (OUTPATIENT)
Dept: FAMILY MEDICINE CLINIC | Age: 46
End: 2021-01-20

## 2021-01-20 RX ORDER — SULFAMETHOXAZOLE AND TRIMETHOPRIM 800; 160 MG/1; MG/1
1 TABLET ORAL 2 TIMES DAILY
Qty: 20 TABLET | Refills: 0 | Status: SHIPPED | OUTPATIENT
Start: 2021-01-20 | End: 2021-01-30

## 2021-01-20 NOTE — TELEPHONE ENCOUNTER
Pt called stating that she noticed that a boil on her left breast was trying to come up last week and today it came up, burst, and it hurts. Pt wanting to know if PCP would be able to see her or if the antibiotic PCP called in for the other breast last time could be called in to [facility].  Call back [phone]

## 2021-04-17 ENCOUNTER — APPOINTMENT (OUTPATIENT)
Dept: GENERAL RADIOLOGY | Age: 46
End: 2021-04-17
Payer: COMMERCIAL

## 2021-04-17 ENCOUNTER — HOSPITAL ENCOUNTER (EMERGENCY)
Age: 46
Discharge: HOME | End: 2021-04-17
Attending: STUDENT IN AN ORGANIZED HEALTH CARE EDUCATION/TRAINING PROGRAM
Payer: COMMERCIAL

## 2021-04-17 VITALS
WEIGHT: 245 LBS | RESPIRATION RATE: 17 BRPM | HEIGHT: 68 IN | TEMPERATURE: 97.7 F | BODY MASS INDEX: 37.13 KG/M2 | SYSTOLIC BLOOD PRESSURE: 133 MMHG | DIASTOLIC BLOOD PRESSURE: 90 MMHG | OXYGEN SATURATION: 95 % | HEART RATE: 85 BPM

## 2021-04-17 DIAGNOSIS — R07.9 CHEST PAIN, UNSPECIFIED TYPE: Primary | ICD-10-CM

## 2021-04-17 LAB
ANION GAP SERPL CALCULATED.3IONS-SCNC: 13 MMOL/L (ref 3–16)
BASOPHILS ABSOLUTE: 0.1 K/UL (ref 0–0.2)
BASOPHILS RELATIVE PERCENT: 1 %
BUN BLDV-MCNC: 14 MG/DL (ref 7–20)
CALCIUM SERPL-MCNC: 9 MG/DL (ref 8.3–10.6)
CHLORIDE BLD-SCNC: 104 MMOL/L (ref 99–110)
CO2: 19 MMOL/L (ref 21–32)
CREAT SERPL-MCNC: 0.6 MG/DL (ref 0.6–1.1)
EOSINOPHILS ABSOLUTE: 0.2 K/UL (ref 0–0.6)
EOSINOPHILS RELATIVE PERCENT: 2.1 %
GFR AFRICAN AMERICAN: >60
GFR NON-AFRICAN AMERICAN: >60
GLUCOSE BLD-MCNC: 137 MG/DL (ref 70–99)
HCT VFR BLD CALC: 41 % (ref 36–48)
HEMOGLOBIN: 14.1 G/DL (ref 12–16)
LYMPHOCYTES ABSOLUTE: 1.5 K/UL (ref 1–5.1)
LYMPHOCYTES RELATIVE PERCENT: 20.9 %
MCH RBC QN AUTO: 30.2 PG (ref 26–34)
MCHC RBC AUTO-ENTMCNC: 34.5 G/DL (ref 31–36)
MCV RBC AUTO: 87.6 FL (ref 80–100)
MONOCYTES ABSOLUTE: 0.6 K/UL (ref 0–1.3)
MONOCYTES RELATIVE PERCENT: 7.7 %
NEUTROPHILS ABSOLUTE: 4.9 K/UL (ref 1.7–7.7)
NEUTROPHILS RELATIVE PERCENT: 68.3 %
PDW BLD-RTO: 13.9 % (ref 12.4–15.4)
PLATELET # BLD: 228 K/UL (ref 135–450)
PMV BLD AUTO: 9.4 FL (ref 5–10.5)
POTASSIUM REFLEX MAGNESIUM: 4 MMOL/L (ref 3.5–5.1)
RBC # BLD: 4.68 M/UL (ref 4–5.2)
SODIUM BLD-SCNC: 136 MMOL/L (ref 136–145)
TROPONIN: <0.01 NG/ML
TROPONIN: <0.01 NG/ML
WBC # BLD: 7.2 K/UL (ref 4–11)

## 2021-04-17 PROCEDURE — 84484 ASSAY OF TROPONIN QUANT: CPT

## 2021-04-17 PROCEDURE — 93005 ELECTROCARDIOGRAM TRACING: CPT | Performed by: STUDENT IN AN ORGANIZED HEALTH CARE EDUCATION/TRAINING PROGRAM

## 2021-04-17 PROCEDURE — 85025 COMPLETE CBC W/AUTO DIFF WBC: CPT

## 2021-04-17 PROCEDURE — 6370000000 HC RX 637 (ALT 250 FOR IP): Performed by: STUDENT IN AN ORGANIZED HEALTH CARE EDUCATION/TRAINING PROGRAM

## 2021-04-17 PROCEDURE — 71046 X-RAY EXAM CHEST 2 VIEWS: CPT

## 2021-04-17 PROCEDURE — 80048 BASIC METABOLIC PNL TOTAL CA: CPT

## 2021-04-17 PROCEDURE — 99283 EMERGENCY DEPT VISIT LOW MDM: CPT

## 2021-04-17 PROCEDURE — 36415 COLL VENOUS BLD VENIPUNCTURE: CPT

## 2021-04-17 RX ORDER — NITROGLYCERIN 0.4 MG/1
0.4 TABLET SUBLINGUAL EVERY 5 MIN PRN
Status: DISCONTINUED | OUTPATIENT
Start: 2021-04-17 | End: 2021-04-17 | Stop reason: HOSPADM

## 2021-04-17 RX ADMIN — NITROGLYCERIN 0.4 MG: 0.4 TABLET SUBLINGUAL at 09:55

## 2021-04-17 ASSESSMENT — ENCOUNTER SYMPTOMS
SHORTNESS OF BREATH: 1
SORE THROAT: 0
VOMITING: 0
DIARRHEA: 0
COUGH: 0
EYE REDNESS: 0
BACK PAIN: 0
EYE PAIN: 0
ABDOMINAL PAIN: 0
NAUSEA: 0

## 2021-04-17 ASSESSMENT — PAIN SCALES - GENERAL: PAINLEVEL_OUTOF10: 6

## 2021-04-17 ASSESSMENT — PAIN DESCRIPTION - PAIN TYPE: TYPE: ACUTE PAIN

## 2021-04-17 NOTE — ED PROVIDER NOTES
[address] [facility]          ATTENDING PHYSICIAN NOTE       Date of evaluation: 4/17/2021    Chief Complaint     Chest Pain    History of Present Illness     Dandy Tinsley is a 39 y.o. female who presents with chest pain. Patient has a history of hypertension, chronic pain and previous chest pain with negative stress testing. 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 Patient received full dose aspirin from EMS during transport but did not receive nitroglycerin, on arrival vital signs are reassuring, she is mildly hypertensive but without fever or hypoxia. Patient denies recent infectious symptoms, states that she had several family members with hypertension but no known early or sudden cardiac disease/death. Review of Systems     Review of Systems   Constitutional: Negative for chills, fatigue and fever. HENT: Negative for congestion and sore throat. Eyes: Negative for pain and redness. Respiratory: Positive for shortness of breath. Negative for cough. Cardiovascular: Positive for chest pain. Negative for palpitations and leg swelling. Gastrointestinal: Negative for abdominal pain, diarrhea, nausea and vomiting. Genitourinary: Negative for dysuria and hematuria. Musculoskeletal: Negative for back pain, neck pain and neck stiffness. Skin: Negative for rash and wound. Neurological: Positive for light-headedness. Negative for syncope, weakness and headaches. Hematological: Does not bruise/bleed easily. Psychiatric/Behavioral: Negative for confusion.        Past Medical, Surgical, Family, and Social History     She has a past medical history of Anemia, Back pain, Chest pain, Chronic pain syndrome, Fibroid uterus, GERD (gastroesophageal reflux disease), Hypertension, Menopausal symptoms, Overactive bladder, Pelvic pain, Right lateral epicondylitis, Rotator cuff syndrome of right shoulder, Stress incontinence, and Tachycardia. She has a past surgical history that includes Tubal ligation; Knee cartilage surgery (Right, 2009); Tonsillectomy and Adenoidectomy; Dilation and curettage of uterus (N/A, 4/16/2019); hysteroscopy; Dilation & curettage; laparoscopy; Tonsillectomy; and Hysterectomy, vaginal (N/A, 6/4/2020). Her family history includes Bipolar Disorder in her sister; Diabetes in her maternal grandmother; Heart Disease in her maternal grandmother and mother; Heart Failure in her maternal grandfather; High Blood Pressure in her maternal grandfather, maternal uncle, maternal uncle, and sister; No Known Problems in her maternal aunt. She reports that she quit smoking about 6 years ago. Her smoking use included cigarettes. She has a 10.00 pack-year smoking history. She has never used smokeless tobacco. She reports that she does not drink alcohol or use drugs. Medications     Previous Medications    CARVEDILOL (COREG) 6.25 MG TABLET    TAKE ONE TABLET BY MOUTH TWICE A DAY    IBUPROFEN (ADVIL;MOTRIN) 800 MG TABLET    Take 1 tablet by mouth every 8 hours as needed for Pain    MULTIPLE VITAMINS-MINERALS (MULTIPLE VITAMINS/WOMENS PO)    Take 1 capsule by mouth daily    OMEPRAZOLE (PRILOSEC) 10 MG DELAYED RELEASE CAPSULE    Take 10 mg by mouth daily     ONDANSETRON (ZOFRAN) 4 MG TABLET    Take 1 tablet by mouth every 8 hours as needed for Nausea       Allergies     She is allergic to penicillins. Physical Exam     INITIAL VITALS: BP: (!) 148/90, Temp: 97.7 °F (36.5 °C), Pulse: 85, Resp: 17, SpO2: 97 %   Physical Exam  Constitutional:       General: She is not in acute distress. Appearance: Normal appearance. She is obese. She is not ill-appearing, toxic-appearing or diaphoretic. HENT:      Head: Normocephalic and atraumatic. Eyes:      General: No scleral icterus. Right eye: No discharge. Left eye: No discharge. Conjunctiva/sclera: Conjunctivae normal.   Neck:      Musculoskeletal: Normal range of motion and neck supple. No neck rigidity. Cardiovascular:      Rate and Rhythm: Normal rate and regular rhythm. Pulses: Normal pulses. Pulmonary:      Effort: Pulmonary effort is normal. No respiratory distress. Chest:      Chest wall: No tenderness. Abdominal:      General: There is no distension. Palpations: Abdomen is soft. Tenderness: There is no abdominal tenderness. There is no guarding. Musculoskeletal: Normal range of motion. General: No swelling. Right lower leg: No edema. Left lower leg: No edema. Skin:     General: Skin is warm and dry. Capillary Refill: Capillary refill takes less than 2 seconds. Findings: No rash. Neurological:      General: No focal deficit present. Mental Status: She is alert and oriented to person, place, and time. Mental status is at baseline. Psychiatric:         Mood and Affect: Mood normal.       Diagnostic Results     EKG   EKG Interpretation    Interpreted by emergency department physician    Rhythm: normal sinus   Rate: normal  Axis: left  Ectopy: none  Conduction: normal  ST Segments: nonspecific changes  T Waves: non specific changes    Clinical Impression: non-specific EKG    Mila Bauer      RADIOLOGY:  XR CHEST (2 VW)   Final Result      1. No acute disease.                 LABS:   Results for orders placed or performed during the hospital encounter of 04/17/21   CBC Auto Differential   Result Value Ref Range    WBC 7.2 4.0 - 11.0 K/uL    RBC 4.68 4.00 - 5.20 M/uL    Hemoglobin 14.1 12.0 - 16.0 g/dL    Hematocrit 41.0 36.0 - 48.0 %    MCV 87.6 80.0 - 100.0 fL    MCH 30.2 26.0 - 34.0 pg    MCHC 34.5 31.0 - 36.0 g/dL    RDW 13.9 12.4 - 15.4 %    Platelets 963 888 - 214 K/uL    MPV 9.4 5.0 - 10.5 fL    Neutrophils % 68.3 %    Lymphocytes % 20.9 %    Monocytes % 7.7 %    Eosinophils % 2.1 %    Basophils % 1.0 %    Neutrophils Absolute 4.9 1.7 - 7.7 K/uL    Lymphocytes Absolute 1.5 1.0 - 5.1 K/uL    Monocytes Absolute 0.6 0.0 - 1.3 K/uL    Eosinophils Absolute 0.2 0.0 - 0.6 K/uL    Basophils Absolute 0.1 0.0 - 0.2 K/uL   Basic Metabolic Panel w/ Reflex to MG   Result Value Ref Range    Sodium 136 136 - 145 mmol/L    Potassium reflex Magnesium 4.0 3.5 - 5.1 mmol/L    Chloride 104 99 - 110 mmol/L    CO2 19 (L) 21 - 32 mmol/L    Anion Gap 13 3 - 16    Glucose 137 (H) 70 - 99 mg/dL    BUN 14 7 - 20 mg/dL    CREATININE 0.6 0.6 - 1.1 mg/dL    GFR Non-African American >60 >60    GFR African American >60 >60    Calcium 9.0 8.3 - 10.6 mg/dL   Troponin   Result Value Ref Range    Troponin <0.01 <0.01 ng/mL   Troponin   Result Value Ref Range    Troponin <0.01 <0.01 ng/mL       RECENT VITALS:  BP: (!) 133/90,Temp: 97.7 °F (36.5 °C), Pulse: 85, Resp: 17, SpO2: 95 %     Procedures       ED Course     Nursing Notes, Past Medical Hx, Past Surgical Hx, Social Hx,Allergies, and Family Hx were reviewed. patient was given the following medications:  Orders Placed This Encounter   Medications    nitroGLYCERIN (NITROSTAT) SL tablet 0.4 mg       CONSULTS:  None    MEDICAL DECISIONMAKING / ASSESSMENT / Josefa Lima is a 39 y.o. female who presents with acute onset chest pain while exerting herself at work. On presentation patient in no acute distress but continues to complain of chest pain, vital signs are reassuring. Received full dose aspirin from EMS. We will plan to provide nitroglycerin to see if this improves pain given patient's description of onset/symptoms, no reproducibility with palpation or movement. Patient does have a low risk heart and Edac score however prior to troponin values, has had a previous stress test that was reported as negative although this was in 2014. We will obtain cardiac labs and continue to closely monitor. Patient's laboratory work was reassuring, chest x-ray without focal consolidation or effusion to suggest infection, no pneumothorax noted, troponin negative x2, EKG without concern for ST elevation or depression to suggest ischemia or infarction. On repeat evaluation patient states that her chest pain has completely resolved. Discussed with patient that given her low heart and Edac score and her age she is very low risk. Given this, her reassuring laboratory work and resolution of symptoms I believe that she is safe for discharge home with outpatient PCP follow-up   Patient is in agreement. Patient elvin return the emergency department immediately if she experiences return of chest pain, shortness of breath, lightheadedness or syncope, or any other concerning changes in her symptoms. Patient agrees with plan, all questions answered. Clinical Impression     1.  Chest pain, unspecified type        Disposition     PATIENT REFERRED TO:  [facility] Emergency Department  [address] [facility]  Go to   If symptoms worsen    ALLY Weathers - [place]  [address]  [phone]    Schedule an appointment as soon as possible for a visit         DISCHARGE MEDICATIONS:  New Prescriptions    No medications on file       DISPOSITION Decision To Discharge 04/17/2021 02:15:18 PM       Simona Aparicio MD  04/17/21 8669

## 2021-04-17 NOTE — ED NOTES
Pt DC from ED ambulatory with her mother in law. She verbalized understanding to DC instructions. All questions answered to patient's satisfaction.       Mayito Billings RN  04/17/21 6689

## 2021-04-17 NOTE — ED TRIAGE NOTES
Pt presents to the ED c/o chest pain that has been ongoing since this morning approx 0800. 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 She states that her left arm and fingers also feel numb and she is experiencing difficulty taking a deep breath. She has a history of palpitations and is currently taking Coreg 6.25. No history of blood clots. Will continue to monitor.

## 2021-04-18 LAB
EKG ATRIAL RATE: 86 BPM
EKG DIAGNOSIS: NORMAL
EKG P AXIS: 52 DEGREES
EKG P-R INTERVAL: 148 MS
EKG Q-T INTERVAL: 354 MS
EKG QRS DURATION: 86 MS
EKG QTC CALCULATION (BAZETT): 423 MS
EKG R AXIS: -28 DEGREES
EKG T AXIS: 41 DEGREES
EKG VENTRICULAR RATE: 86 BPM

## 2021-05-14 DIAGNOSIS — I10 ESSENTIAL HYPERTENSION: ICD-10-CM

## 2021-05-14 DIAGNOSIS — R00.0 TACHYCARDIA: ICD-10-CM

## 2021-05-14 RX ORDER — CARVEDILOL 6.25 MG/1
TABLET ORAL
Qty: 180 TABLET | Refills: 1 | Status: SHIPPED | OUTPATIENT
Start: 2021-05-14 | End: 2021-10-05 | Stop reason: SDUPTHER

## 2021-05-28 ENCOUNTER — HOSPITAL ENCOUNTER (EMERGENCY)
Age: 46
Discharge: HOME | End: 2021-05-28
Payer: COMMERCIAL

## 2021-05-28 VITALS
RESPIRATION RATE: 18 BRPM | HEIGHT: 68 IN | TEMPERATURE: 98.5 F | DIASTOLIC BLOOD PRESSURE: 98 MMHG | HEART RATE: 79 BPM | SYSTOLIC BLOOD PRESSURE: 136 MMHG | WEIGHT: 240 LBS | OXYGEN SATURATION: 100 % | BODY MASS INDEX: 36.37 KG/M2

## 2021-05-28 DIAGNOSIS — T50.905A ADVERSE EFFECT OF DRUG, INITIAL ENCOUNTER: Primary | ICD-10-CM

## 2021-05-28 PROCEDURE — 6370000000 HC RX 637 (ALT 250 FOR IP): Performed by: PHYSICIAN ASSISTANT

## 2021-05-28 PROCEDURE — 99283 EMERGENCY DEPT VISIT LOW MDM: CPT

## 2021-05-28 RX ORDER — FAMOTIDINE 20 MG/1
20 TABLET, FILM COATED ORAL ONCE
Status: COMPLETED | OUTPATIENT
Start: 2021-05-28 | End: 2021-05-28

## 2021-05-28 RX ORDER — PREDNISONE 20 MG/1
40 TABLET ORAL ONCE
Status: COMPLETED | OUTPATIENT
Start: 2021-05-28 | End: 2021-05-28

## 2021-05-28 RX ORDER — DIPHENHYDRAMINE HCL 25 MG
25 TABLET ORAL ONCE
Status: COMPLETED | OUTPATIENT
Start: 2021-05-28 | End: 2021-05-28

## 2021-05-28 RX ADMIN — DIPHENHYDRAMINE HCL 25 MG: 25 TABLET ORAL at 14:37

## 2021-05-28 RX ADMIN — FAMOTIDINE 20 MG: 20 TABLET, FILM COATED ORAL at 14:37

## 2021-05-28 RX ADMIN — PREDNISONE 40 MG: 20 TABLET ORAL at 14:37

## 2021-05-28 ASSESSMENT — ENCOUNTER SYMPTOMS
TROUBLE SWALLOWING: 0
WHEEZING: 0
STRIDOR: 0
SHORTNESS OF BREATH: 0
COLOR CHANGE: 0
ABDOMINAL PAIN: 0
EYES NEGATIVE: 1
VOICE CHANGE: 0
CHEST TIGHTNESS: 0

## 2021-05-28 NOTE — ED PROVIDER NOTES
[address] [facility]  ED  EMERGENCY DEPARTMENT ENCOUNTER        Pt Name: Harris Martin  MRN: [id number]  [place] 1975  Date of evaluation: 5/28/2021  Provider: Jerrye Ahumada, PA-C  PCP: ALLY Salcedo - CNP  ED Attending: Billy Holland MD      This patient was not seen by the attending provider      History provided by the patient    CHIEF COMPLAINT:     Chief Complaint   Patient presents with    Medication Reaction     received [employer] covid vaccine around 1330 and began feeling \"tightness\" under chin and like tongue was swelling, began about 20 minutes ago. speaking clearly. HISTORY OF PRESENT ILLNESS:      Harris Martin is a 42 y.o. female who arrives to the ED by private vehicle. Patient is here less than an hour after receiving her [employer] Covid vaccine. She reports getting the vaccine at her local [facility]. She sat for 15 minutes following the vaccine and had no symptoms. As she was getting out to her car the patient reports she began experiencing a tightness and tingling to her chin. She felt like her tongue might swell. She came immediately to the emergency department. Upon arrival patient has no progression of symptoms. She is not experiencing any intraoral swelling, trouble swallowing or breathing. She reported being afraid that she could develop a reaction and is here for evaluation and treatment. Nursing Notes were reviewed     REVIEW OF SYSTEMS:     Review of Systems   Constitutional: Negative for activity change, appetite change, chills and fever. HENT: Negative for trouble swallowing and voice change. Eyes: Negative. Respiratory: Negative for chest tightness, shortness of breath, wheezing and stridor. Cardiovascular: Negative for chest pain. Gastrointestinal: Negative for abdominal pain. Genitourinary: Negative. Musculoskeletal: Negative for gait problem, joint swelling and neck pain.    Skin: Negative for color change and rash.   Neurological: Negative for headaches. All other systems reviewed and are negative. Except as noted above in the ROS, all other systems were reviewed and negative.          PAST MEDICAL HISTORY:     Past Medical History:   Diagnosis Date    Anemia     Back pain     Chest pain 6/2014    stress myoview normal    Chronic pain syndrome 2017    Dr. Georgia Gann pain management    Fibroid uterus     fibroid uterus ad abnormal uterine bleeding with uterine fibroids    GERD (gastroesophageal reflux disease)     Hypertension     Menopausal symptoms     Overactive bladder     Pelvic pain     Right lateral epicondylitis     Rotator cuff syndrome of right shoulder     Stress incontinence     Tachycardia     on coreg         SURGICAL HISTORY:      Past Surgical History:   Procedure Laterality Date    DILATION AND CURETTAGE      DILATION AND CURETTAGE OF UTERUS N/A 4/16/2019    DIAGNOSTIC LAPAROSCOPY, DILATATION AND CURETTAGE, HYSTEROSCOPY, WITH MYOSURE AND NOVASURE ENDOMETRIAL ABLATION performed by Lauryn Robins DO at [address] [place] [address] N/A 6/4/2020    LAPAROSCOPIC ASSISTED VAGINAL HYSTERECTOMY, BILATERAL SALPINGECTOMY, POSSIBLE CYSTOSCOPY performed by Lauryn Robins DO at [place] [address]    LAPAROSCOPY      TONSILLECTOMY      TONSILLECTOMY AND ADENOIDECTOMY      as a child    TUBAL LIGATION           CURRENT MEDICATIONS:       Discharge Medication List as of 5/28/2021  3:34 PM      CONTINUE these medications which have NOT CHANGED    Details   carvedilol (COREG) 6.25 MG tablet TAKE ONE TABLET BY MOUTH TWICE A DAY, Disp-180 tablet, R-1Normal      ibuprofen (ADVIL;MOTRIN) 800 MG tablet Take 1 tablet by mouth every 8 hours as needed for Pain, Disp-30 tablet, R-3Normal      ondansetron (ZOFRAN) 4 MG tablet Take 1 tablet by mouth every 8 hours as needed for Nausea, Disp-20 tablet, R-0Print      omeprazole (PRILOSEC) 10 MG delayed release capsule Take 10 mg by mouth daily Historical Med      Multiple Vitamins-Minerals (MULTIPLE VITAMINS/WOMENS PO) Take 1 capsule by mouth dailyHistorical Med               ALLERGIES:    Penicillins    FAMILY HISTORY:       Family History   Problem Relation Age of Onset    Heart Disease Mother     Heart Disease Maternal Grandmother     Diabetes Maternal Grandmother     Heart Failure Maternal Grandfather     High Blood Pressure Maternal Grandfather     High Blood Pressure Sister     Bipolar Disorder Sister     No Known Problems Maternal Aunt     High Blood Pressure Maternal Uncle     High Blood Pressure Maternal Uncle           SOCIAL HISTORY:       Social History     Socioeconomic History    Marital status:      Spouse name: None    Number of children: None    Years of education: None    Highest education level: None   Occupational History    None   Tobacco Use    Smoking status: Former Smoker     Packs/day: 1.00     Years: 10.00     Pack years: 10.00     Types: Cigarettes     Quit date: 2014     Years since quittin.9    Smokeless tobacco: Never Used   Vaping Use    Vaping Use: Never used   Substance and Sexual Activity    Alcohol use: No    Drug use: No    Sexual activity: Yes     Partners: Male   Other Topics Concern    None   Social History Narrative    None     Social Determinants of Health     Financial Resource Strain:     Difficulty of Paying Living Expenses:    Food Insecurity:     Worried About Running Out of Food in the Last Year:     Ran Out of Food in the Last Year:    Transportation Needs:     Lack of Transportation (Medical):      Lack of Transportation (Non-Medical):    Physical Activity:     Days of Exercise per Week:     Minutes of Exercise per Session:    Stress:     Feeling of Stress :    Social Connections:     Frequency of Communication with Friends and Family:     Frequency of Social Gatherings with Friends and Family:     Attends Jehovah's witness Services:     Active Member of Clubs or Organizations:     Attends Club or Organization Meetings:     Marital Status:    Intimate Partner Violence:     Fear of Current or Ex-Partner:     Emotionally Abused:     Physically Abused:     Sexually Abused:        SCREENINGS:             PHYSICAL EXAM:       ED Triage Vitals [05/28/21 1416]   BP Temp Temp Source Pulse Resp SpO2 Height Weight   (!) 163/93 98.5 °F (36.9 °C) Oral 80 17 100 % 5' 8\" (1.727 m) 240 lb (108.9 kg)       Physical Exam    CONSTITUTIONAL: Awake and alert. Cooperative. Well-developed. Well-nourished. Non-toxic. No acute distress. HENT: Normocephalic. Atraumatic. External ears normal, without discharge. No nasal discharge. Oropharynx clear. Mucous membranes moist.  No intraoral swelling or edema. Uvula midline. No drooling or trismus. Voice clear. EYES: Conjunctiva non-injected. No scleral icterus. PERRL. EOM's grossly intact. NECK: Supple. Normal ROM. CARDIOVASCULAR: RRR. No Murmer. Intact distal pulses. PULMONARY/CHEST WALL: Effort normal. No tachypnea. Lungs clear to ausculation. No wheezing or stridor. ABDOMEN: Normal BS. Soft. Nondistended. No tenderness to palpate. No guarding. /ANORECTAL: Not assessed  MUSKULOSKELETAL: Normal ROM. No acute deformities. No edema. No tenderness to palpate. SKIN: Warm and dry. No rash. NEUROLOGICAL: Alert and oriented x 3. GCS 15. CN II-XII grossly intact. Strength is 5/5 in all extremities and sensation is intact. Normal gait. PSYCHIATRIC: Normal affect        DIAGNOSTICRESULTS:     None      PROCEDURES:   N/A    CRITICAL CARE TIME:       Due to the immediate potential for life-threatening deterioration due to initial concern for allergic reaction, I spent 4 minutes providing critical care. This time is excluding time spent performing procedures.       CONSULTS:  None      EMERGENCY DEPARTMENT COURSE and DIFFERENTIAL DIAGNOSIS/MDM:   Vitals:    Vitals:    05/28/21 1416 05/28/21 1528   BP: (!) 163/93 (!) 136/98   Pulse: 80 79   Resp: 17 18   Temp: 98.5 °F (36.9 °C)    TempSrc: Oral    SpO2: 100% 100%   Weight: 240 lb (108.9 kg)    Height: 5' 8\" (1.727 m)        Patient was given the following medications:  Medications   diphenhydrAMINE (BENADRYL) tablet 25 mg (25 mg Oral Given 5/28/21 1437)   famotidine (PEPCID) tablet 20 mg (20 mg Oral Given 5/28/21 1437)   predniSONE (DELTASONE) tablet 40 mg (40 mg Oral Given 5/28/21 1437)         I have evaluated this patient in the ED. Old records were reviewed. She arrives after receiving the [address] vaccine concerned that she may be having a reaction. She described feeling a fullness and tingling to her chin and thought her tongue might swell. Patient has had no progression of symptoms since initial onset which was about 20 minutes following the vaccine being administered. She was given oral doses of Benadryl, Pepcid and prednisone in the ED. In less than 1 hour patient reported complete resolution of symptoms and is anxious to go home. I do not see an indication for further monitoring at this point. It has been 2 hours since the vaccine was administered and she is back to baseline. Going to have her follow-up with primary care on an as-needed basis. I estimate there is LOW risk for AIRWAY COMPROMISE, ANAPHYLAXIS, EPIGLOTTITIS, CELLULITIS or NECROTIZING FASCIITIS, thus I consider the discharge disposition reasonable. Also, there is no evidence or peritonitis, sepsis, or toxicity. Dandy Tinsley and I have discussed the diagnosis and risks, and we agree with discharging home to follow-up with their primary doctor. We also discussed returning to the Emergency Department immediately if new or worsening symptoms occur. We have discussed the symptoms which are most concerning (e.g., trouble breathing, fever, changing or worsening pain, vomiting) that necessitate immediate return. FINAL IMPRESSION:      1.  Adverse effect of drug, initial encounter          DISPOSITION/PLAN:   DISPOSITION Decision To Discharge      PATIENT REFERRED TO:  ALLY Sanders CNP  [facility] [address] [facility]  [phone]      As needed      DISCHARGE MEDICATIONS:  Discharge Medication List as of 5/28/2021  3:34 PM                     (Please note thatportions of this note were completed with a voice recognition program.  Efforts were made to edit the dictations, but occasionally words are mis-transcribed.)    Sloan Simpson PA-C (electronicallysigned)              Blanca Garcia, [place]  05/28/21 4660

## 2021-07-15 ENCOUNTER — OFFICE VISIT (OUTPATIENT)
Dept: FAMILY MEDICINE CLINIC | Age: 46
End: 2021-07-15
Payer: COMMERCIAL

## 2021-07-15 VITALS
HEIGHT: 68 IN | OXYGEN SATURATION: 98 % | HEART RATE: 81 BPM | TEMPERATURE: 98 F | WEIGHT: 253 LBS | SYSTOLIC BLOOD PRESSURE: 120 MMHG | BODY MASS INDEX: 38.34 KG/M2 | DIASTOLIC BLOOD PRESSURE: 70 MMHG

## 2021-07-15 DIAGNOSIS — Z13.1 ENCOUNTER FOR SCREENING FOR DIABETES MELLITUS: ICD-10-CM

## 2021-07-15 DIAGNOSIS — Z11.4 SCREENING FOR HIV WITHOUT PRESENCE OF RISK FACTORS: ICD-10-CM

## 2021-07-15 DIAGNOSIS — Z12.31 OTHER SCREENING MAMMOGRAM: ICD-10-CM

## 2021-07-15 DIAGNOSIS — L73.2 AXILLARY HIDRADENITIS SUPPURATIVA: Primary | ICD-10-CM

## 2021-07-15 DIAGNOSIS — Z72.89 OTHER PROBLEMS RELATED TO LIFESTYLE: ICD-10-CM

## 2021-07-15 DIAGNOSIS — Z12.31 ENCOUNTER FOR SCREENING MAMMOGRAM FOR BREAST CANCER: ICD-10-CM

## 2021-07-15 PROCEDURE — 99213 OFFICE O/P EST LOW 20 MIN: CPT | Performed by: NURSE PRACTITIONER

## 2021-07-15 RX ORDER — DOXYCYCLINE HYCLATE 100 MG/1
100 CAPSULE ORAL 2 TIMES DAILY
Qty: 28 CAPSULE | Refills: 0 | Status: SHIPPED | OUTPATIENT
Start: 2021-07-15 | End: 2021-07-29

## 2021-07-15 ASSESSMENT — PATIENT HEALTH QUESTIONNAIRE - PHQ9
2. FEELING DOWN, DEPRESSED OR HOPELESS: 0
SUM OF ALL RESPONSES TO PHQ9 QUESTIONS 1 & 2: 0
1. LITTLE INTEREST OR PLEASURE IN DOING THINGS: 0
SUM OF ALL RESPONSES TO PHQ QUESTIONS 1-9: 0

## 2021-07-15 NOTE — PATIENT INSTRUCTIONS
Patient Education        Hidradenitis Suppurativa: Care Instructions  Your Care Instructions     Hidradenitis suppurativa (say \"ain-juzf-ve-NY-tus sup-rex-uh-TY-vuh\") is a skin condition that causes lumps on the skin that look like pimples or boils. The lumps are usually painful and can break open and drain blood and bad-smelling pus. The condition can come and go for many years. Treatment for this condition may include antibiotics and other medicines. You may need surgery to remove the lumps. Home care includes wearing loose-fitting clothes and washing the area gently. You can help prevent lumps from coming back by staying at a healthy weight and not smoking. Doctors don't know exactly how this condition starts. But they do know that something irritates and inflames the hair follicles, causing them to swell and form lumps. This skin condition can't be spread from person to person (isn't contagious). Follow-up care is a key part of your treatment and safety. Be sure to make and go to all appointments, and call your doctor if you are having problems. It's also a good idea to know your test results and keep a list of the medicines you take. How can you care for yourself at home? Skin care    · Wash the area every day with mild soap. Use your hands rather than a washcloth or sponge when you wash that part of your body.     · Leave the affected areas uncovered when you can. If you have lumps that are draining, you can cover them with a bandage or other dressing. Put petroleum jelly (such as Vaseline) on the dressing to help keep it from sticking.     · Wear-loose fitting clothes that don't rub against the area. Avoid activities that cause skin to rub together.     · If you have pain, try a warm compress. Soak a towel or washcloth in warm water, wring it out, and place it on the affected skin for about 10 minutes. Medicines    · Be safe with medicines. Take your medicines exactly as prescribed.  Call your doctor if you think you are having a problem with your medicine. You will get more details on the specific medicines your doctor prescribes.     · If your doctor prescribed antibiotics, take them as directed. Do not stop taking them just because you feel better. You need to take the full course of antibiotics. Lifestyle choices    · If you smoke, think about quitting. Smoking can make the condition worse. If you need help quitting, talk to your doctor about stop-smoking programs and medicines. These can increase your chances of quitting for good.     · Stay at a healthy weight, or lose weight, by eating healthy foods and being physically active. Being overweight could make this condition worse. When should you call for help? Call your doctor now or seek immediate medical care if:    · You have symptoms of infection, such as:  ? Increased pain, swelling, warmth, or redness. ? Red streaks leading from the area. ? Pus draining from the area. ? A fever. Watch closely for changes in your health, and be sure to contact your doctor if:    · You do not get better as expected. Where can you learn more? Go to https://[employer]. org and sign in to your [employer] account. Enter E859 in the [employer] box to learn more about \"Hidradenitis Suppurativa: Care Instructions. \"     If you do not have an account, please click on the \"Sign Up Now\" link. Current as of: March 3, 2021               Content Version: 12.9  © 1414-4204 [employer]wise, Incorporated. Care instructions adapted under license by [facility] ([facility]). If you have questions about a medical condition or this instruction, always ask your healthcare professional. [address] any warranty or liability for your use of this information.

## 2021-07-20 ASSESSMENT — ENCOUNTER SYMPTOMS
ALLERGIC/IMMUNOLOGIC NEGATIVE: 1
EYES NEGATIVE: 1
GASTROINTESTINAL NEGATIVE: 1
ABDOMINAL PAIN: 0
RESPIRATORY NEGATIVE: 1
NAUSEA: 0
ANAL BLEEDING: 0
SHORTNESS OF BREATH: 0
BLOOD IN STOOL: 0

## 2021-07-23 ENCOUNTER — HOSPITAL ENCOUNTER (OUTPATIENT)
Dept: WOMENS IMAGING | Age: 46
Discharge: HOME | End: 2021-07-23
Payer: COMMERCIAL

## 2021-07-23 DIAGNOSIS — Z12.31 ENCOUNTER FOR SCREENING MAMMOGRAM FOR BREAST CANCER: ICD-10-CM

## 2021-07-23 LAB
ESTIMATED AVERAGE GLUCOSE: 105.4 MG/DL
HBA1C MFR BLD: 5.3 %
HEPATITIS C ANTIBODY INTERPRETATION: NORMAL
HIV AG/AB: NORMAL
HIV ANTIGEN: NORMAL
HIV-1 ANTIBODY: NORMAL
HIV-2 AB: NORMAL
TSH REFLEX: 1.21 UIU/ML (ref 0.27–4.2)

## 2021-07-23 PROCEDURE — 77067 SCR MAMMO BI INCL CAD: CPT

## 2021-07-28 ENCOUNTER — OFFICE VISIT (OUTPATIENT)
Dept: FAMILY MEDICINE CLINIC | Age: 46
End: 2021-07-28
Payer: COMMERCIAL

## 2021-07-28 VITALS
HEART RATE: 73 BPM | BODY MASS INDEX: 38.49 KG/M2 | SYSTOLIC BLOOD PRESSURE: 132 MMHG | HEIGHT: 68 IN | DIASTOLIC BLOOD PRESSURE: 70 MMHG | OXYGEN SATURATION: 98 % | WEIGHT: 254 LBS

## 2021-07-28 DIAGNOSIS — L73.2 AXILLARY HIDRADENITIS SUPPURATIVA: Primary | ICD-10-CM

## 2021-07-28 PROCEDURE — 99212 OFFICE O/P EST SF 10 MIN: CPT | Performed by: NURSE PRACTITIONER

## 2021-07-28 ASSESSMENT — ENCOUNTER SYMPTOMS
ABDOMINAL PAIN: 0
EYES NEGATIVE: 1
ANAL BLEEDING: 0
NAUSEA: 0
BLOOD IN STOOL: 0
ALLERGIC/IMMUNOLOGIC NEGATIVE: 1
GASTROINTESTINAL NEGATIVE: 1
SHORTNESS OF BREATH: 0
RESPIRATORY NEGATIVE: 1

## 2021-07-28 NOTE — PROGRESS NOTES
[address] [facility]  [address] [facility] [address]  Dept: [phone]  Dept Fax: [phone]  Loc: [phone]    Greer Lesches is a 42 y.o. female who presents today for her medical conditions/complaints as noted below. Greer Lesches is c/o of Cyst (right breast area  2 week follow up)       Subjective:     Chief Complaint   Patient presents with    Cyst     right breast area  2 week follow up       HPI  The patient is here for 2-week follow-up regarding a cyst underneath her right axilla. Patient was placed on doxycycline for 14 days to see if this helps. She was diagnosed with axillary hidradenitis. Patient does admit to having some in the pelvic region years ago. The patient states that the current inflamed area has significantly improved and has no drainage. She does have some slight tenderness in the area. She denies any fever, chills, nausea or vomiting    Past Medical History:   Diagnosis Date    Anemia     Back pain     Chest pain 6/2014    stress myoview normal    Chronic pain syndrome 2017    Dr. Valentine Lubin pain management    Fibroid uterus     fibroid uterus ad abnormal uterine bleeding with uterine fibroids    GERD (gastroesophageal reflux disease)     Hypertension     Menopausal symptoms     Overactive bladder     Pelvic pain     Right lateral epicondylitis     Rotator cuff syndrome of right shoulder     Stress incontinence     Tachycardia     on coreg         Review of Systems   Constitutional: Negative. Negative for appetite change, fatigue and unexpected weight change. HENT: Negative. Eyes: Negative. Respiratory: Negative. Negative for shortness of breath. Cardiovascular: Negative. Negative for chest pain, palpitations and leg swelling. Gastrointestinal: Negative. Negative for abdominal pain, anal bleeding, blood in stool and nausea. Endocrine: Negative. Genitourinary: Negative. Negative for hematuria. Musculoskeletal: Negative. Skin: Negative. Negative for rash. Allergic/Immunologic: Negative. Neurological: Negative. Negative for dizziness, syncope, light-headedness and numbness. Hematological: Negative. Does not bruise/bleed easily. Psychiatric/Behavioral: Negative. All other systems reviewed and are negative.        Past Medical History:   Diagnosis Date    Anemia     Back pain     Chest pain 6/2014    stress myoview normal    Chronic pain syndrome 2017    Dr. Cortes [facility] pain management    Fibroid uterus     fibroid uterus ad abnormal uterine bleeding with uterine fibroids    GERD (gastroesophageal reflux disease)     Hypertension     Menopausal symptoms     Overactive bladder     Pelvic pain     Right lateral epicondylitis     Rotator cuff syndrome of right shoulder     Stress incontinence     Tachycardia     on coreg     Family History   Problem Relation Age of Onset    Heart Disease Mother     Heart Disease Maternal Grandmother     Diabetes Maternal Grandmother     Ovarian Cancer Maternal Grandmother     Heart Failure Maternal Grandfather     High Blood Pressure Maternal Grandfather     High Blood Pressure Sister     Bipolar Disorder Sister     No Known Problems Maternal Aunt     High Blood Pressure Maternal Uncle     High Blood Pressure Maternal Uncle      Past Surgical History:   Procedure Laterality Date    DILATION AND CURETTAGE      DILATION AND CURETTAGE OF UTERUS N/A 4/16/2019    DIAGNOSTIC LAPAROSCOPY, DILATATION AND CURETTAGE, HYSTEROSCOPY, WITH MYOSURE AND NOVASURE ENDOMETRIAL ABLATION performed by Syham Bowles DO at [address] [place] [address], VAGINAL N/A 6/4/2020    LAPAROSCOPIC ASSISTED VAGINAL HYSTERECTOMY, BILATERAL SALPINGECTOMY, POSSIBLE CYSTOSCOPY performed by Shyam Bowles DO at [place] [address]    LAPAROSCOPY      TONSILLECTOMY      TONSILLECTOMY AND ADENOIDECTOMY as a child    TUBAL LIGATION       Social History     Socioeconomic History    Marital status:      Spouse name: Not on file    Number of children: Not on file    Years of education: Not on file    Highest education level: Not on file   Occupational History    Not on file   Tobacco Use    Smoking status: Former Smoker     Packs/day: 1.00     Years: 10.00     Pack years: 10.00     Types: Cigarettes     Quit date: 2014     Years since quittin.1    Smokeless tobacco: Never Used   Vaping Use    Vaping Use: Never used   Substance and Sexual Activity    Alcohol use: No    Drug use: No    Sexual activity: Yes     Partners: Male   Other Topics Concern    Not on file   Social History Narrative    Not on file     Social Determinants of Health     Financial Resource Strain:     Difficulty of Paying Living Expenses:    Food Insecurity:     Worried About Running Out of Food in the Last Year:     [address] in the Last Year:    Transportation Needs:     Lack of Transportation (Medical):      Lack of Transportation (Non-Medical):    Physical Activity:     Days of Exercise per Week:     Minutes of Exercise per Session:    Stress:     Feeling of Stress :    Social Connections:     Frequency of Communication with Friends and Family:     Frequency of Social Gatherings with Friends and Family:     Attends Congregation Services:     Active Member of Clubs or Organizations:     Attends Club or Organization Meetings:     Marital Status:    Intimate Partner Violence:     Fear of Current or Ex-Partner:     Emotionally Abused:     Physically Abused:     Sexually Abused:      Current Outpatient Medications   Medication Sig Dispense Refill    Multiple Vitamins-Calcium (ONE-A-DAY WOMENS FORMULA PO) Take 1 tablet by mouth daily      doxycycline hyclate (VIBRAMYCIN) 100 MG capsule Take 1 capsule by mouth 2 times daily for 14 days 28 capsule 0    carvedilol (COREG) 6.25 MG tablet TAKE ONE TABLET BY MOUTH TWICE A  tablet 1    omeprazole (PRILOSEC) 10 MG delayed release capsule Take 20 mg by mouth daily        No current facility-administered medications for this visit. No changes in past medical history, past surgical history, social history, orfamily history were noted during the patient encounter unless specifically listed above. All updates of past medical history, past surgical history, social history, or family history were reviewed personally by me duringthe office visit. All problems listed in the assessment are stable unless noted otherwise. Medication profile reviewed personally by me during the office visit. Medication side effects and possible impairments frommedications were discussed as applicable. Objective:     Physical Exam  Constitutional:       General: She is not in acute distress. Appearance: Normal appearance. She is well-developed. She is not toxic-appearing. HENT:      Head: Normocephalic and atraumatic. Right Ear: Hearing, tympanic membrane and ear canal normal.      Left Ear: Hearing, tympanic membrane and ear canal normal.      Nose: Nose normal.      Mouth/Throat:      Pharynx: Uvula midline. Eyes:      General: Lids are normal.      Conjunctiva/sclera: Conjunctivae normal.   Cardiovascular:      Rate and Rhythm: Normal rate and regular rhythm. Pulses: Normal pulses. Heart sounds: Normal heart sounds. Pulmonary:      Effort: Pulmonary effort is normal. No accessory muscle usage or respiratory distress. Breath sounds: Normal breath sounds. Abdominal:      Palpations: Abdomen is soft. Tenderness: There is no abdominal tenderness. Musculoskeletal:      Cervical back: Neck supple. Lymphadenopathy:      Head:      Right side of head: No submental or submandibular adenopathy. Left side of head: No submental or submandibular adenopathy. Cervical: No cervical adenopathy.    Skin:     General: Skin is warm and dry.      Findings: No lesion or rash. Neurological:      Mental Status: She is alert and oriented to person, place, and time. Psychiatric:         Speech: Speech normal.         Behavior: Behavior normal. Behavior is cooperative. /70 (Site: Left Upper Arm, Position: Sitting, Cuff Size: Large Adult)   Pulse 73   Ht 5' 8\" (1.727 m)   Wt 254 lb (115.2 kg)   LMP 04/01/2019   SpO2 98%   BMI 38.62 kg/m²   Body mass index is 38.62 kg/m². BP Readings from Last 2 Encounters:   07/28/21 132/70   07/15/21 120/70       Wt Readings from Last 3 Encounters:   07/28/21 254 lb (115.2 kg)   07/15/21 253 lb (114.8 kg)   05/28/21 240 lb (108.9 kg)       Lab Review   Office Visit on 07/15/2021   Component Date Value    Hep C Ab Interp 07/22/2021 Non-reactive     HIV Ag/Ab 07/22/2021 Non-Reactive     HIV-1 Antibody 07/22/2021 Non-Reactive     HIV ANTIGEN 07/22/2021 Non-Reactive     HIV-2 Ab 07/22/2021 Non-Reactive     Hemoglobin A1C 07/22/2021 5.3     eAG 07/22/2021 105.4     TSH 07/22/2021 1.21        No results found for this visit on 07/28/21. Assessment:       1. Axillary hidradenitis suppurativa        No results found for this visit on 07/28/21. Plan:       Pamela Valdivia was seen today for cyst.    Diagnoses and all orders for this visit:    Axillary hidradenitis suppurativa    The patient's current inflamed area is resolving. She should finish antibiotic as prescribed. If the patient has a reoccurrence then I do recommend that she see dermatology because there are different treatment options that are now available to treat this condition. Patient has been instructed call the office immediately with new symptoms, change in symptoms or worseningof symptoms. If this is not feasible, patient is instructed to report to the emergency room. Medication profile reviewed. Medication side effects and possible impairments from medications were discussed as applicable. Allergies were reviewed. Health maintenance was reviewed and updated as appropriate. No follow-ups on file. (Comment: Please note this report has been produced using a combination of typing and speech recognition software and may contain errors related to that system including errors in grammar, punctuation, and spelling, as well as words and phrases that may be inappropriate.  If there are any questions or concerns please feel free to contact the dictating provider for clarification.)

## 2021-07-29 ENCOUNTER — HOSPITAL ENCOUNTER (OUTPATIENT)
Dept: WOMENS IMAGING | Age: 46
Discharge: HOME | End: 2021-07-29
Payer: COMMERCIAL

## 2021-07-29 DIAGNOSIS — R92.8 ABNORMAL MAMMOGRAM: ICD-10-CM

## 2021-07-29 PROCEDURE — G0279 TOMOSYNTHESIS, MAMMO: HCPCS

## 2021-07-29 PROCEDURE — 76642 ULTRASOUND BREAST LIMITED: CPT

## 2021-07-29 NOTE — PROGRESS NOTES
[address]   [address] [place] [address], [address] [place] [address]  Lon[address]   Phone: [phone]         ULTRASOUND BIOPSY EDUCATION    NAME:  Kelechi Cortez OF BIRTH:  1975   MEDICAL RECORD NUMBER:  [id number]   TODAY'S DATE:  7/29/2021    Referring Physician: Dr. Bhavana Martinez    Procedure: U/S Core Bx    Left Breast    Date of biopsy: 8/12/21     Patient taking blood thinners: no    Medicine allergies: yes - PCN    Special Instructions: n/a    Biopsy order form faxed to referring MD.          What is an Ultrasound Guided Breast Biopsy? Ultrasound guided breast biopsy is a test that uses ultrasound to find an area of your breast where a tissue sample will be taken. The sample is then looked at under a microscope to check for signs of breast cancer. Why is it done? An Ultrasound biopsy is usually done to check for cancer in a lump or cyst found during a mammogram or ultrasound. Preparing for the test?     * Take your medications as prescribed    You may eat and drink fluids before the test    Take a shower the evening or morning before the biopsy. What happens before the test?  Images are taken to find the exact site to be biopsied.   Your skin is washed with an alcohol prep.   You will be given an injection of medication to numb your breast.   What happens during the test?     Once your breast is numb, a small cut (incision) is made.   Using the imaging, the doctor will guide the needle into the biopsy area.   A sample of breast tissue is taken through the needle.   A small \"Clip\" or Marker is inserted into your breast to oseas the biopsy site.   The needle is removed and pressure put on the needle site to stop any bleeding.   A bandage will be placed over the site.   A post mammogram picture will be taken to document the clip placement. How long does the test take? Approximately 60 minutes.   Most of the time is spent finding the area for the biopsy. What are the risks?   Bleeding: You may have some bleeding which can cause bruising, swelling,    or a bleeding under your skin.   Infection:  Signs of infection are redness, swelling, heat, or increasing pain    at the biopsy Site.   Sample size not adequate: This would require repeating the biopsy. What happens after the test?    The nurse will review your post biopsy instructions which include:         Placing an ice pack in your bra.    Wearing a firm fitting bra.    You may use Tylenol (Acetaminiphen) for discomfort. Lab results take about 2-3 business days. The Nurse Navigator or your doctor will call you with the results. Ultrasound Breast Biopsy:     (Please arrive 15 minutes early)                                                 [x] Blood thinner history reviewed with patient    [x] Take all your other medications on your normal routine schedule. [x] You may eat and drink as normal before your biopsy. [x] You may drive yourself. [x] No heavy lifting or exercise for 48 hours after the biopsy. [x] Printed Pre Ultrasound Biopsy Instructions were provided, reviewed with the patient and all questions were answered. The [facility]'s Information:   Should you experience any significant changes in your health or have questions about your care, please contact:  Sanjiv Salgado or Vish Garner, Breast Navigator's at [facility], [facility]:  [phone]  Monday-Friday. If you need help with your care outside these hours and cannot wait until we are again available, contact your Physician or go to the hospital emergency room. [x] Patient/POA/Caregiver verbalized understanding of instructions.        Electronically signed by Sanjiv Salgado RN on 7/29/2021 at 10:31 AM

## 2021-08-12 ENCOUNTER — HOSPITAL ENCOUNTER (OUTPATIENT)
Dept: WOMENS IMAGING | Age: 46
Discharge: HOME | End: 2021-08-12
Payer: COMMERCIAL

## 2021-08-12 DIAGNOSIS — R92.8 ABNORMAL MAMMOGRAM: ICD-10-CM

## 2021-08-12 PROCEDURE — 77065 DX MAMMO INCL CAD UNI: CPT

## 2021-08-12 PROCEDURE — 88305 TISSUE EXAM BY PATHOLOGIST: CPT

## 2021-08-12 PROCEDURE — 88341 IMHCHEM/IMCYTCHM EA ADD ANTB: CPT

## 2021-08-12 PROCEDURE — 88342 IMHCHEM/IMCYTCHM 1ST ANTB: CPT

## 2021-08-12 PROCEDURE — 2720000010 US BREAST BIOPSY W LOC DEVICE 1ST LESION LEFT

## 2021-08-12 NOTE — LETTER
[address]   Phone: [phone]    [facility] [address]         August 12, 2021     Patient: Fredrick Reddy   YOB: 1975   Date of Visit: 8/12/2021       To Whom It May Concern: It is my medical opinion that Esdras Ogden may return to work on 8/14/21 with the following restrictions: lifting/carrying not to exceed 5 lbs. This restriction is lifted on 8/15/21 at 1pm.     If you have any questions or concerns, please don't hesitate to call.     Sincerely,        José Miguel Moeller RN

## 2021-08-16 ENCOUNTER — TELEPHONE (OUTPATIENT)
Dept: WOMENS IMAGING | Age: 46
End: 2021-08-16

## 2021-08-16 NOTE — TELEPHONE ENCOUNTER
Pathology results complete from breast biopsy. Radiologist confirms concordance. Breast Navigator reviewed results of breast biopsy with patient. Results are negative for any malignancy on the pathology report. Radiologist is recommending a left breast ultrasound in 6 months. Patient verbalized understanding. Path report faxed to referring physician.      Rolan Haji RN

## 2021-10-05 DIAGNOSIS — R00.0 TACHYCARDIA: ICD-10-CM

## 2021-10-05 DIAGNOSIS — I10 ESSENTIAL HYPERTENSION: ICD-10-CM

## 2021-10-05 RX ORDER — CARVEDILOL 6.25 MG/1
TABLET ORAL
Qty: 180 TABLET | Refills: 1 | Status: SHIPPED | OUTPATIENT
Start: 2021-10-05 | End: 2021-11-15
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
## 2021-12-29 ENCOUNTER — TELEPHONE (OUTPATIENT)
Dept: FAMILY MEDICINE CLINIC | Age: 46
End: 2021-12-29

## 2021-12-29 DIAGNOSIS — R68.89 FLU-LIKE SYMPTOMS: ICD-10-CM

## 2021-12-29 DIAGNOSIS — Z11.52 ENCOUNTER FOR SCREENING FOR COVID-19: Primary | ICD-10-CM

## 2021-12-29 LAB — SARS-COV-2: DETECTED

## 2021-12-29 NOTE — TELEPHONE ENCOUNTER
Pt called stating that she's been sick since Saturday. Symptoms sore throat, chest congestion, can't sleep, night sweats/chills, body aches. Pt went somewhere on [address] [facility] and had a rapid covid test done Monday, it was negative. Pt wanting to know if she should get tested again or if there are other recommendations. Pt uses [facility].  Call back [phone]

## 2021-12-30 ENCOUNTER — TELEMEDICINE (OUTPATIENT)
Dept: FAMILY MEDICINE CLINIC | Age: 46
End: 2021-12-30
Payer: COMMERCIAL

## 2021-12-30 DIAGNOSIS — U07.1 COVID-19: Primary | ICD-10-CM

## 2021-12-30 PROCEDURE — 99213 OFFICE O/P EST LOW 20 MIN: CPT | Performed by: NURSE PRACTITIONER

## 2021-12-30 RX ORDER — ALBUTEROL SULFATE 90 UG/1
2 AEROSOL, METERED RESPIRATORY (INHALATION) 4 TIMES DAILY PRN
Qty: 18 G | Refills: 0 | Status: SHIPPED | OUTPATIENT
Start: 2021-12-30 | End: 2022-04-06 | Stop reason: ALTCHOICE

## 2021-12-30 RX ORDER — DEXTROMETHORPHAN HYDROBROMIDE AND PROMETHAZINE HYDROCHLORIDE 15; 6.25 MG/5ML; MG/5ML
5 SYRUP ORAL 4 TIMES DAILY PRN
Qty: 240 ML | Refills: 0 | Status: SHIPPED | OUTPATIENT
Start: 2021-12-30 | End: 2022-01-06

## 2021-12-30 ASSESSMENT — ENCOUNTER SYMPTOMS
BACK PAIN: 0
SHORTNESS OF BREATH: 1
VOICE CHANGE: 1
RECTAL PAIN: 0
SWOLLEN GLANDS: 0
FACIAL SWELLING: 0
ABDOMINAL PAIN: 0
CHOKING: 0
SORE THROAT: 1
RHINORRHEA: 0
STRIDOR: 0
BLOOD IN STOOL: 0
SINUS PAIN: 1
NAUSEA: 1
DIARRHEA: 0
ABDOMINAL DISTENTION: 0
ANAL BLEEDING: 0
CHEST TIGHTNESS: 1
COUGH: 1
TROUBLE SWALLOWING: 0
VOMITING: 0
APNEA: 0
WHEEZING: 1
CONSTIPATION: 0
SINUS PRESSURE: 1

## 2021-12-30 NOTE — PROGRESS NOTES
2021    TELEHEALTH EVALUATION -- Audio/Visual (During [facility]ZAR-70 public health emergency)    HPI:    Taniya Flores (:  1975) has requested an audio/video evaluation for the following concern(s):    URI   This is a new problem. The current episode started in the past 7 days (Since 2021; tested negative for COVID on 2021 and now positive for COVID on 2021). The problem has been gradually worsening. Associated symptoms include congestion (Nasal), coughing (Productive ), headaches, nausea (Mild ), a plugged ear sensation (Bilateral ), sinus pain, a sore throat and wheezing. Pertinent negatives include no abdominal pain, chest pain, diarrhea, dysuria, ear pain, joint pain, joint swelling, neck pain, rash, rhinorrhea, sneezing, swollen glands or vomiting. Review of Systems   Constitutional: Positive for activity change (R/t fatigue ), appetite change (Lack of appetite ), chills, fatigue and fever. Negative for diaphoresis and unexpected weight change. HENT: Positive for congestion (Nasal), postnasal drip, sinus pressure, sinus pain, sore throat and voice change (Hoarse ). Negative for dental problem, drooling, ear discharge, ear pain, facial swelling, hearing loss, mouth sores, nosebleeds, rhinorrhea, sneezing, tinnitus and trouble swallowing. Respiratory: Positive for cough (Productive ), chest tightness, shortness of breath (Related to nasal congestion) and wheezing. Negative for apnea, choking and stridor. Cardiovascular: Negative. Negative for chest pain. Gastrointestinal: Positive for nausea (Mild ). Negative for abdominal distention, abdominal pain, anal bleeding, blood in stool, constipation, diarrhea, rectal pain and vomiting. Genitourinary: Negative. Negative for dysuria. Musculoskeletal: Positive for myalgias (Generalized ). Negative for arthralgias, back pain, gait problem, joint pain, joint swelling, neck pain and neck stiffness. Skin: Negative.   Negative for rash. Neurological: Positive for weakness (Generalized fatigue and malaise) and headaches. Negative for dizziness, tremors, seizures, syncope, facial asymmetry, speech difficulty, light-headedness and numbness. Prior to Visit Medications    Medication Sig Taking?  Authorizing Provider   carvedilol (COREG) 6.25 MG tablet TAKE ONE TABLET BY MOUTH TWICE A DAY Yes ALLY Beckwith CNP   Multiple Vitamins-Calcium (ONE-A-DAY WOMENS FORMULA PO) Take 1 tablet by mouth daily Yes Historical Provider, MD   omeprazole (PRILOSEC) 10 MG delayed release capsule Take 20 mg by mouth daily  Yes Historical Provider, MD   albuterol sulfate HFA (VENTOLIN HFA) 108 (90 Base) MCG/ACT inhaler Inhale 2 puffs into the lungs 4 times daily as needed for Wheezing or Shortness of Breath Yes ALLY Ferreira CNP   promethazine-dextromethorphan (PROMETHAZINE-DM) 6.25-15 MG/5ML syrup Take 5 mLs by mouth 4 times daily as needed for Cough Yes ALLY Ferreira CNP       Social History     Tobacco Use    Smoking status: Former Smoker     Packs/day: 1.00     Years: 10.00     Pack years: 10.00     Types: Cigarettes     Quit date: 2014     Years since quittin.5    Smokeless tobacco: Never Used   Vaping Use    Vaping Use: Never used   Substance Use Topics    Alcohol use: No    Drug use: No        Allergies   Allergen Reactions    Penicillins Anaphylaxis and Hives   ,   Past Medical History:   Diagnosis Date    Anemia     Back pain     Chest pain 2014    stress myoview normal    Chronic pain syndrome     Dr. Hunter Wynne pain management    Fibroid uterus     fibroid uterus ad abnormal uterine bleeding with uterine fibroids    GERD (gastroesophageal reflux disease)     Hypertension     Menopausal symptoms     Overactive bladder     Pelvic pain     Right lateral epicondylitis     Rotator cuff syndrome of right shoulder     Stress incontinence     Tachycardia     on coreg   ,   Past Surgical History:   Procedure Laterality Date    DILATION AND CURETTAGE      DILATION AND CURETTAGE OF UTERUS N/A 2019    DIAGNOSTIC LAPAROSCOPY, DILATATION AND CURETTAGE, HYSTEROSCOPY, WITH MYOSURE AND NOVASURE ENDOMETRIAL ABLATION performed by Yogesh Hayes DO at [address] [place] [address], VAGINAL N/A 2020    LAPAROSCOPIC ASSISTED VAGINAL HYSTERECTOMY, BILATERAL SALPINGECTOMY, POSSIBLE CYSTOSCOPY performed by Yogesh Hayes DO at [facility] Right 2009    LAPAROSCOPY      TONSILLECTOMY      TONSILLECTOMY AND ADENOIDECTOMY      as a child    TUBAL LIGATION      US BREAST NEEDLE BIOPSY LEFT Left 2021    US BREAST NEEDLE BIOPSY LEFT 2021 Roxana Britt MD [facility]   ,   Social History     Tobacco Use    Smoking status: Former Smoker     Packs/day: 1.00     Years: 10.00     Pack years: 10.00     Types: Cigarettes     Quit date: 2014     Years since quittin.5    Smokeless tobacco: Never Used   Vaping Use    Vaping Use: Never used   Substance Use Topics    Alcohol use: No    Drug use: No   ,   Family History   Problem Relation Age of Onset    Heart Disease Mother     Heart Disease Maternal Grandmother     Diabetes Maternal Grandmother     Ovarian Cancer Maternal Grandmother     Heart Failure Maternal Grandfather     High Blood Pressure Maternal Grandfather     High Blood Pressure Sister     Bipolar Disorder Sister     No Known Problems Maternal Aunt     High Blood Pressure Maternal Uncle     High Blood Pressure Maternal Uncle    ,   Immunization History   Administered Date(s) Administered    COVID-19, J&J, PF, 0.5 mL 2021    Influenza, Quadv, IM, (6 mo and older Fluzone, Flulaval, Fluarix and 3 yrs and older Afluria) 2016, 2017, 2018    PPD Test 2015    Td, unspecified formulation 2010    Tdap (Boostrix, Adacel) 2017   ,   Health Maintenance Topic Date Due    COVID-19 Vaccine (2 - Booster for [employer] series) 07/23/2021    Flu vaccine (1) 09/01/2021    Lipid screen  11/19/2023    Diabetes screen  07/22/2024    DTaP/Tdap/Td vaccine (2 - Td or Tdap) 11/17/2027    Hepatitis C screen  Completed    HIV screen  Completed    Hepatitis A vaccine  Aged Out    Hepatitis B vaccine  Aged Out    Hib vaccine  Aged Out    Meningococcal (ACWY) vaccine  Aged Out    Pneumococcal 0-64 years Vaccine  Aged Out       PHYSICAL EXAMINATION:  [ INSTRUCTIONS:  \"[x]\" Indicates a positive item  \"[]\" Indicates a negative item      Constitutional: [x] Appears well-developed and well-nourished [x] No apparent distress      [] Abnormal-   Mental status  [x] Alert and awake  [x] Oriented to person/place/time [x]Able to follow commands      Eyes:  EOM    [x]  Normal  [] Abnormal-  Sclera  [x]  Normal  [] Abnormal -         Discharge [x]  None visible  [] Abnormal -    HENT:   [x] Normocephalic, atraumatic. [] Abnormal   [x] Mouth/Throat: Mucous membranes are moist.     External Ears [x] Normal  [] Abnormal-     Neck: [x] No visualized mass     Pulmonary/Chest: [x] Respiratory effort normal.  [x] No visualized signs of difficulty breathing or respiratory distress        [] Abnormal-      Musculoskeletal:   [x] Normal gait with no signs of ataxia         [x] Normal range of motion of neck        [] Abnormal-       Neurological:        [x] No Facial Asymmetry (Cranial nerve 7 motor function) (limited exam to video visit)          [x] No gaze palsy        [] Abnormal-         Skin:        [x] No significant exanthematous lesions or discoloration noted on facial skin         [] Abnormal-            Psychiatric:       [x] Normal Affect [x] No Hallucinations        [] Abnormal-     Other pertinent observable physical exam findings- Able to talk in complete sentences in no distress     ASSESSMENT/PLAN:  [facility] was seen today for uri.     Patient educated on COVID-19 and the need to quarantine. She is to quarantine for 10 days AND until majority of symptoms resolve AND until fever free for 24 hours. Antibody infusion order sent to [place] - she was educated on potential side effects and she verbalized understanding and would like to go ahead and have the infusion. Patient informed of symptomatic treatment including Tylenol, salt water gargles, rest, increased fluids, Dayquil, Nyquil, cool mist humidifier. If symptoms worsen - she is to go to the ER. Diagnoses and all orders for this visit:    COVID-19  -     albuterol sulfate HFA (VENTOLIN HFA) 108 (90 Base) MCG/ACT inhaler; Inhale 2 puffs into the lungs 4 times daily as needed for Wheezing or Shortness of Breath  -     promethazine-dextromethorphan (PROMETHAZINE-DM) 6.25-15 MG/5ML syrup; Take 5 mLs by mouth 4 times daily as needed for Cough      Return if symptoms worsen or fail to improve. Windy Wellington is a 42 y.o. female being evaluated by a Virtual Visit (video visit) encounter to address concerns as mentioned above. A caregiver was present when appropriate. Due to this being a TeleHealth encounter (During [facility] public health emergency), evaluation of the following organ systems was limited: Vitals/Constitutional/EENT/Resp/CV/GI//MS/Neuro/Skin/Heme-Lymph-Imm. Pursuant to the emergency declaration under the [address] [place] [address], [address] [place] [address] and the [employer] Act, this Virtual Visit was conducted with patient's (and/or legal guardian's) consent, to reduce the patient's risk of exposure to COVID-19 and provide necessary medical care. The patient (and/or legal guardian) has also been advised to contact this office for worsening conditions or problems, and seek emergency medical treatment and/or call 911 if deemed necessary.      Patient identification was verified at the start of the visit: Yes    Total time spent on this encounter: Not billed by time    Services were provided through a video synchronous discussion virtually to substitute for in-person clinic visit. Patient and provider were located at their individual homes. --ALLY Pino CNP on 12/30/2021 at 3:04 PM    An electronic signature was used to authenticate this note. Patient should call the office immediately with new or ongoing signs or symptoms or worsening, or proceed to the emergency room. All entries in chief complaint and history of present illness are reviewed and validated by me. No changes in past medical history, past surgical history, social history, or family history were noted during the patient encounter unless specifically listed above. All updates of past medical history, past surgical history, social history, or family history were reviewed personally by me during the office visit. All problems listed in the assessment are stable unless noted otherwise. Medication profile reviewed personally by me during the office visit. Medication side effects and possible impairments from medications were discussed as applicable. Every effort has been made to assure accurate transcription by this voice recognition software. However, mistakes in transcription may still occur    You are being started on a new medication. All medications have the potential for adverse effects. All medications effect each person differently. Please read and review provided information related to medication. If the medication that you have been prescribed has the potential to cause sedation, do not drive or operate car, truck, or heavy machinery until you know how the medication will effect you. If you experience any adverse effects from the medication, please call the office or report to the emergency department.

## 2021-12-30 NOTE — PATIENT INSTRUCTIONS
Patient Education        Learning About Monoclonal Antibody Treatment for COVID-19  How are monoclonal antibodies used to treat COVID-19? Monoclonal antibodies are medicines that act like natural antibodies. Antibodies are proteins that your immune system makes. They help defend against a threat, like bacteria and viruses. Monoclonal antibodies for COVID-19 target the virus that causes the infection. When the virus enters your body, it invades healthy cells. There, it starts to make more virus. This can make you sick. These antibodies may block the virus from getting into your cells. Then it can't make more virus. This treatment is used for people who aren't in the hospital for COVID-19 but are at high risk for getting more serious symptoms. It can also be used to prevent COVID-19 in some people who are at high risk for getting very sick and have been in close contact with someone who has COVID-19. This treatment is given through a needle in a vein (IV). Or it may be given as an injection. Your doctor can help you learn if this treatment might be right for you. Current as of: October 6, 2021               Content Version: 13.1  © 2006-2021 [employer]. Care instructions adapted under license by [facility] ([facility]). If you have questions about a medical condition or this instruction, always ask your healthcare professional. [address] any warranty or liability for your use of this information.

## 2022-01-03 ENCOUNTER — PATIENT MESSAGE (OUTPATIENT)
Dept: FAMILY MEDICINE CLINIC | Age: 47
End: 2022-01-03

## 2022-01-03 ENCOUNTER — TELEPHONE (OUTPATIENT)
Dept: ADMINISTRATIVE | Age: 47
End: 2022-01-03

## 2022-01-03 ENCOUNTER — TELEPHONE (OUTPATIENT)
Dept: FAMILY MEDICINE CLINIC | Age: 47
End: 2022-01-03

## 2022-01-03 NOTE — LETTER
[address] [facility]  [address] [place] [address] [address] [place] [address]  Phone: [phone]  Fax: [phone]    ALLY Smith CNP        January 3, 2022     Patient: Dick Dent   YOB: 1975   Date of Visit: 1/3/2022       To Whom It May Concern: It is my medical opinion that Reva Silva remain off work from 12/26/21 to 1/10/22 due to a positive Covid test.     If you have any questions or concerns, please don't hesitate to call.     Sincerely,          ALLY Smith CNP

## 2022-01-03 NOTE — TELEPHONE ENCOUNTER
Pt states that she was wanting to get the antibodies infusion. And was wanting to see if the order had been sent. When she seen you she thought that it was sent.

## 2022-01-04 ENCOUNTER — HOSPITAL ENCOUNTER (OUTPATIENT)
Dept: NURSING | Age: 47
Setting detail: INFUSION SERIES
Discharge: HOME | End: 2022-01-04
Payer: COMMERCIAL

## 2022-01-04 VITALS
TEMPERATURE: 97.6 F | HEART RATE: 76 BPM | RESPIRATION RATE: 18 BRPM | DIASTOLIC BLOOD PRESSURE: 97 MMHG | SYSTOLIC BLOOD PRESSURE: 143 MMHG | OXYGEN SATURATION: 100 % | WEIGHT: 240 LBS | HEIGHT: 68 IN | BODY MASS INDEX: 36.37 KG/M2

## 2022-01-04 PROCEDURE — G0463 HOSPITAL OUTPT CLINIC VISIT: HCPCS

## 2022-01-04 PROCEDURE — 6360000002 HC RX W HCPCS: Performed by: NURSE PRACTITIONER

## 2022-01-04 PROCEDURE — 2580000003 HC RX 258: Performed by: NURSE PRACTITIONER

## 2022-01-04 PROCEDURE — 2500000003 HC RX 250 WO HCPCS: Performed by: NURSE PRACTITIONER

## 2022-01-04 PROCEDURE — 99203 OFFICE O/P NEW LOW 30 MIN: CPT

## 2022-01-04 PROCEDURE — M0245 HC IV INFUSION BAMLANIVIMAB & ETESEVIMAB W/MONITORING: HCPCS

## 2022-01-04 RX ADMIN — SODIUM CHLORIDE: 9 INJECTION, SOLUTION INTRAVENOUS at 14:03

## 2022-01-04 ASSESSMENT — PAIN DESCRIPTION - DESCRIPTORS
DESCRIPTORS: ACHING
DESCRIPTORS: CONSTANT;SORE

## 2022-01-04 ASSESSMENT — PAIN - FUNCTIONAL ASSESSMENT
PAIN_FUNCTIONAL_ASSESSMENT: 0-10
PAIN_FUNCTIONAL_ASSESSMENT: 0-10

## 2022-01-05 RX ORDER — ALBUTEROL SULFATE 90 UG/1
2 AEROSOL, METERED RESPIRATORY (INHALATION) EVERY 6 HOURS PRN
Qty: 18 G | Refills: 3 | Status: SHIPPED | OUTPATIENT
Start: 2022-01-05 | End: 2022-04-06 | Stop reason: ALTCHOICE

## 2022-01-05 NOTE — TELEPHONE ENCOUNTER
According to the denial the patient must try generic proair or generic Proventil first.  A prescription for generic Proventil has been sent to the pharmacy.   Please call the patient and notify her and also let her know to call if she has any trouble getting this prescription

## 2022-03-13 ENCOUNTER — APPOINTMENT (OUTPATIENT)
Dept: GENERAL RADIOLOGY | Age: 47
End: 2022-03-13
Payer: COMMERCIAL

## 2022-03-13 ENCOUNTER — HOSPITAL ENCOUNTER (EMERGENCY)
Age: 47
Discharge: HOME | End: 2022-03-13
Attending: EMERGENCY MEDICINE
Payer: COMMERCIAL

## 2022-03-13 VITALS
OXYGEN SATURATION: 99 % | SYSTOLIC BLOOD PRESSURE: 167 MMHG | DIASTOLIC BLOOD PRESSURE: 92 MMHG | RESPIRATION RATE: 16 BRPM | HEART RATE: 79 BPM | TEMPERATURE: 98.2 F | BODY MASS INDEX: 36.37 KG/M2 | HEIGHT: 68 IN | WEIGHT: 240 LBS

## 2022-03-13 DIAGNOSIS — S80.02XA CONTUSION OF LEFT KNEE, INITIAL ENCOUNTER: Primary | ICD-10-CM

## 2022-03-13 PROCEDURE — 96372 THER/PROPH/DIAG INJ SC/IM: CPT

## 2022-03-13 PROCEDURE — 90715 TDAP VACCINE 7 YRS/> IM: CPT | Performed by: EMERGENCY MEDICINE

## 2022-03-13 PROCEDURE — 6360000002 HC RX W HCPCS: Performed by: EMERGENCY MEDICINE

## 2022-03-13 PROCEDURE — 6370000000 HC RX 637 (ALT 250 FOR IP): Performed by: EMERGENCY MEDICINE

## 2022-03-13 PROCEDURE — 90471 IMMUNIZATION ADMIN: CPT | Performed by: EMERGENCY MEDICINE

## 2022-03-13 PROCEDURE — 99283 EMERGENCY DEPT VISIT LOW MDM: CPT

## 2022-03-13 PROCEDURE — 73560 X-RAY EXAM OF KNEE 1 OR 2: CPT

## 2022-03-13 RX ORDER — ACETAMINOPHEN 500 MG
1000 TABLET ORAL ONCE
Status: COMPLETED | OUTPATIENT
Start: 2022-03-13 | End: 2022-03-13

## 2022-03-13 RX ADMIN — TETANUS TOXOID, REDUCED DIPHTHERIA TOXOID AND ACELLULAR PERTUSSIS VACCINE, ADSORBED 0.5 ML: 5; 2.5; 8; 8; 2.5 SUSPENSION INTRAMUSCULAR at 05:33

## 2022-03-13 RX ADMIN — ACETAMINOPHEN 1000 MG: 500 TABLET ORAL at 05:33

## 2022-03-13 ASSESSMENT — PAIN - FUNCTIONAL ASSESSMENT: PAIN_FUNCTIONAL_ASSESSMENT: 0-10

## 2022-03-13 ASSESSMENT — PAIN DESCRIPTION - LOCATION: LOCATION: KNEE

## 2022-03-13 ASSESSMENT — PAIN SCALES - GENERAL: PAINLEVEL_OUTOF10: 6

## 2022-03-13 ASSESSMENT — PAIN DESCRIPTION - FREQUENCY: FREQUENCY: CONTINUOUS

## 2022-03-13 ASSESSMENT — PAIN DESCRIPTION - PAIN TYPE: TYPE: ACUTE PAIN

## 2022-03-13 ASSESSMENT — PAIN DESCRIPTION - DESCRIPTORS: DESCRIPTORS: PRESSURE

## 2022-03-13 ASSESSMENT — PAIN DESCRIPTION - ORIENTATION: ORIENTATION: LEFT

## 2022-03-13 NOTE — ED PROVIDER NOTES
Emergency Department Attending Note    Gloria Coates MD    Date of ED VIsit: 3/13/2022    CHIEF COMPLAINT  Knee Injury (fall on Thursday landing on left knee)      HISTORY OF PRESENT ILLNESS  Fabiana Coleman is a 42 y.o. female  With Vital signs of LMP 04/01/2019  who presents to the ED with a complaint of left knee pain. Patient seen and evaluated in room 6. Patient states that she was walking on Thursday carrying her grandchild when she apparently tripped and started to fall and then wrapped her retrogram child in her arms and had took the brunt of the fall on the left knee. She has had pain in the left knee since then there is been swelling as well she is elevated and iced it and the swelling has not gone down and the pain has not reduced and is what prompted her to come into the emergency department for evaluation. She walked into the room on its own albeit with a limp. Distally she is neurovascularly intact she can lift her leg straight off the bed without any problems. .  No other complaints, modifying factors or associated symptoms.   No other sites of injury    Patients Past medical history reviewed and listed below  Past Medical History:   Diagnosis Date    Anemia     Back pain     Chest pain 6/2014    stress myoview normal    Chronic pain syndrome 2017    Dr. Bea Longo pain management    Fibroid uterus     fibroid uterus ad abnormal uterine bleeding with uterine fibroids    GERD (gastroesophageal reflux disease)     Hypertension     Menopausal symptoms     Overactive bladder     Pelvic pain     Right lateral epicondylitis     Rotator cuff syndrome of right shoulder     Stress incontinence     Tachycardia     on coreg     Past Surgical History:   Procedure Laterality Date    DILATION AND CURETTAGE      DILATION AND CURETTAGE OF UTERUS N/A 4/16/2019    DIAGNOSTIC LAPAROSCOPY, DILATATION AND CURETTAGE, HYSTEROSCOPY, WITH MYOSURE AND NOVASURE ENDOMETRIAL ABLATION performed by Tootie NIXON Satish Garsia DO at [address], VAGINAL N/A 2020    LAPAROSCOPIC ASSISTED VAGINAL HYSTERECTOMY, BILATERAL SALPINGECTOMY, POSSIBLE CYSTOSCOPY performed by Luanne Harding DO at [facility] Right 2009    LAPAROSCOPY      TONSILLECTOMY      TONSILLECTOMY AND ADENOIDECTOMY      as a child    TUBAL LIGATION      US BREAST NEEDLE BIOPSY LEFT Left 2021    US BREAST NEEDLE BIOPSY LEFT 2021 Paulo Garcia MD [address] [facility]       I have reviewed the following from the nursing documentation.     Family History   Problem Relation Age of Onset    Heart Disease Mother     Heart Disease Maternal Grandmother     Diabetes Maternal Grandmother     Ovarian Cancer Maternal Grandmother     Heart Failure Maternal Grandfather     High Blood Pressure Maternal Grandfather     High Blood Pressure Sister     Bipolar Disorder Sister     No Known Problems Maternal Aunt     High Blood Pressure Maternal Uncle     High Blood Pressure Maternal Uncle      Social History     Socioeconomic History    Marital status:      Spouse name: Not on file    Number of children: Not on file    Years of education: Not on file    Highest education level: Not on file   Occupational History    Not on file   Tobacco Use    Smoking status: Former Smoker     Packs/day: 1.00     Years: 10.00     Pack years: 10.00     Types: Cigarettes     Quit date: 2014     Years since quittin.7    Smokeless tobacco: Never Used   Vaping Use    Vaping Use: Never used   Substance and Sexual Activity    Alcohol use: No    Drug use: No    Sexual activity: Yes     Partners: Male   Other Topics Concern    Not on file   Social History Narrative    Not on file     Social Determinants of Health     Financial Resource Strain:     Difficulty of Paying Living Expenses: Not on file   Food Insecurity:     Worried About Running Out of Food in the Last Year: Not on file    [address] in the Last Year: Not on file   Transportation Needs:     Lack of Transportation (Medical): Not on file    Lack of Transportation (Non-Medical): Not on file   Physical Activity:     Days of Exercise per Week: Not on file    Minutes of Exercise per Session: Not on file   Stress:     Feeling of Stress : Not on file   Social Connections:     Frequency of Communication with Friends and Family: Not on file    Frequency of Social Gatherings with Friends and Family: Not on file    Attends [employer] Services: Not on file    Active Member of [address] [place] [address] [employer] or Organizations: Not on file    Attends Club or Organization Meetings: Not on file    Marital Status: Not on file   Intimate Partner Violence:     Fear of Current or Ex-Partner: Not on file    Emotionally Abused: Not on file    Physically Abused: Not on file    Sexually Abused: Not on file   Housing Stability:     Unable to Pay for Housing in the Last Year: Not on file    Number of [place] in the Last Year: Not on file    Unstable Housing in the Last Year: Not on file     No current facility-administered medications for this encounter.      Current Outpatient Medications   Medication Sig Dispense Refill    albuterol sulfate HFA (PROVENTIL HFA) 108 (90 Base) MCG/ACT inhaler Inhale 2 puffs into the lungs every 6 hours as needed for Wheezing 18 g 3    albuterol sulfate HFA (VENTOLIN HFA) 108 (90 Base) MCG/ACT inhaler Inhale 2 puffs into the lungs 4 times daily as needed for Wheezing or Shortness of Breath 18 g 0    carvedilol (COREG) 6.25 MG tablet TAKE ONE TABLET BY MOUTH TWICE A  tablet 0    Multiple Vitamins-Calcium (ONE-A-DAY WOMENS FORMULA PO) Take 1 tablet by mouth daily      omeprazole (PRILOSEC) 10 MG delayed release capsule Take 20 mg by mouth daily        Allergies   Allergen Reactions    Penicillins Anaphylaxis and Hives       REVIEW OF SYSTEMS  10 systems reviewed, pertinent positives per HPI otherwise noted to be negative     PHYSICAL EXAM  LMP 04/01/2019   GENERAL APPEARANCE: Awake and alert. Cooperative. In mild to moderate distress. HEAD: Normocephalic. Atraumatic. EYES: PERRL. EOM's grossly intact. ENT: Mucous membranes are pink and moist.   NECK: Supple. HEART: RRR. No murmurs. LUNGS: Respirations unlabored. CTAB. Good air exchange. ABDOMEN: Soft. Non-distended. Non-tender. No masses. No organomegaly. No guarding or rebound. EXTREMITIES: No peripheral edema. Moves all extremities equally. All extremities neurovascularly intact. Patient has an abrasion over the left knee. She has pain in the knee when I tap on her shin  SKIN: Warm and dry. No acute rashes. NEUROLOGICAL: Alert and oriented. She is neurologically intact distally on that left leg. Strength 5/5, sensation intact. Gait normal.   PSYCHIATRIC: Normal mood and affect. No HI or SI expressed to me. RADIOLOGY    If acquired see below     EKG:     If acquired see below       ED COURSE/MDM    With no fractures I think the patient probably has soft tissue injury or contusion of the knee. She will be placed in an Ace wrap and given a knee immobilizer for comfort and advised to take that off as the pain gets better. She was advised also continue with ice treatment and Tylenol or Motrin for pain    ED Course as of 03/13/22 0550   Sun Mar 13, 2022   0547 FINDINGS:  There is no evidence of acute fracture or dislocation. There is normal bone  mineralization. There is normal alignment. There is no joint effusion. There is no focal soft tissue swelling.     IMPRESSION:  No acute osseous abnormality. [DL]      ED Course User Index  [DL] Annalisa Alaniz MD       The ED course and plan were reviewed and results discussed with the patient    The patient understood and agreed with the Discharge/transfer planning.     CLINICAL IMPRESSION and DISPOSITION    Ousmane Urbano was stable and diagnosed with knee contusion    Patient was treated with Ace wrap Tylenol, tetanus, knee immobilizer       Sofia Torres MD  03/13/22 5792

## 2022-04-05 DIAGNOSIS — I10 ESSENTIAL HYPERTENSION: ICD-10-CM

## 2022-04-05 DIAGNOSIS — R00.0 TACHYCARDIA: ICD-10-CM

## 2022-04-06 ENCOUNTER — OFFICE VISIT (OUTPATIENT)
Dept: FAMILY MEDICINE CLINIC | Age: 47
End: 2022-04-06
Payer: COMMERCIAL

## 2022-04-06 VITALS
OXYGEN SATURATION: 98 % | WEIGHT: 254 LBS | HEART RATE: 70 BPM | BODY MASS INDEX: 38.49 KG/M2 | HEIGHT: 68 IN | DIASTOLIC BLOOD PRESSURE: 88 MMHG | SYSTOLIC BLOOD PRESSURE: 132 MMHG

## 2022-04-06 DIAGNOSIS — K21.9 GASTROESOPHAGEAL REFLUX DISEASE WITHOUT ESOPHAGITIS: ICD-10-CM

## 2022-04-06 DIAGNOSIS — M25.572 PAIN AND SWELLING OF LEFT ANKLE: ICD-10-CM

## 2022-04-06 DIAGNOSIS — I10 PRIMARY HYPERTENSION: Primary | ICD-10-CM

## 2022-04-06 DIAGNOSIS — Z13.21 ENCOUNTER FOR VITAMIN DEFICIENCY SCREENING: ICD-10-CM

## 2022-04-06 DIAGNOSIS — R00.0 TACHYCARDIA: ICD-10-CM

## 2022-04-06 DIAGNOSIS — M25.472 PAIN AND SWELLING OF LEFT ANKLE: ICD-10-CM

## 2022-04-06 DIAGNOSIS — Z13.220 SCREENING CHOLESTEROL LEVEL: ICD-10-CM

## 2022-04-06 DIAGNOSIS — M25.562 CHRONIC PAIN OF LEFT KNEE: ICD-10-CM

## 2022-04-06 DIAGNOSIS — G89.29 CHRONIC PAIN OF LEFT KNEE: ICD-10-CM

## 2022-04-06 LAB
A/G RATIO: 1.9 (ref 1.1–2.2)
ALBUMIN SERPL-MCNC: 4.3 G/DL (ref 3.4–5)
ALP BLD-CCNC: 55 U/L (ref 40–129)
ALT SERPL-CCNC: 22 U/L (ref 10–40)
ANION GAP SERPL CALCULATED.3IONS-SCNC: 12 MMOL/L (ref 3–16)
AST SERPL-CCNC: 14 U/L (ref 15–37)
BANDED NEUTROPHILS RELATIVE PERCENT: 3 % (ref 0–7)
BASOPHILS ABSOLUTE: 0 K/UL (ref 0–0.2)
BASOPHILS RELATIVE PERCENT: 0 %
BILIRUB SERPL-MCNC: 0.3 MG/DL (ref 0–1)
BUN BLDV-MCNC: 15 MG/DL (ref 7–20)
CALCIUM SERPL-MCNC: 9 MG/DL (ref 8.3–10.6)
CHLORIDE BLD-SCNC: 103 MMOL/L (ref 99–110)
CHOLESTEROL, TOTAL: 177 MG/DL (ref 0–199)
CO2: 22 MMOL/L (ref 21–32)
CREAT SERPL-MCNC: 0.7 MG/DL (ref 0.6–1.1)
EOSINOPHILS ABSOLUTE: 0.1 K/UL (ref 0–0.6)
EOSINOPHILS RELATIVE PERCENT: 1 %
GFR AFRICAN AMERICAN: >60
GFR NON-AFRICAN AMERICAN: >60
GLUCOSE BLD-MCNC: 91 MG/DL (ref 70–99)
HCT VFR BLD CALC: 42.4 % (ref 36–48)
HDLC SERPL-MCNC: 40 MG/DL (ref 40–60)
HEMOGLOBIN: 14.2 G/DL (ref 12–16)
LDL CHOLESTEROL CALCULATED: 109 MG/DL
LYMPHOCYTES ABSOLUTE: 2.3 K/UL (ref 1–5.1)
LYMPHOCYTES RELATIVE PERCENT: 28 %
MCH RBC QN AUTO: 29.1 PG (ref 26–34)
MCHC RBC AUTO-ENTMCNC: 33.5 G/DL (ref 31–36)
MCV RBC AUTO: 86.7 FL (ref 80–100)
METAMYELOCYTES RELATIVE PERCENT: 3 %
MONOCYTES ABSOLUTE: 0.8 K/UL (ref 0–1.3)
MONOCYTES RELATIVE PERCENT: 10 %
MYELOCYTE PERCENT: 3 %
NEUTROPHILS ABSOLUTE: 4.9 K/UL (ref 1.7–7.7)
NEUTROPHILS RELATIVE PERCENT: 52 %
PDW BLD-RTO: 14.1 % (ref 12.4–15.4)
PLATELET # BLD: 224 K/UL (ref 135–450)
PMV BLD AUTO: 8.8 FL (ref 5–10.5)
POTASSIUM SERPL-SCNC: 4.6 MMOL/L (ref 3.5–5.1)
RBC # BLD: 4.89 M/UL (ref 4–5.2)
SLIDE REVIEW: ABNORMAL
SODIUM BLD-SCNC: 137 MMOL/L (ref 136–145)
TOTAL PROTEIN: 6.6 G/DL (ref 6.4–8.2)
TRIGL SERPL-MCNC: 138 MG/DL (ref 0–150)
VITAMIN D 25-HYDROXY: 37.9 NG/ML
VLDLC SERPL CALC-MCNC: 28 MG/DL
WBC # BLD: 8.1 K/UL (ref 4–11)

## 2022-04-06 PROCEDURE — 99214 OFFICE O/P EST MOD 30 MIN: CPT | Performed by: NURSE PRACTITIONER

## 2022-04-06 RX ORDER — CARVEDILOL 6.25 MG/1
TABLET ORAL
Qty: 180 TABLET | Refills: 1 | Status: SHIPPED | OUTPATIENT
Start: 2022-04-06 | End: 2022-09-26

## 2022-04-06 RX ORDER — OMEPRAZOLE 40 MG/1
40 CAPSULE, DELAYED RELEASE ORAL
Qty: 30 CAPSULE | Refills: 1 | Status: SHIPPED | OUTPATIENT
Start: 2022-04-06 | End: 2022-05-26 | Stop reason: ALTCHOICE

## 2022-04-06 RX ORDER — CARVEDILOL 6.25 MG/1
TABLET ORAL
Qty: 180 TABLET | Refills: 0 | OUTPATIENT
Start: 2022-04-06

## 2022-04-06 RX ORDER — HYDROCHLOROTHIAZIDE 12.5 MG/1
12.5 CAPSULE, GELATIN COATED ORAL EVERY MORNING
Qty: 30 CAPSULE | Refills: 1 | Status: SHIPPED | OUTPATIENT
Start: 2022-04-06 | End: 2022-06-01

## 2022-04-06 SDOH — ECONOMIC STABILITY: FOOD INSECURITY: WITHIN THE PAST 12 MONTHS, THE FOOD YOU BOUGHT JUST DIDN'T LAST AND YOU DIDN'T HAVE MONEY TO GET MORE.: NEVER TRUE

## 2022-04-06 SDOH — ECONOMIC STABILITY: FOOD INSECURITY: WITHIN THE PAST 12 MONTHS, YOU WORRIED THAT YOUR FOOD WOULD RUN OUT BEFORE YOU GOT MONEY TO BUY MORE.: NEVER TRUE

## 2022-04-06 ASSESSMENT — ENCOUNTER SYMPTOMS
ABDOMINAL PAIN: 0
BLOOD IN STOOL: 0
NAUSEA: 0
RESPIRATORY NEGATIVE: 1
ALLERGIC/IMMUNOLOGIC NEGATIVE: 1
EYES NEGATIVE: 1
ANAL BLEEDING: 0
SHORTNESS OF BREATH: 0
GASTROINTESTINAL NEGATIVE: 1

## 2022-04-06 ASSESSMENT — SOCIAL DETERMINANTS OF HEALTH (SDOH): HOW HARD IS IT FOR YOU TO PAY FOR THE VERY BASICS LIKE FOOD, HOUSING, MEDICAL CARE, AND HEATING?: NOT HARD AT ALL

## 2022-04-06 NOTE — PROGRESS NOTES
[address] [facility] [place]  [address] [facility] [address]  Dept: [phone]  Dept Fax: [phone]  Loc: [phone]    Mila Morales is a 42 y.o. female who presents today for her medical conditions/complaints as noted below. Mila Morales is c/o of Hypertension (Patient has not been monitoring her BP. Denies edema, SOB or chest pains)       Subjective:     Chief Complaint   Patient presents with    Hypertension     Patient has not been monitoring her BP. Denies edema, SOB or chest pains       HPI  Hypertension:    The patient is here for routine follow up on HTN. Patient denies chest pain, shortness of breath, headache, lightheadedness, blurred vision, , palpitations, dry cough  + fatigue and intermittent dizziness and mild swelling of ankles at end of day   She is adherent to a low sodium diet. Patient denies antihypertensive medication side effects of: fatigue, dry cough, swelling in ankles, weakness, orthostatic lightheadedness, myalgias, rash, nausea, abdominal discomfort, headaches, insomnia, weight gain, palpitations, slow heart rate, excessive urination, depression, and wheezing. She is not checking her BP outside of the office. Sodium (mmol/L)   Date Value   04/17/2021 136    BUN (mg/dL)   Date Value   04/17/2021 14    Glucose (mg/dL)   Date Value   04/17/2021 137 (H)      Potassium reflex Magnesium (mmol/L)   Date Value   04/17/2021 4.0    CREATININE (mg/dL)   Date Value   04/17/2021 0.6         BP Readings from Last 3 Encounters:   04/06/22 132/88   03/13/22 (!) 167/92   01/04/22 (!) 143/97       GERD  Nneka is here for follow up of heartburn. Symptoms have been present for awhile and is a chronic condition. Currently treated with proton pump inhibitor: omeprazole 20 mg bid OTC, which has been effective.    Patient denies dysphagia, cough, hoarseness, chest pain, unintentional weight loss, N/V, bloating, early satiety, abdominal pain or melena, hematochezia, hematemesis, and coffee ground emesis. .    Acid was coming up into throat when taking the 20 mg once daily. Since changing to 20 mg bid she has not had any symptoms. The patient had a fall in March 2022 and went to the emergency room. She was diagnosed with contusion of the left knee and left ankle strain. Patient continues to have pain and swelling of her left ankle. She continues to have pain in her left knee with occasional giving out. Patient is requesting a referral to orthopedist    Past Medical History:   Diagnosis Date    Anemia     Back pain     Chest pain 6/2014    stress myoview normal    Chronic pain syndrome 2017    Dr. Mohamud Horton pain management    Fibroid uterus     fibroid uterus ad abnormal uterine bleeding with uterine fibroids    GERD (gastroesophageal reflux disease)     Hypertension     Menopausal symptoms     Overactive bladder     Pelvic pain     Right lateral epicondylitis     Rotator cuff syndrome of right shoulder     Stress incontinence     Tachycardia     on coreg         Review of Systems   Constitutional: Negative. Negative for appetite change, fatigue and unexpected weight change. HENT: Negative. Eyes: Negative. Respiratory: Negative. Negative for shortness of breath. Cardiovascular: Negative. Negative for chest pain, palpitations and leg swelling. Gastrointestinal: Negative. Negative for abdominal pain, anal bleeding, blood in stool and nausea. Endocrine: Negative. Genitourinary: Negative. Negative for hematuria. Musculoskeletal: Negative. Skin: Negative. Negative for rash. Allergic/Immunologic: Negative. Neurological: Negative. Negative for dizziness, syncope, light-headedness and numbness. Hematological: Negative. Does not bruise/bleed easily. Psychiatric/Behavioral: Negative. All other systems reviewed and are negative.        Past Medical History:   Diagnosis Date    Anemia     Back pain     Chest pain 6/2014    stress myoview normal    Chronic pain syndrome 2017    Dr. Bishop Giang pain management    Fibroid uterus     fibroid uterus ad abnormal uterine bleeding with uterine fibroids    GERD (gastroesophageal reflux disease)     Hypertension     Menopausal symptoms     Overactive bladder     Pelvic pain     Right lateral epicondylitis     Rotator cuff syndrome of right shoulder     Stress incontinence     Tachycardia     on coreg     Family History   Problem Relation Age of Onset    Heart Disease Mother     Heart Disease Maternal Grandmother     Diabetes Maternal Grandmother     Ovarian Cancer Maternal Grandmother     Heart Failure Maternal Grandfather     High Blood Pressure Maternal Grandfather     High Blood Pressure Sister     Bipolar Disorder Sister     No Known Problems Maternal Aunt     High Blood Pressure Maternal Uncle     High Blood Pressure Maternal Uncle      Past Surgical History:   Procedure Laterality Date    DILATION AND CURETTAGE      DILATION AND CURETTAGE OF UTERUS N/A 4/16/2019    DIAGNOSTIC LAPAROSCOPY, DILATATION AND CURETTAGE, HYSTEROSCOPY, WITH MYOSURE AND NOVASURE ENDOMETRIAL ABLATION performed by Nabil Hilario DO at [address] [place] [address], VAGINAL N/A 6/4/2020    LAPAROSCOPIC ASSISTED VAGINAL HYSTERECTOMY, BILATERAL SALPINGECTOMY, POSSIBLE CYSTOSCOPY performed by Nabil H[facility]io DO at [facility] Right 2009    LAPAROSCOPY      TONSILLECTOMY      TONSILLECTOMY AND ADENOIDECTOMY      as a child    TUBAL LIGATION      US BREAST NEEDLE BIOPSY LEFT Left 8/12/2021    US BREAST NEEDLE BIOPSY LEFT 8/12/2021 Fidelia Loya MD [facility]     Social History     Socioeconomic History    Marital status:      Spouse name: Not on file    Number of children: Not on file    Years of education: Not on file    Highest education level: Not on file   Occupational History    Not on file   Tobacco Use    Smoking status: Former Smoker     Packs/day: 1.00     Years: 10.00     Pack years: 10.00     Types: Cigarettes     Quit date: 2014     Years since quittin.8    Smokeless tobacco: Never Used   Vaping Use    Vaping Use: Never used   Substance and Sexual Activity    Alcohol use: No    Drug use: No    Sexual activity: Yes     Partners: Male   Other Topics Concern    Not on file   Social History Narrative    Not on file     Social Determinants of Health     Financial Resource Strain: Low Risk     Difficulty of Paying Living Expenses: Not hard at all   Food Insecurity: No Food Insecurity    Worried About [address] in the Last Year: Never true    [address] in the Last Year: Never true   Transportation Needs:     Lack of Transportation (Medical): Not on file    Lack of Transportation (Non-Medical):  Not on file   Physical Activity:     Days of Exercise per Week: Not on file    Minutes of Exercise per Session: Not on file   Stress:     Feeling of Stress : Not on file   Social Connections:     Frequency of Communication with Friends and Family: Not on file    Frequency of Social Gatherings with Friends and Family: Not on file    Attends Gnosticism Services: Not on file    Active Member of [address] [place] [address] or Organizations: Not on file    Attends Club or Organization Meetings: Not on file    Marital Status: Not on file   Intimate Partner Violence:     Fear of Current or Ex-Partner: Not on file    Emotionally Abused: Not on file    Physically Abused: Not on file    Sexually Abused: Not on file   Housing Stability:     Unable to Pay for Housing in the Last Year: Not on file    Number of [place] in the Last Year: Not on file    Unstable Housing in the Last Year: Not on file     Current Outpatient Medications   Medication Sig Dispense Refill    carvedilol (COREG) 6.25 MG tablet TAKE ONE TABLET BY MOUTH TWICE A  tablet 0    Multiple Vitamins-Calcium (ONE-A-DAY WOMENS FORMULA PO) Take 1 tablet by mouth daily      omeprazole (PRILOSEC) 10 MG delayed release capsule Take 20 mg by mouth daily       albuterol sulfate HFA (PROVENTIL HFA) 108 (90 Base) MCG/ACT inhaler Inhale 2 puffs into the lungs every 6 hours as needed for Wheezing (Patient not taking: Reported on 4/6/2022) 18 g 3    albuterol sulfate HFA (VENTOLIN HFA) 108 (90 Base) MCG/ACT inhaler Inhale 2 puffs into the lungs 4 times daily as needed for Wheezing or Shortness of Breath (Patient not taking: Reported on 4/6/2022) 18 g 0     No current facility-administered medications for this visit. No changes in past medical history, past surgical history, social history, orfamily history were noted during the patient encounter unless specifically listed above. All updates of past medical history, past surgical history, social history, or family history were reviewed personally by me duringthe office visit. All problems listed in the assessment are stable unless noted otherwise. Medication profile reviewed personally by me during the office visit. Medication side effects and possible impairments frommedications were discussed as applicable. Objective:     Physical Exam  Vitals and nursing note reviewed. Constitutional:       General: She is not in acute distress. Appearance: Normal appearance. She is well-developed. She is obese. HENT:      Head: Normocephalic and atraumatic. Right Ear: Tympanic membrane, ear canal and external ear normal.      Left Ear: Tympanic membrane, ear canal and external ear normal.      Nose: Nose normal.      Mouth/Throat:      Pharynx: Uvula midline. No oropharyngeal exudate. Eyes:      General: Lids are normal.      Conjunctiva/sclera: Conjunctivae normal.      Pupils: Pupils are equal, round, and reactive to light. Neck:      Thyroid: No thyromegaly. Vascular: No carotid bruit or JVD.    Cardiovascular: Rate and Rhythm: Normal rate and regular rhythm. Pulses: Normal pulses. Radial pulses are 2+ on the right side and 2+ on the left side. Dorsalis pedis pulses are 2+ on the right side and 2+ on the left side. Posterior tibial pulses are 2+ on the right side and 2+ on the left side. Heart sounds: Normal heart sounds. No murmur heard. No friction rub. No gallop. Pulmonary:      Effort: Pulmonary effort is normal.      Breath sounds: Normal breath sounds. Abdominal:      General: Bowel sounds are normal.      Palpations: Abdomen is soft. There is no mass. Tenderness: There is no abdominal tenderness. Musculoskeletal:      Cervical back: Normal range of motion and neck supple. Left knee: No swelling or erythema. Tenderness present. Left ankle: Swelling present. Tenderness present. Decreased range of motion. Lymphadenopathy:      Head:      Right side of head: No submandibular adenopathy. Left side of head: No submandibular adenopathy. Cervical: No cervical adenopathy. Skin:     General: Skin is warm and dry. Findings: No lesion or rash. Neurological:      Mental Status: She is alert and oriented to person, place, and time. Gait: Gait normal.   Psychiatric:         Speech: Speech normal.         Behavior: Behavior normal.         Thought Content: Thought content normal.         Judgment: Judgment normal.         /88 (Site: Left Upper Arm, Position: Sitting, Cuff Size: Large Adult)   Pulse 70   Ht 5' 8\" (1.727 m)   Wt 254 lb (115.2 kg)   LMP 04/01/2019   SpO2 98%   BMI 38.62 kg/m²   Body mass index is 38.62 kg/m². BP Readings from Last 2 Encounters:   04/06/22 132/88   03/13/22 (!) 167/92       Wt Readings from Last 3 Encounters:   04/06/22 254 lb (115.2 kg)   03/13/22 240 lb (108.9 kg)   01/04/22 240 lb (108.9 kg)       Lab Review   No visits with results within 2 Month(s) from this visit.    Latest known visit with results is:   Abstract on 12/29/2021   Component Date Value    SARS-CoV-2 12/29/2021 Detected*       No results found for this visit on 04/06/22. Assessment:       1. Primary hypertension    2. Tachycardia    3. Gastroesophageal reflux disease without esophagitis    4. Pain and swelling of left ankle    5. Chronic pain of left knee    6. Encounter for vitamin deficiency screening    7. Screening cholesterol level        No results found for this visit on 04/06/22. Plan:       [place] [facility] [facility] today for hypertension. Diagnoses and all orders for this visit:    Primary hypertension  -     CBC with Auto Differential  -     Comprehensive Metabolic Panel  Patient is not doing well on current medication regimen  Continue the following:  -     carvedilol (COREG) 6.25 MG tablet; TAKE ONE TABLET BY MOUTH TWICE A DAY  Start/Add the following medication:  -     hydroCHLOROthiazide (MICROZIDE) 12.5 MG capsule; Take 1 capsule by mouth every morning  You should monitor your blood pressure closely at home, then call or send a "[employer] message in 1-2 weeks to report the results. Tachycardia  Condition appears stable with current medication regimen. No reported or noted side effects of medication. Will continue to monitor at routine intervals as appropriate. Continue current medications as follows:  -     carvedilol (COREG) 6.25 MG tablet; TAKE ONE TABLET BY MOUTH TWICE A DAY    Gastroesophageal reflux disease without esophagitis. Patient not at goal on current medication regimen. Will increase medication to the following:  -     omeprazole (PRILOSEC) 40 MG delayed release capsule;  Take 1 capsule by mouth every morning (before breakfast)    Pain and swelling of left ankle  -     Brandee Orellana MD, Orthopedic Surgery (Foot, Ankle), [place]    Chronic pain of left knee  -     Brandee Orellana MD, Orthopedic Surgery (Foot, Ankle), [place]    Encounter for vitamin deficiency screening  - Vitamin D 25 Hydroxy    Screening cholesterol level  -     Lipid Panel    she will follow-up in office in 4 to 6 weeks to see how her blood pressure and her acid reflux is doing. She will also have a BMP at that time since I am starting her on hydrochlorothiazide    Patient has been instructed call the office immediately with new symptoms, change in symptoms or worseningof symptoms. If this is not feasible, patient is instructed to report to the emergency room. Medication profile reviewed. Medication side effects and possible impairments from medications were discussed as applicable. Allergies were reviewed. Health maintenance was reviewed and updated as appropriate. (Comment: Please note this report has been produced using a combination of typing and speech recognition software and may contain errors related to that system including errors in grammar, punctuation, and spelling, as well as words and phrases that may be inappropriate.  If there are any questions or concerns please feel free to contact the dictating provider for clarification.)

## 2022-04-08 DIAGNOSIS — R79.89 ABNORMAL CBC: Primary | ICD-10-CM

## 2022-04-19 ENCOUNTER — OFFICE VISIT (OUTPATIENT)
Dept: ORTHOPEDIC SURGERY | Age: 47
End: 2022-04-19
Payer: COMMERCIAL

## 2022-04-19 VITALS — HEIGHT: 68 IN | BODY MASS INDEX: 38.49 KG/M2 | WEIGHT: 254 LBS

## 2022-04-19 DIAGNOSIS — S93.402A SPRAIN OF LEFT ANKLE, UNSPECIFIED LIGAMENT, INITIAL ENCOUNTER: Primary | ICD-10-CM

## 2022-04-19 DIAGNOSIS — S80.02XA CONTUSION OF LEFT KNEE, INITIAL ENCOUNTER: ICD-10-CM

## 2022-04-19 PROCEDURE — 99203 OFFICE O/P NEW LOW 30 MIN: CPT | Performed by: ORTHOPAEDIC SURGERY

## 2022-04-19 NOTE — PROGRESS NOTES
[facility]  Outpatient Progress Note  Sujatha Moulton MD    Patient Name: Elena Velazquez MRN: [id number]   Age: 42 y.o. YOB: 1975   Sex: female      [address] Complaint   Patient presents with    Ankle Pain     lt ankle  pain fall tripped on concrete, staying swollen 3 weeks ago no imaging doi 3/23/22    Knee Pain     lt knee cannot kneel  swelling fall  seen [facility] er xrays 3/ 13/2022        HISTORY OF PRESENT ILLNESS   Elena Velazquez is a 42 y.o. female referred by Dr. Rufino Fernandez for orthopedic consultation regarding left knee and left ankle injury. The patient tripped and fell about a month ago while carrying her grandchild and fell onto her left knee and twisted the ankle and has had pain since. She was seen in the emergency department x-rays of the knee were negative. No x-rays were taken of the ankle.     Pain Assessment  Location of Pain: Knee  Location Modifiers: Left  Severity of Pain: 6  Quality of Pain: Aching  Duration of Pain: Persistent  Frequency of Pain: Intermittent  Date Pain First Started: 03/13/22  Aggravating Factors: Standing,Walking,Exercise,Bending,Stairs  Limiting Behavior: Yes  Relieving Factors: Rest,Nsaids  Result of Injury: Yes    PAST MEDICAL HISTORY      Past Medical History:   Diagnosis Date    Anemia     Back pain     Chest pain 6/2014    stress myoview normal    Chronic pain syndrome 2017    Dr. Clayton Santana pain management    Fibroid uterus     fibroid uterus ad abnormal uterine bleeding with uterine fibroids    GERD (gastroesophageal reflux disease)     Hypertension     Menopausal symptoms     Overactive bladder     Pelvic pain     Right lateral epicondylitis     Rotator cuff syndrome of right shoulder     Stress incontinence     Tachycardia     on coreg       PAST SURGICAL HISTORY     Past Surgical History:   Procedure Laterality Date    DILATION AND CURETTAGE      DILATION AND CURETTAGE OF children: Not on file    Years of education: Not on file    Highest education level: Not on file   Occupational History    Not on file   Tobacco Use    Smoking status: Former Smoker     Packs/day: 1.00     Years: 10.00     Pack years: 10.00     Types: Cigarettes     Quit date: 2014     Years since quittin.8    Smokeless tobacco: Never Used   Vaping Use    Vaping Use: Never used   Substance and Sexual Activity    Alcohol use: No    Drug use: No    Sexual activity: Yes     Partners: Male   Other Topics Concern    Not on file   Social History Narrative    Not on file     Social Determinants of Health     Financial Resource Strain: Low Risk     Difficulty of Paying Living Expenses: Not hard at all   Food Insecurity: No Food Insecurity    Worried About [address] in the Last Year: Never true    [address] [employer] in the Last Year: Never true   Transportation Needs:     Lack of Transportation (Medical): Not on file    Lack of Transportation (Non-Medical):  Not on file   Physical Activity:     Days of Exercise per Week: Not on file    Minutes of Exercise per Session: Not on file   Stress:     Feeling of Stress : Not on file   Social Connections:     Frequency of Communication with Friends and Family: Not on file    Frequency of Social Gatherings with Friends and Family: Not on file    Attends Hindu Services: Not on file    Active Member of [address] [place] [address] or Organizations: Not on file    Attends Club or Organization Meetings: Not on file    Marital Status: Not on file   Intimate Partner Violence:     Fear of Current or Ex-Partner: Not on file    Emotionally Abused: Not on file    Physically Abused: Not on file    Sexually Abused: Not on file   Housing Stability:     Unable to Pay for Housing in the Last Year: Not on file    Number of [place] in the Last Year: Not on file    Unstable Housing in the Last Year: Not on file       [address]: no fever, chills, night sweats, anorexia, malaise, fatigue, or weight change  Hematologic:  no unexplained bleeding or bruising  HEENT:   no nasal congestion, rhinorrhea, sore throat, or facial pain  Respiratory:  no cough, dyspnea, or chest pain  Cardiovascular:  no angina, FELIX, PND, orthopnea, dependent edema, or palpitations  Gastrointestinal:  no nausea, vomiting, diarrhea, constipation, or abdominal pain  Genitourinary:  no urinary urgency, frequency, dysuria, or hematuria  Musculoskeletal: see HPI  Endocrine:  no heat or cold intolerance and no polyphagia, polydipsia, or polyuria  Skin:  no skin eruptions or changing lesions  Neurologic:  no focal weakness, numbness/tingling, tremor, or severe headache. See HPI. See HPI for pertinent positives. PHYSICAL EXAM   Vital Signs:   Vitals:    04/19/22 1358   Weight: 254 lb (115.2 kg)   Height: 5' 8\" (1.727 m)       General appearance: healthy, alert, no distress  Skin: Skin color, texture, turgor normal. No rashes or lesions  HEENT: atraumatic, normocephalic. PERRL  Respiratory: Unlabored breathing  Lymphatic: No adenopathy   Neuro: Alert and oriented, normal distal sensation, normal bilateral DTRs  Vascular: Normal distal capillary and distal pulses  Muskuloskeletal Exam: Left ankle examination has no swelling erythema warmth ecchymosis or edema. Mild nonspecific tenderness about the lateral ligament complex is noted. Range of motion is without restriction. There is no ligament instability. Left knee examination reveals a resolving hematoma in the proximal medial tibia which is slightly tender to palpation. No effusion joint line tenderness crepitation or restriction in motion is noted in the knee. The knee is stable ligamentous exam    RADIOLOGY   X-rays obtained and reviewed in office:  Views left knee x-rays taken in the emergency department 4 weeks ago were negative for fracture.   Three-view radiographs of the left ankle taken in the office today are normal        IMPRESSION     1. Sprain of left ankle, unspecified ligament, initial encounter    2. Contusion of left knee, initial encounter         PLAN   I had a lengthy discussion with patient today regarding diagnosis and treatment options and recommendations. Would anticipate both injuries to continue to heal with conservative measures. FOLLOWUP     Return if symptoms worsen or fail to improve. Orders Placed This Encounter   Procedures    XR ANKLE LEFT (MIN 3 VIEWS)     Standing Status:   Future     Number of Occurrences:   1     Standing Expiration Date:   4/18/2023     Order Specific Question:   Reason for exam:     Answer:   pain      No orders of the defined types were placed in this encounter.       Patient was instructed on appropriate use of braces, participation in home exercise programs, healthy lifestyle choices and weight loss as appropriate     Opal Lyn MD

## 2022-04-19 NOTE — PATIENT INSTRUCTIONS
Patient Education        Ankle Sprain: Rehab Exercises  Introduction  Here are some examples of exercises for you to try. The exercises may be suggested for a condition or for rehabilitation. Start each exercise slowly. Ease off the exercises if you start to have pain. You will be told when to start these exercises and which ones will work bestfor you. How to do the exercises  'Alphabet' exercise    1. Trace the alphabet with your toe. This helps your ankle move in all directions. Side-to-side knee swing exercise    1. Sit in a chair with your foot flat on the floor. 2. Slowly move your knee from side to side. Keep your foot pressed flat. 3. Continue this exercise for 2 to 3 minutes. Towel curl    1. While sitting, place your foot on a towel on the floor. Scrunch the towel toward you with your toes. 2. Then use your toes to push the towel away from you. 3. To make this exercise more challenging you can put something on the other end of the towel. A can of soup is about the right weight for this. Towel stretch    1. Sit with your legs extended and knees straight. 2. Place a towel around your foot just under the toes. 3. Hold each end of the towel in each hand, with your hands above your knees. 4. Pull back with the towel so that your foot stretches toward you. 5. Hold the position for at least 15 to 30 seconds. 6. Repeat 2 to 4 times a session. Do up to 5 sessions a day. Ankle eversion exercise    1. Start by sitting with your foot flat on the floor. Push your foot outward against a wall or a piece of furniture that doesn't move. Hold for about 6 seconds, and relax. Repeat 8 to 12 times. 2. After you feel comfortable with this, try using rubber tubing looped around the outside of your feet for resistance. Push your foot out to the side against the tubing, and then count to 10 as you slowly bring your foot back to the middle. Repeat 8 to 12 times. Isometric opposition exercises    1.  While sitting, put your feet together flat on the floor. 2. Press your injured foot inward against your other foot. Hold for about 6 seconds, and relax. Repeat 8 to 12 times. 3. Then place the heel of your other foot on top of the injured one. Push down with the top heel while trying to push up with your injured foot. Hold for about 6 seconds, and relax. Repeat 8 to 12 times. Resisted ankle inversion    1. Sit on the floor with your good leg crossed over your other leg. 2. Hold both ends of an exercise band and loop the band around the inside of your affected foot. Then press your other foot against the band. 3. Keeping your legs crossed, slowly push your affected foot against the band so that foot moves away from your other foot. Then slowly relax. 4. Repeat 8 to 12 times. Resisted ankle eversion    1. Sit on the floor with your legs straight. 2. Hold both ends of an exercise band and loop the band around the outside of your affected foot. Then press your other foot against the band. 3. Keeping your leg straight, slowly push your affected foot outward against the band and away from your other foot without letting your leg rotate. Then slowly relax. 4. Repeat 8 to 12 times. Resisted ankle dorsiflexion    1. Tie the ends of an exercise band together to form a loop. Attach one end of the loop to a secure object or shut a door on it to hold it in place. (Or you can have someone hold one end of the loop to provide resistance.)  2. While sitting on the floor or in a chair, loop the other end of the band over the top of your affected foot. 3. Keeping your knee and leg straight, slowly flex your foot to pull back on the exercise band, and then slowly relax. 4. Repeat 8 to 12 times. Single-leg balance    1. Stand on a flat surface with your arms stretched out to your sides like you are making the letter \"T. \" Then lift your good leg off the floor, bending it at the knee.  If you are not steady on your feet, use one hand to hold on to a chair, counter, or wall. 2. Standing on the leg with your affected ankle, keep that knee straight. Try to balance on that leg for up to 30 seconds. Then rest for up to 10 seconds. 3. Repeat 6 to 8 times. 4. When you can balance on your affected leg for 30 seconds with your eyes open, try to balance on it with your eyes closed. 5. When you can do this exercise with your eyes closed for 30 seconds and with ease and no pain, try standing on a pillow or piece of foam, and repeat steps 1 through 4. Follow-up care is a key part of your treatment and safety. Be sure to make and go to all appointments, and call your doctor if you are having problems. It's also a good idea to know your test results and keep alist of the medicines you take. Where can you learn more? Go to https://Secpanelpepiceweb.Jipio. org and sign in to your [employer] account. Enter Allison Khan in the YouLike box to learn more about \"Ankle Sprain: Rehab Exercises. \"     If you do not have an account, please click on the \"Sign Up Now\" link. Current as of: July 1, 2021               Content Version: 13.2  © 2006-2022 [employer]. Care instructions adapted under license by [facility] ([facility]). If you have questions about a medical condition or this instruction, always ask your healthcare professional. [place] [address] any warranty or liability for your use of this information. Patient Education        Knee: Exercises  Introduction  Here are some examples of exercises for you to try. The exercises may be suggested for a condition or for rehabilitation. Start each exercise slowly. Ease off the exercises if you start to have pain. You will be told when to start these exercises and which ones will work bestfor you. How to do the exercises  Quad sets    1. Sit with your leg straight and supported on the floor or a firm bed.  (If you feel discomfort in the front or back of your knee, place a small towel roll under your knee.)  2. Tighten the muscles on top of your thigh by pressing the back of your knee flat down to the floor. (If you feel discomfort under your kneecap, place a small towel roll under your knee.)  3. Hold for about 6 seconds, then rest for up to 10 seconds. 4. Do 8 to 12 repetitions several times a day. Straight-leg raises to the front    1. Lie on your back with your good knee bent so that your foot rests flat on the floor. Your injured leg should be straight. Make sure that your low back has a normal curve. You should be able to slip your flat hand in between the floor and the small of your back, with your palm touching the floor and your back touching the back of your hand. 2. Tighten the thigh muscles in the injured leg by pressing the back of your knee flat down to the floor. Hold your knee straight. 3. Keeping the thigh muscles tight, lift your injured leg up so that your heel is about 12 inches off the floor. Hold for about 6 seconds and then lower slowly. 4. Do 8 to 12 repetitions, 3 times a day. Straight-leg raises to the outside    1. Lie on your side, with your injured leg on top. 2. Tighten the front thigh muscles of your injured leg to keep your knee straight. 3. Keep your hip and your leg straight in line with the rest of your body, and keep your knee pointing forward. Do not drop your hip back. 4. Lift your injured leg straight up toward the ceiling, about 12 inches off the floor. Hold for about 6 seconds, then slowly lower your leg. 5. Do 8 to 12 repetitions. Straight-leg raises to the back    1. Lie on your stomach, and lift your leg straight up behind you (toward the ceiling). 2. Lift your toes about 6 inches off the floor, hold for about 6 seconds, then lower slowly. 3. Do 8 to 12 repetitions. Straight-leg raises to the inside    1. Lie on the side of your body with the injured leg.   2. You can either prop your other (good) leg up on a chair, or you can bend that you squat down like you are going to sit in a chair. Make sure your knees do not go in front of your toes. 3. Lower yourself about 6 inches. Your heels should remain on the floor at all times. 4. Rise slowly to a standing position. Heel raises    1. Stand with your feet a few inches apart, with your hands lightly resting on a counter or chair in front of you. 2. Slowly raise your heels off the floor while keeping your knees straight. 3. Hold for about 6 seconds, then slowly lower your heels to the floor. 4. Do 8 to 12 repetitions several times during the day. Follow-up care is a key part of your treatment and safety. Be sure to make and go to all appointments, and call your doctor if you are having problems. It's also a good idea to know your test results and keep alist of the medicines you take. Where can you learn more? Go to https://monEchelle.Swyft. org and sign in to your [employer] account. Enter W793 in the Histros box to learn more about \"Knee: Exercises. \"     If you do not have an account, please click on the \"Sign Up Now\" link. Current as of: July 1, 2021               Content Version: 13.2  © 2006-2022 [employer]. Care instructions adapted under license by [facility] ([facility]). If you have questions about a medical condition or this instruction, always ask your healthcare professional. [place] [address] any warranty or liability for your use of this information.

## 2022-04-21 ENCOUNTER — TELEPHONE (OUTPATIENT)
Dept: FAMILY MEDICINE CLINIC | Age: 47
End: 2022-04-21

## 2022-04-21 DIAGNOSIS — Z20.822 COVID-19 RULED OUT: Primary | ICD-10-CM

## 2022-04-21 DIAGNOSIS — Z11.52 ENCOUNTER FOR SCREENING FOR COVID-19: Primary | ICD-10-CM

## 2022-04-21 NOTE — TELEPHONE ENCOUNTER
Place orders and then faxed to [facility] and then notify patient once this has been done so that she can go get testing

## 2022-04-21 NOTE — TELEPHONE ENCOUNTER
Pt states that she thinks she may have covid. She can't taste or smell and has been this way for 2 days. Was wanting to see if she could get an order sent to [facility]. Order placed and sent.

## 2022-04-22 LAB — SARS-COV-2: NEGATIVE

## 2022-04-28 ENCOUNTER — NURSE ONLY (OUTPATIENT)
Dept: FAMILY MEDICINE CLINIC | Age: 47
End: 2022-04-28
Payer: COMMERCIAL

## 2022-04-28 DIAGNOSIS — R79.89 ABNORMAL CBC: ICD-10-CM

## 2022-04-28 PROCEDURE — 36415 COLL VENOUS BLD VENIPUNCTURE: CPT | Performed by: NURSE PRACTITIONER

## 2022-04-29 LAB
BASOPHILS ABSOLUTE: 0.1 K/UL (ref 0–0.2)
BASOPHILS RELATIVE PERCENT: 1.3 %
EOSINOPHILS ABSOLUTE: 0.1 K/UL (ref 0–0.6)
EOSINOPHILS RELATIVE PERCENT: 1.1 %
HCT VFR BLD CALC: 40.2 % (ref 36–48)
HEMOGLOBIN: 13.9 G/DL (ref 12–16)
LYMPHOCYTES ABSOLUTE: 2.1 K/UL (ref 1–5.1)
LYMPHOCYTES RELATIVE PERCENT: 36.6 %
MCH RBC QN AUTO: 29.9 PG (ref 26–34)
MCHC RBC AUTO-ENTMCNC: 34.5 G/DL (ref 31–36)
MCV RBC AUTO: 86.7 FL (ref 80–100)
MONOCYTES ABSOLUTE: 0.6 K/UL (ref 0–1.3)
MONOCYTES RELATIVE PERCENT: 10 %
NEUTROPHILS ABSOLUTE: 2.9 K/UL (ref 1.7–7.7)
NEUTROPHILS RELATIVE PERCENT: 51 %
PDW BLD-RTO: 13.7 % (ref 12.4–15.4)
PLATELET # BLD: 252 K/UL (ref 135–450)
PMV BLD AUTO: 8.8 FL (ref 5–10.5)
RBC # BLD: 4.64 M/UL (ref 4–5.2)
WBC # BLD: 5.8 K/UL (ref 4–11)

## 2022-05-26 ENCOUNTER — OFFICE VISIT (OUTPATIENT)
Dept: FAMILY MEDICINE CLINIC | Age: 47
End: 2022-05-26
Payer: COMMERCIAL

## 2022-05-26 VITALS
SYSTOLIC BLOOD PRESSURE: 129 MMHG | DIASTOLIC BLOOD PRESSURE: 94 MMHG | OXYGEN SATURATION: 98 % | BODY MASS INDEX: 37.28 KG/M2 | HEIGHT: 68 IN | HEART RATE: 64 BPM | WEIGHT: 246 LBS

## 2022-05-26 DIAGNOSIS — R00.0 TACHYCARDIA: ICD-10-CM

## 2022-05-26 DIAGNOSIS — K21.9 GASTROESOPHAGEAL REFLUX DISEASE WITHOUT ESOPHAGITIS: ICD-10-CM

## 2022-05-26 DIAGNOSIS — I10 PRIMARY HYPERTENSION: Primary | ICD-10-CM

## 2022-05-26 PROCEDURE — 99214 OFFICE O/P EST MOD 30 MIN: CPT | Performed by: NURSE PRACTITIONER

## 2022-05-26 RX ORDER — LISINOPRIL 5 MG/1
5 TABLET ORAL DAILY
Qty: 30 TABLET | Refills: 0 | Status: SHIPPED | OUTPATIENT
Start: 2022-05-26 | End: 2022-06-21

## 2022-05-26 RX ORDER — FAMOTIDINE 40 MG/1
40 TABLET, FILM COATED ORAL EVERY EVENING
Qty: 30 TABLET | Refills: 3 | Status: SHIPPED | OUTPATIENT
Start: 2022-05-26 | End: 2022-09-23

## 2022-05-26 RX ORDER — OMEPRAZOLE 20 MG/1
20 CAPSULE, DELAYED RELEASE ORAL
Qty: 30 CAPSULE | Refills: 0 | Status: SHIPPED | OUTPATIENT
Start: 2022-05-26 | End: 2022-06-21

## 2022-05-26 ASSESSMENT — PATIENT HEALTH QUESTIONNAIRE - PHQ9
2. FEELING DOWN, DEPRESSED OR HOPELESS: 0
SUM OF ALL RESPONSES TO PHQ QUESTIONS 1-9: 0
9. THOUGHTS THAT YOU WOULD BE BETTER OFF DEAD, OR OF HURTING YOURSELF: 0
10. IF YOU CHECKED OFF ANY PROBLEMS, HOW DIFFICULT HAVE THESE PROBLEMS MADE IT FOR YOU TO DO YOUR WORK, TAKE CARE OF THINGS AT HOME, OR GET ALONG WITH OTHER PEOPLE: 0
SUM OF ALL RESPONSES TO PHQ QUESTIONS 1-9: 0
3. TROUBLE FALLING OR STAYING ASLEEP: 0
6. FEELING BAD ABOUT YOURSELF - OR THAT YOU ARE A FAILURE OR HAVE LET YOURSELF OR YOUR FAMILY DOWN: 0
7. TROUBLE CONCENTRATING ON THINGS, SUCH AS READING THE NEWSPAPER OR WATCHING TELEVISION: 0
8. MOVING OR SPEAKING SO SLOWLY THAT OTHER PEOPLE COULD HAVE NOTICED. OR THE OPPOSITE, BEING SO FIGETY OR RESTLESS THAT YOU HAVE BEEN MOVING AROUND A LOT MORE THAN USUAL: 0
SUM OF ALL RESPONSES TO PHQ9 QUESTIONS 1 & 2: 0
4. FEELING TIRED OR HAVING LITTLE ENERGY: 0
1. LITTLE INTEREST OR PLEASURE IN DOING THINGS: 0
SUM OF ALL RESPONSES TO PHQ QUESTIONS 1-9: 0
SUM OF ALL RESPONSES TO PHQ QUESTIONS 1-9: 0
5. POOR APPETITE OR OVEREATING: 0

## 2022-05-26 NOTE — PROGRESS NOTES
[address]  [address] [facility] [address]  Dept: [phone]  Dept Fax: [phone]  Loc: [phone]    Jaylon Tobias is a 41 y.o. female who presents today for her medical conditions/complaints as noted below. Jaylon Tobias is c/o of Hypertension (BP has been 130's over 80's, 90's. Has some swelling bi-lateral feet and ankles. Denies SOB or chest pain) and Gastroesophageal Reflux       Subjective:     Chief Complaint   Patient presents with    Hypertension     BP has been 130's over 80's, 90's. Has some swelling bi-lateral feet and ankles. Denies SOB or chest pain    Gastroesophageal Reflux       HPI  Hypertension:    The patient is here for routine follow up on HTN. Patient denies chest pain, shortness of breath, headache, lightheadedness, blurred vision, , palpitations, dry cough, and fatigue. She is adherent to a low sodium diet. Patient denies antihypertensive medication side effects of: fatigue, dry cough,  weakness, orthostatic lightheadedness, myalgias, rash, nausea, abdominal discomfort, headaches, insomnia, weight gain, palpitations, slow heart rate, excessive urination, depression, and wheezing. Blood pressure typically runs 130/80-90 outside of the office. + peripheral edema BLE intermittently,  Edema had improved but Noticed it again this morning.   Drinking a lot of fluids  Patient is currently on carvedilol 6.25 mg twice daily for tachycardia and hydrochlorothiazide 12.5 mg daily                                      Sodium (mmol/L)   Date Value   04/06/2022 137    BUN (mg/dL)   Date Value   04/06/2022 15    Glucose (mg/dL)   Date Value   04/06/2022 91      Potassium (mmol/L)   Date Value   04/06/2022 4.6     Potassium reflex Magnesium (mmol/L)   Date Value   04/17/2021 4.0    CREATININE (mg/dL)   Date Value   04/06/2022 0.7         BP Readings from Last 3 Encounters:   05/26/22 (!) 129/94   04/06/22 132/88   03/13/22 (!) 167/92       GERD  Nneka is here for follow up of heartburn. Symptoms have been present for awhile and is a chronic condition. Currently treated with proton pump inhibitor: Omeprazole 40 mg, which has been effective. Patient denies dysphagia, cough, hoarseness, chest pain, unintentional weight loss, N/V, bloating, early satiety, abdominal pain or melena, hematochezia, hematemesis, and coffee ground emesis. .        Past Medical History:   Diagnosis Date    Anemia     Back pain     Chest pain 6/2014    stress myoview normal    Chronic pain syndrome 2017    Dr. Robel Dodge pain management    Fibroid uterus     fibroid uterus ad abnormal uterine bleeding with uterine fibroids    GERD (gastroesophageal reflux disease)     Hypertension     Menopausal symptoms     Overactive bladder     Pelvic pain     Right lateral epicondylitis     Rotator cuff syndrome of right shoulder     Stress incontinence     Tachycardia     on coreg         Review of Systems   Constitutional: Negative. Negative for appetite change, fatigue and unexpected weight change. HENT: Negative. Eyes: Negative. Respiratory: Negative. Negative for shortness of breath. Cardiovascular: Positive for leg swelling (Intermittently). Negative for chest pain and palpitations. Gastrointestinal: Negative. Negative for abdominal pain, anal bleeding, blood in stool and nausea. Endocrine: Negative. Genitourinary: Negative. Negative for hematuria. Musculoskeletal: Negative. Skin: Negative. Negative for rash. Allergic/Immunologic: Negative. Neurological: Negative. Negative for dizziness, syncope, light-headedness and numbness. Hematological: Negative. Does not bruise/bleed easily. Psychiatric/Behavioral: Negative. All other systems reviewed and are negative.        Past Medical History:   Diagnosis Date    Anemia     Back pain     Chest pain 6/2014    stress myoview normal    Chronic pain syndrome 2017     (before breakfast) 30 capsule 1    carvedilol (COREG) 6.25 MG tablet TAKE ONE TABLET BY MOUTH TWICE A  tablet 1    Multiple Vitamins-Calcium (ONE-A-DAY WOMENS FORMULA PO) Take 1 tablet by mouth daily       No current facility-administered medications for this visit. No changes in past medical history, past surgical history, social history, orfamily history were noted during the patient encounter unless specifically listed above. All updates of past medical history, past surgical history, social history, or family history were reviewed personally by me duringthe office visit. All problems listed in the assessment are stable unless noted otherwise. Medication profile reviewed personally by me during the office visit. Medication side effects and possible impairments frommedications were discussed as applicable. Objective:     Physical Exam  Vitals and nursing note reviewed. Constitutional:       General: She is not in acute distress. Appearance: Normal appearance. She is well-developed. HENT:      Head: Normocephalic and atraumatic. Right Ear: Tympanic membrane, ear canal and external ear normal.      Left Ear: Tympanic membrane, ear canal and external ear normal.      Nose: Nose normal.      Mouth/Throat:      Pharynx: Uvula midline. No oropharyngeal exudate. Eyes:      General: Lids are normal.      Conjunctiva/sclera: Conjunctivae normal.      Pupils: Pupils are equal, round, and reactive to light. Neck:      Thyroid: No thyromegaly. Vascular: No carotid bruit or JVD. Cardiovascular:      Rate and Rhythm: Normal rate and regular rhythm. Pulses: Normal pulses. Radial pulses are 2+ on the right side and 2+ on the left side. Dorsalis pedis pulses are 2+ on the right side and 2+ on the left side. Posterior tibial pulses are 2+ on the right side and 2+ on the left side. Heart sounds: Normal heart sounds. No murmur heard. No friction rub. No gallop. Pulmonary:      Effort: Pulmonary effort is normal.      Breath sounds: Normal breath sounds. Abdominal:      General: Bowel sounds are normal.      Palpations: Abdomen is soft. There is no mass. Tenderness: There is no abdominal tenderness. Musculoskeletal:         General: Normal range of motion. Cervical back: Normal range of motion and neck supple. Lymphadenopathy:      Head:      Right side of head: No submandibular adenopathy. Left side of head: No submandibular adenopathy. Cervical: No cervical adenopathy. Skin:     General: Skin is warm and dry. Findings: No lesion or rash. Neurological:      Mental Status: She is alert and oriented to person, place, and time. Gait: Gait normal.   Psychiatric:         Speech: Speech normal.         Behavior: Behavior normal.         Thought Content: Thought content normal.         Judgment: Judgment normal.         BP (!) 136/98 (Site: Left Upper Arm, Position: Sitting, Cuff Size: Large Adult)   Pulse 64   Ht 5' 8\" (1.727 m)   Wt 246 lb (111.6 kg)   LMP 04/01/2019   SpO2 98%   BMI 37.40 kg/m²   Body mass index is 37.4 kg/m².     BP Readings from Last 2 Encounters:   05/26/22 (!) 136/98   04/06/22 132/88       Wt Readings from Last 3 Encounters:   05/26/22 246 lb (111.6 kg)   04/19/22 254 lb (115.2 kg)   04/06/22 254 lb (115.2 kg)       Lab Review   Nurse Only on 04/28/2022   Component Date Value    WBC 04/28/2022 5.8     RBC 04/28/2022 4.64     Hemoglobin 04/28/2022 13.9     Hematocrit 04/28/2022 40.2     MCV 04/28/2022 86.7     MCH 04/28/2022 29.9     MCHC 04/28/2022 34.5     RDW 04/28/2022 13.7     Platelets 79/75/0418 252     MPV 04/28/2022 8.8     Neutrophils % 04/28/2022 51.0     Lymphocytes % 04/28/2022 36.6     Monocytes % 04/28/2022 10.0     Eosinophils % 04/28/2022 1.1     Basophils % 04/28/2022 1.3     Neutrophils Absolute 04/28/2022 2.9     Lymphocytes Absolute 04/28/2022 2.1     Monocytes Absolute 04/28/2022 0.6     Eosinophils Absolute 04/28/2022 0.1     Basophils Absolute 04/28/2022 0.1    Abstract on 04/25/2022   Component Date Value    SARS-CoV-2 04/21/2022 Negative    Office Visit on 04/06/2022   Component Date Value    WBC 04/06/2022 8.1     RBC 04/06/2022 4.89     Hemoglobin 04/06/2022 14.2     Hematocrit 04/06/2022 42.4     MCV 04/06/2022 86.7     MCH 04/06/2022 29.1     MCHC 04/06/2022 33.5     RDW 04/06/2022 14.1     Platelets 54/87/1855 224     MPV 04/06/2022 8.8     SLIDE REVIEW 04/06/2022 see below     Neutrophils % 04/06/2022 52.0     Lymphocytes % 04/06/2022 28.0     Monocytes % 04/06/2022 10.0     Eosinophils % 04/06/2022 1.0     Basophils % 04/06/2022 0.0     Neutrophils Absolute 04/06/2022 4.9     Lymphocytes Absolute 04/06/2022 2.3     Monocytes Absolute 04/06/2022 0.8     Eosinophils Absolute 04/06/2022 0.1     Basophils Absolute 04/06/2022 0.0     Bands Relative 04/06/2022 3     Metamyelocytes Relative 04/06/2022 3*    Myelocyte Percent 04/06/2022 3*    Cholesterol, Total 04/06/2022 177     Triglycerides 04/06/2022 138     HDL 04/06/2022 40     LDL Calculated 04/06/2022 109*    VLDL Cholesterol Calcula* 04/06/2022 28     Sodium 04/06/2022 137     Potassium 04/06/2022 4.6     Chloride 04/06/2022 103     CO2 04/06/2022 22     Anion Gap 04/06/2022 12     Glucose 04/06/2022 91     BUN 04/06/2022 15     CREATININE 04/06/2022 0.7     GFR Non- 04/06/2022 >60     GFR  04/06/2022 >60     Calcium 04/06/2022 9.0     Total Protein 04/06/2022 6.6     Albumin 04/06/2022 4.3     Albumin/Globulin Ratio 04/06/2022 1.9     Total Bilirubin 04/06/2022 0.3     Alkaline Phosphatase 04/06/2022 55     ALT 04/06/2022 22     AST 04/06/2022 14*    Vit D, 25-Hydroxy 04/06/2022 37.9        No results found for this visit on 05/26/22. Assessment:       1. Primary hypertension    2.  Gastroesophageal reflux disease without esophagitis    3. Tachycardia        No results found for this visit on 05/26/22. Plan:       Kamila Tirado was seen today for hypertension and gastroesophageal reflux. Diagnoses and all orders for this visit:    Primary hypertension  -     lisinopril (PRINIVIL;ZESTRIL) 5 MG tablet; Take 1 tablet by mouth daily  Patient's blood pressure is not well controlled on her current carvedilol dose. Her pulse is typically in the 60s therefore I do not want to change her carvedilol but instead add lisinopril 5 mg daily  She should monitor her blood pressure on a daily basis and record those and bring those in for review in 2 weeks. She also will be scheduled for a 2-week follow-up with the medical assistant for blood pressure check  She should follow-up with me in 4 to 8 weeks for reevaluation of her hypertension and also to get a BMP at that time due to initiation of lisinopril    Gastroesophageal reflux disease without esophagitis  Patient will discontinue Prilosec 40 mg daily  Start the following  -     omeprazole (PRILOSEC) 20 MG delayed release capsule; Take 1 capsule by mouth every morning (before breakfast)--patient will take 1 every day for the first 2 weeks, and then week 3 she will take 1 every other day  Then week 4 she will take 1 every 3 to 4 days as needed  As she is titrating off of the omeprazole she will start taking Pepcid daily as follows  -     famotidine (PEPCID) 40 MG tablet; Take 1 tablet by mouth every evening    Tachycardia  Continue carvedilol and continue following up with cardiology as recommended      Patient has been instructed call the office immediately with new symptoms, change in symptoms or worseningof symptoms. If this is not feasible, patient is instructed to report to the emergency room. Medication profile reviewed. Medication side effects and possible impairments from medications were discussed as applicable. Allergies were reviewed.  Health maintenance was reviewed and updated as appropriate. (Comment: Please note this report has been produced using a combination of typing and speech recognition software and may contain errors related to that system including errors in grammar, punctuation, and spelling, as well as words and phrases that may be inappropriate.  If there are any questions or concerns please feel free to contact the dictating provider for clarification.)

## 2022-06-01 DIAGNOSIS — I10 PRIMARY HYPERTENSION: ICD-10-CM

## 2022-06-01 DIAGNOSIS — K21.9 GASTROESOPHAGEAL REFLUX DISEASE WITHOUT ESOPHAGITIS: ICD-10-CM

## 2022-06-01 RX ORDER — OMEPRAZOLE 40 MG/1
CAPSULE, DELAYED RELEASE ORAL
Qty: 30 CAPSULE | Refills: 1 | OUTPATIENT
Start: 2022-06-01

## 2022-06-01 RX ORDER — HYDROCHLOROTHIAZIDE 12.5 MG/1
CAPSULE, GELATIN COATED ORAL
Qty: 30 CAPSULE | Refills: 5 | Status: SHIPPED | OUTPATIENT
Start: 2022-06-01

## 2022-06-02 ASSESSMENT — ENCOUNTER SYMPTOMS
ANAL BLEEDING: 0
SHORTNESS OF BREATH: 0
NAUSEA: 0
BLOOD IN STOOL: 0
ALLERGIC/IMMUNOLOGIC NEGATIVE: 1
GASTROINTESTINAL NEGATIVE: 1
EYES NEGATIVE: 1
RESPIRATORY NEGATIVE: 1
ABDOMINAL PAIN: 0

## 2022-06-09 ENCOUNTER — TELEPHONE (OUTPATIENT)
Dept: FAMILY MEDICINE CLINIC | Age: 47
End: 2022-06-09

## 2022-06-09 NOTE — TELEPHONE ENCOUNTER
No changes in patient's medication regimen at this time.   Please make sure that the patient's vital signs are in an encounter so that they do go toward blood pressure goals

## 2022-06-09 NOTE — TELEPHONE ENCOUNTER
Pt stopped in for Blood Pressure check. B / P   L arm sitting 114/ 80   5 minutes  Re check  111/ 80 L arm sitting  HR 68 & 74  Temp 97.7   Wt 247.2 #  Reviewed medication list and confirmed it is correct and she has taken her meds as prescribed. She did not bring her B/P cuff from home but she plans to for her next in office appointment.

## 2022-06-10 NOTE — TELEPHONE ENCOUNTER
Pt returned call and let her know no changes in meds. But vitals signs need to be put in the encounter.

## 2022-06-21 DIAGNOSIS — K21.9 GASTROESOPHAGEAL REFLUX DISEASE WITHOUT ESOPHAGITIS: ICD-10-CM

## 2022-06-21 DIAGNOSIS — I10 PRIMARY HYPERTENSION: ICD-10-CM

## 2022-06-21 RX ORDER — OMEPRAZOLE 20 MG/1
CAPSULE, DELAYED RELEASE ORAL
Qty: 30 CAPSULE | Refills: 2 | Status: SHIPPED | OUTPATIENT
Start: 2022-06-21 | End: 2022-07-14 | Stop reason: ALTCHOICE

## 2022-06-21 RX ORDER — LISINOPRIL 5 MG/1
TABLET ORAL
Qty: 30 TABLET | Refills: 2 | Status: SHIPPED
Start: 2022-06-21 | End: 2022-07-14 | Stop reason: ALTCHOICE

## 2022-07-14 ENCOUNTER — OFFICE VISIT (OUTPATIENT)
Dept: FAMILY MEDICINE CLINIC | Age: 47
End: 2022-07-14
Payer: COMMERCIAL

## 2022-07-14 VITALS
DIASTOLIC BLOOD PRESSURE: 76 MMHG | SYSTOLIC BLOOD PRESSURE: 124 MMHG | HEART RATE: 94 BPM | OXYGEN SATURATION: 98 % | BODY MASS INDEX: 37.28 KG/M2 | HEIGHT: 68 IN | WEIGHT: 246 LBS

## 2022-07-14 DIAGNOSIS — I10 PRIMARY HYPERTENSION: Primary | ICD-10-CM

## 2022-07-14 PROCEDURE — 99213 OFFICE O/P EST LOW 20 MIN: CPT | Performed by: NURSE PRACTITIONER

## 2022-07-14 RX ORDER — LOSARTAN POTASSIUM 25 MG/1
25 TABLET ORAL DAILY
Qty: 30 TABLET | Refills: 3 | Status: SHIPPED
Start: 2022-07-14 | End: 2022-07-25 | Stop reason: DRUGHIGH

## 2022-07-14 ASSESSMENT — ENCOUNTER SYMPTOMS
SHORTNESS OF BREATH: 0
NAUSEA: 0
BLOOD IN STOOL: 0
ANAL BLEEDING: 0
EYES NEGATIVE: 1
COUGH: 1
ALLERGIC/IMMUNOLOGIC NEGATIVE: 1
ABDOMINAL PAIN: 0
GASTROINTESTINAL NEGATIVE: 1

## 2022-07-14 NOTE — PROGRESS NOTES
[address]  [address] [place] [id number]  Dept: [phone]  Dept Fax: [phone]  Loc: [phone]    Teresa Flores is a 41 y.o. female who presents today for her medical conditions/complaints as noted below. Teresa Flores is c/o of Hypertension (BP has been running 120's over 80's. Denies edema, SOB or chest pain)       Subjective:     Chief Complaint   Patient presents with    Hypertension     BP has been running 120's over 80's. Denies edema, SOB or chest pain       HPI   Patient is here today for follow-up on her hypertension. Patient was seen on 5/26/2022 for having elevated blood pressures in the 130s over 90s at home however office visit she has had readings of 167/92 and 129/94  She was on carvedilol 6.25 mg daily for tachycardia and HCTZ 12.5 mg daily. The patient was started on lisinopril 5 mg daily at that office visit of 5/26/2022. He did come in for a blood pressure check on 6/9/2022 and a blood pressure was 114/80. Patient continued with her blood pressure medication and now her blood pressure is stabilized in the 120s over 70-80  Denies any chest pain, shortness of breath, palpitations, edema, lightheaded, dizziness, or unusual fatigue  She does state that her pulse at home has been in the 70s. Swelling has improved  Unfortunately she is having the side effect of an ACE inhibitor cough  But starting on the lisinopril.   It is a dry cough and it is not improving    Past Medical History:   Diagnosis Date    Anemia     Back pain     Chest pain 6/2014    stress myoview normal    Chronic pain syndrome 2017    Dr. Bessie Valverde pain management    Fibroid uterus     fibroid uterus ad abnormal uterine bleeding with uterine fibroids    GERD (gastroesophageal reflux disease)     Hypertension     Menopausal symptoms     Overactive bladder     Pelvic pain     Right lateral epicondylitis     Rotator cuff syndrome of right shoulder  Stress incontinence     Tachycardia     on coreg         Review of Systems   Constitutional: Negative. Negative for appetite change, fatigue and unexpected weight change. HENT: Negative. Eyes: Negative. Respiratory: Positive for cough. Negative for shortness of breath. Cardiovascular: Negative. Negative for chest pain, palpitations and leg swelling. Gastrointestinal: Negative. Negative for abdominal pain, anal bleeding, blood in stool and nausea. Endocrine: Negative. Genitourinary: Negative. Negative for hematuria. Musculoskeletal: Negative. Skin: Negative. Negative for rash. Allergic/Immunologic: Negative. Neurological: Negative. Negative for dizziness, syncope, light-headedness and numbness. Hematological: Negative. Does not bruise/bleed easily. Psychiatric/Behavioral: Negative. All other systems reviewed and are negative.        Past Medical History:   Diagnosis Date    Anemia     Back pain     Chest pain 6/2014    stress myoview normal    Chronic pain syndrome 2017    Dr. Bessie Valverde pain management    Fibroid uterus     fibroid uterus ad abnormal uterine bleeding with uterine fibroids    GERD (gastroesophageal reflux disease)     Hypertension     Menopausal symptoms     Overactive bladder     Pelvic pain     Right lateral epicondylitis     Rotator cuff syndrome of right shoulder     Stress incontinence     Tachycardia     on coreg     Family History   Problem Relation Age of Onset    Heart Disease Mother     Heart Disease Maternal Grandmother     Diabetes Maternal Grandmother     Ovarian Cancer Maternal Grandmother     Heart Failure Maternal Grandfather     High Blood Pressure Maternal Grandfather     High Blood Pressure Sister     Bipolar Disorder Sister     No Known Problems Maternal Aunt     High Blood Pressure Maternal Uncle     High Blood Pressure Maternal Uncle      Past Surgical History:   Procedure Laterality Date    DILATION AND CURETTAGE      DILATION AND CURETTAGE OF UTERUS N/A 2019    DIAGNOSTIC LAPAROSCOPY, DILATATION AND CURETTAGE, HYSTEROSCOPY, WITH MYOSURE AND NOVASURE ENDOMETRIAL ABLATION performed by Richard Lin DO at [facility] [address] ([address] [place] [address])      HYSTERECTOMY, VAGINAL N/A 2020    LAPAROSCOPIC ASSISTED VAGINAL HYSTERECTOMY, BILATERAL SALPINGECTOMY, POSSIBLE CYSTOSCOPY performed by Richard Lin DO at [facility] 2009    LAPAROSCOPY      TONSILLECTOMY      TONSILLECTOMY AND ADENOIDECTOMY      as a child    TUBAL LIGATION      US BREAST NEEDLE BIOPSY LEFT Left 2021    US BREAST NEEDLE BIOPSY LEFT 2021 Da Horner MD [facility]     Social History     Socioeconomic History    Marital status:      Spouse name: Not on file    Number of children: Not on file    Years of education: Not on file    Highest education level: Not on file   Occupational History    Not on file   Tobacco Use    Smoking status: Former Smoker     Packs/day: 1.00     Years: 10.00     Pack years: 10.00     Types: Cigarettes     Quit date: 2014     Years since quittin.1    Smokeless tobacco: Never Used   Vaping Use    Vaping Use: Never used   Substance and Sexual Activity    Alcohol use: No    Drug use: No    Sexual activity: Yes     Partners: Male   Other Topics Concern    Not on file   Social History Narrative    Not on file     Social Determinants of Health     Financial Resource Strain: Low Risk     Difficulty of Paying Living Expenses: Not hard at all   Food Insecurity: No Food Insecurity    Worried About [address] in the Last Year: Never true    [address] in the Last Year: Never true   Transportation Needs:     Lack of Transportation (Medical): Not on file    Lack of Transportation (Non-Medical):  Not on file   Physical Activity:     Days of Exercise per Week: Not on file    Minutes of Exercise per Session: Not on file   Stress:     Feeling of Stress : Not on file   Social Connections:     Frequency of Communication with Friends and Family: Not on file    Frequency of Social Gatherings with Friends and Family: Not on file    Attends Confucianism Services: Not on file    Active Member of [address] [place] [address] or Organizations: Not on file    Attends Club or Organization Meetings: Not on file    Marital Status: Not on file   Intimate Partner Violence:     Fear of Current or Ex-Partner: Not on file    Emotionally Abused: Not on file    Physically Abused: Not on file    Sexually Abused: Not on file   Housing Stability:     Unable to Pay for Housing in the Last Year: Not on file    Number of [place] in the Last Year: Not on file    Unstable Housing in the Last Year: Not on file     Current Outpatient Medications   Medication Sig Dispense Refill    lisinopril (PRINIVIL;ZESTRIL) 5 MG tablet TAKE ONE TABLET BY MOUTH DAILY 30 tablet 2    hydroCHLOROthiazide (MICROZIDE) 12.5 MG capsule TAKE ONE CAPSULE BY MOUTH EVERY MORNING 30 capsule 5    famotidine (PEPCID) 40 MG tablet Take 1 tablet by mouth every evening 30 tablet 3    carvedilol (COREG) 6.25 MG tablet TAKE ONE TABLET BY MOUTH TWICE A  tablet 1    Multiple Vitamins-[place] (ONE-A-DAY WOMENS FORMULA PO) Take 1 tablet by mouth daily       No current facility-administered medications for this visit. No changes in past medical history, past surgical history, social history, orfamily history were noted during the patient encounter unless specifically listed above. All updates of past medical history, past surgical history, social history, or family history were reviewed personally by me duringthe office visit. All problems listed in the assessment are stable unless noted otherwise. Medication profile reviewed personally by me during the office visit.   Medication side effects and possible impairments frommedications were discussed as applicable. Objective:     Physical Exam  Vitals and nursing note reviewed. Constitutional:       General: She is not in acute distress. Appearance: Normal appearance. She is well-developed. HENT:      Head: Normocephalic and atraumatic. Right Ear: Tympanic membrane, ear canal and external ear normal.      Left Ear: Tympanic membrane, ear canal and external ear normal.      Nose: Nose normal.      Mouth/Throat:      Pharynx: Uvula midline. No oropharyngeal exudate. Eyes:      General: Lids are normal.      Conjunctiva/sclera: Conjunctivae normal.      Pupils: Pupils are equal, round, and reactive to light. Neck:      Thyroid: No thyromegaly. Vascular: No carotid bruit or JVD. Cardiovascular:      Rate and Rhythm: Normal rate and regular rhythm. Pulses: Normal pulses. Radial pulses are 2+ on the right side and 2+ on the left side. Dorsalis pedis pulses are 2+ on the right side and 2+ on the left side. Posterior tibial pulses are 2+ on the right side and 2+ on the left side. Heart sounds: Normal heart sounds. No murmur heard. No friction rub. No gallop. Pulmonary:      Effort: Pulmonary effort is normal.      Breath sounds: Normal breath sounds. Abdominal:      General: Bowel sounds are normal.      Palpations: Abdomen is soft. There is no mass. Tenderness: There is no abdominal tenderness. Musculoskeletal:         General: Normal range of motion. Cervical back: Normal range of motion and neck supple. Lymphadenopathy:      Head:      Right side of head: No submandibular adenopathy. Left side of head: No submandibular adenopathy. Cervical: No cervical adenopathy. Skin:     General: Skin is warm and dry. Findings: No lesion or rash. Neurological:      Mental Status: She is alert and oriented to person, place, and time.       Gait: Gait normal.   Psychiatric:         Speech: Speech normal.         Behavior: Behavior normal.         Thought Content: Thought content normal.         Judgment: Judgment normal.         /76 (Site: Left Upper Arm, Position: Sitting, Cuff Size: Large Adult)   Pulse 94   Ht 5' 8\" (1.727 m)   Wt 246 lb (111.6 kg)   LMP 04/01/2019   SpO2 98%   BMI 37.40 kg/m²   Body mass index is 37.4 kg/m². BP Readings from Last 2 Encounters:   07/14/22 124/76   05/26/22 (!) 129/94       Wt Readings from Last 3 Encounters:   07/14/22 246 lb (111.6 kg)   05/26/22 246 lb (111.6 kg)   04/19/22 254 lb (115.2 kg)       Lab Review   No visits with results within 2 Month(s) from this visit. Latest known visit with results is:   Nurse Only on 04/28/2022   Component Date Value    WBC 04/28/2022 5.8     RBC 04/28/2022 4.64     Hemoglobin 04/28/2022 13.9     Hematocrit 04/28/2022 40.2     MCV 04/28/2022 86.7     MCH 04/28/2022 29.9     MCHC 04/28/2022 34.5     RDW 04/28/2022 13.7     Platelets 13/03/8283 252     MPV 04/28/2022 8.8     Neutrophils % 04/28/2022 51.0     Lymphocytes % 04/28/2022 36.6     Monocytes % 04/28/2022 10.0     Eosinophils % 04/28/2022 1.1     Basophils % 04/28/2022 1.3     Neutrophils Absolute 04/28/2022 2.9     Lymphocytes Absolute 04/28/2022 2.1     Monocytes Absolute 04/28/2022 0.6     Eosinophils Absolute 04/28/2022 0.1     Basophils Absolute 04/28/2022 0.1        No results found for this visit on 07/14/22. Assessment:       1. Primary hypertension        No results found for this visit on 07/14/22. Plan:       Rossi Singh was seen today for hypertension. Diagnoses and all orders for this visit:    Primary hypertension  Patient's blood pressure has stabilized and is doing well. Her pulse is also good in the 70s. Unfortunately she is having an ACE inhibitor cough on lisinopril so that will be discontinued and she will start losartan as follows  -     losartan (COZAAR) 25 MG tablet;  Take 1 tablet by mouth daily    You should monitor your blood pressure closely at home, then call or send a [employer] message in 1-2 weeks to report the results. Patient has been instructed call the office immediately with new symptoms, change in symptoms or worseningof symptoms. If this is not feasible, patient is instructed to report to the emergency room. Medication profile reviewed. Medication side effects and possible impairments from medications were discussed as applicable. Allergies were reviewed. Health maintenance was reviewed and updated as appropriate. Return in about 5 months (around 12/14/2022). (Comment: Please note this report has been produced using a combination of typing and speech recognition software and may contain errors related to that system including errors in grammar, punctuation, and spelling, as well as words and phrases that may be inappropriate.  If there are any questions or concerns please feel free to contact the dictating provider for clarification.)

## 2022-09-18 DIAGNOSIS — K21.9 GASTROESOPHAGEAL REFLUX DISEASE WITHOUT ESOPHAGITIS: ICD-10-CM

## 2022-09-19 RX ORDER — OMEPRAZOLE 20 MG/1
CAPSULE, DELAYED RELEASE ORAL
Qty: 90 CAPSULE | OUTPATIENT
Start: 2022-09-19

## 2022-09-23 DIAGNOSIS — K21.9 GASTROESOPHAGEAL REFLUX DISEASE WITHOUT ESOPHAGITIS: ICD-10-CM

## 2022-09-23 RX ORDER — FAMOTIDINE 40 MG/1
TABLET, FILM COATED ORAL
Qty: 30 TABLET | Refills: 2 | Status: SHIPPED | OUTPATIENT
Start: 2022-09-23

## 2022-09-26 DIAGNOSIS — R00.0 TACHYCARDIA: ICD-10-CM

## 2022-09-26 DIAGNOSIS — I10 PRIMARY HYPERTENSION: ICD-10-CM

## 2022-09-26 RX ORDER — CARVEDILOL 6.25 MG/1
TABLET ORAL
Qty: 180 TABLET | Refills: 0 | Status: SHIPPED | OUTPATIENT
Start: 2022-09-26

## 2022-10-05 ENCOUNTER — TELEMEDICINE (OUTPATIENT)
Dept: FAMILY MEDICINE CLINIC | Age: 47
End: 2022-10-05
Payer: COMMERCIAL

## 2022-10-05 DIAGNOSIS — B96.89 ACUTE BACTERIAL SINUSITIS: Primary | ICD-10-CM

## 2022-10-05 DIAGNOSIS — J01.90 ACUTE BACTERIAL SINUSITIS: Primary | ICD-10-CM

## 2022-10-05 DIAGNOSIS — H92.02 OTALGIA, LEFT: ICD-10-CM

## 2022-10-05 PROCEDURE — 99213 OFFICE O/P EST LOW 20 MIN: CPT | Performed by: NURSE PRACTITIONER

## 2022-10-05 RX ORDER — CLARITHROMYCIN 500 MG/1
500 TABLET, COATED ORAL 2 TIMES DAILY
Qty: 14 TABLET | Refills: 0 | Status: SHIPPED | OUTPATIENT
Start: 2022-10-05 | End: 2022-10-12

## 2022-10-05 ASSESSMENT — ENCOUNTER SYMPTOMS
SINUS PAIN: 1
SHORTNESS OF BREATH: 0
NAUSEA: 0
ALLERGIC/IMMUNOLOGIC NEGATIVE: 1
BLOOD IN STOOL: 0
RHINORRHEA: 0
DIARRHEA: 0
GASTROINTESTINAL NEGATIVE: 1
SORE THROAT: 1
ANAL BLEEDING: 0
ABDOMINAL PAIN: 0
SWOLLEN GLANDS: 0
EYES NEGATIVE: 1
WHEEZING: 0
COUGH: 1
VOMITING: 0

## 2022-10-05 NOTE — PROGRESS NOTES
10/5/2022    TELEHEALTH EVALUATION -- Audio/Visual [facility])    HPI:    Myesha Whitten (:  1975) has requested an audio/video evaluation for the following concern(s):  Chief Complaint   Patient presents with    Cough    Congestion       URI   This is a new problem. Episode onset: 22. The problem has been gradually worsening. There has been no fever. Associated symptoms include congestion (nasal and chest), coughing (MPC  green/yellow mucous), ear pain (left), headaches, sinus pain, sneezing and a sore throat. Pertinent negatives include no abdominal pain, chest pain, diarrhea, dysuria, joint pain, joint swelling, nausea, neck pain, plugged ear sensation, rash, rhinorrhea, swollen glands, vomiting or wheezing. She has tried increased fluids and acetaminophen (mucinex) for the symptoms. The treatment provided no relief. Patient did not do covid test.      Review of Systems   Constitutional:  Positive for chills and fatigue. Negative for appetite change, fever and unexpected weight change. HENT:  Positive for congestion (nasal and chest), ear pain (left), sinus pain, sneezing and sore throat. Negative for rhinorrhea. Eyes: Negative. Respiratory:  Positive for cough (MPC  green/yellow mucous). Negative for shortness of breath and wheezing. Cardiovascular: Negative. Negative for chest pain, palpitations and leg swelling. Gastrointestinal: Negative. Negative for abdominal pain, anal bleeding, blood in stool, diarrhea, nausea and vomiting. Endocrine: Negative. Genitourinary: Negative. Negative for dysuria and hematuria. Musculoskeletal: Negative. Negative for joint pain and neck pain. Skin: Negative. Negative for rash. Allergic/Immunologic: Negative. Neurological:  Positive for headaches. Negative for dizziness, syncope, light-headedness and numbness. Hematological: Negative. Does not bruise/bleed easily.    Psychiatric/Behavioral: Negative. All other systems reviewed and are negative. Prior to Visit Medications    Medication Sig Taking? Authorizing Provider   carvedilol (COREG) 6.25 MG tablet TAKE ONE TABLET BY MOUTH TWICE A DAY Yes ALLY Guerra CNP   famotidine (PEPCID) 40 MG tablet TAKE ONE TABLET BY MOUTH EVERY EVENING Yes ALLY Guerra CNP   losartan (COZAAR) 50 MG tablet Take 1 tablet by mouth in the morning. Yes ALLY Guerra CNP   hydroCHLOROthiazide (MICROZIDE) 12.5 MG capsule TAKE ONE CAPSULE BY MOUTH EVERY MORNING Yes ALLY Acevedo CNP   Multiple Vitamins-[place] (ONE-A-DAY WOMENS FORMULA PO) Take 1 tablet by mouth daily Yes Historical Provider, MD       Allergies   Allergen Reactions    Penicillins Anaphylaxis and Hives    Lisinopril Cough       PHYSICAL EXAMINATION:  [ INSTRUCTIONS:  \"[x]\" Indicates a positive item  \"[]\" Indicates a negative item  -- DELETE ALL ITEMS NOT EXAMINED]  Vital Signs: (As obtained by patient/caregiver or practitioner observation)     Blood pressure-          Heart rate-         Respiratory rate-         Temperature-            Pulse oximetry-      Constitutional: [x] Appears well-developed and well-nourished [x] No apparent distress                            [] Abnormal-   Mental status  [x] Alert and awake  [x] Oriented to person/place/time [x]Able to follow commands       Eyes:  EOM    [x]  Normal  [] Abnormal-  Sclera  [x]  Normal  [] Abnormal -         Discharge [x]  None visible  [] Abnormal -     HENT:   [x] Normocephalic, atraumatic.   [] Abnormal   [x] Mouth/Throat: Mucous membranes are moist.      External Ears [x] Normal  [] Abnormal-      Neck: [x] No visualized mass      Pulmonary/Chest: [x] Respiratory effort normal.  [x] No visualized signs of difficulty breathing or respiratory distress        [] Abnormal-      Musculoskeletal:   [x] Normal gait with no signs of ataxia         [x] Normal range of motion of neck        [] Abnormal-         Neurological:        [x] No Facial Asymmetry (Cranial nerve 7 motor function) (limited exam to video visit)                       [x] No gaze palsy        [] Abnormal-         Skin:                     [x] No significant exanthematous lesions or discoloration noted on facial skin         [] Abnormal-                                  Psychiatric:           [] Normal Affect [x] No Hallucinations        [x] Abnormal- tearful     Other pertinent observable physical exam findings-      ASSESSMENT/PLAN:  1. Acute bacterial sinusitis  2. Otalgia, left  - clarithromycin (BIAXIN) 500 MG tablet; Take 1 tablet by mouth 2 times daily for 7 days  Dispense: 14 tablet; Refill: 0    Symptomatic therapy suggested: push fluids, rest, gargle warm salt water, use vaporizer or mist prn, use acetaminophen, ibuprofen prn, apply heat to sinuses prn, and return office visit prn if symptoms persist or worsen. Call or return to clinic prn if these symptoms worsen or fail to improve as anticipated. Patient has been instructed call the office immediately with new symptoms, change in symptoms or worseningof symptoms. If this is not feasible, patient is instructed to report to the emergency room. Medication profile reviewed. Medication side effects and possible impairments from medications were discussed as applicable. Allergies were reviewed. Health maintenance was reviewed and updated as appropriate. Return if symptoms worsen or fail to improve. Dasha Zelaya, was evaluated through a synchronous (real-time) audio-video encounter. The patient (or guardian if applicable) is aware that this is a billable service, which includes applicable co-pays. This Virtual Visit was conducted with patient's (and/or legal guardian's) consent.  The visit was conducted pursuant to the emergency declaration under the [facility] [place], [facility]5 waiver authority and the [place] Resources and Response Supplemental Appropriations Act. Patient identification was verified, and a caregiver was present when appropriate. The patient was located at Home: [id number] [place] [address]. Provider was located at [facility] (Appt Dept): [address] [place] [address]. [facility],  [address] [place] [address]. Total time spent on this encounter:  19 minutes    --ALLY Ramsey CNP on 10/5/2022 at 3:07 PM    An electronic signature was used to authenticate this note.

## 2022-11-28 DIAGNOSIS — I10 PRIMARY HYPERTENSION: ICD-10-CM

## 2022-11-28 RX ORDER — HYDROCHLOROTHIAZIDE 12.5 MG/1
CAPSULE, GELATIN COATED ORAL
Qty: 30 CAPSULE | Refills: 0 | Status: SHIPPED | OUTPATIENT
Start: 2022-11-28

## 2022-12-10 ENCOUNTER — HOSPITAL ENCOUNTER (EMERGENCY)
Age: 47
Discharge: HOME | End: 2022-12-10
Attending: EMERGENCY MEDICINE
Payer: COMMERCIAL

## 2022-12-10 VITALS
SYSTOLIC BLOOD PRESSURE: 150 MMHG | RESPIRATION RATE: 18 BRPM | TEMPERATURE: 98.2 F | DIASTOLIC BLOOD PRESSURE: 92 MMHG | OXYGEN SATURATION: 98 % | HEART RATE: 87 BPM

## 2022-12-10 DIAGNOSIS — J06.9 VIRAL URI WITH COUGH: Primary | ICD-10-CM

## 2022-12-10 DIAGNOSIS — J10.1 INFLUENZA DUE TO IDENTIFIED INFLUENZA VIRUS: ICD-10-CM

## 2022-12-10 PROCEDURE — 99283 EMERGENCY DEPT VISIT LOW MDM: CPT

## 2022-12-10 RX ORDER — OSELTAMIVIR PHOSPHATE 75 MG/1
75 CAPSULE ORAL 2 TIMES DAILY
Qty: 10 CAPSULE | Refills: 0 | Status: SHIPPED | OUTPATIENT
Start: 2022-12-10 | End: 2022-12-15

## 2022-12-10 NOTE — ED PROVIDER NOTES
CHIEF COMPLAINT  Cough (Pt endorses productive cough, sore throat, body aches, and fatigue. Sx began yesterday.)      HISTORY OF PRESENT ILLNESS  Jemma Ryan is a 41 y.o. female who presents to the ED complaining of cough, fever and body aches for the last 24 hours. The patient has multiple family members with influenza a. She has not had a sore throat she is not been vomiting, does have a headache, but I will get improved by Motrin. She is not short of breath with rest.  She has had no pain or swelling in her legs. No other complaints, modifying factors or associated symptoms. Nursing notes reviewed.    Past Medical History:   Diagnosis Date    Anemia     Back pain     Chest pain 6/2014    stress myoview normal    Chronic pain syndrome 2017    Dr. Dinh Record pain management    Fibroid uterus     fibroid uterus ad abnormal uterine bleeding with uterine fibroids    GERD (gastroesophageal reflux disease)     Hypertension     Menopausal symptoms     Overactive bladder     Pelvic pain     Right lateral epicondylitis     Rotator cuff syndrome of right shoulder     Stress incontinence     Tachycardia     on coreg     Past Surgical History:   Procedure Laterality Date    DILATION AND CURETTAGE      DILATION AND CURETTAGE OF UTERUS N/A 4/16/2019    DIAGNOSTIC LAPAROSCOPY, DILATATION AND CURETTAGE, HYSTEROSCOPY, WITH MYOSURE AND NOVASURE ENDOMETRIAL ABLATION performed by Sandra Landry DO at [address] [facility] ([facility])      HYSTERECTOMY, VAGINAL N/A 6/4/2020    LAPAROSCOPIC ASSISTED VAGINAL HYSTERECTOMY, BILATERAL SALPINGECTOMY, POSSIBLE CYSTOSCOPY performed by Sandra Landry DO at [address] Right 2009    [address]      as a child    TUBAL LIGATION      US BREAST NEEDLE BIOPSY LEFT Left 8/12/2021    US BREAST NEEDLE BIOPSY LEFT 8/12/2021 [address] Family History   Problem Relation Age of Onset    Heart Disease Mother     Heart Disease Maternal Grandmother     Diabetes Maternal Grandmother     Ovarian Cancer Maternal Grandmother     Heart Failure Maternal Grandfather     High Blood Pressure Maternal Grandfather     High Blood Pressure Sister     Bipolar Disorder Sister     No Known Problems Maternal Aunt     High Blood Pressure Maternal Uncle     High Blood Pressure Maternal Uncle      Social History     Socioeconomic History    Marital status:      Spouse name: Not on file    Number of children: Not on file    Years of education: Not on file    Highest education level: Not on file   Occupational History    Not on file   Tobacco Use    Smoking status: Former     Packs/day: 1.00     Years: 10.00     Pack years: 10.00     Types: Cigarettes     Quit date: 2014     Years since quittin.5    Smokeless tobacco: Never   Vaping Use    Vaping Use: Never used   Substance and Sexual Activity    Alcohol use: No    Drug use: No    Sexual activity: Yes     Partners: Male   Other Topics Concern    Not on file   Social History Narrative    Not on file     Social Determinants of Health     Financial Resource Strain: Low Risk     Difficulty of Paying Living Expenses: Not hard at all   Food Insecurity: No Food Insecurity    Worried About Running Out of Food in the Last Year: Never true    Ran Out of Food in the Last Year: Never true   Transportation Needs: Not on file   Physical Activity: Not on file   Stress: Not on file   Social Connections: Not on file   Intimate Partner Violence: Not on file   Housing Stability: Not on file     No current facility-administered medications for this encounter.      Current Outpatient Medications   Medication Sig Dispense Refill    oseltamivir (TAMIFLU) 75 MG capsule Take 1 capsule by mouth 2 times daily for 5 days 10 capsule 0    hydroCHLOROthiazide (MICROZIDE) 12.5 MG capsule TAKE ONE CAPSULE BY MOUTH EVERY MORNING 30 capsule 0    carvedilol (COREG) 6.25 MG tablet TAKE ONE TABLET BY MOUTH TWICE A  tablet 0    famotidine (PEPCID) 40 MG tablet TAKE ONE TABLET BY MOUTH EVERY EVENING 30 tablet 2    losartan (COZAAR) 50 MG tablet Take 1 tablet by mouth in the morning. 30 tablet 5    Multiple Vitamins-Calcium (ONE-A-DAY WOMENS FORMULA PO) Take 1 tablet by mouth daily       Allergies   Allergen Reactions    Penicillins Anaphylaxis and Hives    Lisinopril Cough       REVIEW OF SYSTEMS  10 systems reviewed, pertinent positives per HPI otherwise noted to be negativ    PHYSICAL EXAM  BP (!) 150/92   Pulse 87   Temp 98.2 °F (36.8 °C) (Oral)   Resp 18   LMP 04/01/2019   SpO2 98%   GENERAL APPEARANCE: Awake and alert. Cooperative. No acute distress. HEAD: Normocephalic. Atraumatic. EYES: PERRL. EOM's grossly intact. ENT: Mucous membranes are moist.  Clear rhinorrhea and postnasal drip. NECK: Supple. Normal ROM. No adenopathy. CHEST: Equal symmetric chest rise. Heart regular rate and rhythm with no murmurs. LUNGS: Breathing is unlabored. Speaking comfortably in full sentences. Frequent nonproductive cough, clear lungs with no wheeze, rales or rhonchi. Abdomen: Nondistended  EXTREMITIES: MAEE. No acute deformities. No edema. SKIN: Warm and dry. NEUROLOGICAL: Alert and oriented. Strength is 5/5 in all extremities and sensation is intact. RADIOLOGY  No results found. ED COURSE/MDM  Patient seen and evaluated. Patient is being seen for fever and nonproductive cough associated with body aches with symptoms starting in the last 24 hours and multiple contacts with influenza A. No evidence of strep pharyngitis, clear lungs, unlikely bacterial pneumonia. Will start on Tamiflu, she declined testing after discussion. Pt is to follow up in 3-4 days for a recheck. Patient was given scripts for the following medications. I counseled patient how to take these medications.    New Prescriptions    OSELTAMIVIR (TAMIFLU) 75 MG CAPSULE    Take 1 capsule by mouth 2 times daily for 5 days           CLINICAL IMPRESSION  1. Viral URI with cough    2. Influenza due to identified influenza virus        Blood pressure (!) 150/92, pulse 87, temperature 98.2 °F (36.8 °C), temperature source Oral, resp. rate 18, last menstrual period 04/01/2019, SpO2 98 %, not currently breastfeeding. DISPOSITION  Patient was discharged to home in good condition.       Shay Smith MD  12/10/22 [id number]

## 2022-12-24 DIAGNOSIS — I10 PRIMARY HYPERTENSION: ICD-10-CM

## 2022-12-24 DIAGNOSIS — R00.0 TACHYCARDIA: ICD-10-CM

## 2022-12-25 DIAGNOSIS — I10 PRIMARY HYPERTENSION: ICD-10-CM

## 2022-12-25 DIAGNOSIS — K21.9 GASTROESOPHAGEAL REFLUX DISEASE WITHOUT ESOPHAGITIS: ICD-10-CM

## 2022-12-26 RX ORDER — LOSARTAN POTASSIUM 50 MG/1
TABLET ORAL
Qty: 90 TABLET | Refills: 0 | Status: SHIPPED | OUTPATIENT
Start: 2022-12-26

## 2022-12-26 RX ORDER — FAMOTIDINE 40 MG/1
TABLET, FILM COATED ORAL
Qty: 30 TABLET | Refills: 0 | Status: SHIPPED | OUTPATIENT
Start: 2022-12-26 | End: 2023-01-25 | Stop reason: SDUPTHER

## 2022-12-26 RX ORDER — CARVEDILOL 6.25 MG/1
TABLET ORAL
Qty: 180 TABLET | Refills: 0 | Status: SHIPPED | OUTPATIENT
Start: 2022-12-26

## 2022-12-26 RX ORDER — HYDROCHLOROTHIAZIDE 12.5 MG/1
CAPSULE, GELATIN COATED ORAL
Qty: 30 CAPSULE | Refills: 0 | Status: SHIPPED | OUTPATIENT
Start: 2022-12-26 | End: 2023-01-25 | Stop reason: SDUPTHER

## 2023-01-25 ENCOUNTER — OFFICE VISIT (OUTPATIENT)
Dept: FAMILY MEDICINE CLINIC | Age: 48
End: 2023-01-25
Payer: COMMERCIAL

## 2023-01-25 VITALS
HEIGHT: 68 IN | DIASTOLIC BLOOD PRESSURE: 80 MMHG | WEIGHT: 239 LBS | OXYGEN SATURATION: 98 % | HEART RATE: 84 BPM | BODY MASS INDEX: 36.22 KG/M2 | SYSTOLIC BLOOD PRESSURE: 124 MMHG

## 2023-01-25 DIAGNOSIS — Z12.11 COLON CANCER SCREENING: ICD-10-CM

## 2023-01-25 DIAGNOSIS — R00.0 TACHYCARDIA: ICD-10-CM

## 2023-01-25 DIAGNOSIS — Z12.31 OTHER SCREENING MAMMOGRAM: ICD-10-CM

## 2023-01-25 DIAGNOSIS — Z13.220 SCREENING FOR CHOLESTEROL LEVEL: ICD-10-CM

## 2023-01-25 DIAGNOSIS — K21.9 GASTROESOPHAGEAL REFLUX DISEASE WITHOUT ESOPHAGITIS: ICD-10-CM

## 2023-01-25 DIAGNOSIS — Z86.39 HISTORY OF VITAMIN D DEFICIENCY: ICD-10-CM

## 2023-01-25 DIAGNOSIS — I10 PRIMARY HYPERTENSION: Primary | ICD-10-CM

## 2023-01-25 PROCEDURE — 3079F DIAST BP 80-89 MM HG: CPT | Performed by: NURSE PRACTITIONER

## 2023-01-25 PROCEDURE — 99214 OFFICE O/P EST MOD 30 MIN: CPT | Performed by: NURSE PRACTITIONER

## 2023-01-25 PROCEDURE — 3074F SYST BP LT 130 MM HG: CPT | Performed by: NURSE PRACTITIONER

## 2023-01-25 RX ORDER — FAMOTIDINE 40 MG/1
TABLET, FILM COATED ORAL
Qty: 90 TABLET | Refills: 0 | Status: SHIPPED | OUTPATIENT
Start: 2023-01-25

## 2023-01-25 RX ORDER — HYDROCHLOROTHIAZIDE 12.5 MG/1
CAPSULE, GELATIN COATED ORAL
Qty: 90 CAPSULE | Refills: 0 | Status: SHIPPED | OUTPATIENT
Start: 2023-01-25

## 2023-01-25 RX ORDER — LOSARTAN POTASSIUM 50 MG/1
TABLET ORAL
Qty: 90 TABLET | Refills: 0 | Status: SHIPPED | OUTPATIENT
Start: 2023-01-25

## 2023-01-25 RX ORDER — CARVEDILOL 6.25 MG/1
TABLET ORAL
Qty: 180 TABLET | Refills: 0 | Status: SHIPPED | OUTPATIENT
Start: 2023-01-25

## 2023-01-25 ASSESSMENT — ENCOUNTER SYMPTOMS
NAUSEA: 0
GASTROINTESTINAL NEGATIVE: 1
BLOOD IN STOOL: 0
RESPIRATORY NEGATIVE: 1
EYES NEGATIVE: 1
SHORTNESS OF BREATH: 0
ALLERGIC/IMMUNOLOGIC NEGATIVE: 1
ANAL BLEEDING: 0
ABDOMINAL PAIN: 0

## 2023-01-25 ASSESSMENT — PATIENT HEALTH QUESTIONNAIRE - PHQ9
SUM OF ALL RESPONSES TO PHQ9 QUESTIONS 1 & 2: 0
7. TROUBLE CONCENTRATING ON THINGS, SUCH AS READING THE NEWSPAPER OR WATCHING TELEVISION: 0
10. IF YOU CHECKED OFF ANY PROBLEMS, HOW DIFFICULT HAVE THESE PROBLEMS MADE IT FOR YOU TO DO YOUR WORK, TAKE CARE OF THINGS AT HOME, OR GET ALONG WITH OTHER PEOPLE: 0
SUM OF ALL RESPONSES TO PHQ QUESTIONS 1-9: 0
8. MOVING OR SPEAKING SO SLOWLY THAT OTHER PEOPLE COULD HAVE NOTICED. OR THE OPPOSITE, BEING SO FIGETY OR RESTLESS THAT YOU HAVE BEEN MOVING AROUND A LOT MORE THAN USUAL: 0
SUM OF ALL RESPONSES TO PHQ QUESTIONS 1-9: 0
4. FEELING TIRED OR HAVING LITTLE ENERGY: 0
SUM OF ALL RESPONSES TO PHQ QUESTIONS 1-9: 0
9. THOUGHTS THAT YOU WOULD BE BETTER OFF DEAD, OR OF HURTING YOURSELF: 0
3. TROUBLE FALLING OR STAYING ASLEEP: 0
SUM OF ALL RESPONSES TO PHQ QUESTIONS 1-9: 0
1. LITTLE INTEREST OR PLEASURE IN DOING THINGS: 0
5. POOR APPETITE OR OVEREATING: 0
2. FEELING DOWN, DEPRESSED OR HOPELESS: 0
6. FEELING BAD ABOUT YOURSELF - OR THAT YOU ARE A FAILURE OR HAVE LET YOURSELF OR YOUR FAMILY DOWN: 0

## 2023-01-25 NOTE — PROGRESS NOTES
[address] [facility]  [address] [facility] [address]  Dept: [phone]  Dept Fax: [phone]  Loc: [phone]    Kristi Schirmer is a 41 y.o. female who presents today for her medical conditions/complaints as noted below. Kristi Schirmer is c/o of Hypertension (Patient does not monitor BP at home. Denies edema, SOB or chest pain)       Subjective:     Chief Complaint   Patient presents with    Hypertension     Patient does not monitor BP at home. Denies edema, SOB or chest pain       HPI  Hypertension:    The patient is here for routine follow up on HTN. Patient denies chest pain, shortness of breath, headache, lightheadedness, blurred vision, peripheral edema, dry cough, and fatigue. She is adherent to a low sodium diet. Patient denies antihypertensive medication side effects of: fatigue, dry cough, swelling in ankles, weakness, orthostatic lightheadedness, myalgias, rash, nausea, abdominal discomfort, headaches, insomnia, weight gain, palpitations, slow heart rate, excessive urination, depression, and wheezing. Has palpitations at times if she does not take BB on time. She is not checking her BP outside of the office. Sodium (mmol/L)   Date Value   04/06/2022 137    BUN (mg/dL)   Date Value   04/06/2022 15    Glucose (mg/dL)   Date Value   04/06/2022 91      Potassium (mmol/L)   Date Value   04/06/2022 4.6     Potassium reflex Magnesium (mmol/L)   Date Value   04/17/2021 4.0    Creatinine (mg/dL)   Date Value   04/06/2022 0.7         BP Readings from Last 3 Encounters:   01/25/23 124/80   12/10/22 (!) 150/92   07/14/22 124/76       CHRIS Early is here for follow up of heartburn. Symptoms have been present for awhile and is a chronic condition. Currently treated with prescription H2 blocker: Pepcid 40 mg, which has been effective.    Patient denies dysphagia, cough, hoarseness, chest pain, unintentional weight loss, N/V, bloating, early satiety, abdominal pain or melena, hematochezia, hematemesis, and coffee ground emesis..          Past Medical History:   Diagnosis Date    Anemia     Back pain     Chest pain 6/2014    stress myoview normal    Chronic pain syndrome 2017    Dr. Garcia pain management    Fibroid uterus     fibroid uterus ad abnormal uterine bleeding with uterine fibroids    GERD (gastroesophageal reflux disease)     Hypertension     Menopausal symptoms     Overactive bladder     Pelvic pain     Right lateral epicondylitis     Rotator cuff syndrome of right shoulder     Stress incontinence     Tachycardia     on coreg         Review of Systems   Constitutional: Negative.  Negative for appetite change, fatigue and unexpected weight change.   HENT: Negative.     Eyes: Negative.    Respiratory: Negative.  Negative for shortness of breath.    Cardiovascular: Negative.  Negative for chest pain, palpitations and leg swelling.   Gastrointestinal: Negative.  Negative for abdominal pain, anal bleeding, blood in stool and nausea.   Endocrine: Negative.    Genitourinary: Negative.  Negative for hematuria.   Musculoskeletal: Negative.    Skin: Negative.  Negative for rash.   Allergic/Immunologic: Negative.    Neurological: Negative.  Negative for dizziness, syncope, light-headedness and numbness.   Hematological: Negative.  Does not bruise/bleed easily.   Psychiatric/Behavioral: Negative.     All other systems reviewed and are negative.     Current Outpatient Medications   Medication Sig Dispense Refill    losartan (COZAAR) 50 MG tablet TAKE ONE TABLET BY MOUTH EVERY MORNING 90 tablet 0    carvedilol (COREG) 6.25 MG tablet TAKE ONE TABLET BY MOUTH TWICE A  tablet 0    famotidine (PEPCID) 40 MG tablet TAKE ONE TABLET BY MOUTH EVERY EVENING 30 tablet 0    hydroCHLOROthiazide (MICROZIDE) 12.5 MG capsule TAKE ONE CAPSULE BY MOUTH EVERY MORNING 30 capsule 0    Multiple Vitamins-Calcium (ONE-A-DAY WOMENS FORMULA PO)  Take 1 tablet by mouth daily       No current facility-administered medications for this visit. No changes in past medical history, past surgical history, social history, orfamily history were noted during the patient encounter unless specifically listed above. All updates of past medical history, past surgical history, social history, or family history were reviewed personally by me duringthe office visit. All problems listed in the assessment are stable unless noted otherwise. Medication profile reviewed personally by me during the office visit. Medication side effects and possible impairments frommedications were discussed as applicable. Objective:     Physical Exam  Vitals and nursing note reviewed. Constitutional:       General: She is not in acute distress. Appearance: Normal appearance. She is well-developed. HENT:      Head: Normocephalic and atraumatic. Right Ear: Tympanic membrane, ear canal and external ear normal.      Left Ear: Tympanic membrane, ear canal and external ear normal.      Nose: Nose normal.      Mouth/Throat:      Pharynx: Uvula midline. No oropharyngeal exudate. Eyes:      General: Lids are normal.      Conjunctiva/sclera: Conjunctivae normal.      Pupils: Pupils are equal, round, and reactive to light. Neck:      Thyroid: No thyromegaly. Vascular: No carotid bruit or JVD. Cardiovascular:      Rate and Rhythm: Normal rate and regular rhythm. Pulses: Normal pulses. Radial pulses are 2+ on the right side and 2+ on the left side. Dorsalis pedis pulses are 2+ on the right side and 2+ on the left side. Posterior tibial pulses are 2+ on the right side and 2+ on the left side. Heart sounds: Normal heart sounds. No murmur heard. No friction rub. No gallop. Pulmonary:      Effort: Pulmonary effort is normal.      Breath sounds: Normal breath sounds.    Abdominal:      General: Bowel sounds are normal.      Palpations: Abdomen is soft. There is no mass. Tenderness: There is no abdominal tenderness. Musculoskeletal:         General: Normal range of motion. Cervical back: Normal range of motion and neck supple. Lymphadenopathy:      Head:      Right side of head: No submandibular adenopathy. Left side of head: No submandibular adenopathy. Cervical: No cervical adenopathy. Skin:     General: Skin is warm and dry. Findings: No lesion or rash. Neurological:      Mental Status: She is alert and oriented to person, place, and time. Gait: Gait normal.   Psychiatric:         Speech: Speech normal.         Behavior: Behavior normal.         Thought Content: Thought content normal.         Judgment: Judgment normal.       /80 (Site: Left Upper Arm, Position: Sitting, Cuff Size: Large Adult)   Pulse 84   Ht 5' 8\" (1.727 m)   Wt 239 lb (108.4 kg)   LMP 04/01/2019   SpO2 98%   BMI 36.34 kg/m²   Body mass index is 36.34 kg/m². BP Readings from Last 2 Encounters:   01/25/23 124/80   12/10/22 (!) 150/92       Wt Readings from Last 3 Encounters:   01/25/23 239 lb (108.4 kg)   07/14/22 246 lb (111.6 kg)   05/26/22 246 lb (111.6 kg)       Lab Review   No visits with results within 2 Month(s) from this visit.    Latest known visit with results is:   Nurse Only on 04/28/2022   Component Date Value    WBC 04/28/2022 5.8     RBC 04/28/2022 4.64     Hemoglobin 04/28/2022 13.9     Hematocrit 04/28/2022 40.2     MCV 04/28/2022 86.7     MCH 04/28/2022 29.9     MCHC 04/28/2022 34.5     RDW 04/28/2022 13.7     Platelets 41/31/5916 252     MPV 04/28/2022 8.8     Neutrophils % 04/28/2022 51.0     Lymphocytes % 04/28/2022 36.6     Monocytes % 04/28/2022 10.0     Eosinophils % 04/28/2022 1.1     Basophils % 04/28/2022 1.3     Neutrophils Absolute 04/28/2022 2.9     Lymphocytes Absolute 04/28/2022 2.1     Monocytes Absolute 04/28/2022 0.6     Eosinophils Absolute 04/28/2022 0.1     Basophils Absolute 04/28/2022 0.1        No results found for this visit on 01/25/23.       Assessment:       1. Primary hypertension    2. Tachycardia    3. Gastroesophageal reflux disease without esophagitis    4. History of vitamin D deficiency    5. Screening for cholesterol level    6. Colon cancer screening    7. Other screening mammogram        No results found for this visit on 01/25/23.         Plan:       aPm was seen today for hypertension.    Diagnoses and all orders for this visit:    Primary hypertension  -     Comprehensive Metabolic Panel; Future  -     CBC with Auto Differential; Future  -     losartan (COZAAR) 50 MG tablet; TAKE ONE TABLET BY MOUTH EVERY MORNING  -     carvedilol (COREG) 6.25 MG tablet; TAKE ONE TABLET BY MOUTH TWICE A DAY  -     hydroCHLOROthiazide (MICROZIDE) 12.5 MG capsule; TAKE ONE CAPSULE BY MOUTH EVERY MORNING  Hypertension, Blood pressure is  well controlled on current medication regimen.   Medication: no change.   Dietary sodium restriction.  Regular aerobic exercise.  Check blood pressures monthly and record.    Tachycardia  -     TSH with Reflex; Future  -     carvedilol (COREG) 6.25 MG tablet; TAKE ONE TABLET BY MOUTH TWICE A DAY    Gastroesophageal reflux disease without esophagitis  -     CBC with Auto Differential; Future  Condition appears stable with current medication regimen. No reported or noted side effects of medication. Will continue to monitor at routine intervals as appropriate. Continue current medications as follows:  -     famotidine (PEPCID) 40 MG tablet; TAKE ONE TABLET BY MOUTH EVERY EVENING    History of vitamin D deficiency  -     Vitamin D 25 Hydroxy; Future  Discussed with patient that we make vitamin D from the sun and get it from some food sources, but it is very common to be deficient.  Discussed the need for vitamin D replacement because low vitamin D can cause fatigue, joint aches and has been implicated in heart disease, bone disease like osteoporosis, and some other  chronic illnesses. Patient will start/continue vitamin D supplement as per order. Recommend vitamin D to be rechecked in 6 months. Screening for cholesterol level  -     Lipid Panel; Future    Colon cancer screening  -     Fecal DNA Colorectal cancer screening (Cologuard)    Other screening mammogram  -     MAR DIGITAL SCREEN W OR WO CAD BILATERAL; Future    Patient has been instructed call the office immediately with new symptoms, change in symptoms or worseningof symptoms. If this is not feasible, patient is instructed to report to the emergency room. Medication profile reviewed. Medication side effects and possible impairments from medications were discussed as applicable. Allergies were reviewed. Health maintenance was reviewed and updated as appropriate. Return in about 6 months (around 7/25/2023) for [facility]. (Comment: Please note this report has been produced using a combination of typing and speech recognition software and may contain errors related to that system including errors in grammar, punctuation, and spelling, as well as words and phrases that may be inappropriate.  If there are any questions or concerns please feel free to contact the dictating provider for clarification.)

## 2023-04-22 DIAGNOSIS — I10 PRIMARY HYPERTENSION: ICD-10-CM

## 2023-04-22 DIAGNOSIS — K21.9 GASTROESOPHAGEAL REFLUX DISEASE WITHOUT ESOPHAGITIS: ICD-10-CM

## 2023-04-24 RX ORDER — FAMOTIDINE 40 MG/1
TABLET, FILM COATED ORAL
Qty: 90 TABLET | Refills: 0 | Status: SHIPPED | OUTPATIENT
Start: 2023-04-24

## 2023-04-24 RX ORDER — HYDROCHLOROTHIAZIDE 12.5 MG/1
CAPSULE, GELATIN COATED ORAL
Qty: 90 CAPSULE | Refills: 0 | Status: SHIPPED | OUTPATIENT
Start: 2023-04-24

## 2023-06-16 ENCOUNTER — APPOINTMENT (OUTPATIENT)
Dept: CT IMAGING | Age: 48
End: 2023-06-16
Payer: COMMERCIAL

## 2023-06-16 ENCOUNTER — APPOINTMENT (OUTPATIENT)
Dept: GENERAL RADIOLOGY | Age: 48
End: 2023-06-16
Payer: COMMERCIAL

## 2023-06-16 ENCOUNTER — HOSPITAL ENCOUNTER (EMERGENCY)
Age: 48
Discharge: HOME | End: 2023-06-16
Attending: EMERGENCY MEDICINE
Payer: COMMERCIAL

## 2023-06-16 VITALS
WEIGHT: 246.2 LBS | SYSTOLIC BLOOD PRESSURE: 134 MMHG | RESPIRATION RATE: 14 BRPM | DIASTOLIC BLOOD PRESSURE: 81 MMHG | HEART RATE: 78 BPM | TEMPERATURE: 97.9 F | BODY MASS INDEX: 37.43 KG/M2 | OXYGEN SATURATION: 100 %

## 2023-06-16 DIAGNOSIS — R42 DIZZINESS: Primary | ICD-10-CM

## 2023-06-16 DIAGNOSIS — H81.10 BENIGN PAROXYSMAL POSITIONAL VERTIGO, UNSPECIFIED LATERALITY: ICD-10-CM

## 2023-06-16 LAB
ALBUMIN SERPL-MCNC: 4.5 G/DL (ref 3.4–5)
ALBUMIN/GLOB SERPL: 1.9 {RATIO} (ref 1.1–2.2)
ALP SERPL-CCNC: 56 U/L (ref 40–129)
ALT SERPL-CCNC: 18 U/L (ref 10–40)
ANION GAP SERPL CALCULATED.3IONS-SCNC: 10 MMOL/L (ref 3–16)
AST SERPL-CCNC: 11 U/L (ref 15–37)
BASOPHILS # BLD: 0.1 K/UL (ref 0–0.2)
BASOPHILS NFR BLD: 0.7 %
BILIRUB SERPL-MCNC: <0.2 MG/DL (ref 0–1)
BUN SERPL-MCNC: 15 MG/DL (ref 7–20)
CALCIUM SERPL-MCNC: 9.8 MG/DL (ref 8.3–10.6)
CHLORIDE SERPL-SCNC: 102 MMOL/L (ref 99–110)
CO2 SERPL-SCNC: 24 MMOL/L (ref 21–32)
CREAT SERPL-MCNC: 0.7 MG/DL (ref 0.6–1.1)
DEPRECATED RDW RBC AUTO: 14 % (ref 12.4–15.4)
EKG ATRIAL RATE: 110 BPM
EKG DIAGNOSIS: NORMAL
EKG P AXIS: 70 DEGREES
EKG P-R INTERVAL: 166 MS
EKG Q-T INTERVAL: 320 MS
EKG QRS DURATION: 84 MS
EKG QTC CALCULATION (BAZETT): 433 MS
EKG R AXIS: -16 DEGREES
EKG T AXIS: 52 DEGREES
EKG VENTRICULAR RATE: 110 BPM
EOSINOPHIL # BLD: 0.1 K/UL (ref 0–0.6)
EOSINOPHIL NFR BLD: 1.7 %
GFR SERPLBLD CREATININE-BSD FMLA CKD-EPI: >60 ML/MIN/{1.73_M2}
GLUCOSE BLD-MCNC: 108 MG/DL (ref 70–99)
GLUCOSE SERPL-MCNC: 106 MG/DL (ref 70–99)
HCT VFR BLD AUTO: 42.1 % (ref 36–48)
HGB BLD-MCNC: 14.1 G/DL (ref 12–16)
INR PPP: 0.88 (ref 0.84–1.16)
LYMPHOCYTES # BLD: 2.5 K/UL (ref 1–5.1)
LYMPHOCYTES NFR BLD: 31.9 %
MCH RBC QN AUTO: 28.5 PG (ref 26–34)
MCHC RBC AUTO-ENTMCNC: 33.6 G/DL (ref 31–36)
MCV RBC AUTO: 84.9 FL (ref 80–100)
MONOCYTES # BLD: 0.7 K/UL (ref 0–1.3)
MONOCYTES NFR BLD: 9.1 %
NEUTROPHILS # BLD: 4.5 K/UL (ref 1.7–7.7)
NEUTROPHILS NFR BLD: 56.6 %
PERFORMED ON: ABNORMAL
PLATELET # BLD AUTO: 274 K/UL (ref 135–450)
PMV BLD AUTO: 8.8 FL (ref 5–10.5)
POTASSIUM SERPL-SCNC: 3.9 MMOL/L (ref 3.5–5.1)
PROT SERPL-MCNC: 6.9 G/DL (ref 6.4–8.2)
PROTHROMBIN TIME: 12 SEC (ref 11.5–14.8)
RBC # BLD AUTO: 4.96 M/UL (ref 4–5.2)
SODIUM SERPL-SCNC: 136 MMOL/L (ref 136–145)
TROPONIN, HIGH SENSITIVITY: <6 NG/L (ref 0–14)
WBC # BLD AUTO: 8 K/UL (ref 4–11)

## 2023-06-16 PROCEDURE — 36415 COLL VENOUS BLD VENIPUNCTURE: CPT

## 2023-06-16 PROCEDURE — 93005 ELECTROCARDIOGRAM TRACING: CPT | Performed by: EMERGENCY MEDICINE

## 2023-06-16 PROCEDURE — 99285 EMERGENCY DEPT VISIT HI MDM: CPT

## 2023-06-16 PROCEDURE — 70498 CT ANGIOGRAPHY NECK: CPT

## 2023-06-16 PROCEDURE — 80053 COMPREHEN METABOLIC PANEL: CPT

## 2023-06-16 PROCEDURE — 71046 X-RAY EXAM CHEST 2 VIEWS: CPT

## 2023-06-16 PROCEDURE — 84484 ASSAY OF TROPONIN QUANT: CPT

## 2023-06-16 PROCEDURE — 70450 CT HEAD/BRAIN W/O DYE: CPT

## 2023-06-16 PROCEDURE — 93010 ELECTROCARDIOGRAM REPORT: CPT | Performed by: INTERNAL MEDICINE

## 2023-06-16 PROCEDURE — 6360000004 HC RX CONTRAST MEDICATION: Performed by: EMERGENCY MEDICINE

## 2023-06-16 PROCEDURE — 85610 PROTHROMBIN TIME: CPT

## 2023-06-16 PROCEDURE — 85025 COMPLETE CBC W/AUTO DIFF WBC: CPT

## 2023-06-16 RX ADMIN — IOMEPROL INJECTION 75 ML: 714 INJECTION, SOLUTION INTRAVASCULAR at 12:25

## 2023-06-16 ASSESSMENT — ENCOUNTER SYMPTOMS: CHEST TIGHTNESS: 1

## 2023-06-16 NOTE — DISCHARGE INSTRUCTIONS
Continue to take Tylenol and ibuprofen for your aches pains and headache  Follow-up with your primary care team on Monday and Tuesday for possibly further evaluation if needed

## 2023-06-16 NOTE — ED PROVIDER NOTES
100%   Weight: 246 lb 3.2 oz (111.7 kg)            [facility]  Historian: Patient alone she apparently drove herself here and walked in without ataxia according to the nursing staff. Patient states that she did feel unsteady however and felt that she was not walking straight  Patient denied any motor weakness fact her NIH stroke score is being calculated at most less than 1  Patient had no slurring of her speech she knows her age she knows what month it is she has no neck rigidity meningismus no confusion no fever    Limitations of history patient does seem rather emotional with blinking eyelids constantly    Physical examination blood pressure is 150/97 pupils PERRLA extra muscle intact GCS 15 cranial nerves II through XII intact NIH stroke score 021 at the most  No carotid bruits lung sounds are clear cardiac exam is normal  No nystagmus  Finger-nose performed adequately  Maybe a little bit of a left facial droop but nothing more no motor weakness  EKG was ordered and interpreted by myself without assistance of cardiology patient is a sinus rhythm at 110 no acute ischemic injury pattern. No ectopy  Intervals are normal    CBC and CMP: Ordered and independently interpreted and are normal    CT and CTAs of the brain: Independently interpreted and ordered upfront are normal without signs of stroke masses aneurysms    Repeat evaluation still reveals an NIH stroke score of 0 in fact she now endorses that ever since she had Bell's she has had a little bit of a left facial droop so I think what we were concerned about is probably her residual also the complaints of numbness in her arm when I reevaluated her she says that she has pain in her left shoulder with movement. Therefore I advised her that this most likely would not be a stroke because people with strokes usually do not have pain she was reassured by this.   We did talk about going to [facility] for an MRI but she felt comfortable going home and following up with her

## 2023-06-19 ENCOUNTER — TELEPHONE (OUTPATIENT)
Dept: FAMILY MEDICINE CLINIC | Age: 48
End: 2023-06-19

## 2023-06-19 DIAGNOSIS — I10 PRIMARY HYPERTENSION: ICD-10-CM

## 2023-06-19 RX ORDER — LOSARTAN POTASSIUM 50 MG/1
TABLET ORAL
Qty: 90 TABLET | Refills: 0 | Status: SHIPPED | OUTPATIENT
Start: 2023-06-19

## 2023-06-19 NOTE — TELEPHONE ENCOUNTER
Last appt 1/25/2023, no appt scheduled  Pt due \"Return in about 6 months (around 7/25/2023) for [facility]. \"

## 2023-06-19 NOTE — TELEPHONE ENCOUNTER
Pt called stating that she was in the ED over the weekend for what she thought was a stroke. States that she was told it was vertigo. Pt is wanting to see PCP to follow up from the ED. No avail appts.  Call back pt [phone]

## 2023-06-20 NOTE — TELEPHONE ENCOUNTER
I can see patient on Thursday at 140 for a 40 minute er follow up if still available once patient contacted

## 2023-06-22 ENCOUNTER — OFFICE VISIT (OUTPATIENT)
Dept: FAMILY MEDICINE CLINIC | Age: 48
End: 2023-06-22
Payer: COMMERCIAL

## 2023-06-22 VITALS
DIASTOLIC BLOOD PRESSURE: 82 MMHG | HEART RATE: 68 BPM | BODY MASS INDEX: 37.74 KG/M2 | HEIGHT: 68 IN | OXYGEN SATURATION: 98 % | WEIGHT: 249 LBS | SYSTOLIC BLOOD PRESSURE: 122 MMHG

## 2023-06-22 DIAGNOSIS — E66.01 SEVERE OBESITY (BMI 35.0-39.9) WITH COMORBIDITY (HCC): ICD-10-CM

## 2023-06-22 DIAGNOSIS — R00.0 TACHYCARDIA: ICD-10-CM

## 2023-06-22 DIAGNOSIS — I10 PRIMARY HYPERTENSION: ICD-10-CM

## 2023-06-22 DIAGNOSIS — R42 VERTIGO: Primary | ICD-10-CM

## 2023-06-22 LAB
BILIRUBIN, POC: NORMAL
BLOOD URINE, POC: NORMAL
CLARITY, POC: CLEAR
COLOR, POC: YELLOW
GLUCOSE URINE, POC: NORMAL
KETONES, POC: NORMAL
LEUKOCYTE EST, POC: NORMAL
NITRITE, POC: NORMAL
PH, POC: 7
PROTEIN, POC: NORMAL
SPECIFIC GRAVITY, POC: 1.01
UROBILINOGEN, POC: 0.2

## 2023-06-22 PROCEDURE — 99213 OFFICE O/P EST LOW 20 MIN: CPT | Performed by: NURSE PRACTITIONER

## 2023-06-22 PROCEDURE — 3074F SYST BP LT 130 MM HG: CPT | Performed by: NURSE PRACTITIONER

## 2023-06-22 PROCEDURE — 3079F DIAST BP 80-89 MM HG: CPT | Performed by: NURSE PRACTITIONER

## 2023-06-22 PROCEDURE — 81002 URINALYSIS NONAUTO W/O SCOPE: CPT | Performed by: NURSE PRACTITIONER

## 2023-06-22 RX ORDER — CARVEDILOL 6.25 MG/1
TABLET ORAL
Qty: 180 TABLET | Refills: 0 | Status: SHIPPED | OUTPATIENT
Start: 2023-06-22

## 2023-06-22 RX ORDER — MECLIZINE HYDROCHLORIDE 25 MG/1
25 TABLET ORAL 3 TIMES DAILY PRN
Qty: 20 TABLET | Refills: 0 | Status: SHIPPED | OUTPATIENT
Start: 2023-06-22 | End: 2023-07-02

## 2023-06-22 ASSESSMENT — ENCOUNTER SYMPTOMS
ALLERGIC/IMMUNOLOGIC NEGATIVE: 1
EYES NEGATIVE: 1
NAUSEA: 0
BLOOD IN STOOL: 0
ANAL BLEEDING: 0
RESPIRATORY NEGATIVE: 1
SHORTNESS OF BREATH: 0
ABDOMINAL PAIN: 0
GASTROINTESTINAL NEGATIVE: 1

## 2023-06-22 NOTE — PROGRESS NOTES
Ambulatory Patient Note Following Recent ED Visit   Subjective:     Chief Complaint   Patient presents with    Follow-up     Pt went to ER for dizziness, left arm numbness, left sided face numbness. Pt is still having headaches. HPI  Recent ED visit for dizziness   Orlene Arielle  is a 57 y.o. female who presents today for evaluation following a recent ED visit related to the above mentioned issue. The patient comes in for ER follow-up labs from 6/16/2023. The patient states that she had been tired and slightly short of breath on and off for approximately 1 to 2 weeks prior to going to the ER. She did develop some dizziness 2 days prior to her ER appointment but she never lost her balance. The morning she went to the ER she states that she was having difficulty keeping her balance and started having facial numbness on the left side of her face as well as her left arm. The patient also started having headaches daily. The patient had a negative chest x-ray, CTA of the head and neck and head CT. EKG in the hospital exhibited no changes when compared to her EKG of April 2021. Lab work was essentially benign  The patient states that since discharge from the ER her dizziness has improved. She does explain her dizziness as the room spinning. She states it has been on and off and not as severe. She states the numbness in her left arm has resolved. She still has some slight numbness in the left side of her face but again overall feels it is improving. She denies any syncope, seizures, difficulty with speech, tremors, unusual weakness, facial asymmetry, loss of consciousness or recent fall or head injury  She Also states her shortness of breath as well as her fatigue also has significantly improved    Review of Systems   Constitutional: Negative. Negative for appetite change, fatigue and unexpected weight change. HENT: Negative. Eyes: Negative.

## 2023-07-27 DIAGNOSIS — K21.9 GASTROESOPHAGEAL REFLUX DISEASE WITHOUT ESOPHAGITIS: ICD-10-CM

## 2023-07-27 DIAGNOSIS — I10 PRIMARY HYPERTENSION: ICD-10-CM

## 2023-07-27 RX ORDER — FAMOTIDINE 40 MG/1
40 TABLET, FILM COATED ORAL NIGHTLY
Qty: 90 TABLET | Refills: 0 | Status: SHIPPED | OUTPATIENT
Start: 2023-07-27

## 2023-07-27 RX ORDER — HYDROCHLOROTHIAZIDE 12.5 MG/1
12.5 CAPSULE, GELATIN COATED ORAL EVERY MORNING
Qty: 90 CAPSULE | Refills: 0 | Status: SHIPPED | OUTPATIENT
Start: 2023-07-27

## 2023-07-27 NOTE — TELEPHONE ENCOUNTER
Refill Request     CONFIRM preferrred pharmacy with the patient. If Mail Order Rx - Pend for 90 day refill. Last Seen: Last Seen Department: 6/22/2023  Last Seen by PCP: 6/22/2023    Last Written: 04/24/2023    If no future appointment scheduled, route STAFF MESSAGE with patient name to the [facility] for scheduling. Next Appointment:   Future Appointments   Date Time Provider [address] [place] [address]   8/17/2023  2:00 PM Vineet Martin, ALLY - CNP [place]  [place] - [facility]       Message sent to  to schedule appt with patient?   N/A      Requested Prescriptions     Pending Prescriptions Disp Refills    hydroCHLOROthiazide (MICROZIDE) 12.5 MG capsule 90 capsule 0     Sig: Take 1 capsule by mouth every morning    famotidine (PEPCID) 40 MG tablet 90 tablet 0     Sig: Take 1 tablet by mouth nightly

## 2023-08-17 ENCOUNTER — OFFICE VISIT (OUTPATIENT)
Dept: FAMILY MEDICINE CLINIC | Age: 48
End: 2023-08-17
Payer: COMMERCIAL

## 2023-08-17 VITALS
SYSTOLIC BLOOD PRESSURE: 114 MMHG | WEIGHT: 239 LBS | OXYGEN SATURATION: 97 % | BODY MASS INDEX: 36.22 KG/M2 | DIASTOLIC BLOOD PRESSURE: 78 MMHG | HEIGHT: 68 IN | HEART RATE: 84 BPM

## 2023-08-17 DIAGNOSIS — I10 PRIMARY HYPERTENSION: Primary | ICD-10-CM

## 2023-08-17 DIAGNOSIS — Z13.220 SCREENING FOR CHOLESTEROL LEVEL: ICD-10-CM

## 2023-08-17 DIAGNOSIS — Z13.1 DIABETES MELLITUS SCREENING: ICD-10-CM

## 2023-08-17 DIAGNOSIS — R00.0 TACHYCARDIA: ICD-10-CM

## 2023-08-17 DIAGNOSIS — K21.9 GASTROESOPHAGEAL REFLUX DISEASE WITHOUT ESOPHAGITIS: ICD-10-CM

## 2023-08-17 DIAGNOSIS — E55.9 VITAMIN D DEFICIENCY: ICD-10-CM

## 2023-08-17 PROCEDURE — 3078F DIAST BP <80 MM HG: CPT | Performed by: NURSE PRACTITIONER

## 2023-08-17 PROCEDURE — 3074F SYST BP LT 130 MM HG: CPT | Performed by: NURSE PRACTITIONER

## 2023-08-17 PROCEDURE — 99214 OFFICE O/P EST MOD 30 MIN: CPT | Performed by: NURSE PRACTITIONER

## 2023-08-17 RX ORDER — HYDROCHLOROTHIAZIDE 12.5 MG/1
12.5 CAPSULE, GELATIN COATED ORAL EVERY OTHER DAY
Qty: 45 CAPSULE | Refills: 0 | Status: SHIPPED | OUTPATIENT
Start: 2023-08-17

## 2023-08-17 RX ORDER — FAMOTIDINE 40 MG/1
40 TABLET, FILM COATED ORAL NIGHTLY
Qty: 90 TABLET | Refills: 2 | Status: SHIPPED | OUTPATIENT
Start: 2023-08-17

## 2023-08-17 SDOH — ECONOMIC STABILITY: INCOME INSECURITY: HOW HARD IS IT FOR YOU TO PAY FOR THE VERY BASICS LIKE FOOD, HOUSING, MEDICAL CARE, AND HEATING?: NOT HARD AT ALL

## 2023-08-17 SDOH — ECONOMIC STABILITY: FOOD INSECURITY: WITHIN THE PAST 12 MONTHS, YOU WORRIED THAT YOUR FOOD WOULD RUN OUT BEFORE YOU GOT MONEY TO BUY MORE.: NEVER TRUE

## 2023-08-17 SDOH — ECONOMIC STABILITY: FOOD INSECURITY: WITHIN THE PAST 12 MONTHS, THE FOOD YOU BOUGHT JUST DIDN'T LAST AND YOU DIDN'T HAVE MONEY TO GET MORE.: NEVER TRUE

## 2023-08-17 SDOH — ECONOMIC STABILITY: HOUSING INSECURITY
IN THE LAST 12 MONTHS, WAS THERE A TIME WHEN YOU DID NOT HAVE A STEADY PLACE TO SLEEP OR SLEPT IN A SHELTER (INCLUDING NOW)?: NO

## 2023-08-17 ASSESSMENT — ENCOUNTER SYMPTOMS
GASTROINTESTINAL NEGATIVE: 1
BLOOD IN STOOL: 0
NAUSEA: 0
RESPIRATORY NEGATIVE: 1
EYES NEGATIVE: 1
ALLERGIC/IMMUNOLOGIC NEGATIVE: 1
ABDOMINAL PAIN: 0
SHORTNESS OF BREATH: 0
ANAL BLEEDING: 0

## 2023-08-17 NOTE — PROGRESS NOTES
06/22/2023 7.0     Protein, UA POC 06/22/2023 neg     Urobilinogen, UA 06/22/2023 0.2     Leukocytes, UA 06/22/2023 neg     Nitrite, UA 06/22/2023 neg        No results found for this visit on 08/17/23. Assessment:       1. Primary hypertension    2. Tachycardia    3. Gastroesophageal reflux disease without esophagitis    4. Vitamin D deficiency    5. Screening for cholesterol level    6. Diabetes mellitus screening        No results found for this visit on 08/17/23. Plan:       Shannon Coker was seen today for gastroesophageal reflux, hypertension and other. Diagnoses and all orders for this visit:    Primary hypertension  The patient has been complaining of some lightheadedness on a daily basis. She has changed her diet and has lost weight. This is most likely the cause for some of her lightheadedness. Orthostatic blood pressures were checked and she did not have any orthostatic hypotension. Currently she is on carvedilol for the tachycardia and she will continue her current dose. She will also continue losartan 50 mg daily. I we will change her hydrochlorothiazide 12.5 mg daily to every other day as noted below  -     hydroCHLOROthiazide (MICROZIDE) 12.5 MG capsule; Take 1 capsule by mouth every other day  Recommend checking her blood pressure at home daily and coming in in about 3 weeks for blood pressure check with the medical assistant or sooner if needed    Tachycardia  Again her pulse is well controlled on carvedilol 6.25 mg twice daily and she should continue this medication    Gastroesophageal reflux disease without esophagitis  Condition appears stable with current medication regimen. No reported or noted side effects of medication. Will continue to monitor at routine intervals as appropriate. Continue current medications as follows:  -     famotidine (PEPCID) 40 MG tablet;  Take 1 tablet by mouth nightly    Vitamin D deficiency  Discussed with patient that we make vitamin D from the sun

## 2023-08-18 LAB
CHOLEST SERPL-MCNC: 154 MG/DL (ref 0–199)
GLUCOSE P FAST SERPL-MCNC: 98 MG/DL (ref 70–99)
HDLC SERPL-MCNC: 45 MG/DL (ref 40–60)
LDLC SERPL CALC-MCNC: 84 MG/DL
TRIGL SERPL-MCNC: 127 MG/DL (ref 0–150)
VLDLC SERPL CALC-MCNC: 25 MG/DL

## 2023-09-14 DIAGNOSIS — I10 PRIMARY HYPERTENSION: ICD-10-CM

## 2023-09-14 RX ORDER — LOSARTAN POTASSIUM 50 MG/1
TABLET ORAL
Qty: 90 TABLET | Refills: 1 | Status: SHIPPED | OUTPATIENT
Start: 2023-09-14

## 2023-09-14 NOTE — TELEPHONE ENCOUNTER
Refill Request     CONFIRM preferrred pharmacy with the patient. If Mail Order Rx - Pend for 90 day refill. Last Seen: Last Seen Department: 8/17/2023  Last Seen by PCP: 8/17/2023    Last Written: 06/13/2023    If no future appointment scheduled, route STAFF MESSAGE with patient name to the [facility] for scheduling. Next Appointment:   Future Appointments   Date Time Provider Department Center   2/15/2024  8:40 AM Debra , APRN - CNP [facility] [place] [facility]       Message sent to  to schedule appt with patient?   N/A      Requested Prescriptions     Pending Prescriptions Disp Refills    losartan (COZAAR) 50 MG tablet [Pharmacy Med Name: LOSARTAN POTASSIUM 50 MG TAB] 90 tablet 0     Sig: TAKE ONE TABLET BY MOUTH EVERY MORNING

## 2023-09-19 DIAGNOSIS — I10 PRIMARY HYPERTENSION: ICD-10-CM

## 2023-09-19 DIAGNOSIS — R00.0 TACHYCARDIA: ICD-10-CM

## 2023-09-19 RX ORDER — CARVEDILOL 6.25 MG/1
TABLET ORAL
Qty: 180 TABLET | Refills: 0 | Status: SHIPPED | OUTPATIENT
Start: 2023-09-19

## 2023-09-19 NOTE — TELEPHONE ENCOUNTER
Refill Request     CONFIRM preferrred pharmacy with the patient. If Mail Order Rx - Pend for 90 day refill. Last Seen: Last Seen Department: 8/17/2023  Last Seen by PCP: 8/17/2023    Last Written: 6-    If no future appointment scheduled, route STAFF MESSAGE with patient name to the [facility] for scheduling. Next Appointment:   Future Appointments   Date Time Provider Department Center   2/15/2024  8:40 AM ALLY Carrillo CNP, [place]  [place] [facility]       Message sent to  to schedule appt with patient?   N/A      Requested Prescriptions     Pending Prescriptions Disp Refills    carvedilol (COREG) 6.25 MG tablet [Pharmacy Med Name: CARVEDILOL 6.25 MG TABLET] 180 tablet 0     Sig: TAKE 1 TABLET BY MOUTH TWICE A DAY

## 2024-03-04 ENCOUNTER — OFFICE VISIT (OUTPATIENT)
Dept: FAMILY MEDICINE CLINIC | Age: 49
End: 2024-03-04
Payer: COMMERCIAL

## 2024-03-04 VITALS
HEART RATE: 90 BPM | HEIGHT: 68 IN | DIASTOLIC BLOOD PRESSURE: 72 MMHG | TEMPERATURE: 98.1 F | OXYGEN SATURATION: 98 % | BODY MASS INDEX: 36.98 KG/M2 | WEIGHT: 244 LBS | SYSTOLIC BLOOD PRESSURE: 110 MMHG

## 2024-03-04 DIAGNOSIS — J01.90 ACUTE BACTERIAL SINUSITIS: Primary | ICD-10-CM

## 2024-03-04 DIAGNOSIS — R68.89 FLU-LIKE SYMPTOMS: ICD-10-CM

## 2024-03-04 DIAGNOSIS — E66.01 SEVERE OBESITY (BMI 35.0-39.9) WITH COMORBIDITY (HCC): ICD-10-CM

## 2024-03-04 DIAGNOSIS — Z11.52 ENCOUNTER FOR SCREENING FOR COVID-19: ICD-10-CM

## 2024-03-04 DIAGNOSIS — B96.89 ACUTE BACTERIAL SINUSITIS: Primary | ICD-10-CM

## 2024-03-04 LAB
INFLUENZA A ANTIBODY: NEGATIVE
INFLUENZA B ANTIBODY: NEGATIVE
Lab: NORMAL
QC PASS/FAIL: NORMAL
SARS-COV-2 RDRP RESP QL NAA+PROBE: NEGATIVE

## 2024-03-04 PROCEDURE — 3074F SYST BP LT 130 MM HG: CPT | Performed by: NURSE PRACTITIONER

## 2024-03-04 PROCEDURE — 87804 INFLUENZA ASSAY W/OPTIC: CPT | Performed by: NURSE PRACTITIONER

## 2024-03-04 PROCEDURE — 99213 OFFICE O/P EST LOW 20 MIN: CPT | Performed by: NURSE PRACTITIONER

## 2024-03-04 PROCEDURE — 3078F DIAST BP <80 MM HG: CPT | Performed by: NURSE PRACTITIONER

## 2024-03-04 PROCEDURE — 87635 SARS-COV-2 COVID-19 AMP PRB: CPT | Performed by: NURSE PRACTITIONER

## 2024-03-04 RX ORDER — CLARITHROMYCIN 500 MG/1
500 TABLET, COATED ORAL 2 TIMES DAILY
Qty: 28 TABLET | Refills: 0 | Status: SHIPPED | OUTPATIENT
Start: 2024-03-04 | End: 2024-03-07 | Stop reason: SINTOL

## 2024-03-04 ASSESSMENT — ENCOUNTER SYMPTOMS
RESPIRATORY NEGATIVE: 1
HOARSE VOICE: 0
BLOOD IN STOOL: 0
EYES NEGATIVE: 1
SORE THROAT: 0
NAUSEA: 0
ANAL BLEEDING: 0
ABDOMINAL PAIN: 0
SINUS PAIN: 1
GASTROINTESTINAL NEGATIVE: 1
SHORTNESS OF BREATH: 0
COUGH: 0
SINUS PRESSURE: 1
SINUS COMPLAINT: 1
ALLERGIC/IMMUNOLOGIC NEGATIVE: 1

## 2024-03-04 NOTE — PROGRESS NOTES
[facility]  [facility]  [address] [place] [id number]  Dept: [phone]  Dept Fax: [phone]  Loc: [phone]    Pam Guido is a 48 y.o. female who presents today for her medical conditions/complaints as noted below.  Pam Guido is c/o of Other (Sinus pressure for a month ) and Headache       Subjective:     Chief Complaint   Patient presents with    Other     Sinus pressure for a month     Headache       Sinus Problem  This is a new problem. The current episode started more than 1 month ago. The problem is unchanged. Associated symptoms include congestion (slight), headaches and sinus pressure. Pertinent negatives include no chills, coughing, diaphoresis, ear pain, hoarse voice, neck pain, shortness of breath, sneezing or sore throat. (Slight bleeding with clear discharge)   Hurts in upper teeth at times but denies any dental problems    Past Medical History:   Diagnosis Date    Anemia     Back pain     Chest pain 6/2014    stress myoview normal    Chronic pain syndrome 2017    Dr. Garcia pain management    Fibroid uterus     fibroid uterus ad abnormal uterine bleeding with uterine fibroids    GERD (gastroesophageal reflux disease)     Hypertension     Menopausal symptoms     Overactive bladder     Pelvic pain     Right lateral epicondylitis     Rotator cuff syndrome of right shoulder     Stress incontinence     Tachycardia     on coreg         Review of Systems   Constitutional: Negative.  Negative for appetite change, chills, diaphoresis, fatigue, fever and unexpected weight change.   HENT:  Positive for congestion (slight), nosebleeds, postnasal drip (slight at night), sinus pressure, sinus pain and tinnitus. Negative for ear discharge, ear pain, hoarse voice, sneezing and sore throat.    Eyes: Negative.    Respiratory: Negative.  Negative for cough and shortness of breath.    Cardiovascular: Negative.  Negative for chest pain, palpitations and leg

## 2024-03-04 NOTE — PATIENT INSTRUCTIONS
Not feeling your best?  Where to go for the right care at the right time.    Dear Pam Guido   I wanted to provide you with some information that might help you seek care for your condition when your primary care provider or specialist is unavailable. If you have a need outside of normal business hours, you should first contact your primary care office or specialist caring for your condition. They may have on-call providers that could assist with your care. During office hours, you may request a virtual or same day appointment.   But what if your primary care provider is not in the office that day and you can't wait until the  next day for care? In that situation, your next option is to visit an urgent care facility.          [facility] now has urgent care sites open to support our community.   [facility] is a great alternative when you need immediate medical care that is not a serious threat to your health or your doctor's office is closed or unable to get you in for an appointment. The urgent care centers offer fast access to [facility] doctors for minor illnesses and injuries for patients of all ages. There are other medical services available including lab testing, X-rays, EKGs, and IV fluids.  Locations are open daily from 8 a.m. - 8 p.m.     [facility]  [address], [place], [place] [address]  [phone]    [address], [place], [place] [address]  [phone]    Local Urgent Care     [facility] 8:30 am - 7:70 pm   [address] [place] [address]  [phone]    [facility]     8 am - 8 pm   [address], [place] [address]  [phone]    [facility] 7 am - 7:30 pm  [address] [place] [address]  [phone]     [facility] [place] 7 am - 7:30 pm  [address] [place] [address]  [phone]    Chidi

## 2024-03-07 ENCOUNTER — TELEPHONE (OUTPATIENT)
Dept: FAMILY MEDICINE CLINIC | Age: 49
End: 2024-03-07

## 2024-03-07 RX ORDER — SULFAMETHOXAZOLE AND TRIMETHOPRIM 800; 160 MG/1; MG/1
1 TABLET ORAL 2 TIMES DAILY
Qty: 20 TABLET | Refills: 0 | Status: SHIPPED | OUTPATIENT
Start: 2024-03-07 | End: 2024-03-17

## 2024-03-07 NOTE — TELEPHONE ENCOUNTER
Patient calling stating she can not take the medication of clarithromycin (BIAXIN) 500 MG tablet [[id number]] .  When she took the first pill, she broke out with a rash on her face.  Rash got worse after 2nd pill, and with the 3rd pill, she got SOB.  She stopped taking it and is breathing fine now, rash is going away, but patient's headaches are coming back.  Patient states she is deathly allergic to penicillin.  Asking if something else can be called in for her to [facility].  Please call patient and advise.

## 2024-03-07 NOTE — TELEPHONE ENCOUNTER
Discontinue Biaxin and I added it to her allergy list  I did send a prescription for Bactrim DS 1 p.o. twice daily x 10 days to treat her symptoms

## 2024-03-13 DIAGNOSIS — I10 PRIMARY HYPERTENSION: ICD-10-CM

## 2024-03-13 DIAGNOSIS — R00.0 TACHYCARDIA: ICD-10-CM

## 2024-03-13 RX ORDER — CARVEDILOL 6.25 MG/1
TABLET ORAL
Qty: 180 TABLET | Refills: 0 | Status: SHIPPED | OUTPATIENT
Start: 2024-03-13

## 2024-03-13 RX ORDER — LOSARTAN POTASSIUM 50 MG/1
TABLET ORAL
Qty: 90 TABLET | Refills: 1 | Status: SHIPPED | OUTPATIENT
Start: 2024-03-13

## 2024-03-13 NOTE — TELEPHONE ENCOUNTER
Refill Request     CONFIRM preferrred pharmacy with the patient.    If Mail Order Rx - Pend for 90 day refill.      Last Seen: Last Seen Department: 3/4/2024  Last Seen by PCP: 3/4/2024    Last Written: 12/11/23    If no future appointment scheduled, route STAFF MESSAGE with patient name to the  Pool for scheduling.      Next Appointment:   Future Appointments   Date Time Provider Department Center   4/10/2024  2:20 PM [facility] Jensen, [facility] [place] [facility]       Message sent to  to schedule appt with patient?  N/A      Requested Prescriptions     Pending Prescriptions Disp Refills    losartan (COZAAR) 50 MG tablet [Pharmacy Med Name: LOSARTAN POTASSIUM 50 MG TAB] 90 tablet 1     Sig: TAKE ONE TABLET BY MOUTH EVERY MORNING    carvedilol (COREG) 6.25 MG tablet [Pharmacy Med Name: CARVEDILOL 6.25 MG TABLET] 180 tablet 0     Sig: TAKE 1 TABLET BY MOUTH TWICE A DAY

## 2024-03-19 ENCOUNTER — HOSPITAL ENCOUNTER (EMERGENCY)
Age: 49
Discharge: HOME | End: 2024-03-19
Attending: EMERGENCY MEDICINE
Payer: COMMERCIAL

## 2024-03-19 ENCOUNTER — APPOINTMENT (OUTPATIENT)
Dept: GENERAL RADIOLOGY | Age: 49
End: 2024-03-19
Payer: COMMERCIAL

## 2024-03-19 VITALS
DIASTOLIC BLOOD PRESSURE: 91 MMHG | BODY MASS INDEX: 36.99 KG/M2 | RESPIRATION RATE: 20 BRPM | WEIGHT: 243.3 LBS | SYSTOLIC BLOOD PRESSURE: 147 MMHG | HEART RATE: 97 BPM | TEMPERATURE: 98.3 F | OXYGEN SATURATION: 98 %

## 2024-03-19 DIAGNOSIS — R11.0 NAUSEA: ICD-10-CM

## 2024-03-19 DIAGNOSIS — R07.89 ATYPICAL CHEST PAIN: ICD-10-CM

## 2024-03-19 DIAGNOSIS — K29.00 ACUTE GASTRITIS WITHOUT HEMORRHAGE, UNSPECIFIED GASTRITIS TYPE: ICD-10-CM

## 2024-03-19 DIAGNOSIS — J06.9 ACUTE UPPER RESPIRATORY INFECTION: Primary | ICD-10-CM

## 2024-03-19 LAB
ALBUMIN SERPL-MCNC: 4.4 G/DL (ref 3.4–5)
ALBUMIN/GLOB SERPL: 1.6 {RATIO} (ref 1.1–2.2)
ALP SERPL-CCNC: 52 U/L (ref 40–129)
ALT SERPL-CCNC: 11 U/L (ref 10–40)
ANION GAP SERPL CALCULATED.3IONS-SCNC: 10 MMOL/L (ref 3–16)
AST SERPL-CCNC: 9 U/L (ref 15–37)
BASOPHILS # BLD: 0.1 K/UL (ref 0–0.2)
BASOPHILS NFR BLD: 0.8 %
BILIRUB SERPL-MCNC: 0.5 MG/DL (ref 0–1)
BUN SERPL-MCNC: 11 MG/DL (ref 7–20)
CALCIUM SERPL-MCNC: 9.4 MG/DL (ref 8.3–10.6)
CHLORIDE SERPL-SCNC: 106 MMOL/L (ref 99–110)
CO2 SERPL-SCNC: 21 MMOL/L (ref 21–32)
CREAT SERPL-MCNC: 0.6 MG/DL (ref 0.6–1.1)
D DIMER: <0.27 UG/ML FEU (ref 0–0.6)
DEPRECATED RDW RBC AUTO: 13.7 % (ref 12.4–15.4)
EKG ATRIAL RATE: 85 BPM
EKG DIAGNOSIS: NORMAL
EKG P AXIS: 55 DEGREES
EKG P-R INTERVAL: 146 MS
EKG Q-T INTERVAL: 358 MS
EKG QRS DURATION: 84 MS
EKG QTC CALCULATION (BAZETT): 426 MS
EKG R AXIS: -23 DEGREES
EKG T AXIS: 39 DEGREES
EKG VENTRICULAR RATE: 85 BPM
EOSINOPHIL # BLD: 0.1 K/UL (ref 0–0.6)
EOSINOPHIL NFR BLD: 1 %
FLUAV RNA UPPER RESP QL NAA+PROBE: NEGATIVE
FLUBV AG NPH QL: NEGATIVE
GFR SERPLBLD CREATININE-BSD FMLA CKD-EPI: >60 ML/MIN/{1.73_M2}
GLUCOSE SERPL-MCNC: 111 MG/DL (ref 70–99)
HCT VFR BLD AUTO: 42.1 % (ref 36–48)
HGB BLD-MCNC: 14.4 G/DL (ref 12–16)
LYMPHOCYTES # BLD: 1.4 K/UL (ref 1–5.1)
LYMPHOCYTES NFR BLD: 17.5 %
MCH RBC QN AUTO: 28.9 PG (ref 26–34)
MCHC RBC AUTO-ENTMCNC: 34.3 G/DL (ref 31–36)
MCV RBC AUTO: 84.1 FL (ref 80–100)
MONOCYTES # BLD: 0.6 K/UL (ref 0–1.3)
MONOCYTES NFR BLD: 7.7 %
NEUTROPHILS # BLD: 5.8 K/UL (ref 1.7–7.7)
NEUTROPHILS NFR BLD: 73 %
PLATELET # BLD AUTO: 236 K/UL (ref 135–450)
PMV BLD AUTO: 8.9 FL (ref 5–10.5)
POTASSIUM SERPL-SCNC: 4 MMOL/L (ref 3.5–5.1)
PROT SERPL-MCNC: 7.2 G/DL (ref 6.4–8.2)
RBC # BLD AUTO: 5 M/UL (ref 4–5.2)
SARS-COV-2 RDRP RESP QL NAA+PROBE: NOT DETECTED
SODIUM SERPL-SCNC: 137 MMOL/L (ref 136–145)
TROPONIN, HIGH SENSITIVITY: <6 NG/L (ref 0–14)
TROPONIN, HIGH SENSITIVITY: <6 NG/L (ref 0–14)
WBC # BLD AUTO: 7.9 K/UL (ref 4–11)

## 2024-03-19 PROCEDURE — 93010 ELECTROCARDIOGRAM REPORT: CPT | Performed by: INTERNAL MEDICINE

## 2024-03-19 PROCEDURE — 93005 ELECTROCARDIOGRAM TRACING: CPT | Performed by: EMERGENCY MEDICINE

## 2024-03-19 PROCEDURE — 80053 COMPREHEN METABOLIC PANEL: CPT

## 2024-03-19 PROCEDURE — 36415 COLL VENOUS BLD VENIPUNCTURE: CPT

## 2024-03-19 PROCEDURE — 84484 ASSAY OF TROPONIN QUANT: CPT

## 2024-03-19 PROCEDURE — 85025 COMPLETE CBC W/AUTO DIFF WBC: CPT

## 2024-03-19 PROCEDURE — 6370000000 HC RX 637 (ALT 250 FOR IP): Performed by: EMERGENCY MEDICINE

## 2024-03-19 PROCEDURE — 99285 EMERGENCY DEPT VISIT HI MDM: CPT

## 2024-03-19 PROCEDURE — 71046 X-RAY EXAM CHEST 2 VIEWS: CPT

## 2024-03-19 PROCEDURE — 85379 FIBRIN DEGRADATION QUANT: CPT

## 2024-03-19 PROCEDURE — 87804 INFLUENZA ASSAY W/OPTIC: CPT

## 2024-03-19 PROCEDURE — 87635 SARS-COV-2 COVID-19 AMP PRB: CPT

## 2024-03-19 RX ORDER — ONDANSETRON 4 MG/1
4 TABLET, FILM COATED ORAL 3 TIMES DAILY PRN
Qty: 15 TABLET | Refills: 0 | Status: SHIPPED | OUTPATIENT
Start: 2024-03-19

## 2024-03-19 RX ORDER — ONDANSETRON 4 MG/1
4 TABLET, ORALLY DISINTEGRATING ORAL ONCE
Status: COMPLETED | OUTPATIENT
Start: 2024-03-19 | End: 2024-03-19

## 2024-03-19 RX ORDER — FAMOTIDINE 20 MG/1
20 TABLET, FILM COATED ORAL 2 TIMES DAILY
Qty: 60 TABLET | Refills: 3 | Status: SHIPPED | OUTPATIENT
Start: 2024-03-19

## 2024-03-19 RX ADMIN — ONDANSETRON 4 MG: 4 TABLET, ORALLY DISINTEGRATING ORAL at 10:09

## 2024-03-19 ASSESSMENT — PAIN SCALES - GENERAL: PAINLEVEL_OUTOF10: 5

## 2024-03-19 ASSESSMENT — PAIN DESCRIPTION - DESCRIPTORS: DESCRIPTORS: ACHING

## 2024-03-19 ASSESSMENT — PAIN - FUNCTIONAL ASSESSMENT
PAIN_FUNCTIONAL_ASSESSMENT: NONE - DENIES PAIN
PAIN_FUNCTIONAL_ASSESSMENT: 0-10

## 2024-03-19 ASSESSMENT — PAIN DESCRIPTION - LOCATION: LOCATION: ARM;ABDOMEN

## 2024-03-19 NOTE — ED PROVIDER NOTES
CC:   Chief Complaint   Patient presents with    Illness     Two days of not feeling good. Tired, headache. States has probably been around sick people at work.        HPI:  Pam is a 48 y.o. female with a history of anemia, GERD, hypertension who presents emergency department stating she is not feeling well for the past 2 to 3 days.  She says she feels very tired, has no energy, she feels anxious and is somewhat tearful, she also reports having left-sided lower vague chest discomfort and mild shortness of breath.  She feels nauseous.  Earlier today she had discomfort in her left arm but that resolved.  No calf or leg pain.  No immobilization.  No fever.  No cough.  Earlier in the month she was seen by her family physician for a frontal headache and was diagnosed with sinusitis.  She was started on Biaxin but developed a facial rash and was switched to Bactrim.  She is still taking the Bactrim.  She has had nausea over the past several days.  No diarrhea.  No dizziness.  Slight decreased p.o. intake.  Headache is resolved  History obtained via the patient    External records reviewed    ROS:  All Pertinent ROS Negative Unless otherwise stated within HPI.    VITALS:  Vitals:    03/19/24 0945   BP: (!) 147/91   Pulse: 97   Resp: 20   Temp: 98.3 °F (36.8 °C)   SpO2: 98%        PHYSICAL EXAM:      Vital signs reviewed  General:  Patient appeared in no distress  Vitals:  Vital signs reviewed, see nurses notes  Neck:  No JVD, or lymphadenopathy.  HEENT exam mucous membranes are moist oropharynx is clear.  No tenderness to percussion over the sinuses  Cardiovascular:  Regular rhythm, Normal   sounds and absence of murmurs, rubs or gallops.  Chest wall: No rash minimally tender to palpation over the left lower costochondral cartilages  Lungs:  Clear to auscultation without rales, ronchi, or wheezing.  Abdomen:  Soft, unremarkable and without evidence of organomegally, masses, or abdominal aortic enlargement, No

## 2024-03-21 ENCOUNTER — OFFICE VISIT (OUTPATIENT)
Dept: FAMILY MEDICINE CLINIC | Age: 49
End: 2024-03-21
Payer: COMMERCIAL

## 2024-03-21 VITALS
HEART RATE: 108 BPM | HEIGHT: 68 IN | DIASTOLIC BLOOD PRESSURE: 80 MMHG | BODY MASS INDEX: 35.92 KG/M2 | OXYGEN SATURATION: 99 % | WEIGHT: 237 LBS | SYSTOLIC BLOOD PRESSURE: 122 MMHG | TEMPERATURE: 98.1 F

## 2024-03-21 DIAGNOSIS — Z11.52 ENCOUNTER FOR SCREENING FOR COVID-19: ICD-10-CM

## 2024-03-21 DIAGNOSIS — J02.9 SORE THROAT: ICD-10-CM

## 2024-03-21 DIAGNOSIS — R68.89 FLU-LIKE SYMPTOMS: Primary | ICD-10-CM

## 2024-03-21 DIAGNOSIS — R11.2 NAUSEA AND VOMITING, UNSPECIFIED VOMITING TYPE: ICD-10-CM

## 2024-03-21 LAB
BASOPHILS # BLD: 0.1 K/UL (ref 0–0.2)
BASOPHILS NFR BLD: 1 %
DEPRECATED RDW RBC AUTO: 13.6 % (ref 12.4–15.4)
EOSINOPHIL # BLD: 0.1 K/UL (ref 0–0.6)
EOSINOPHIL NFR BLD: 1 %
HCT VFR BLD AUTO: 43.4 % (ref 36–48)
HGB BLD-MCNC: 15.3 G/DL (ref 12–16)
INFLUENZA A ANTIBODY: NEGATIVE
INFLUENZA B ANTIBODY: NEGATIVE
LYMPHOCYTES # BLD: 2 K/UL (ref 1–5.1)
LYMPHOCYTES NFR BLD: 24.9 %
Lab: NORMAL
MCH RBC QN AUTO: 30 PG (ref 26–34)
MCHC RBC AUTO-ENTMCNC: 35.3 G/DL (ref 31–36)
MCV RBC AUTO: 85 FL (ref 80–100)
MONOCYTES # BLD: 0.7 K/UL (ref 0–1.3)
MONOCYTES NFR BLD: 8.7 %
NEUTROPHILS # BLD: 5.2 K/UL (ref 1.7–7.7)
NEUTROPHILS NFR BLD: 64.4 %
PLATELET # BLD AUTO: 286 K/UL (ref 135–450)
PMV BLD AUTO: 9.6 FL (ref 5–10.5)
QC PASS/FAIL: NORMAL
RBC # BLD AUTO: 5.1 M/UL (ref 4–5.2)
S PYO AG THROAT QL: NORMAL
SARS-COV-2 RDRP RESP QL NAA+PROBE: NEGATIVE
WBC # BLD AUTO: 8 K/UL (ref 4–11)

## 2024-03-21 PROCEDURE — 87635 SARS-COV-2 COVID-19 AMP PRB: CPT | Performed by: NURSE PRACTITIONER

## 2024-03-21 PROCEDURE — 3079F DIAST BP 80-89 MM HG: CPT | Performed by: NURSE PRACTITIONER

## 2024-03-21 PROCEDURE — 3074F SYST BP LT 130 MM HG: CPT | Performed by: NURSE PRACTITIONER

## 2024-03-21 PROCEDURE — 99213 OFFICE O/P EST LOW 20 MIN: CPT | Performed by: NURSE PRACTITIONER

## 2024-03-21 PROCEDURE — 87880 STREP A ASSAY W/OPTIC: CPT | Performed by: NURSE PRACTITIONER

## 2024-03-21 PROCEDURE — 87804 INFLUENZA ASSAY W/OPTIC: CPT | Performed by: NURSE PRACTITIONER

## 2024-03-21 RX ORDER — PROMETHAZINE HYDROCHLORIDE 25 MG/1
25 TABLET ORAL 4 TIMES DAILY PRN
Qty: 20 TABLET | Refills: 0 | Status: SHIPPED | OUTPATIENT
Start: 2024-03-21 | End: 2024-03-28

## 2024-03-21 NOTE — PROGRESS NOTES
swallowing.    Eyes: Negative.    Respiratory: Negative.  Negative for shortness of breath.    Cardiovascular: Negative.  Negative for chest pain, palpitations and leg swelling.   Gastrointestinal:  Positive for nausea and vomiting. Negative for abdominal pain, anal bleeding, blood in stool, constipation and diarrhea.   Endocrine: Negative.    Genitourinary: Negative.  Negative for hematuria.   Musculoskeletal:  Positive for myalgias.   Skin: Negative.  Negative for rash.   Allergic/Immunologic: Negative.    Neurological:  Positive for headaches. Negative for dizziness, syncope, light-headedness and numbness.   Hematological: Negative.  Does not bruise/bleed easily.   Psychiatric/Behavioral: Negative.     All other systems reviewed and are negative.       Current Outpatient Medications   Medication Sig Dispense Refill    ondansetron (ZOFRAN) 4 MG tablet Take 1 tablet by mouth 3 times daily as needed for Nausea or Vomiting 15 tablet 0    famotidine (PEPCID) 20 MG tablet Take 1 tablet by mouth 2 times daily 60 tablet 3    losartan (COZAAR) 50 MG tablet TAKE ONE TABLET BY MOUTH EVERY MORNING 90 tablet 1    carvedilol (COREG) 6.25 MG tablet TAKE 1 TABLET BY MOUTH TWICE A  tablet 0    hydroCHLOROthiazide (MICROZIDE) 12.5 MG capsule TAKE ONE CAPSULE BY MOUTH EVERY MORNING 90 capsule 0    famotidine (PEPCID) 40 MG tablet Take 1 tablet by mouth nightly 90 tablet 2     No current facility-administered medications for this visit.        No changes in past medical history, past surgical history, social history, orfamily history were noted during the patient encounter unless specifically listed above.  All updates of past medical history, past surgical history, social history, or family history were reviewed personally by me duringthe office visit.  All problems listed in the assessment are stable unless noted otherwise.  Medication profile reviewed personally by me during the office visit.  Medication side effects and

## 2024-03-22 DIAGNOSIS — E87.5 SERUM POTASSIUM ELEVATED: Primary | ICD-10-CM

## 2024-03-22 LAB
ANION GAP SERPL CALCULATED.3IONS-SCNC: 12 MMOL/L (ref 3–16)
BUN SERPL-MCNC: 14 MG/DL (ref 7–20)
CALCIUM SERPL-MCNC: 9.9 MG/DL (ref 8.3–10.6)
CHLORIDE SERPL-SCNC: 103 MMOL/L (ref 99–110)
CO2 SERPL-SCNC: 22 MMOL/L (ref 21–32)
CREAT SERPL-MCNC: 0.7 MG/DL (ref 0.6–1.1)
GFR SERPLBLD CREATININE-BSD FMLA CKD-EPI: >60 ML/MIN/{1.73_M2}
GLUCOSE SERPL-MCNC: 91 MG/DL (ref 70–99)
POTASSIUM SERPL-SCNC: 5.2 MMOL/L (ref 3.5–5.1)
SODIUM SERPL-SCNC: 137 MMOL/L (ref 136–145)

## 2024-03-22 ASSESSMENT — ENCOUNTER SYMPTOMS
RESPIRATORY NEGATIVE: 1
SHORTNESS OF BREATH: 0
ABDOMINAL PAIN: 0
BLOOD IN STOOL: 0
SORE THROAT: 1
EYES NEGATIVE: 1
CONSTIPATION: 0
ANAL BLEEDING: 0
ALLERGIC/IMMUNOLOGIC NEGATIVE: 1
DIARRHEA: 0
TROUBLE SWALLOWING: 0
NAUSEA: 1

## 2024-03-24 LAB — BACTERIA THROAT AEROBE CULT: NORMAL

## 2024-03-25 ENCOUNTER — TELEPHONE (OUTPATIENT)
Dept: FAMILY MEDICINE CLINIC | Age: 49
End: 2024-03-25

## 2024-03-25 DIAGNOSIS — Z13.29 THYROID DISORDER SCREENING: Primary | ICD-10-CM

## 2024-03-25 LAB — TSH SERPL DL<=0.005 MIU/L-ACNC: 1.24 UIU/ML (ref 0.27–4.2)

## 2024-03-25 NOTE — TELEPHONE ENCOUNTER
Pt's  called and was wanting to see if we could add a thyroid test to pt's blood work. She forgot to mention that her sister has issues with her thyroid and wondered if she could have it also.

## 2024-03-26 ENCOUNTER — HOSPITAL ENCOUNTER (OUTPATIENT)
Age: 49
Discharge: HOME | End: 2024-03-26
Payer: COMMERCIAL

## 2024-03-26 DIAGNOSIS — E87.5 SERUM POTASSIUM ELEVATED: ICD-10-CM

## 2024-03-26 LAB — POTASSIUM SERPL-SCNC: 4.1 MMOL/L (ref 3.5–5.1)

## 2024-03-26 PROCEDURE — 84132 ASSAY OF SERUM POTASSIUM: CPT

## 2024-03-26 PROCEDURE — 36415 COLL VENOUS BLD VENIPUNCTURE: CPT

## 2024-04-06 DIAGNOSIS — I10 PRIMARY HYPERTENSION: ICD-10-CM

## 2024-04-08 RX ORDER — HYDROCHLOROTHIAZIDE 12.5 MG/1
12.5 CAPSULE, GELATIN COATED ORAL EVERY MORNING
Qty: 90 CAPSULE | Refills: 0 | Status: SHIPPED | OUTPATIENT
Start: 2024-04-08

## 2024-04-08 NOTE — TELEPHONE ENCOUNTER
Refill Request     CONFIRM preferrred pharmacy with the patient.    If Mail Order Rx - Pend for 90 day refill.      Last Seen: Last Seen Department: 3/21/2024  Last Seen by PCP: 3/21/2024    Last Written: 10/23/23    If no future appointment scheduled, route STAFF MESSAGE with patient name to the  Pool for scheduling.      Next Appointment:   Future Appointments   Date Time Provider Department Center   4/10/2024  2:20 PM Johanna Jensen, APRN - CNP [place]  [place] [facility]       Message sent to  to schedule appt with patient?  N/A      Requested Prescriptions     Pending Prescriptions Disp Refills    hydroCHLOROthiazide 12.5 MG capsule [Pharmacy Med Name: hydroCHLOROthiazide 12.5 MG CAPSULE] 90 capsule 0     Sig: TAKE ONE CAPSULE BY MOUTH EVERY MORNING

## 2024-05-19 DIAGNOSIS — K21.9 GASTROESOPHAGEAL REFLUX DISEASE WITHOUT ESOPHAGITIS: ICD-10-CM

## 2024-05-20 RX ORDER — FAMOTIDINE 40 MG/1
40 TABLET, FILM COATED ORAL NIGHTLY
Qty: 90 TABLET | Refills: 0 | Status: SHIPPED | OUTPATIENT
Start: 2024-05-20

## 2024-05-20 NOTE — TELEPHONE ENCOUNTER
Refill Request     CONFIRM preferrred pharmacy with the patient.    If Mail Order Rx - Pend for 90 day refill.      Last Seen: Last Seen Department: 3/21/2024  Last Seen by PCP: 3/21/2024    Last Written: 8/17/23    If no future appointment scheduled, route STAFF MESSAGE with patient name to the  Pool for scheduling.      Next Appointment:   Future Appointments   Date Time Provider Department Center   5/22/2024  3:00 PM [facility], [facility] [place] [facility]       Message sent to  to schedule appt with patient?  N/A      Requested Prescriptions     Pending Prescriptions Disp Refills    famotidine (PEPCID) 40 MG tablet [Pharmacy Med Name: FAMOTIDINE 40 MG TABLET] 90 tablet 2     Sig: TAKE ONE TABLET BY MOUTH ONCE NIGHTLY

## 2024-05-22 ENCOUNTER — OFFICE VISIT (OUTPATIENT)
Dept: FAMILY MEDICINE CLINIC | Age: 49
End: 2024-05-22
Payer: COMMERCIAL

## 2024-05-22 VITALS
HEIGHT: 68 IN | SYSTOLIC BLOOD PRESSURE: 134 MMHG | WEIGHT: 253 LBS | BODY MASS INDEX: 38.34 KG/M2 | DIASTOLIC BLOOD PRESSURE: 78 MMHG | HEART RATE: 84 BPM | OXYGEN SATURATION: 100 %

## 2024-05-22 DIAGNOSIS — K21.9 GASTROESOPHAGEAL REFLUX DISEASE WITHOUT ESOPHAGITIS: ICD-10-CM

## 2024-05-22 DIAGNOSIS — M25.511 CHRONIC RIGHT SHOULDER PAIN: Primary | ICD-10-CM

## 2024-05-22 DIAGNOSIS — E55.9 VITAMIN D DEFICIENCY: ICD-10-CM

## 2024-05-22 DIAGNOSIS — G89.29 CHRONIC RIGHT SHOULDER PAIN: Primary | ICD-10-CM

## 2024-05-22 DIAGNOSIS — I10 PRIMARY HYPERTENSION: ICD-10-CM

## 2024-05-22 DIAGNOSIS — R00.0 TACHYCARDIA: ICD-10-CM

## 2024-05-22 PROCEDURE — 99214 OFFICE O/P EST MOD 30 MIN: CPT | Performed by: NURSE PRACTITIONER

## 2024-05-22 PROCEDURE — 3075F SYST BP GE 130 - 139MM HG: CPT | Performed by: NURSE PRACTITIONER

## 2024-05-22 PROCEDURE — 3078F DIAST BP <80 MM HG: CPT | Performed by: NURSE PRACTITIONER

## 2024-05-22 RX ORDER — LOSARTAN POTASSIUM 50 MG/1
50 TABLET ORAL EVERY MORNING
Qty: 90 TABLET | Refills: 1 | Status: SHIPPED | OUTPATIENT
Start: 2024-05-22 | End: 2024-05-23 | Stop reason: SDUPTHER

## 2024-05-22 RX ORDER — CARVEDILOL 6.25 MG/1
6.25 TABLET ORAL 2 TIMES DAILY
Qty: 180 TABLET | Refills: 1 | Status: SHIPPED | OUTPATIENT
Start: 2024-05-22 | End: 2024-05-23 | Stop reason: SDUPTHER

## 2024-05-22 ASSESSMENT — PATIENT HEALTH QUESTIONNAIRE - PHQ9
SUM OF ALL RESPONSES TO PHQ QUESTIONS 1-9: 0
3. TROUBLE FALLING OR STAYING ASLEEP: NOT AT ALL
8. MOVING OR SPEAKING SO SLOWLY THAT OTHER PEOPLE COULD HAVE NOTICED. OR THE OPPOSITE, BEING SO FIGETY OR RESTLESS THAT YOU HAVE BEEN MOVING AROUND A LOT MORE THAN USUAL: NOT AT ALL
2. FEELING DOWN, DEPRESSED OR HOPELESS: NOT AT ALL
5. POOR APPETITE OR OVEREATING: NOT AT ALL
SUM OF ALL RESPONSES TO PHQ9 QUESTIONS 1 & 2: 0
4. FEELING TIRED OR HAVING LITTLE ENERGY: NOT AT ALL
SUM OF ALL RESPONSES TO PHQ QUESTIONS 1-9: 0
7. TROUBLE CONCENTRATING ON THINGS, SUCH AS READING THE NEWSPAPER OR WATCHING TELEVISION: NOT AT ALL
6. FEELING BAD ABOUT YOURSELF - OR THAT YOU ARE A FAILURE OR HAVE LET YOURSELF OR YOUR FAMILY DOWN: NOT AT ALL
9. THOUGHTS THAT YOU WOULD BE BETTER OFF DEAD, OR OF HURTING YOURSELF: NOT AT ALL
SUM OF ALL RESPONSES TO PHQ QUESTIONS 1-9: 0
1. LITTLE INTEREST OR PLEASURE IN DOING THINGS: NOT AT ALL
SUM OF ALL RESPONSES TO PHQ QUESTIONS 1-9: 0
10. IF YOU CHECKED OFF ANY PROBLEMS, HOW DIFFICULT HAVE THESE PROBLEMS MADE IT FOR YOU TO DO YOUR WORK, TAKE CARE OF THINGS AT HOME, OR GET ALONG WITH OTHER PEOPLE: NOT DIFFICULT AT ALL

## 2024-05-22 ASSESSMENT — ENCOUNTER SYMPTOMS
GASTROINTESTINAL NEGATIVE: 1
ANAL BLEEDING: 0
NAUSEA: 0
ALLERGIC/IMMUNOLOGIC NEGATIVE: 1
ABDOMINAL PAIN: 0
EYES NEGATIVE: 1
BLOOD IN STOOL: 0
RESPIRATORY NEGATIVE: 1
SHORTNESS OF BREATH: 0

## 2024-05-22 NOTE — PROGRESS NOTES
(COZAAR) 50 MG tablet; Take 1 tablet by mouth every morning    Gastroesophageal reflux disease without esophagitis  Condition appears stable with current medication regimen. Continue current medications. No reported or noted side effects of medication. Will continue to monitor at routine intervals as appropriate.     Vitamin D deficiency  Discussed with patient that we make vitamin D from the sun and get it from some food sources, but it is very common to be deficient.  Discussed the need for vitamin D replacement because low vitamin D can cause fatigue, joint aches and has been implicated in heart disease, bone disease like osteoporosis, and some other chronic illnesses.    Patient will start/continue vitamin D supplement as per order.   Recommend vitamin D to be rechecked in 6 months.      Tachycardia  -     carvedilol (COREG) 6.25 MG tablet; Take 1 tablet by mouth 2 times daily    Patient has been instructed call the office immediately with new symptoms, change in symptoms or worseningof symptoms. If this is not feasible, patient is instructed to report to the emergency room. Medication profile reviewed. Medication side effects and possible impairments from medications were discussed as applicable.Allergies were reviewed. Health maintenance was reviewed and updated as appropriate.     Return in about 6 months (around 11/22/2024) for [facility].

## 2024-05-23 ENCOUNTER — TELEPHONE (OUTPATIENT)
Dept: FAMILY MEDICINE CLINIC | Age: 49
End: 2024-05-23

## 2024-05-23 DIAGNOSIS — I10 PRIMARY HYPERTENSION: ICD-10-CM

## 2024-05-23 DIAGNOSIS — R00.0 TACHYCARDIA: ICD-10-CM

## 2024-05-23 RX ORDER — LOSARTAN POTASSIUM 50 MG/1
50 TABLET ORAL EVERY MORNING
Qty: 90 TABLET | Refills: 1 | Status: SHIPPED | OUTPATIENT
Start: 2024-05-23

## 2024-05-23 RX ORDER — CARVEDILOL 6.25 MG/1
6.25 TABLET ORAL 2 TIMES DAILY
Qty: 180 TABLET | Refills: 1 | Status: SHIPPED | OUTPATIENT
Start: 2024-05-23

## 2024-05-23 RX ORDER — MELOXICAM 15 MG/1
15 TABLET ORAL DAILY
Qty: 30 TABLET | Refills: 0 | Status: SHIPPED | OUTPATIENT
Start: 2024-05-23

## 2024-05-23 NOTE — TELEPHONE ENCOUNTER
Unfortunately [facility] prescription service was down yesterday afternoon.  I resent the medication today

## 2024-05-23 NOTE — TELEPHONE ENCOUNTER
Patient's  called. Patient was seen yesterday and Johanna was going to [facility] something to [facility] for patient for her shoulder.  [facility] say they have not received anything for patient.  Please call and advise.

## 2024-06-05 ENCOUNTER — TELEPHONE (OUTPATIENT)
Dept: FAMILY MEDICINE CLINIC | Age: 49
End: 2024-06-05

## 2024-06-05 NOTE — TELEPHONE ENCOUNTER
Patient calling c/o L jaw felt like it was locked up this morning, painful, can't hardly chew.  Patient taking anti-inflammatories for her R shoulder.  When she take them it helps but then it wears off.  No appointments available.  Please call patient and advise.

## 2024-06-05 NOTE — TELEPHONE ENCOUNTER
There was an opening for tomorrow at 1pm with jennifer AG, pt called and she is agreeable with this time. Pt scheduled. Advised patient that she can take tylenol as needed for her pain. She can't take ibuprofen due to being meloxicam.

## 2024-06-06 ENCOUNTER — OFFICE VISIT (OUTPATIENT)
Dept: FAMILY MEDICINE CLINIC | Age: 49
End: 2024-06-06
Payer: COMMERCIAL

## 2024-06-06 ENCOUNTER — OFFICE VISIT (OUTPATIENT)
Dept: ORTHOPEDIC SURGERY | Age: 49
End: 2024-06-06

## 2024-06-06 VITALS — HEIGHT: 68 IN | BODY MASS INDEX: 38.34 KG/M2 | WEIGHT: 253 LBS

## 2024-06-06 VITALS
SYSTOLIC BLOOD PRESSURE: 136 MMHG | DIASTOLIC BLOOD PRESSURE: 80 MMHG | HEIGHT: 68 IN | OXYGEN SATURATION: 98 % | BODY MASS INDEX: 38.19 KG/M2 | WEIGHT: 252 LBS | HEART RATE: 74 BPM

## 2024-06-06 DIAGNOSIS — M25.811 SHOULDER IMPINGEMENT, RIGHT: ICD-10-CM

## 2024-06-06 DIAGNOSIS — M75.21 BICEPS TENDINITIS OF RIGHT UPPER EXTREMITY: ICD-10-CM

## 2024-06-06 DIAGNOSIS — K11.20 SIALADENITIS: Primary | ICD-10-CM

## 2024-06-06 DIAGNOSIS — M25.511 RIGHT SHOULDER PAIN, UNSPECIFIED CHRONICITY: Primary | ICD-10-CM

## 2024-06-06 PROCEDURE — 3075F SYST BP GE 130 - 139MM HG: CPT | Performed by: NURSE PRACTITIONER

## 2024-06-06 PROCEDURE — 3079F DIAST BP 80-89 MM HG: CPT | Performed by: NURSE PRACTITIONER

## 2024-06-06 PROCEDURE — 99213 OFFICE O/P EST LOW 20 MIN: CPT | Performed by: NURSE PRACTITIONER

## 2024-06-06 RX ORDER — DICLOFENAC SODIUM 75 MG/1
75 TABLET, DELAYED RELEASE ORAL 2 TIMES DAILY PRN
Qty: 60 TABLET | Refills: 0 | Status: SHIPPED | OUTPATIENT
Start: 2024-06-06

## 2024-06-06 RX ORDER — TRIAMCINOLONE ACETONIDE 40 MG/ML
40 INJECTION, SUSPENSION INTRA-ARTICULAR; INTRAMUSCULAR ONCE
Status: COMPLETED | OUTPATIENT
Start: 2024-06-06 | End: 2024-06-06

## 2024-06-06 RX ORDER — BUPIVACAINE HYDROCHLORIDE 2.5 MG/ML
30 INJECTION, SOLUTION INFILTRATION; PERINEURAL ONCE
Status: COMPLETED | OUTPATIENT
Start: 2024-06-06 | End: 2024-06-06

## 2024-06-06 RX ORDER — LIDOCAINE HYDROCHLORIDE 10 MG/ML
5 INJECTION, SOLUTION INFILTRATION; PERINEURAL ONCE
Status: COMPLETED | OUTPATIENT
Start: 2024-06-06 | End: 2024-06-06

## 2024-06-06 RX ORDER — CLINDAMYCIN HYDROCHLORIDE 300 MG/1
300 CAPSULE ORAL 3 TIMES DAILY
Qty: 30 CAPSULE | Refills: 0 | Status: SHIPPED | OUTPATIENT
Start: 2024-06-06 | End: 2024-06-16

## 2024-06-06 RX ADMIN — TRIAMCINOLONE ACETONIDE 40 MG: 40 INJECTION, SUSPENSION INTRA-ARTICULAR; INTRAMUSCULAR at 08:35

## 2024-06-06 RX ADMIN — LIDOCAINE HYDROCHLORIDE 5 ML: 10 INJECTION, SOLUTION INFILTRATION; PERINEURAL at 08:35

## 2024-06-06 RX ADMIN — BUPIVACAINE HYDROCHLORIDE 75 MG: 2.5 INJECTION, SOLUTION INFILTRATION; PERINEURAL at 08:35

## 2024-06-06 ASSESSMENT — ENCOUNTER SYMPTOMS
ANAL BLEEDING: 0
RESPIRATORY NEGATIVE: 1
EYES NEGATIVE: 1
BLOOD IN STOOL: 0
SHORTNESS OF BREATH: 0
GASTROINTESTINAL NEGATIVE: 1
NAUSEA: 0
ALLERGIC/IMMUNOLOGIC NEGATIVE: 1
ABDOMINAL PAIN: 0

## 2024-06-06 NOTE — PROGRESS NOTES
Dr Richardson Brown      Date /Time 6/6/2024             8:44 AM EDT  Name Pam Guido             1975   [facility]  MRN [id number]                Chief Complaint   Patient presents with    Shoulder Pain     NP Right Shoulder         History of Present Illness    Pam Guido is a 49 y.o. female who presents with  right Shoulder pain.    Sent in consultation by Johanna Jensen, APRN - CNP, .    She is Right-handed.  Athletic/exercise activity: no sports.  Injury Mechanism:  none.  Worker's Comp. & legal issues:   none.  Previous Treatments: Ice, Heat, NSAIDs, and pain medication    She reports 2 months of indolent right shoulder pain which now keeps her up at night.  She has pain with overhead activity.  She feels weaker and more painful    Past Medical History  Past Medical History:   Diagnosis Date    Anemia     Back pain     Chest pain 6/2014    stress myoview normal    Chronic pain syndrome 2017    Dr. Garcia pain management    Fibroid uterus     fibroid uterus ad abnormal uterine bleeding with uterine fibroids    GERD (gastroesophageal reflux disease)     Hypertension     Menopausal symptoms     Overactive bladder     Pelvic pain     Right lateral epicondylitis     Rotator cuff syndrome of right shoulder     Stress incontinence     Tachycardia     on coreg     Past Surgical History:   Procedure Laterality Date    DILATION AND CURETTAGE      DILATION AND CURETTAGE OF UTERUS N/A 4/16/2019    DIAGNOSTIC LAPAROSCOPY, DILATATION AND CURETTAGE, HYSTEROSCOPY, WITH MYOSURE AND NOVASURE ENDOMETRIAL ABLATION performed by Denise Joya DO at [facility] OR    HYSTERECTOMY (CERVIX STATUS UNKNOWN)      HYSTERECTOMY, VAGINAL N/A 6/4/2020    LAPAROSCOPIC ASSISTED VAGINAL HYSTERECTOMY, BILATERAL SALPINGECTOMY, POSSIBLE CYSTOSCOPY performed by Denise Joya DO at [facility] OR    HYSTEROSCOPY      KNEE CARTILAGE SURGERY Right 2009    LAPAROSCOPY      TONSILLECTOMY      TONSILLECTOMY AND

## 2024-06-06 NOTE — PROGRESS NOTES
clicking noted but there was a slight deviation of the jaw to the left with opening     Right Ear: Hearing, tympanic membrane and ear canal normal.      Left Ear: Hearing, tympanic membrane and ear canal normal.      Nose: Nose normal.      Mouth/Throat:      Pharynx: Uvula midline.   Eyes:      General: Lids are normal.      Conjunctiva/sclera: Conjunctivae normal.   Cardiovascular:      Rate and Rhythm: Normal rate and regular rhythm.      Pulses: Normal pulses.      Heart sounds: Normal heart sounds.   Pulmonary:      Effort: Pulmonary effort is normal. No accessory muscle usage or respiratory distress.      Breath sounds: Normal breath sounds.   Abdominal:      Palpations: Abdomen is soft.      Tenderness: There is no abdominal tenderness.   Musculoskeletal:      Cervical back: Neck supple.   Lymphadenopathy:      Head:      Right side of head: No submental or submandibular adenopathy.      Left side of head: Submandibular and preauricular adenopathy present. No submental adenopathy.      Cervical: No cervical adenopathy.      Comments: Significant tenderness over the left parotid gland and also with palpation inside the mouth between the tongue and the gumline in the salivary gland region   Skin:     General: Skin is warm and dry.      Findings: No lesion or rash.   Neurological:      Mental Status: She is alert and oriented to person, place, and time.   Psychiatric:         Speech: Speech normal.         Behavior: Behavior normal. Behavior is cooperative.         /80 (Site: Left Upper Arm, Position: Sitting, Cuff Size: Large Adult)   Pulse 74   Ht 1.727 m (5' 8\")   Wt 114.3 kg (252 lb)   LMP 03/01/2019   SpO2 98%   BMI 38.32 kg/m²   Body mass index is 38.32 kg/m².    BP Readings from Last 2 Encounters:   06/06/24 136/80   05/22/24 134/78       Wt Readings from Last 3 Encounters:   06/06/24 114.3 kg (252 lb)   06/06/24 114.8 kg (253 lb)   05/22/24 114.8 kg (253 lb)       Lab Review   No visits with

## 2024-06-11 ENCOUNTER — HOSPITAL ENCOUNTER (OUTPATIENT)
Dept: PHYSICAL THERAPY | Age: 49
Setting detail: THERAPIES SERIES
Discharge: HOME | End: 2024-06-11
Payer: COMMERCIAL

## 2024-06-11 PROCEDURE — 97110 THERAPEUTIC EXERCISES: CPT | Performed by: PHYSICAL THERAPIST

## 2024-06-11 PROCEDURE — 97161 PT EVAL LOW COMPLEX 20 MIN: CPT | Performed by: PHYSICAL THERAPIST

## 2024-06-11 NOTE — PLAN OF CARE
[facility]  [address], [place] [address] office: [phone] fax: [phone]     Physical Therapy Initial Evaluation Certification      Dear Richardson Brown MD,    We had the pleasure of evaluating the following patient for physical therapy services at [facility].  A summary of our findings can be found in the initial assessment below.  This includes our plan of care.  If you have any questions or concerns regarding these findings, please do not hesitate to contact me at the office phone number listed above.  Thank you for the referral.     Physician Signature:_______________________________Date:__________________  By signing above (or electronic signature), therapist’s plan is approved by physician       Physical Therapy: TREATMENT/PROGRESS NOTE   Patient: Pam Guido (49 y.o. female)   Examination Date: 2024   :  1975 MRN: [id number]   Visit #: 1   Insurance Allowable Auth Needed   Need auth    []Yes    [x]No    Insurance: Payor: BCBS / Plan: BCBS COVID / Product Type: *No Product type* /   Insurance ID: [id number] - ([facility])  Secondary Insurance (if applicable):    Treatment Diagnosis:      Medical Diagnosis:  Biceps tendinitis of right upper extremity [M75.21]  Shoulder impingement, right [M25.811]   Referring Physician: Richardson Brown MD  PCP: Johanna Jensen, ALLY - CNP     Plan of care signed (Y/N):     Date of Patient follow up with Physician:      Progress Report/POC: EVAL today  POC update due: (10 visits /OR AUTH LIMITS, whichever is less)  2024                                             Precautions/ Contra-indications:           Latex allergy:  NO  Pacemaker:    NO  Contraindications for Manipulation: None  Date of Surgery: n/a  Other:  HBP    Red Flags:  None    C-SSRS Triggered by Intake questionnaire:   Patient answered 'NO' to both behavioral questions on intake.

## 2024-06-19 ENCOUNTER — HOSPITAL ENCOUNTER (OUTPATIENT)
Dept: PHYSICAL THERAPY | Age: 49
Setting detail: THERAPIES SERIES
Discharge: HOME | End: 2024-06-19
Payer: COMMERCIAL

## 2024-06-19 PROCEDURE — 97110 THERAPEUTIC EXERCISES: CPT | Performed by: PHYSICAL THERAPIST

## 2024-06-19 PROCEDURE — 97140 MANUAL THERAPY 1/> REGIONS: CPT | Performed by: PHYSICAL THERAPIST

## 2024-06-19 NOTE — FLOWSHEET NOTE
No further screening warranted    Preferred Language for Healthcare:   [x] English       [] other:    SUBJECTIVE EXAMINATION     Patient stated complaint: Sleep is sometimes disrupted.  Pt is taking anti inflammatory.  Pt did bake at home this weekend for Father's Day.   Pt had to bake at work, and she sliced meat in deli.  Pt had soreness in right shoulder blade today.     EVAL: Pt has had pain for a couple of months.  She thought maybe she pulled it when picking up grandkids.   She was taking ibuprofen and using heat.   Pt received a cortisone injection, and she is taking diclofenac.    Right shoulder is feeling better, but left shoulder is hurting.    Pt is having a hard time sleeping.  Pt is a side sleeper.  Prior to injection, she had her  help her dress.   Pt using her arms a lot at work.  She has to reach overhead, and she bakes.   Pt has intermittent and infreq N/T in UE        Test used Initial score  6/11/24 06/19/2024   Pain Summary VAS 4-6/10 R 4/10  L 5/10   Functional questionnaire Quick DASH 29 = 41%    Other:                OBJECTIVE EXAMINATION     6/11/24  ROM/Strength: (Blank cells denote NT)    Mvmt (norm) PROM L PROM R Notes     CERVICAL Flex (60) WNL     Ext (70) WNL     SB(45) WNL WNL     Rotation (80) WNL WNL        SHOULDER Flexion (180)  148     Abduction (180)       ER -0  55     ER -90 (90)       IR -0  72     IR -90 (70)        ELBOW Flex/biceps (140)  130     Ext/triceps (0)  0        MMT L MMT R Notes       SHOULDER Flexion 5 4     Abduction 5 4     ER -0 5 4     ER -90       IR -0 5 4     IR -90        ELBOW Flex/biceps 5 4     Ext/triceps 5 4      Exercises/Interventions     Therapeutic Ex (72729)  resistance Sets/time Reps Notes/Cues/Progressions   Cane flex  X 10     Supine pec s :20 3x     Supine horiz abd RD 2 x 10      hep   hep   hep   hep   Table slides  nv     TB row/  TB  ext  nv     Upper trap s :20 3x      Upper thoracic s :20 3x      Pec s :20 3x

## 2024-06-26 ENCOUNTER — HOSPITAL ENCOUNTER (OUTPATIENT)
Dept: PHYSICAL THERAPY | Age: 49
Setting detail: THERAPIES SERIES
End: 2024-06-26
Payer: COMMERCIAL

## 2024-07-03 ENCOUNTER — HOSPITAL ENCOUNTER (OUTPATIENT)
Dept: PHYSICAL THERAPY | Age: 49
Setting detail: THERAPIES SERIES
Discharge: HOME | End: 2024-07-03
Payer: COMMERCIAL

## 2024-07-03 PROCEDURE — 97140 MANUAL THERAPY 1/> REGIONS: CPT | Performed by: PHYSICAL THERAPIST

## 2024-07-03 PROCEDURE — 97110 THERAPEUTIC EXERCISES: CPT | Performed by: PHYSICAL THERAPIST

## 2024-07-03 NOTE — FLOWSHEET NOTE
s   3 min     STM  2 min                   NMR re-education (55462) resistance Sets/time Reps CUES NEEDED                                      Therapeutic Activity (30207)  Sets/time                                          Modalities:    No modalities applied this session    Education/Home Exercise Program: Patient HEP program created electronically.  Refer to [employer] access code:    [employer] program provided.  Access Code: [email or web address]  URL: [email or web address]  Date: 06/19/2024  Prepared by: Deisi Peña    Exercises  - Supine Pectoralis Stretch  - 1 x daily - 7 x weekly - 1 sets - 3 reps - 30 hold  - Shoulder External Rotation and Scapular Retraction  - 1 x daily - 7 x weekly - 2 sets - 10 reps - 2 hold  - Doorway Pec Stretch at 90 Degrees Abduction  - 1 x daily - 7 x weekly - 1 sets - 3 reps - 20 hold  - Standing Lower Cervical and Upper Thoracic Stretch  - 1 x daily - 7 x weekly - 1 sets - 3 reps - 20 hold  - Seated Upper Trap Stretch  - 1 x daily - 7 x weekly - 1 sets - 3 reps - 20 hold    ASSESSMENT     Today's Assessment: During therapy this date, patient required progression of exercises and program and manual interventions for exercise progression, modulating pain, and improving soft tissue extensibility.Patient will continue to benefit from ongoing evaluation and advanced clinical decision from a Physical Therapist to address and improve pain control to safely return to PLOF without symptoms or restrictions.  Pt is able to flex and abduct shoulders without shrug sign.  Pt does need tactile cues to help with scap retraction with TB exercises.       Medical Necessity Documentation:  I certify that this patient meets the below criteria necessary for medical necessity for care and/or justification of therapy services:  The patient has a musculoskeletal condition(s) with a corresponding ICD-10 code that is of complexity and severity that require skilled therapeutic intervention. This has a direct

## 2024-07-07 ENCOUNTER — APPOINTMENT (OUTPATIENT)
Dept: CT IMAGING | Age: 49
End: 2024-07-07
Payer: COMMERCIAL

## 2024-07-07 ENCOUNTER — HOSPITAL ENCOUNTER (EMERGENCY)
Age: 49
Discharge: ANOTHER HOSPITAL | End: 2024-07-08
Attending: STUDENT IN AN ORGANIZED HEALTH CARE EDUCATION/TRAINING PROGRAM
Payer: COMMERCIAL

## 2024-07-07 ENCOUNTER — APPOINTMENT (OUTPATIENT)
Dept: GENERAL RADIOLOGY | Age: 49
End: 2024-07-07
Payer: COMMERCIAL

## 2024-07-07 DIAGNOSIS — R07.9 CHEST PAIN, UNSPECIFIED TYPE: Primary | ICD-10-CM

## 2024-07-07 DIAGNOSIS — A41.9 SEPSIS WITHOUT ACUTE ORGAN DYSFUNCTION, DUE TO UNSPECIFIED ORGANISM (HCC): ICD-10-CM

## 2024-07-07 LAB
ALBUMIN SERPL-MCNC: 4.4 G/DL (ref 3.4–5)
ALBUMIN/GLOB SERPL: 1.5 {RATIO} (ref 1.1–2.2)
ALP SERPL-CCNC: 53 U/L (ref 40–129)
ALT SERPL-CCNC: 21 U/L (ref 10–40)
ANION GAP SERPL CALCULATED.3IONS-SCNC: 15 MMOL/L (ref 3–16)
AST SERPL-CCNC: 23 U/L (ref 15–37)
BASOPHILS # BLD: 0.2 K/UL (ref 0–0.2)
BASOPHILS NFR BLD: 1 %
BILIRUB SERPL-MCNC: 0.3 MG/DL (ref 0–1)
BILIRUB UR QL STRIP.AUTO: NEGATIVE
BUN SERPL-MCNC: 16 MG/DL (ref 7–20)
CALCIUM SERPL-MCNC: 9.1 MG/DL (ref 8.3–10.6)
CHLORIDE SERPL-SCNC: 99 MMOL/L (ref 99–110)
CLARITY UR: CLEAR
CO2 SERPL-SCNC: 23 MMOL/L (ref 21–32)
COLOR UR: YELLOW
CREAT SERPL-MCNC: 0.6 MG/DL (ref 0.6–1.1)
D-DIMER QUANTITATIVE: 0.77 UG/ML FEU (ref 0–0.6)
DEPRECATED RDW RBC AUTO: 14.5 % (ref 12.4–15.4)
EKG ATRIAL RATE: 106 BPM
EKG DIAGNOSIS: NORMAL
EKG P AXIS: 57 DEGREES
EKG P-R INTERVAL: 136 MS
EKG Q-T INTERVAL: 316 MS
EKG QRS DURATION: 76 MS
EKG QTC CALCULATION (BAZETT): 419 MS
EKG R AXIS: 9 DEGREES
EKG T AXIS: 16 DEGREES
EKG VENTRICULAR RATE: 106 BPM
EOSINOPHIL # BLD: 0.2 K/UL (ref 0–0.6)
EOSINOPHIL NFR BLD: 1 %
FLUAV RNA UPPER RESP QL NAA+PROBE: NEGATIVE
FLUBV AG NPH QL: NEGATIVE
GFR SERPLBLD CREATININE-BSD FMLA CKD-EPI: >90 ML/MIN/{1.73_M2}
GLUCOSE SERPL-MCNC: 103 MG/DL (ref 70–99)
GLUCOSE UR STRIP.AUTO-MCNC: NEGATIVE MG/DL
HCT VFR BLD AUTO: 43.6 % (ref 36–48)
HGB BLD-MCNC: 14.7 G/DL (ref 12–16)
HGB UR QL STRIP.AUTO: NEGATIVE
KETONES UR STRIP.AUTO-MCNC: NEGATIVE MG/DL
LACTATE BLDV-SCNC: 1.4 MMOL/L (ref 0.4–1.9)
LEUKOCYTE ESTERASE UR QL STRIP.AUTO: NEGATIVE
LIPASE SERPL-CCNC: 14 U/L (ref 13–60)
LYMPHOCYTES # BLD: 4.4 K/UL (ref 1–5.1)
LYMPHOCYTES NFR BLD: 18 %
MCH RBC QN AUTO: 28.7 PG (ref 26–34)
MCHC RBC AUTO-ENTMCNC: 33.8 G/DL (ref 31–36)
MCV RBC AUTO: 84.9 FL (ref 80–100)
MONOCYTES # BLD: 2.2 K/UL (ref 0–1.3)
MONOCYTES NFR BLD: 9 %
NEUTROPHILS # BLD: 17.4 K/UL (ref 1.7–7.7)
NEUTROPHILS NFR BLD: 65 %
NEUTS BAND NFR BLD MANUAL: 6 % (ref 0–7)
NITRITE UR QL STRIP.AUTO: NEGATIVE
PH UR STRIP.AUTO: 7.5 [PH] (ref 5–8)
PLATELET # BLD AUTO: 276 K/UL (ref 135–450)
PMV BLD AUTO: 8 FL (ref 5–10.5)
POTASSIUM SERPL-SCNC: 4 MMOL/L (ref 3.5–5.1)
PROT SERPL-MCNC: 7.3 G/DL (ref 6.4–8.2)
PROT UR STRIP.AUTO-MCNC: NEGATIVE MG/DL
RBC # BLD AUTO: 5.14 M/UL (ref 4–5.2)
SARS-COV-2 RDRP RESP QL NAA+PROBE: NOT DETECTED
SODIUM SERPL-SCNC: 137 MMOL/L (ref 136–145)
SP GR UR STRIP.AUTO: 1.01 (ref 1–1.03)
TROPONIN, HIGH SENSITIVITY: <6 NG/L (ref 0–14)
TROPONIN, HIGH SENSITIVITY: <6 NG/L (ref 0–14)
UA DIPSTICK W REFLEX MICRO PNL UR: NORMAL
URN SPEC COLLECT METH UR: NORMAL
UROBILINOGEN UR STRIP-ACNC: 0.2 E.U./DL
WBC # BLD AUTO: 24.5 K/UL (ref 4–11)

## 2024-07-07 PROCEDURE — 85025 COMPLETE CBC W/AUTO DIFF WBC: CPT

## 2024-07-07 PROCEDURE — 87635 SARS-COV-2 COVID-19 AMP PRB: CPT

## 2024-07-07 PROCEDURE — 2580000003 HC RX 258: Performed by: STUDENT IN AN ORGANIZED HEALTH CARE EDUCATION/TRAINING PROGRAM

## 2024-07-07 PROCEDURE — 96366 THER/PROPH/DIAG IV INF ADDON: CPT

## 2024-07-07 PROCEDURE — 6360000004 HC RX CONTRAST MEDICATION: Performed by: STUDENT IN AN ORGANIZED HEALTH CARE EDUCATION/TRAINING PROGRAM

## 2024-07-07 PROCEDURE — 96376 TX/PRO/DX INJ SAME DRUG ADON: CPT

## 2024-07-07 PROCEDURE — 93005 ELECTROCARDIOGRAM TRACING: CPT | Performed by: STUDENT IN AN ORGANIZED HEALTH CARE EDUCATION/TRAINING PROGRAM

## 2024-07-07 PROCEDURE — 81003 URINALYSIS AUTO W/O SCOPE: CPT

## 2024-07-07 PROCEDURE — 96375 TX/PRO/DX INJ NEW DRUG ADDON: CPT

## 2024-07-07 PROCEDURE — 6360000002 HC RX W HCPCS: Performed by: STUDENT IN AN ORGANIZED HEALTH CARE EDUCATION/TRAINING PROGRAM

## 2024-07-07 PROCEDURE — 96365 THER/PROPH/DIAG IV INF INIT: CPT

## 2024-07-07 PROCEDURE — 80053 COMPREHEN METABOLIC PANEL: CPT

## 2024-07-07 PROCEDURE — 96367 TX/PROPH/DG ADDL SEQ IV INF: CPT

## 2024-07-07 PROCEDURE — 84484 ASSAY OF TROPONIN QUANT: CPT

## 2024-07-07 PROCEDURE — 85379 FIBRIN DEGRADATION QUANT: CPT

## 2024-07-07 PROCEDURE — 83605 ASSAY OF LACTIC ACID: CPT

## 2024-07-07 PROCEDURE — 74177 CT ABD & PELVIS W/CONTRAST: CPT

## 2024-07-07 PROCEDURE — 87804 INFLUENZA ASSAY W/OPTIC: CPT

## 2024-07-07 PROCEDURE — 36415 COLL VENOUS BLD VENIPUNCTURE: CPT

## 2024-07-07 PROCEDURE — 93010 ELECTROCARDIOGRAM REPORT: CPT | Performed by: INTERNAL MEDICINE

## 2024-07-07 PROCEDURE — 6370000000 HC RX 637 (ALT 250 FOR IP): Performed by: STUDENT IN AN ORGANIZED HEALTH CARE EDUCATION/TRAINING PROGRAM

## 2024-07-07 PROCEDURE — 71045 X-RAY EXAM CHEST 1 VIEW: CPT

## 2024-07-07 PROCEDURE — 83690 ASSAY OF LIPASE: CPT

## 2024-07-07 PROCEDURE — 87040 BLOOD CULTURE FOR BACTERIA: CPT

## 2024-07-07 PROCEDURE — 99285 EMERGENCY DEPT VISIT HI MDM: CPT

## 2024-07-07 PROCEDURE — 71260 CT THORAX DX C+: CPT

## 2024-07-07 RX ORDER — NITROGLYCERIN 0.4 MG/1
0.4 TABLET SUBLINGUAL EVERY 5 MIN PRN
Status: DISCONTINUED | OUTPATIENT
Start: 2024-07-07 | End: 2024-07-08 | Stop reason: HOSPADM

## 2024-07-07 RX ORDER — MORPHINE SULFATE 2 MG/ML
2 INJECTION, SOLUTION INTRAMUSCULAR; INTRAVENOUS ONCE
Status: COMPLETED | OUTPATIENT
Start: 2024-07-07 | End: 2024-07-07

## 2024-07-07 RX ORDER — ASPIRIN 81 MG/1
243 TABLET, CHEWABLE ORAL ONCE
Status: COMPLETED | OUTPATIENT
Start: 2024-07-07 | End: 2024-07-07

## 2024-07-07 RX ORDER — KETOROLAC TROMETHAMINE 15 MG/ML
15 INJECTION, SOLUTION INTRAMUSCULAR; INTRAVENOUS ONCE
Status: COMPLETED | OUTPATIENT
Start: 2024-07-07 | End: 2024-07-07

## 2024-07-07 RX ORDER — ONDANSETRON 2 MG/ML
4 INJECTION INTRAMUSCULAR; INTRAVENOUS ONCE
Status: COMPLETED | OUTPATIENT
Start: 2024-07-07 | End: 2024-07-07

## 2024-07-07 RX ADMIN — VANCOMYCIN HYDROCHLORIDE 1500 MG: 1 INJECTION, POWDER, LYOPHILIZED, FOR SOLUTION INTRAVENOUS at 09:35

## 2024-07-07 RX ADMIN — NITROGLYCERIN 0.4 MG: 0.4 TABLET SUBLINGUAL at 07:50

## 2024-07-07 RX ADMIN — ASPIRIN 243 MG: 81 TABLET, CHEWABLE ORAL at 07:46

## 2024-07-07 RX ADMIN — CEFEPIME 2000 MG: 2 INJECTION, POWDER, FOR SOLUTION INTRAVENOUS at 23:30

## 2024-07-07 RX ADMIN — VANCOMYCIN HYDROCHLORIDE 1500 MG: 1 INJECTION, POWDER, LYOPHILIZED, FOR SOLUTION INTRAVENOUS at 21:20

## 2024-07-07 RX ADMIN — KETOROLAC TROMETHAMINE 15 MG: 15 INJECTION, SOLUTION INTRAMUSCULAR; INTRAVENOUS at 23:26

## 2024-07-07 RX ADMIN — KETOROLAC TROMETHAMINE 15 MG: 15 INJECTION, SOLUTION INTRAMUSCULAR; INTRAVENOUS at 12:37

## 2024-07-07 RX ADMIN — CEFEPIME 2000 MG: 2 INJECTION, POWDER, FOR SOLUTION INTRAVENOUS at 08:29

## 2024-07-07 RX ADMIN — ONDANSETRON 4 MG: 2 INJECTION INTRAMUSCULAR; INTRAVENOUS at 11:14

## 2024-07-07 RX ADMIN — IOMEPROL INJECTION 75 ML: 714 INJECTION, SOLUTION INTRAVASCULAR at 08:50

## 2024-07-07 RX ADMIN — MORPHINE SULFATE 2 MG: 2 INJECTION, SOLUTION INTRAMUSCULAR; INTRAVENOUS at 11:14

## 2024-07-07 RX ADMIN — KETOROLAC TROMETHAMINE 15 MG: 15 INJECTION, SOLUTION INTRAMUSCULAR; INTRAVENOUS at 19:24

## 2024-07-07 ASSESSMENT — LIFESTYLE VARIABLES
HOW MANY STANDARD DRINKS CONTAINING ALCOHOL DO YOU HAVE ON A TYPICAL DAY: PATIENT DOES NOT DRINK
HOW OFTEN DO YOU HAVE A DRINK CONTAINING ALCOHOL: NEVER

## 2024-07-07 ASSESSMENT — PAIN SCALES - GENERAL
PAINLEVEL_OUTOF10: 7
PAINLEVEL_OUTOF10: 8
PAINLEVEL_OUTOF10: 8

## 2024-07-07 ASSESSMENT — PAIN DESCRIPTION - DESCRIPTORS: DESCRIPTORS: ACHING

## 2024-07-07 ASSESSMENT — PAIN DESCRIPTION - LOCATION: LOCATION: CHEST

## 2024-07-07 ASSESSMENT — PAIN DESCRIPTION - ORIENTATION: ORIENTATION: LEFT

## 2024-07-07 ASSESSMENT — PAIN - FUNCTIONAL ASSESSMENT: PAIN_FUNCTIONAL_ASSESSMENT: 0-10

## 2024-07-07 NOTE — CONSULTS
Pharmacy to dose vancomycin one time dose in the ED:    Dr Najera requested pharmacy to dose vancomycin IV one time dose in the [facility] ED for sepsis on this 49 year old female.  HT 68 inches  .4 kg  IBW 63.9 kg Adjusted dosing wt 83.7 kg  BUN/SRCR 16/0.6   Est crcl = 153 ml/min   WBC 24.5                Tmax 98.9  Based on patient's age, weight, and renal function will give vancomycin 1500 mg IVPB X one dose in the ED.  If patient is admitted and vancomycin is to be continued, please re-consult pharmacy to dose vancomycin as in-patient.

## 2024-07-07 NOTE — ED PROVIDER NOTES
[facility] EMERGENCY DEPARTMENT     EMERGENCY DEPARTMENT ENCOUNTER            Pt Name: Pam Guido   MRN: [id number]   Birthdate 1975   Date of evaluation: 7/7/2024   Provider: Aleisha Najera MD   PCP: Johanna Jensen, ALLY - CNP   Note Started: 7:23 AM EDT 7/7/24          CHIEF COMPLAINT     Chief Complaint   Patient presents with    Chest Pain     Pt presents to ED with complaints of waking up with chest pain around 0630.  Took 1 baby aspirin.      HISTORY OF PRESENT ILLNESS:   History from : Patient   Limitations to history : None     Pam Guido is a 49 y.o. female who presents complaining of substernal chest pain, body aches.  Patient states that she woke up early this morning and was having pain throughout her body.  Woke up again around 6:30 AM and is having substernal chest pressure that she feels like somebody is sitting on her chest.  No exacerbating or relieving factors.  States that she took 1 baby aspirin prior to arrival.  She states that the pain is radiating throughout her entire body.  Denies fevers, cough or shortness of breath.  Has felt nauseated but has not had any episodes of emesis.  She denies any other complaints or concerns.    Nursing Notes were all reviewed and agreed with, or any disagreements were addressed in the HPI.     REVIEW OF SYSTEMS :    Positives and Pertinent negatives as per HPI.      MEDICAL HISTORY   has a past medical history of Anemia, Back pain, Chest pain (6/2014), Chronic pain syndrome (2017), Fibroid uterus, GERD (gastroesophageal reflux disease), Hypertension, Menopausal symptoms, Overactive bladder, Pelvic pain, Right lateral epicondylitis, Rotator cuff syndrome of right shoulder, Stress incontinence, and Tachycardia.    Past Surgical History:   Procedure Laterality Date    DILATION AND CURETTAGE      DILATION AND CURETTAGE OF UTERUS N/A 4/16/2019    DIAGNOSTIC LAPAROSCOPY, DILATATION AND CURETTAGE, HYSTEROSCOPY, WITH MYOSURE AND

## 2024-07-08 ENCOUNTER — HOSPITAL ENCOUNTER (INPATIENT)
Age: 49
LOS: 4 days | Discharge: ANOTHER HOSPITAL | End: 2024-07-12
Attending: STUDENT IN AN ORGANIZED HEALTH CARE EDUCATION/TRAINING PROGRAM | Admitting: INTERNAL MEDICINE
Payer: COMMERCIAL

## 2024-07-08 VITALS
HEIGHT: 68 IN | DIASTOLIC BLOOD PRESSURE: 82 MMHG | TEMPERATURE: 98.8 F | HEART RATE: 94 BPM | RESPIRATION RATE: 16 BRPM | OXYGEN SATURATION: 96 % | BODY MASS INDEX: 37.89 KG/M2 | SYSTOLIC BLOOD PRESSURE: 140 MMHG | WEIGHT: 250 LBS

## 2024-07-08 DIAGNOSIS — C91.00 ACUTE LYMPHOBLASTIC LEUKEMIA (ALL) NOT HAVING ACHIEVED REMISSION (HCC): Primary | ICD-10-CM

## 2024-07-08 PROBLEM — A41.9 SEPSIS (HCC): Status: ACTIVE | Noted: 2024-07-08

## 2024-07-08 PROBLEM — R07.9 CHEST PAIN: Status: ACTIVE | Noted: 2024-07-08

## 2024-07-08 LAB
ANION GAP SERPL CALCULATED.3IONS-SCNC: 10 MMOL/L (ref 3–16)
ANION GAP SERPL CALCULATED.3IONS-SCNC: 14 MMOL/L (ref 3–16)
ANISOCYTOSIS BLD QL SMEAR: ABNORMAL
BASOPHILS # BLD: 0 K/UL (ref 0–0.2)
BASOPHILS # BLD: 0 K/UL (ref 0–0.2)
BASOPHILS NFR BLD: 0 %
BASOPHILS NFR BLD: 0 %
BUN SERPL-MCNC: 11 MG/DL (ref 7–20)
BUN SERPL-MCNC: 12 MG/DL (ref 7–20)
CALCIUM SERPL-MCNC: 8.6 MG/DL (ref 8.3–10.6)
CALCIUM SERPL-MCNC: 9 MG/DL (ref 8.3–10.6)
CHLORIDE SERPL-SCNC: 100 MMOL/L (ref 99–110)
CHLORIDE SERPL-SCNC: 103 MMOL/L (ref 99–110)
CHOLEST SERPL-MCNC: 192 MG/DL (ref 0–199)
CO2 SERPL-SCNC: 23 MMOL/L (ref 21–32)
CO2 SERPL-SCNC: 23 MMOL/L (ref 21–32)
CREAT SERPL-MCNC: 0.6 MG/DL (ref 0.6–1.1)
CREAT SERPL-MCNC: 0.7 MG/DL (ref 0.6–1.1)
DEPRECATED RDW RBC AUTO: 14.4 % (ref 12.4–15.4)
DEPRECATED RDW RBC AUTO: 14.7 % (ref 12.4–15.4)
EOSINOPHIL # BLD: 0 K/UL (ref 0–0.6)
EOSINOPHIL # BLD: 0.2 K/UL (ref 0–0.6)
EOSINOPHIL NFR BLD: 0 %
EOSINOPHIL NFR BLD: 1 %
GFR SERPLBLD CREATININE-BSD FMLA CKD-EPI: >90 ML/MIN/{1.73_M2}
GFR SERPLBLD CREATININE-BSD FMLA CKD-EPI: >90 ML/MIN/{1.73_M2}
GLUCOSE SERPL-MCNC: 129 MG/DL (ref 70–99)
GLUCOSE SERPL-MCNC: 95 MG/DL (ref 70–99)
HCT VFR BLD AUTO: 39.4 % (ref 36–48)
HCT VFR BLD AUTO: 42.9 % (ref 36–48)
HDLC SERPL-MCNC: 51 MG/DL (ref 40–60)
HGB BLD-MCNC: 13.2 G/DL (ref 12–16)
HGB BLD-MCNC: 13.9 G/DL (ref 12–16)
LDLC SERPL CALC-MCNC: 114 MG/DL
LYMPHOCYTES # BLD: 2.1 K/UL (ref 1–5.1)
LYMPHOCYTES # BLD: 4.8 K/UL (ref 1–5.1)
LYMPHOCYTES NFR BLD: 11 %
LYMPHOCYTES NFR BLD: 22 %
MACROCYTES BLD QL SMEAR: ABNORMAL
MAGNESIUM SERPL-MCNC: 2.4 MG/DL (ref 1.8–2.4)
MCH RBC QN AUTO: 28.5 PG (ref 26–34)
MCH RBC QN AUTO: 28.8 PG (ref 26–34)
MCHC RBC AUTO-ENTMCNC: 32.4 G/DL (ref 31–36)
MCHC RBC AUTO-ENTMCNC: 33.4 G/DL (ref 31–36)
MCV RBC AUTO: 85.3 FL (ref 80–100)
MCV RBC AUTO: 88.8 FL (ref 80–100)
METAMYELOCYTES NFR BLD MANUAL: 1 %
MICROCYTES BLD QL SMEAR: ABNORMAL
MONOCYTES # BLD: 1 K/UL (ref 0–1.3)
MONOCYTES # BLD: 1.5 K/UL (ref 0–1.3)
MONOCYTES NFR BLD: 5 %
MONOCYTES NFR BLD: 8 %
MONONUC CELLS NFR BLD MANUAL: 1 %
NEUTROPHILS # BLD: 13.1 K/UL (ref 1.7–7.7)
NEUTROPHILS # BLD: 15.2 K/UL (ref 1.7–7.7)
NEUTROPHILS NFR BLD: 48 %
NEUTROPHILS NFR BLD: 59 %
NEUTS BAND NFR BLD MANUAL: 20 % (ref 0–7)
NEUTS BAND NFR BLD MANUAL: 21 % (ref 0–7)
NRBC BLD-RTO: 5 /100 WBC
OVALOCYTES BLD QL SMEAR: ABNORMAL
PATH INTERP BLD-IMP: YES
PLATELET # BLD AUTO: 233 K/UL (ref 135–450)
PLATELET # BLD AUTO: 253 K/UL (ref 135–450)
PLATELET BLD QL SMEAR: ADEQUATE
PLATELET BLD QL SMEAR: ADEQUATE
PMV BLD AUTO: 7.8 FL (ref 5–10.5)
PMV BLD AUTO: 7.8 FL (ref 5–10.5)
POIKILOCYTOSIS BLD QL SMEAR: ABNORMAL
POLYCHROMASIA BLD QL SMEAR: ABNORMAL
POTASSIUM SERPL-SCNC: 3.3 MMOL/L (ref 3.5–5.1)
POTASSIUM SERPL-SCNC: 3.8 MMOL/L (ref 3.5–5.1)
RBC # BLD AUTO: 4.62 M/UL (ref 4–5.2)
RBC # BLD AUTO: 4.83 M/UL (ref 4–5.2)
SLIDE REVIEW: ABNORMAL
SLIDE REVIEW: ABNORMAL
SODIUM SERPL-SCNC: 133 MMOL/L (ref 136–145)
SODIUM SERPL-SCNC: 140 MMOL/L (ref 136–145)
TRIGL SERPL-MCNC: 136 MG/DL (ref 0–150)
TROPONIN, HIGH SENSITIVITY: 8 NG/L (ref 0–14)
TROPONIN, HIGH SENSITIVITY: <6 NG/L (ref 0–14)
VANCOMYCIN TROUGH SERPL-MCNC: 12.2 UG/ML (ref 10–20)
VARIANT LYMPHS NFR BLD MANUAL: 3 % (ref 0–6)
VLDLC SERPL CALC-MCNC: 27 MG/DL
WBC # BLD AUTO: 18.8 K/UL (ref 4–11)
WBC # BLD AUTO: 19.2 K/UL (ref 4–11)

## 2024-07-08 PROCEDURE — 36415 COLL VENOUS BLD VENIPUNCTURE: CPT

## 2024-07-08 PROCEDURE — 6360000002 HC RX W HCPCS: Performed by: STUDENT IN AN ORGANIZED HEALTH CARE EDUCATION/TRAINING PROGRAM

## 2024-07-08 PROCEDURE — 83735 ASSAY OF MAGNESIUM: CPT

## 2024-07-08 PROCEDURE — 96366 THER/PROPH/DIAG IV INF ADDON: CPT

## 2024-07-08 PROCEDURE — 1200000000 HC SEMI PRIVATE

## 2024-07-08 PROCEDURE — 80048 BASIC METABOLIC PNL TOTAL CA: CPT

## 2024-07-08 PROCEDURE — 6360000002 HC RX W HCPCS

## 2024-07-08 PROCEDURE — 85025 COMPLETE CBC W/AUTO DIFF WBC: CPT

## 2024-07-08 PROCEDURE — 6360000002 HC RX W HCPCS: Performed by: NURSE PRACTITIONER

## 2024-07-08 PROCEDURE — 83036 HEMOGLOBIN GLYCOSYLATED A1C: CPT

## 2024-07-08 PROCEDURE — 80202 ASSAY OF VANCOMYCIN: CPT

## 2024-07-08 PROCEDURE — 2580000003 HC RX 258: Performed by: EMERGENCY MEDICINE

## 2024-07-08 PROCEDURE — 2580000003 HC RX 258: Performed by: STUDENT IN AN ORGANIZED HEALTH CARE EDUCATION/TRAINING PROGRAM

## 2024-07-08 PROCEDURE — 84484 ASSAY OF TROPONIN QUANT: CPT

## 2024-07-08 PROCEDURE — 2580000003 HC RX 258

## 2024-07-08 PROCEDURE — 80061 LIPID PANEL: CPT

## 2024-07-08 PROCEDURE — 96376 TX/PRO/DX INJ SAME DRUG ADON: CPT

## 2024-07-08 PROCEDURE — 6360000002 HC RX W HCPCS: Performed by: EMERGENCY MEDICINE

## 2024-07-08 RX ORDER — CARVEDILOL 6.25 MG/1
6.25 TABLET ORAL 2 TIMES DAILY
Status: DISCONTINUED | OUTPATIENT
Start: 2024-07-08 | End: 2024-07-12 | Stop reason: HOSPADM

## 2024-07-08 RX ORDER — ACETAMINOPHEN 325 MG/1
650 TABLET ORAL EVERY 6 HOURS PRN
Status: DISCONTINUED | OUTPATIENT
Start: 2024-07-08 | End: 2024-07-12 | Stop reason: HOSPADM

## 2024-07-08 RX ORDER — SODIUM CHLORIDE 0.9 % (FLUSH) 0.9 %
5-40 SYRINGE (ML) INJECTION PRN
Status: DISCONTINUED | OUTPATIENT
Start: 2024-07-08 | End: 2024-07-11 | Stop reason: SDUPTHER

## 2024-07-08 RX ORDER — SODIUM CHLORIDE 0.9 % (FLUSH) 0.9 %
5-40 SYRINGE (ML) INJECTION EVERY 12 HOURS SCHEDULED
Status: DISCONTINUED | OUTPATIENT
Start: 2024-07-08 | End: 2024-07-11 | Stop reason: SDUPTHER

## 2024-07-08 RX ORDER — SODIUM CHLORIDE 9 MG/ML
INJECTION, SOLUTION INTRAVENOUS PRN
Status: DISCONTINUED | OUTPATIENT
Start: 2024-07-08 | End: 2024-07-12 | Stop reason: HOSPADM

## 2024-07-08 RX ORDER — ENOXAPARIN SODIUM 100 MG/ML
30 INJECTION SUBCUTANEOUS EVERY 12 HOURS
Status: DISCONTINUED | OUTPATIENT
Start: 2024-07-08 | End: 2024-07-12 | Stop reason: HOSPADM

## 2024-07-08 RX ORDER — ACETAMINOPHEN 650 MG/1
650 SUPPOSITORY RECTAL EVERY 6 HOURS PRN
Status: DISCONTINUED | OUTPATIENT
Start: 2024-07-08 | End: 2024-07-12 | Stop reason: HOSPADM

## 2024-07-08 RX ORDER — KETOROLAC TROMETHAMINE 15 MG/ML
15 INJECTION, SOLUTION INTRAMUSCULAR; INTRAVENOUS ONCE
Status: COMPLETED | OUTPATIENT
Start: 2024-07-08 | End: 2024-07-08

## 2024-07-08 RX ORDER — POLYETHYLENE GLYCOL 3350 17 G/17G
17 POWDER, FOR SOLUTION ORAL DAILY PRN
Status: DISCONTINUED | OUTPATIENT
Start: 2024-07-08 | End: 2024-07-12 | Stop reason: HOSPADM

## 2024-07-08 RX ORDER — ONDANSETRON 4 MG/1
4 TABLET, ORALLY DISINTEGRATING ORAL EVERY 8 HOURS PRN
Status: DISCONTINUED | OUTPATIENT
Start: 2024-07-08 | End: 2024-07-12 | Stop reason: HOSPADM

## 2024-07-08 RX ORDER — MORPHINE SULFATE 2 MG/ML
2 INJECTION, SOLUTION INTRAMUSCULAR; INTRAVENOUS EVERY 4 HOURS PRN
Status: COMPLETED | OUTPATIENT
Start: 2024-07-08 | End: 2024-07-10

## 2024-07-08 RX ORDER — KETOROLAC TROMETHAMINE 30 MG/ML
15 INJECTION, SOLUTION INTRAMUSCULAR; INTRAVENOUS EVERY 6 HOURS PRN
Status: DISCONTINUED | OUTPATIENT
Start: 2024-07-08 | End: 2024-07-10

## 2024-07-08 RX ORDER — HYDROCHLOROTHIAZIDE 12.5 MG/1
12.5 CAPSULE, GELATIN COATED ORAL EVERY MORNING
Status: DISCONTINUED | OUTPATIENT
Start: 2024-07-09 | End: 2024-07-12 | Stop reason: HOSPADM

## 2024-07-08 RX ORDER — LOSARTAN POTASSIUM 25 MG/1
50 TABLET ORAL EVERY MORNING
Status: DISCONTINUED | OUTPATIENT
Start: 2024-07-09 | End: 2024-07-12 | Stop reason: HOSPADM

## 2024-07-08 RX ORDER — ONDANSETRON 2 MG/ML
4 INJECTION INTRAMUSCULAR; INTRAVENOUS EVERY 6 HOURS PRN
Status: DISCONTINUED | OUTPATIENT
Start: 2024-07-08 | End: 2024-07-12 | Stop reason: HOSPADM

## 2024-07-08 RX ADMIN — CEFEPIME 2000 MG: 2 INJECTION, POWDER, FOR SOLUTION INTRAVENOUS at 18:25

## 2024-07-08 RX ADMIN — ENOXAPARIN SODIUM 30 MG: 100 INJECTION SUBCUTANEOUS at 20:05

## 2024-07-08 RX ADMIN — VANCOMYCIN HYDROCHLORIDE 1250 MG: 1 INJECTION, POWDER, LYOPHILIZED, FOR SOLUTION INTRAVENOUS at 09:20

## 2024-07-08 RX ADMIN — KETOROLAC TROMETHAMINE 15 MG: 30 INJECTION INTRAMUSCULAR; INTRAVENOUS at 14:35

## 2024-07-08 RX ADMIN — KETOROLAC TROMETHAMINE 15 MG: 15 INJECTION, SOLUTION INTRAMUSCULAR; INTRAVENOUS at 08:28

## 2024-07-08 RX ADMIN — MORPHINE SULFATE 2 MG: 2 INJECTION, SOLUTION INTRAMUSCULAR; INTRAVENOUS at 20:05

## 2024-07-08 RX ADMIN — SODIUM CHLORIDE, PRESERVATIVE FREE 10 ML: 5 INJECTION INTRAVENOUS at 20:04

## 2024-07-08 RX ADMIN — CEFEPIME 2000 MG: 2 INJECTION, POWDER, FOR SOLUTION INTRAVENOUS at 08:29

## 2024-07-08 ASSESSMENT — PAIN DESCRIPTION - ORIENTATION
ORIENTATION: LEFT
ORIENTATION: RIGHT;LEFT

## 2024-07-08 ASSESSMENT — LIFESTYLE VARIABLES
HOW OFTEN DO YOU HAVE A DRINK CONTAINING ALCOHOL: NEVER
HOW MANY STANDARD DRINKS CONTAINING ALCOHOL DO YOU HAVE ON A TYPICAL DAY: PATIENT DOES NOT DRINK

## 2024-07-08 ASSESSMENT — PAIN SCALES - GENERAL
PAINLEVEL_OUTOF10: 6
PAINLEVEL_OUTOF10: 8
PAINLEVEL_OUTOF10: 7
PAINLEVEL_OUTOF10: 7
PAINLEVEL_OUTOF10: 4
PAINLEVEL_OUTOF10: 7
PAINLEVEL_OUTOF10: 4

## 2024-07-08 ASSESSMENT — PAIN DESCRIPTION - DESCRIPTORS
DESCRIPTORS: ACHING
DESCRIPTORS: ACHING
DESCRIPTORS: ACHING;PRESSURE
DESCRIPTORS: ACHING
DESCRIPTORS: ACHING

## 2024-07-08 ASSESSMENT — PAIN DESCRIPTION - LOCATION
LOCATION: GENERALIZED
LOCATION: HEAD;CHEST;ARM
LOCATION: CHEST;HEAD;ARM
LOCATION: CHEST;BACK
LOCATION: CHEST;BACK

## 2024-07-08 ASSESSMENT — PAIN - FUNCTIONAL ASSESSMENT: PAIN_FUNCTIONAL_ASSESSMENT: ACTIVITIES ARE NOT PREVENTED

## 2024-07-08 NOTE — ED NOTES
Spoke to Jocy, Clinical at [facility] who states that they are discharge dependent but that they have multiple hold in their ED as well. States that they do staffing huddle at 0930 and that she will call back after to left me know when they are getting a bed. She states that \"ideally\" they should get a bed after they have some discharges because they have been on the board the longest. States that she will follow up with me after staffing huddle.

## 2024-07-08 NOTE — PROGRESS NOTES
Vanc 1250mg q8h, placed this am   and  Ctrough 16.4.  Pt to be discharged to [facility] but discharge dependent  I will order level now, in case he stays  Tamela Moody Pharm D 7/8/202411:09 AM  .

## 2024-07-08 NOTE — ED NOTES
called to check on PT.  PT sleeping in hospital bed at this time,  stated not to wake her.   stated that she recently had infection in salivary gland that was treated with antibiotics within last 30 days by PMD.

## 2024-07-08 NOTE — PROGRESS NOTES
4 Eyes Skin Assessment     The patient is being assess for  Admission    I agree that 2 RN's have performed a thorough Head to Toe Skin Assessment on the patient. ALL assessment sites listed below have been assessed.       Areas assessed by both nurses: Rafael  [x]   Head, Face, and Ears   [x]   Shoulders, Back, and Chest  [x]   Arms, Elbows, and Hands   [x]   Coccyx, Sacrum, and Ischum  [x]   Legs, Feet, and Heels        Does the Patient have Skin Breakdown?  No         Jim Prevention initiated:  No   Wound Care Orders initiated:  No      [facility] nurse consulted for Pressure Injury (Stage 3,4, Unstageable, DTI, NWPT, and Complex wounds):  No      Nurse 1 eSignature: Electronically signed by Mitra Jackson RN on 7/8/24 at 2:52 PM EDT    **SHARE this note so that the co-signing nurse is able to place an eSignature**    Nurse 2 eSignature: Electronically signed by Carol Day RN on 7/8/24 at 3:38 PM EDT

## 2024-07-08 NOTE — PROGRESS NOTES
Patient DA admitted to room 110 from [facility] ER. Patient oriented to room, call light, bed rails, phone, lights and bathroom. Patient instructed about the schedule of the day including: vital sign frequency, lab draws, possible tests, frequency of MD and staff rounds, including RN/MD rounding together at bedside, daily weights, and I &O's.  Patient instructed about prescribed diet, how to use 30856 and television. Bed locked, in lowest position, side rails up 2/4, call light within reach.  Will continue to monitor.

## 2024-07-08 NOTE — H&P
Hospital Medicine History & Physical      Date of Admission: 7/8/2024    Date of Service:  Pt seen/examined on 7/8/24     [x]Admitted to Inpatient with expected LOS greater than two midnights due to medical therapy.  []Placed in Observation status.    Chief Admission Complaint:  generalized pain    Presenting Admission History: 49 y.o. female with past medical history significant for hypertension, back pain, chronic pain, tachycardia, obesity.  Initially presented to outside facility for pain in the chest, arms, legs, head.  She states the pain woke her up Odilon morning.  It is located in her chest, arms, legs.  She describes the pain as squeezing, pulling.  This was associated with feeling hot/flushed, nausea, fatigue.  She took Tylenol, but it did not provide any relief.  She also reports a headache since Sunday morning, denies photo/phonophobia.  At transferring facility, troponin was within expected range x2, WBC was 18.8 with bands.  She was also tachycardic.  She was given vanc, cefepime.  Denies association with dyspnea.  Denies recent sick contacts.  States she had an infected salivary gland about one month ago and was treated with abx from her PCP    Transferred to [facility] for further evaluation and treatment.      Assessment/Plan:      Current Principal Problem:  Chest pain    Chest pain.  Possible etiologies include: GI, MSK, cardiac.  HS trop within expected range on arrival to [facility], it was normal x2 at previous hospital.  EKG with no acute ischemic changes.  Cardiology consulted, appreciate recommendations.      Leukocytosis, with bandemia.  Unclear etiology at this time.  At previous facility she did have sepsis markers, those resolved prior to transfer to [facility].  Vitals have been within normal range since arrival to [facility].  States received abx at outside hospital.  Blood cultures obtained at outside hospital are in process at this time, follow for results.  UA was unremarkable.  Negative flu/covid.   screening, adhere to Lung-RADS guidelines. Reference: Radiology. 2017; 284(1):228-43.     CT ABDOMEN PELVIS W IV CONTRAST Additional Contrast? None    Result Date: 7/7/2024  EXAMINATION: CTA OF THE CHEST; CT OF THE ABDOMEN AND PELVIS WITH CONTRAST 7/7/2024 8:30 am TECHNIQUE: CTA of the chest was performed after the administration of intravenous contrast.  Multiplanar reformatted images are provided for review.  MIP images are provided for review. Automated exposure control, iterative reconstruction, and/or weight based adjustment of the mA/kV was utilized to reduce the radiation dose to as low as reasonably achievable.; CT of the abdomen and pelvis was performed with the administration of intravenous contrast. Multiplanar reformatted images are provided for review. Automated exposure control, iterative reconstruction, and/or weight based adjustment of the mA/kV was utilized to reduce the radiation dose to as low as reasonably achievable. COMPARISON: Abdominal CT May 2020 HISTORY: ORDERING SYSTEM PROVIDED HISTORY: chest pain TECHNOLOGIST PROVIDED HISTORY: Reason for exam:->chest pain Additional Contrast?->1 Reason for Exam: chest pain; ORDERING SYSTEM PROVIDED HISTORY: epigastric pain, sepsis TECHNOLOGIST PROVIDED HISTORY: Reason for exam:->epigastric pain, sepsis Additional Contrast?->None Decision Support Exception - unselect if not a suspected or confirmed emergency medical condition->Emergency Medical Condition (MA) Reason for Exam: chest pain FINDINGS: Chest: Mediastinum: 9 mm hypodense nodule seen in right lobe of thyroid.  No specific imaging follow-up recommended based on size.  No pericardial effusion.  No pericardial calcification noted.  Small hiatal hernia seen. There is nonspecific thickening at the GE junction. No embolus is seen in the central, right, or left main pulmonary artery. Segmental branches and subsegmental branches not well evaluated secondary to motion. No significant mediastinal or hilar  Chronic pain syndrome 2017    Dr. Garcia pain management    Fibroid uterus     fibroid uterus ad abnormal uterine bleeding with uterine fibroids    GERD (gastroesophageal reflux disease)     Hypertension     Menopausal symptoms     Overactive bladder     Pelvic pain     Right lateral epicondylitis     Rotator cuff syndrome of right shoulder     Stress incontinence     Tachycardia     on coreg       Past Surgical History:        Procedure Laterality Date    DILATION AND CURETTAGE      DILATION AND CURETTAGE OF UTERUS N/A 4/16/2019    DIAGNOSTIC LAPAROSCOPY, DILATATION AND CURETTAGE, HYSTEROSCOPY, WITH MYOSURE AND NOVASURE ENDOMETRIAL ABLATION performed by Denise Joya DO at [facility] OR    HYSTERECTOMY (CERVIX STATUS UNKNOWN)      HYSTERECTOMY, VAGINAL N/A 6/4/2020    LAPAROSCOPIC ASSISTED VAGINAL HYSTERECTOMY, BILATERAL SALPINGECTOMY, POSSIBLE CYSTOSCOPY performed by Denise Joya DO at [facility] OR    HYSTEROSCOPY      KNEE CARTILAGE SURGERY Right 2009    LAPAROSCOPY      TONSILLECTOMY      TONSILLECTOMY AND ADENOIDECTOMY      as a child    TUBAL LIGATION      US BREAST BIOPSY W LOC DEVICE 1ST LESION LEFT Left 8/12/2021    US BREAST NEEDLE BIOPSY LEFT 8/12/2021 Osito Rosario MD [facility]       Medications Prior to Admission:   Prior to Admission medications    Medication Sig Start Date End Date Taking? Authorizing Provider   diclofenac (VOLTAREN) 75 MG EC tablet Take 1 tablet by mouth 2 times daily as needed (pain)  Patient not taking: Reported on 7/8/2024 6/6/24   Johanna Jensen APRN - CNP   carvedilol (COREG) 6.25 MG tablet Take 1 tablet by mouth 2 times daily 5/23/24   Johanna Jensen APRN - CNP   losartan (COZAAR) 50 MG tablet Take 1 tablet by mouth every morning 5/23/24   Johanna Jensen APRN - CNP   famotidine (PEPCID) 40 MG tablet TAKE ONE TABLET BY MOUTH ONCE NIGHTLY  Patient not taking: Reported on 7/7/2024 5/20/24   Johanna Jensen APRN - CNP

## 2024-07-08 NOTE — PROGRESS NOTES
Pharmacy Note - Extended Infusion Beta-Lactam Adjustment    Cefepime 2000mg Q12h for treatment of Sepsis of unknown etiology. Per [facility] Extended Infusion Beta-Lactam Policy, cefepime will be changed to   2000mg Q8h extended infusion    Estimated Creatinine Clearance: Estimated Creatinine Clearance: 131 mL/min (based on SCr of 0.7 mg/dL).    BMI: Body mass index is 38.02 kg/m².    Please call with any questions.    Thank you,    Lise Phipps, [facility]

## 2024-07-08 NOTE — CONSULTS
Pharmacy Note  Vancomycin Consult    Pam Guido is a 49 y.o. female started on Vancomycin for Sepsis of Unknown Etiology; consult received from Luz Marina LOUIE to manage therapy. Also receiving the following antibiotics: Cefepime.    Allergies:  Penicillins, Lisinopril, and Biaxin [clarithromycin]     Micro: pending    Recent Labs     07/08/24  0543 07/08/24  1425   CREATININE 0.6 0.7       Recent Labs     07/08/24  0543 07/08/24  1425   WBC 18.8* 19.2*       Estimated Creatinine Clearance: 131 mL/min (based on SCr of 0.7 mg/dL).    No intake or output data in the 24 hours ending 07/08/24 1731    Wt Readings from Last 1 Encounters:   07/08/24 113.4 kg (250 lb)         Body mass index is 38.02 kg/m².    Loading dose (critically ill or in ICU, require dialysis or renal replacement therapy): Vancomycin 25 mg/kg IVPB x 1 (maximum 2500 mg).  Maintenance dose: 10-20 mg/kg (maximum: 2000 mg/dose and 4500 mg/day) starting at the next dosing interval determined by renal function  Pulse dose: fluctuating renal function, RAOUL, ESRD  CRRT: 7.5-10 mg/kg q12h   Goal Vancomycin trough: 15-20 mcg/mL   Goal Vancomycin AUC: 400-600     Assessment/Plan:  Vancomycin started at [facility] on 7/7/24 @ 0900.   1500 mg IV load, followed By 1500 mg IV X1 and 1250 mg IV X1 this am.  Will begin 1500 mg IV q12h first dose tonight @ 2100  Calculated Vancomycin AUC = 482 mg/L*h with an estimated steady-state vancomycin trough = 13.3 mcg/mL. Vancomycin level ordered for 7/9 @ 0600. Timing of trough level will be determined based on culture results, renal function, and clinical response.   Sandra [facility]      Thank you for the consult.     -----------------------------------------------------------------                                     Vancomycin Progress Note  Day: 2 Indication: Sepsis Other Antibiotics: Cefepime     Recent Labs     07/08/24  0543   [facility]     Recent Labs     07/09/24  0647   DONNY 13.9     Recent

## 2024-07-08 NOTE — ED NOTES
Report given to transporting ambulance service at this time. Questions/concerns addressed. VS as noted.

## 2024-07-08 NOTE — CARE COORDINATION
Case Management Assessment  Initial Evaluation    Date/Time of Evaluation: 7/8/2024 1:58 PM  Assessment Completed by: Katarina Kenny RN    If patient is discharged prior to next notation, then this note serves as note for discharge by case management.    Patient Name: Pam Guido                   YOB: 1975  Diagnosis: sepsis, chest pain                   Date / Time: 7/8/2024 12:53 PM    Patient Admission Status: Inpatient   Readmission Risk (Low < 19, Mod (19-27), High > 27): No data recorded  Current PCP: Johanna Jensen, APRN - CNP  PCP verified by CM? Yes (Johanna CANALES)    Chart Reviewed: Yes      History Provided by: Patient, Medical Record  Patient Orientation: Alert and Oriented    Patient Cognition: Alert    Hospitalization in the last 30 days (Readmission):  No    If yes, Readmission Assessment in  Navigator will be completed.    Advance Directives:      Code Status: Prior   Patient's Primary Decision Maker is: Legal Next of Kin    Primary Decision Maker: Sunday Guido - Spouse - [phone]    Discharge Planning:    Patient lives with: Spouse/Significant Other Type of Home: Apartment  Primary Care Giver: Self  Patient Support Systems include: Spouse/Significant Other   Current Financial resources: Other (Comment) (BCBS)  Current community resources: None  Current services prior to admission: None            Current DME:              Type of Home Care services:  None    ADLS  Prior functional level: Independent in ADLs/IADLs  Current functional level: Independent in ADLs/IADLs    PT AM-PAC:   /24  OT AM-PAC:   /24    Family can provide assistance at DC: Yes  Would you like Case Management to discuss the discharge plan with any other family members/significant others, and if so, who? No  Plans to Return to Present Housing: Yes  Other Identified Issues/Barriers to RETURNING to current housing: None  Potential Assistance needed at discharge: N/A            Potential  DME:    Patient expects to discharge to: House  Plan for transportation at discharge: Family    Financial    Payor: BCBS / Plan: BCBS COVID / Product Type: *No Product type* /     Does insurance require precert for SNF: Yes    Potential assistance Purchasing Medications: No  Meds-to-Beds request:        [facility] [id number] - [facility], [place] - [address] - P [phone] - F [phone]  [address] [facility] [place] [address]  Phone: [phone] Fax: [phone]    [facility] [id number] - [place], [place] - [address] - P [phone] - F [phone]  [address] [facility] [address] [place] [address]  Phone: [phone] Fax: [phone]    [facility] [id number] - [place], [place] - [address] -  [phone] - F [phone]  [address] [address]  Phone: [phone] Fax: [phone]      Notes:    Factors facilitating achievement of predicted outcomes: Family support, Cooperative, and Pleasant    Barriers to discharge: Pain and Decreased endurance    Additional Case Management Notes: IPTA; Lives in Apt with spouse. No needs at this time Will follow    The Plan for Transition of Care is related to the following treatment goals of sepsis, chest pain    IF APPLICABLE: The Patient and/or patient representative Pam and her family were provided with a choice of provider and agrees with the discharge plan. Freedom of choice list with basic dialogue that supports the patient's individualized plan of care/goals and shares the quality data associated with the providers was provided to:     Patient Representative Name:       The Patient and/or Patient Representative Agree with the Discharge Plan?      Katarina Kenny RN  Case Management Department  Ph: [phone]

## 2024-07-08 NOTE — CONSULTS
Pharmacy to redose IV Vanco - Sepsis  Please give an additional 1.5g dose of IV Vanco to provide Gram+ organism coverage to include MRSA.  Continue with 1250mg IV Q8hrs if patient not transferred prior to 0600 7/8/24 when the next 1,250mg dose would be due.  Dejuan Joya RPH PharmD 7/7/2024 8:57 PM

## 2024-07-09 ENCOUNTER — APPOINTMENT (OUTPATIENT)
Dept: INTERVENTIONAL RADIOLOGY/VASCULAR | Age: 49
End: 2024-07-09
Attending: STUDENT IN AN ORGANIZED HEALTH CARE EDUCATION/TRAINING PROGRAM
Payer: COMMERCIAL

## 2024-07-09 ENCOUNTER — APPOINTMENT (OUTPATIENT)
Dept: CT IMAGING | Age: 49
End: 2024-07-09
Attending: STUDENT IN AN ORGANIZED HEALTH CARE EDUCATION/TRAINING PROGRAM
Payer: COMMERCIAL

## 2024-07-09 LAB
ANION GAP SERPL CALCULATED.3IONS-SCNC: 9 MMOL/L (ref 3–16)
APPEARANCE CSF: CLEAR
BASOPHILS # BLD: 0 K/UL (ref 0–0.2)
BASOPHILS NFR BLD: 0 %
BUN SERPL-MCNC: 10 MG/DL (ref 7–20)
CALCIUM SERPL-MCNC: 8.7 MG/DL (ref 8.3–10.6)
CHLORIDE SERPL-SCNC: 104 MMOL/L (ref 99–110)
CLOT EVALUATION CSF: ABNORMAL
CO2 SERPL-SCNC: 25 MMOL/L (ref 21–32)
COLOR CSF: COLORLESS
CREAT SERPL-MCNC: 0.6 MG/DL (ref 0.6–1.1)
CRP SERPL-MCNC: 128.3 MG/L (ref 0–5.1)
DEPRECATED RDW RBC AUTO: 14.3 % (ref 12.4–15.4)
EOSINOPHIL # BLD: 0 K/UL (ref 0–0.6)
EOSINOPHIL NFR BLD: 0 %
ERYTHROCYTE [SEDIMENTATION RATE] IN BLOOD BY WESTERGREN METHOD: 27 MM/HR (ref 0–20)
EST. AVERAGE GLUCOSE BLD GHB EST-MCNC: 102.5 MG/DL
GFR SERPLBLD CREATININE-BSD FMLA CKD-EPI: >90 ML/MIN/{1.73_M2}
GLUCOSE CSF-MCNC: 63 MG/DL (ref 40–80)
GLUCOSE SERPL-MCNC: 104 MG/DL (ref 70–99)
HBA1C MFR BLD: 5.2 %
HCT VFR BLD AUTO: 40.6 % (ref 36–48)
HGB BLD-MCNC: 13.5 G/DL (ref 12–16)
INR PPP: 0.99 (ref 0.85–1.15)
LYMPHOCYTES # BLD: 4.2 K/UL (ref 1–5.1)
LYMPHOCYTES NFR BLD: 21 %
MANUAL DIF COMMENT CSF-IMP: ABNORMAL
MCH RBC QN AUTO: 29 PG (ref 26–34)
MCHC RBC AUTO-ENTMCNC: 33.3 G/DL (ref 31–36)
MCV RBC AUTO: 87 FL (ref 80–100)
MENING+ENC PNL CSF NAA+NON-PROBE: NORMAL
METAMYELOCYTES NFR BLD MANUAL: 1 %
MONOCYTES # BLD: 1 K/UL (ref 0–1.3)
MONOCYTES NFR BLD: 5 %
MYELOCYTES NFR BLD MANUAL: 3 %
NEUTROPHILS # BLD: 14.7 K/UL (ref 1.7–7.7)
NEUTROPHILS NFR BLD: 48 %
NEUTS BAND NFR BLD MANUAL: 22 % (ref 0–7)
NRBC BLD-RTO: 1 /100 WBC
NUC CELL # FLD MANUAL: 0 /CUMM (ref 0–5)
PATH INTERP BLD-IMP: NORMAL
PLATELET # BLD AUTO: 236 K/UL (ref 135–450)
PLATELET BLD QL SMEAR: ADEQUATE
PMV BLD AUTO: 7.9 FL (ref 5–10.5)
POTASSIUM SERPL-SCNC: 3.7 MMOL/L (ref 3.5–5.1)
PROT CSF-MCNC: 15 MG/DL (ref 15–45)
PROTHROMBIN TIME: 13.3 SEC (ref 11.9–14.9)
RBC # BLD AUTO: 4.66 M/UL (ref 4–5.2)
RBC # FLD MANUAL: 2 /CUMM
RBC MORPH BLD: NORMAL
REPORT: NORMAL
SLIDE REVIEW: ABNORMAL
SODIUM SERPL-SCNC: 138 MMOL/L (ref 136–145)
TUBE # CSF: ABNORMAL
VANCOMYCIN SERPL-MCNC: 13.9 UG/ML
WBC # BLD AUTO: 19.8 K/UL (ref 4–11)

## 2024-07-09 PROCEDURE — 82945 GLUCOSE OTHER FLUID: CPT

## 2024-07-09 PROCEDURE — 85610 PROTHROMBIN TIME: CPT

## 2024-07-09 PROCEDURE — 99222 1ST HOSP IP/OBS MODERATE 55: CPT | Performed by: INTERNAL MEDICINE

## 2024-07-09 PROCEDURE — 86140 C-REACTIVE PROTEIN: CPT

## 2024-07-09 PROCEDURE — 85025 COMPLETE CBC W/AUTO DIFF WBC: CPT

## 2024-07-09 PROCEDURE — 87205 SMEAR GRAM STAIN: CPT

## 2024-07-09 PROCEDURE — 6360000002 HC RX W HCPCS: Performed by: NURSE PRACTITIONER

## 2024-07-09 PROCEDURE — 2580000003 HC RX 258

## 2024-07-09 PROCEDURE — 87483 CNS DNA AMP PROBE TYPE 12-25: CPT

## 2024-07-09 PROCEDURE — 85652 RBC SED RATE AUTOMATED: CPT

## 2024-07-09 PROCEDURE — 6360000004 HC RX CONTRAST MEDICATION

## 2024-07-09 PROCEDURE — 6370000000 HC RX 637 (ALT 250 FOR IP)

## 2024-07-09 PROCEDURE — 84157 ASSAY OF PROTEIN OTHER: CPT

## 2024-07-09 PROCEDURE — 80048 BASIC METABOLIC PNL TOTAL CA: CPT

## 2024-07-09 PROCEDURE — 80202 ASSAY OF VANCOMYCIN: CPT

## 2024-07-09 PROCEDURE — 1200000000 HC SEMI PRIVATE

## 2024-07-09 PROCEDURE — 36415 COLL VENOUS BLD VENIPUNCTURE: CPT

## 2024-07-09 PROCEDURE — 62328 DX LMBR SPI PNXR W/FLUOR/CT: CPT

## 2024-07-09 PROCEDURE — 87070 CULTURE OTHR SPECIMN AEROBIC: CPT

## 2024-07-09 PROCEDURE — 70491 CT SOFT TISSUE NECK W/DYE: CPT

## 2024-07-09 PROCEDURE — 6360000002 HC RX W HCPCS

## 2024-07-09 PROCEDURE — 009U3ZX DRAINAGE OF SPINAL CANAL, PERCUTANEOUS APPROACH, DIAGNOSTIC: ICD-10-PCS | Performed by: STUDENT IN AN ORGANIZED HEALTH CARE EDUCATION/TRAINING PROGRAM

## 2024-07-09 PROCEDURE — 89050 BODY FLUID CELL COUNT: CPT

## 2024-07-09 RX ORDER — SODIUM CHLORIDE 0.9 % (FLUSH) 0.9 %
5-40 SYRINGE (ML) INJECTION PRN
Status: DISCONTINUED | OUTPATIENT
Start: 2024-07-09 | End: 2024-07-12 | Stop reason: HOSPADM

## 2024-07-09 RX ORDER — SODIUM CHLORIDE 9 MG/ML
INJECTION, SOLUTION INTRAVENOUS PRN
Status: DISCONTINUED | OUTPATIENT
Start: 2024-07-09 | End: 2024-07-12 | Stop reason: HOSPADM

## 2024-07-09 RX ORDER — SODIUM CHLORIDE 0.9 % (FLUSH) 0.9 %
5-40 SYRINGE (ML) INJECTION EVERY 12 HOURS SCHEDULED
Status: DISCONTINUED | OUTPATIENT
Start: 2024-07-09 | End: 2024-07-12 | Stop reason: HOSPADM

## 2024-07-09 RX ADMIN — KETOROLAC TROMETHAMINE 15 MG: 30 INJECTION INTRAMUSCULAR; INTRAVENOUS at 19:15

## 2024-07-09 RX ADMIN — CARVEDILOL 6.25 MG: 6.25 TABLET, FILM COATED ORAL at 09:09

## 2024-07-09 RX ADMIN — SODIUM CHLORIDE, PRESERVATIVE FREE 10 ML: 5 INJECTION INTRAVENOUS at 10:41

## 2024-07-09 RX ADMIN — ACETAMINOPHEN 650 MG: 325 TABLET ORAL at 23:10

## 2024-07-09 RX ADMIN — VANCOMYCIN HYDROCHLORIDE 1750 MG: 10 INJECTION, POWDER, LYOPHILIZED, FOR SOLUTION INTRAVENOUS at 15:46

## 2024-07-09 RX ADMIN — CARVEDILOL 6.25 MG: 6.25 TABLET, FILM COATED ORAL at 21:08

## 2024-07-09 RX ADMIN — CEFEPIME 2000 MG: 2 INJECTION, POWDER, FOR SOLUTION INTRAVENOUS at 09:54

## 2024-07-09 RX ADMIN — CEFEPIME 2000 MG: 2 INJECTION, POWDER, FOR SOLUTION INTRAVENOUS at 02:33

## 2024-07-09 RX ADMIN — SODIUM CHLORIDE, PRESERVATIVE FREE 1 ML: 5 INJECTION INTRAVENOUS at 21:07

## 2024-07-09 RX ADMIN — KETOROLAC TROMETHAMINE 15 MG: 30 INJECTION INTRAMUSCULAR; INTRAVENOUS at 01:39

## 2024-07-09 RX ADMIN — LOSARTAN POTASSIUM 50 MG: 25 TABLET, FILM COATED ORAL at 09:09

## 2024-07-09 RX ADMIN — VANCOMYCIN HYDROCHLORIDE 1500 MG: 10 INJECTION, POWDER, LYOPHILIZED, FOR SOLUTION INTRAVENOUS at 00:01

## 2024-07-09 RX ADMIN — MORPHINE SULFATE 2 MG: 2 INJECTION, SOLUTION INTRAMUSCULAR; INTRAVENOUS at 21:12

## 2024-07-09 RX ADMIN — IOPAMIDOL 75 ML: 755 INJECTION, SOLUTION INTRAVENOUS at 10:44

## 2024-07-09 RX ADMIN — ACETAMINOPHEN 650 MG: 325 TABLET ORAL at 06:36

## 2024-07-09 RX ADMIN — KETOROLAC TROMETHAMINE 15 MG: 30 INJECTION INTRAMUSCULAR; INTRAVENOUS at 12:47

## 2024-07-09 RX ADMIN — HYDROCHLOROTHIAZIDE 12.5 MG: 12.5 CAPSULE ORAL at 09:09

## 2024-07-09 ASSESSMENT — PAIN DESCRIPTION - DESCRIPTORS
DESCRIPTORS: THROBBING
DESCRIPTORS: ACHING
DESCRIPTORS: ACHING
DESCRIPTORS: ACHING;DISCOMFORT

## 2024-07-09 ASSESSMENT — PAIN SCALES - GENERAL
PAINLEVEL_OUTOF10: 7
PAINLEVEL_OUTOF10: 3
PAINLEVEL_OUTOF10: 6

## 2024-07-09 ASSESSMENT — PAIN - FUNCTIONAL ASSESSMENT: PAIN_FUNCTIONAL_ASSESSMENT: ACTIVITIES ARE NOT PREVENTED

## 2024-07-09 ASSESSMENT — PAIN DESCRIPTION - LOCATION
LOCATION: BACK
LOCATION: CHEST;ARM;NECK
LOCATION: HEAD

## 2024-07-09 NOTE — CARE COORDINATION
CM update; LOS # 1; Followed by IM,Patient currently off floor for  CT guided LP. IPTA. No needs at this time.Katarina Kenny RN

## 2024-07-09 NOTE — PLAN OF CARE
Problem: Discharge Planning  Goal: Discharge to home or other facility with appropriate resources  7/9/2024 0056 by Ceci Scott RN  Outcome: Progressing  7/8/2024 2243 by Kathryn Eller RN  Outcome: Progressing  Flowsheets  Taken 7/8/2024 1626 by Kathryn Eller, RN  Discharge to home or other facility with appropriate resources:   Identify barriers to discharge with patient and caregiver   Arrange for needed discharge resources and transportation as appropriate   Identify discharge learning needs (meds, wound care, etc)  Taken 7/8/2024 1309 by Mitra Jackson RN  Discharge to home or other facility with appropriate resources:   Identify barriers to discharge with patient and caregiver   Identify discharge learning needs (meds, wound care, etc)     Problem: Pain  Goal: Verbalizes/displays adequate comfort level or baseline comfort level  7/9/2024 0056 by Ceci Scott RN  Outcome: Progressing  7/8/2024 2243 by Kathryn Eller RN  Outcome: Progressing     Problem: ABCDS Injury Assessment  Goal: Absence of physical injury  7/9/2024 0056 by Ceci Scott RN  Outcome: Progressing  7/8/2024 2243 by Kathryn Eller, RN  Outcome: Progressing

## 2024-07-09 NOTE — CONSULTS
Infectious Diseases   Consult Note      Reason for Consult:  leukocytosis and infection of unclear etiology    Requesting Physician:  Remedios       Date of Admission: 7/8/2024    Subjective:   CHIEF COMPLAINT:  none given       HPI:    Pam Guido is a 49yoF with history of HTN, obesity, chronic pain  She was recently treated by PCP for sialoadenitis; associated symptoms have resolved.                   ED 7/7 - woke from sleep with chest pain and pressure and diffuse myalgia.  Some nausea.   WBC was elevated at 24  CT CAP notable only for simple appearing L adnexal cyst  UA, COVID and flu tests were negative.     She was admitted for evaluation and started on empiric abx, vanc and cefepime.    She is having persistent headache, continued intermittent chest pain and pain in the R thigh.    LP was completed today with 0 wbc, low protein   BC collected on admission are negative to date    She has been consistently AF   WBC remains elevated w L shift despite abx.    ESR 27,   LFTs were wnl       Current abx:  Cefepime 2g q8, s 7/7  Vanc 1750 q12, s 7/7       Past Surgical History:       Diagnosis Date    Anemia     Back pain     Chest pain 6/2014    stress myoview normal    Chronic pain syndrome 2017    Dr. Garcia pain management    Fibroid uterus     fibroid uterus ad abnormal uterine bleeding with uterine fibroids    GERD (gastroesophageal reflux disease)     Hypertension     Menopausal symptoms     Overactive bladder     Pelvic pain     Right lateral epicondylitis     Rotator cuff syndrome of right shoulder     Stress incontinence     Tachycardia     on coreg         Procedure Laterality Date    DILATION AND CURETTAGE      DILATION AND CURETTAGE OF UTERUS N/A 4/16/2019    DIAGNOSTIC LAPAROSCOPY, DILATATION AND CURETTAGE, HYSTEROSCOPY, WITH MYOSURE AND NOVASURE ENDOMETRIAL ABLATION performed by Denise Joya DO at [facility] OR    HYSTERECTOMY (CERVIX STATUS UNKNOWN)      HYSTERECTOMY, VAGINAL N/A  mg, SubCUTAneous, Q12H  ondansetron (ZOFRAN-ODT) disintegrating tablet 4 mg, 4 mg, Oral, Q8H PRN **OR** ondansetron (ZOFRAN) injection 4 mg, 4 mg, IntraVENous, Q6H PRN  polyethylene glycol (GLYCOLAX) packet 17 g, 17 g, Oral, Daily PRN  acetaminophen (TYLENOL) tablet 650 mg, 650 mg, Oral, Q6H PRN **OR** acetaminophen (TYLENOL) suppository 650 mg, 650 mg, Rectal, Q6H PRN  carvedilol (COREG) tablet 6.25 mg, 6.25 mg, Oral, BID  hydroCHLOROthiazide capsule 12.5 mg, 12.5 mg, Oral, QAM  losartan (COZAAR) tablet 50 mg, 50 mg, Oral, QAM  ketorolac (TORADOL) injection 15 mg, 15 mg, IntraVENous, Q6H PRN  ceFEPIme (MAXIPIME) 2,000 mg in sodium chloride 0.9 % 100 mL IVPB (mini-bag), 2,000 mg, IntraVENous, Q8H  morphine (PF) injection 2 mg, 2 mg, IntraVENous, Q4H PRN      Allergies   Allergen Reactions    Penicillins Anaphylaxis and Hives    Lisinopril Cough    Biaxin [Clarithromycin] Rash          REVIEW OF SYSTEMS:    CONSTITUTIONAL:  negative for fevers, chills, diaphoresis, activity change, appetite change, fatigue, night sweats and unexpected weight change.   EYES:  negative for blurred vision, eye discharge, visual disturbance and icterus  HEENT:  negative for hearing loss, tinnitus, ear drainage, sinus pressure, nasal congestion, epistaxis and snoring  RESPIRATORY:  No cough, shortness of breath, hemoptysis  CARDIOVASCULAR:  negative for chest pain, palpitations  GASTROINTESTINAL:  negative for nausea, vomiting, diarrhea, constipation, blood in stool and abdominal pain  GENITOURINARY:  negative for frequency, dysuria, urinary incontinence, decreased urine volume, and hematuria  HEMATOLOGIC/LYMPHATIC:  negative for easy bruising, bleeding and lymphadenopathy  ALLERGIC/IMMUNOLOGIC:  negative for recurrent infections, angioedema, anaphylaxis and drug reactions  ENDOCRINE:  negative for weight changes and diabetic symptoms including polyuria, polydipsia and polyphagia  MUSCULOSKELETAL:  negative for acute joint swelling,  associated with any fever  Lactic wnl   Extensive workup, including UA, tests for COVID, flu, BC, CT CAP and neck notable only for adnexal cyst   LP completed today and negative   She does not appear acutely ill  WBC was wnl 3/2024         -etiology of leukocytosis unclear, perhaps reactive.  Suspicion of occult infection is very low  -check procal   -could consider pelvic US to better characterize the adnexal mass, though she has no focal pelvic or vaginal complaints and hence likely low yield   -HIV screen  -follow trend of CBC and CMP  -JUAN    Will follow     Patient in agreement with this plan   D/w RN and Hospitalist            Luz Marina Kamara M.D.    Thank you for the opportunity to participate in the care of your patient.    Please do not hesitate to contact me:   [phone] office

## 2024-07-09 NOTE — PROGRESS NOTES
Hospital Medicine Progress Note      Date of Admission: 7/8/2024  Hospital Day: 2    Chief Admission Complaint:  generalized pain    Subjective:  reports ongoing headache; top of R leg feels numb;      Presenting Admission History: 49 y.o. female with past medical history significant for hypertension, back pain, chronic pain, tachycardia, obesity.  Initially presented to outside facility for pain in the chest, arms, legs, head.  She states the pain woke her up Odilon morning.  It is located in her chest, arms, legs.  She describes the pain as squeezing, pulling.  This was associated with feeling hot/flushed, nausea, fatigue.  She took Tylenol, but it did not provide any relief.  She also reports a headache since Sunday morning, denies photo/phonophobia.  At transferring facility, troponin was within expected range x2, WBC was 18.8 with bands.  She was also tachycardic.  She was given vanc, cefepime.  Denies association with dyspnea.  Denies recent sick contacts.  States she had an infected salivary gland about one month ago and was treated with abx from her PCP     Transferred to [facility] for further evaluation and treatment    Assessment/Plan:      Current Principal Problem:  Sepsis (HCC)     Leukocytosis, with bandemia.  Unclear etiology at this time.  At previous facility she did have sepsis markers, those resolved prior to transfer to [facility].  Vitals have been within normal range since arrival to [facility].  Continue vanc, cefepime.  Closely following and personally reviewed serial labs as documented in this note for evidence of vanc induced nephrotoxicity.  Blood cultures, NGTD.  UA was unremarkable.  Negative flu/covid.  CT neck was unremarkable.  ID consulted, appreciate recommendations.  LP ordered 7/9    Chest pain.  Possible etiologies include: GI, MSK, cardiac.  HS trop within expected range on arrival to [facility], it was normal x2 at previous hospital.  EKG with no acute ischemic changes.  Cardiology consulted,  appreciate recommendations.  Telemetry was personally reviewed and noted to show NSR.      Hypokalemia/magnesemia.  Monitor and replace as needed.      Essential hypertension.  Continue losartan, carvedilol, hctz.  Monitor     Obesity.  Body mass index is 38.02 kg/m².  Complicating assessment and treatment.  Placing patient at risk for multiple co-morbidities as well as early death and contributing to the patient's presentation.  Counseled on weight loss.  [] Class I - 30.0 to 34.9 kg/m2  [x] Class II - 35.0 to 39.9 kg/m2  [] Class III - ?40 kg/m2 (AKA severe/morbid/massive)   [] Super Obesity  - > 50% kg/m2  [] Super Super Obesity  - > 60% kg/m2      Physical Exam Performed:      General appearance:  No apparent distress  Respiratory:  Normal respiratory effort.  Room air   Cardiovascular:  Regular rate and rhythm.  Abdomen:  Soft, non-tender, non-distended.  Musculoskelatal:  No edema  Neurologic:  Non-focal  Psychiatric:  Alert and oriented    /87   Pulse 72   Temp 98.5 °F (36.9 °C) (Oral)   Resp 16   Ht 1.727 m (5' 7.99\")   Wt 113.9 kg (251 lb 1.6 oz)   LMP 03/01/2019   SpO2 97%   BMI 38.19 kg/m²     Diet: ADULT DIET; Regular    DVT Prophylaxis: [x]PPx LMWH  []SQ Heparin  []IPC/SCDs  []Eliquis  []Xarelto  []Coumadin  []Other -      Code status: Full Code    PT/OT Eval Status:   [x]NOT yet ordered  []Ordered and Pending   []Seen with Recommendations for:  []Home independently  []Home w/ assist  []HHC  []SNF  []Acute Rehab    Anticipated Discharge Day/Date:  pending further tests/results    Anticipated Discharge Location: [x]Home  []HHC  []SNF  []Acute Rehab  []LTAC  []Hospice  []Other -      Consults:      IP CONSULT TO PHARMACY  IP CONSULT TO INFECTIOUS DISEASES  IP CONSULT TO INTERVENTIONAL RADIOLOGY      This patient has a high likelihood of being discharged tomorrow and is appropriate for A1 Discharge Unit in AM pending clinical course overnight: []Yes

## 2024-07-09 NOTE — PLAN OF CARE
Problem: Discharge Planning  Goal: Discharge to home or other facility with appropriate resources  7/9/2024 1112 by Mitra Jackson, RN  Outcome: Progressing     Problem: Pain  Goal: Verbalizes/displays adequate comfort level or baseline comfort level  7/9/2024 1112 by Mitra Jackson, RN  Outcome: Progressing     Problem: ABCDS Injury Assessment  Goal: Absence of physical injury  7/9/2024 1112 by Mitra Jackson, RN  Outcome: Progressing

## 2024-07-09 NOTE — PLAN OF CARE
Problem: Discharge Planning  Goal: Discharge to home or other facility with appropriate resources  Outcome: Progressing  Flowsheets  Taken 7/8/2024 1626 by Kathryn Eller RN  Discharge to home or other facility with appropriate resources:   Identify barriers to discharge with patient and caregiver   Arrange for needed discharge resources and transportation as appropriate   Identify discharge learning needs (meds, wound care, etc)  Taken 7/8/2024 1309 by Mitra Jackson RN  Discharge to home or other facility with appropriate resources:   Identify barriers to discharge with patient and caregiver   Identify discharge learning needs (meds, wound care, etc)     Problem: Pain  Goal: Verbalizes/displays adequate comfort level or baseline comfort level  Outcome: Progressing     Problem: ABCDS Injury Assessment  Goal: Absence of physical injury  Outcome: Progressing

## 2024-07-09 NOTE — OR NURSING
Pt arrived for image guided lumbar puncture by Rajesh. Procedure explained including the risk and benefits of the procedure. All questions answered. Pt verbalizes understanding of the procedure and states no more questions. Consent confirmed. Vital signs stable. Labs, allergies, medications, and code status reviewed. No contraindications noted. Patient was placed prone on the IR table. Time out completed prior to procedure start.     Vital Signs  Vitals:    07/09/24 1322   BP: 125/85   Pulse: 81   Resp: 16   Temp:    SpO2: 97%    (vital signs in table format)      Allergies  Penicillins, Lisinopril, and Biaxin [clarithromycin] (allergies)    Labs  Lab Results   Component Value Date    INR 0.99 07/09/2024    PROTIME 13.3 07/09/2024     Lab Results   Component Value Date    CREATININE 0.6 07/09/2024    BUN 10 07/09/2024     07/09/2024    K 3.7 07/09/2024     07/09/2024    CO2 25 07/09/2024     Lab Results   Component Value Date    WBC 19.8 (H) 07/09/2024    HGB 13.5 07/09/2024    HCT 40.6 07/09/2024    MCV 87.0 07/09/2024     07/09/2024

## 2024-07-10 ENCOUNTER — APPOINTMENT (OUTPATIENT)
Dept: MRI IMAGING | Age: 49
End: 2024-07-10
Attending: STUDENT IN AN ORGANIZED HEALTH CARE EDUCATION/TRAINING PROGRAM
Payer: COMMERCIAL

## 2024-07-10 ENCOUNTER — APPOINTMENT (OUTPATIENT)
Dept: PHYSICAL THERAPY | Age: 49
End: 2024-07-10
Payer: COMMERCIAL

## 2024-07-10 LAB
ALBUMIN SERPL-MCNC: 3.8 G/DL (ref 3.4–5)
ALBUMIN/GLOB SERPL: 1.1 {RATIO} (ref 1.1–2.2)
ALP SERPL-CCNC: 62 U/L (ref 40–129)
ALT SERPL-CCNC: 25 U/L (ref 10–40)
ANA SER QL IA: NEGATIVE
ANION GAP SERPL CALCULATED.3IONS-SCNC: 13 MMOL/L (ref 3–16)
ANISOCYTOSIS BLD QL SMEAR: ABNORMAL
AST SERPL-CCNC: 31 U/L (ref 15–37)
BACTERIA CSF CULT: NORMAL
BASOPHILS # BLD: 0.2 K/UL (ref 0–0.2)
BASOPHILS NFR BLD: 1 %
BILIRUB SERPL-MCNC: <0.2 MG/DL (ref 0–1)
BUN SERPL-MCNC: 14 MG/DL (ref 7–20)
CALCIUM SERPL-MCNC: 8.6 MG/DL (ref 8.3–10.6)
CHLORIDE SERPL-SCNC: 96 MMOL/L (ref 99–110)
CO2 SERPL-SCNC: 23 MMOL/L (ref 21–32)
CREAT SERPL-MCNC: 0.6 MG/DL (ref 0.6–1.1)
DEPRECATED RDW RBC AUTO: 14.1 % (ref 12.4–15.4)
EOSINOPHIL # BLD: 0 K/UL (ref 0–0.6)
EOSINOPHIL NFR BLD: 0 %
GFR SERPLBLD CREATININE-BSD FMLA CKD-EPI: >90 ML/MIN/{1.73_M2}
GLUCOSE BLD-MCNC: 129 MG/DL (ref 70–99)
GLUCOSE SERPL-MCNC: 118 MG/DL (ref 70–99)
GRAM STN SPEC: NORMAL
HCT VFR BLD AUTO: 41.4 % (ref 36–48)
HGB BLD-MCNC: 13.8 G/DL (ref 12–16)
HIV 1+2 AB+HIV1 P24 AG SERPL QL IA: NORMAL
HIV 2 AB SERPL QL IA: NORMAL
HIV1 AB SERPL QL IA: NORMAL
HIV1 P24 AG SERPL QL IA: NORMAL
LYMPHOCYTES # BLD: 3.4 K/UL (ref 1–5.1)
LYMPHOCYTES NFR BLD: 15 %
MACROCYTES BLD QL SMEAR: ABNORMAL
MCH RBC QN AUTO: 29 PG (ref 26–34)
MCHC RBC AUTO-ENTMCNC: 33.4 G/DL (ref 31–36)
MCV RBC AUTO: 86.8 FL (ref 80–100)
METAMYELOCYTES NFR BLD MANUAL: 2 %
MONOCYTES # BLD: 0.6 K/UL (ref 0–1.3)
MONOCYTES NFR BLD: 3 %
MONONUC CELLS NFR BLD MANUAL: 2 %
MYELOCYTES NFR BLD MANUAL: 6 %
NEUTROPHILS # BLD: 15.2 K/UL (ref 1.7–7.7)
NEUTROPHILS NFR BLD: 50 %
NEUTS BAND NFR BLD MANUAL: 19 % (ref 0–7)
PATH INTERP BLD-IMP: NORMAL
PATH INTERP BLD-IMP: YES
PERFORMED ON: ABNORMAL
PLATELET # BLD AUTO: 236 K/UL (ref 135–450)
PLATELET BLD QL SMEAR: ADEQUATE
PMV BLD AUTO: 7.9 FL (ref 5–10.5)
POLYCHROMASIA BLD QL SMEAR: ABNORMAL
POTASSIUM SERPL-SCNC: 3.6 MMOL/L (ref 3.5–5.1)
POTASSIUM SERPL-SCNC: 3.6 MMOL/L (ref 3.5–5.1)
PROCALCITONIN SERPL IA-MCNC: 0.13 NG/ML (ref 0–0.15)
PROT SERPL-MCNC: 7.2 G/DL (ref 6.4–8.2)
RBC # BLD AUTO: 4.78 M/UL (ref 4–5.2)
SLIDE REVIEW: ABNORMAL
SODIUM SERPL-SCNC: 132 MMOL/L (ref 136–145)
URATE SERPL-MCNC: 4.8 MG/DL (ref 2.6–6)
VARIANT LYMPHS NFR BLD MANUAL: 2 % (ref 0–6)
WBC # BLD AUTO: 19.8 K/UL (ref 4–11)

## 2024-07-10 PROCEDURE — 36415 COLL VENOUS BLD VENIPUNCTURE: CPT

## 2024-07-10 PROCEDURE — 87390 HIV-1 AG IA: CPT

## 2024-07-10 PROCEDURE — 81338 MPL GENE COMMON VARIANTS: CPT

## 2024-07-10 PROCEDURE — 6360000002 HC RX W HCPCS

## 2024-07-10 PROCEDURE — 84145 PROCALCITONIN (PCT): CPT

## 2024-07-10 PROCEDURE — 2580000003 HC RX 258

## 2024-07-10 PROCEDURE — 85613 RUSSELL VIPER VENOM DILUTED: CPT

## 2024-07-10 PROCEDURE — 80053 COMPREHEN METABOLIC PANEL: CPT

## 2024-07-10 PROCEDURE — 85610 PROTHROMBIN TIME: CPT

## 2024-07-10 PROCEDURE — 6370000000 HC RX 637 (ALT 250 FOR IP): Performed by: INTERNAL MEDICINE

## 2024-07-10 PROCEDURE — 88184 FLOWCYTOMETRY/ TC 1 MARKER: CPT

## 2024-07-10 PROCEDURE — 6360000002 HC RX W HCPCS: Performed by: NURSE PRACTITIONER

## 2024-07-10 PROCEDURE — 85730 THROMBOPLASTIN TIME PARTIAL: CPT

## 2024-07-10 PROCEDURE — 85025 COMPLETE CBC W/AUTO DIFF WBC: CPT

## 2024-07-10 PROCEDURE — 6370000000 HC RX 637 (ALT 250 FOR IP): Performed by: STUDENT IN AN ORGANIZED HEALTH CARE EDUCATION/TRAINING PROGRAM

## 2024-07-10 PROCEDURE — 6370000000 HC RX 637 (ALT 250 FOR IP)

## 2024-07-10 PROCEDURE — 86701 HIV-1ANTIBODY: CPT

## 2024-07-10 PROCEDURE — 81270 JAK2 GENE: CPT

## 2024-07-10 PROCEDURE — 84550 ASSAY OF BLOOD/URIC ACID: CPT

## 2024-07-10 PROCEDURE — 86431 RHEUMATOID FACTOR QUANT: CPT

## 2024-07-10 PROCEDURE — 1200000000 HC SEMI PRIVATE

## 2024-07-10 PROCEDURE — 72148 MRI LUMBAR SPINE W/O DYE: CPT

## 2024-07-10 PROCEDURE — 99232 SBSQ HOSP IP/OBS MODERATE 35: CPT | Performed by: INTERNAL MEDICINE

## 2024-07-10 PROCEDURE — 86038 ANTINUCLEAR ANTIBODIES: CPT

## 2024-07-10 PROCEDURE — 86702 HIV-2 ANTIBODY: CPT

## 2024-07-10 RX ORDER — LIDOCAINE 4 G/G
1 PATCH TOPICAL DAILY
Status: DISCONTINUED | OUTPATIENT
Start: 2024-07-10 | End: 2024-07-12

## 2024-07-10 RX ORDER — METHOCARBAMOL 500 MG/1
750 TABLET, FILM COATED ORAL 4 TIMES DAILY
Status: DISCONTINUED | OUTPATIENT
Start: 2024-07-10 | End: 2024-07-12 | Stop reason: HOSPADM

## 2024-07-10 RX ORDER — KETOROLAC TROMETHAMINE 30 MG/ML
15 INJECTION, SOLUTION INTRAMUSCULAR; INTRAVENOUS EVERY 6 HOURS
Status: COMPLETED | OUTPATIENT
Start: 2024-07-10 | End: 2024-07-11

## 2024-07-10 RX ORDER — OXYCODONE HYDROCHLORIDE 5 MG/1
5 TABLET ORAL EVERY 4 HOURS PRN
Status: DISCONTINUED | OUTPATIENT
Start: 2024-07-10 | End: 2024-07-11

## 2024-07-10 RX ORDER — LIDOCAINE 4 G/G
1 PATCH TOPICAL DAILY
Status: DISCONTINUED | OUTPATIENT
Start: 2024-07-10 | End: 2024-07-10

## 2024-07-10 RX ORDER — GABAPENTIN 100 MG/1
100 CAPSULE ORAL 2 TIMES DAILY
Status: DISCONTINUED | OUTPATIENT
Start: 2024-07-10 | End: 2024-07-11

## 2024-07-10 RX ORDER — OXYCODONE HYDROCHLORIDE 5 MG/1
10 TABLET ORAL EVERY 4 HOURS PRN
Status: DISCONTINUED | OUTPATIENT
Start: 2024-07-10 | End: 2024-07-11

## 2024-07-10 RX ADMIN — MORPHINE SULFATE 2 MG: 2 INJECTION, SOLUTION INTRAMUSCULAR; INTRAVENOUS at 04:27

## 2024-07-10 RX ADMIN — OXYCODONE 10 MG: 5 TABLET ORAL at 10:27

## 2024-07-10 RX ADMIN — ACETAMINOPHEN 650 MG: 325 TABLET ORAL at 05:53

## 2024-07-10 RX ADMIN — ENOXAPARIN SODIUM 30 MG: 100 INJECTION SUBCUTANEOUS at 14:24

## 2024-07-10 RX ADMIN — METHOCARBAMOL 750 MG: 500 TABLET ORAL at 17:05

## 2024-07-10 RX ADMIN — KETOROLAC TROMETHAMINE 15 MG: 30 INJECTION INTRAMUSCULAR; INTRAVENOUS at 01:43

## 2024-07-10 RX ADMIN — HYDROMORPHONE HYDROCHLORIDE 0.25 MG: 1 INJECTION, SOLUTION INTRAMUSCULAR; INTRAVENOUS; SUBCUTANEOUS at 02:56

## 2024-07-10 RX ADMIN — CARVEDILOL 6.25 MG: 6.25 TABLET, FILM COATED ORAL at 20:57

## 2024-07-10 RX ADMIN — SODIUM CHLORIDE, PRESERVATIVE FREE 10 ML: 5 INJECTION INTRAVENOUS at 20:58

## 2024-07-10 RX ADMIN — KETOROLAC TROMETHAMINE 15 MG: 30 INJECTION INTRAMUSCULAR; INTRAVENOUS at 08:27

## 2024-07-10 RX ADMIN — METHOCARBAMOL 750 MG: 500 TABLET ORAL at 12:50

## 2024-07-10 RX ADMIN — CARVEDILOL 6.25 MG: 6.25 TABLET, FILM COATED ORAL at 09:09

## 2024-07-10 RX ADMIN — SODIUM CHLORIDE, PRESERVATIVE FREE 10 ML: 5 INJECTION INTRAVENOUS at 08:29

## 2024-07-10 RX ADMIN — GABAPENTIN 100 MG: 100 CAPSULE ORAL at 20:57

## 2024-07-10 RX ADMIN — OXYCODONE 5 MG: 5 TABLET ORAL at 23:40

## 2024-07-10 RX ADMIN — KETOROLAC TROMETHAMINE 15 MG: 30 INJECTION INTRAMUSCULAR; INTRAVENOUS at 20:57

## 2024-07-10 RX ADMIN — METHOCARBAMOL 750 MG: 500 TABLET ORAL at 09:09

## 2024-07-10 RX ADMIN — METHOCARBAMOL 750 MG: 500 TABLET ORAL at 20:57

## 2024-07-10 RX ADMIN — OXYCODONE 10 MG: 5 TABLET ORAL at 17:43

## 2024-07-10 RX ADMIN — LOSARTAN POTASSIUM 50 MG: 25 TABLET, FILM COATED ORAL at 09:09

## 2024-07-10 RX ADMIN — OXYCODONE 10 MG: 5 TABLET ORAL at 05:53

## 2024-07-10 RX ADMIN — KETOROLAC TROMETHAMINE 15 MG: 30 INJECTION INTRAMUSCULAR; INTRAVENOUS at 14:24

## 2024-07-10 RX ADMIN — HYDROCHLOROTHIAZIDE 12.5 MG: 12.5 CAPSULE ORAL at 09:09

## 2024-07-10 ASSESSMENT — PAIN DESCRIPTION - DESCRIPTORS
DESCRIPTORS: ACHING;SPASM
DESCRIPTORS: ACHING;DISCOMFORT;SHOOTING
DESCRIPTORS: ACHING;SHOOTING
DESCRIPTORS: THROBBING
DESCRIPTORS: ACHING;SHOOTING
DESCRIPTORS: THROBBING

## 2024-07-10 ASSESSMENT — PAIN DESCRIPTION - LOCATION
LOCATION: GENERALIZED;BACK;LEG;NECK
LOCATION: BACK;LEG
LOCATION: LEG
LOCATION: BACK;LEG
LOCATION: LEG
LOCATION: LEG
LOCATION: KNEE
LOCATION: BACK;LEG

## 2024-07-10 ASSESSMENT — ENCOUNTER SYMPTOMS
TROUBLE SWALLOWING: 0
NAUSEA: 0
SINUS PAIN: 0
SORE THROAT: 0
PHOTOPHOBIA: 0
ABDOMINAL PAIN: 0
ABDOMINAL DISTENTION: 0
SINUS PRESSURE: 0
SHORTNESS OF BREATH: 0
WHEEZING: 0
COUGH: 0
VOMITING: 0
CHEST TIGHTNESS: 0
DIARRHEA: 0
CONSTIPATION: 0

## 2024-07-10 ASSESSMENT — PAIN SCALES - GENERAL
PAINLEVEL_OUTOF10: 7
PAINLEVEL_OUTOF10: 8
PAINLEVEL_OUTOF10: 7
PAINLEVEL_OUTOF10: 5
PAINLEVEL_OUTOF10: 6
PAINLEVEL_OUTOF10: 7
PAINLEVEL_OUTOF10: 8
PAINLEVEL_OUTOF10: 7
PAINLEVEL_OUTOF10: 6

## 2024-07-10 ASSESSMENT — PAIN DESCRIPTION - ORIENTATION
ORIENTATION: RIGHT;LEFT
ORIENTATION: LEFT

## 2024-07-10 ASSESSMENT — PAIN - FUNCTIONAL ASSESSMENT
PAIN_FUNCTIONAL_ASSESSMENT: PREVENTS OR INTERFERES SOME ACTIVE ACTIVITIES AND ADLS

## 2024-07-10 NOTE — PLAN OF CARE
Problem: Discharge Planning  Goal: Discharge to home or other facility with appropriate resources  Outcome: Progressing     Problem: ABCDS Injury Assessment  Goal: Absence of physical injury  Outcome: Progressing     Problem: Pain  Goal: Verbalizes/displays adequate comfort level or baseline comfort level  Outcome: Not Progressing

## 2024-07-10 NOTE — CARE COORDINATION
Chart review day 2- from home, IPTA. LP yesterday. ID consult- IV abx stopped; MRI lumbar today. Anticipate dc home no needs. Cm will follow for DCP needs.

## 2024-07-10 NOTE — PROGRESS NOTES
Infectious Disease Follow up Notes    CC :  leukocytosis      Antibiotics:  None      Admit Date:   7/8/2024  Hospital Day: 3    Subjective:   Tm / Tc 99.4  She is on RA   Developed severe low back pain last evening, radiation down both legs.  No paresthesias or weakness   Still with tightness across the chest.    No URI/GI//joint complaints.      Objective:     Patient Vitals for the past 8 hrs:   BP Temp Temp src Pulse Resp SpO2   07/10/24 1105 134/77 99.4 °F (37.4 °C) Oral 80 18 92 %   07/10/24 0826 (!) 157/86 98.5 °F (36.9 °C) Oral 91 18 91 %   07/10/24 0422 (!) 158/95 98 °F (36.7 °C) Oral 82 18 92 %       EXAM:  General:   looks uncomfortable   Facial flushing     HEENT:   NCAT, PERRL, sclera anicteric   MMM, no thrush    NECK:   Supple      LUNGS:  CTA bradford without W/R/R   CV:   RRR  ABD: Soft, flat, NT     EXT: No focal rash   SKIN: No stigmata of emboli       LINE:     PIV in place       Scheduled Meds:   methocarbamol  750 mg Oral 4x Daily    sodium chloride flush  5-40 mL IntraVENous 2 times per day    sodium chloride flush  5-40 mL IntraVENous 2 times per day    enoxaparin  30 mg SubCUTAneous Q12H    carvedilol  6.25 mg Oral BID    hydroCHLOROthiazide  12.5 mg Oral QAM    losartan  50 mg Oral QAM       Continuous Infusions:   sodium chloride      sodium chloride            Data Review:    Lab Results   Component Value Date    WBC 19.8 (H) 07/10/2024    HGB 13.8 07/10/2024    HCT 41.4 07/10/2024    MCV 86.8 07/10/2024     07/10/2024     Lab Results   Component Value Date    CREATININE 0.6 07/10/2024    BUN 14 07/10/2024     (L) 07/10/2024    K 3.6 07/10/2024    K 3.6 07/10/2024    CL 96 (L) 07/10/2024    CO2 23 07/10/2024       Hepatic Function Panel:   Lab Results   Component Value Date/Time    ALKPHOS 62 07/10/2024 06:22 AM    ALT 25 07/10/2024 06:22 AM    AST 31 07/10/2024 06:22 AM    BILITOT <0.2 07/10/2024

## 2024-07-10 NOTE — PROGRESS NOTES
Pt assessment completed and charted. VSS. Pt a/o x4. Pt has been complaining of generalized pain throughout night. Heat packs and ice packs applied periodically. Prn pain medication given as followed.    1915- 15mg Toradol given    2112- 2mg Morphine given    2310- 650 Tylenol given    143- 15mg Toradol given    256- One time dose 0.25 dilaudid given    427- 2mg Morphine given    RN paged attending at 530 \" Pt originally admitted for chest pain. R/o cardiac factors, thinking musculoskeletal. ID was consulted for increased WBC and was on IV abx w/ no change over several days. Pt has been complaining about extreme neck, back, leg pain. Pt describes pain at shooting pain. I have given pt Toradol x2, Morphine x2, Tylenol, and one time dose of dilaudid. Pt is crying and in absolute pain right now. Can I get something ordered? Thanks    553-10mg of oxy and tylenol given

## 2024-07-10 NOTE — CONSULTS
[facility]    Consult Note      Requesting Physician:  Luz Marina Whittaker CNP     CHIEF COMPLAINT:  persistent leukocytosis       HISTORY OF PRESENT ILLNESS:    Pam Guido is a 49 year old female with PMHX of chronic pain syndrome, fibroid uterus, HTN who presented to [facility] ED on 07/07 with concerns for substernal chest pain and body aches. Notes that AM with pain throughout her entire body in addition to substernal chest pain. She took 1 baby aspirin prior to arrival. Pain through her whole body.    She reports chronic shoulder pain but the event that led her to the ED presentation was acute chest pain. This has since resolved and now she is having left leg pain.     Hem/onc consulted for evaluation of leukocytosis. No night sweats, fever, chills, or weight loss.     Ms. Guido  is a 49 y.o. female we are seeing in consultation for No diagnosis found.       Past Medical History:  Past Medical History:   Diagnosis Date    Anemia     Back pain     Chest pain 6/2014    stress myoview normal    Chronic pain syndrome 2017    Dr. Garcia pain management    Fibroid uterus     fibroid uterus ad abnormal uterine bleeding with uterine fibroids    GERD (gastroesophageal reflux disease)     Hypertension     Menopausal symptoms     Overactive bladder     Pelvic pain     Right lateral epicondylitis     Rotator cuff syndrome of right shoulder     Stress incontinence     Tachycardia     on coreg       Past Surgical History:  Past Surgical History:   Procedure Laterality Date    DILATION AND CURETTAGE      DILATION AND CURETTAGE OF UTERUS N/A 4/16/2019    DIAGNOSTIC LAPAROSCOPY, DILATATION AND CURETTAGE, HYSTEROSCOPY, WITH MYOSURE AND NOVASURE ENDOMETRIAL ABLATION performed by Denise Joya DO at [facility] OR    HYSTERECTOMY (CERVIX STATUS UNKNOWN)      HYSTERECTOMY, VAGINAL N/A 6/4/2020     85.3 84.9   MCH 29.0 29.0 28.8 28.5 28.7   MCHC 33.4 33.3 32.4 33.4 33.8   RDW 14.1 14.3 14.7 14.4 14.5    236 253 233 276        LDH:No results for input(s): \"LDH\" in the last 720 hours.      Radiology Review:  IR LUMBAR PUNCTURE FOR DIAGNOSIS  Narrative: EXAMINATION:  FLUOROSCOPIC GUIDED LUMBAR PUNCTURE 7/9/2024 1:38 pm    HISTORY:  ORDERING SYSTEM PROVIDED HISTORY: leukocytosis, headache. negative workup so  far  TECHNOLOGIST PROVIDED HISTORY:  Reason for exam:->leukocytosis, headache. negative workup so far  Reason for Exam: leukocytosis, headache. negative workup so far    FLUOROSCOPY DOSE AND TYPE:    Radiation Exposure Index: Kerma mGy, 40.66 mGy    PROCEDURE:  :  Burke Mena MD    Informed consent was obtained after the risks and benefits of the procedure  were discussed with the patient and all questions were answered fully.  Nursery protocol was observed and a standard timeout was performed.    The patient was positioned prone and the back was prepped and draped in the  normal sterile fashion. 1% lidocaine was used for local anesthesia. The  subarachnoid space was accessed with a 20-gauge 3.5 \"spinal needle at the  L2/L3 level. Free flow of clear CSF was noted. Approximately 10 ml of CSF was  removed and sent for analysis. The stylet was reinserted, spinal needle was  removed and brief pressure was applied at the puncture site. There were no  immediate complications and the patient tolerated the procedure well.  Impression: Successful fluoroscopic-guided lumbar puncture.  CT SOFT TISSUE NECK W CONTRAST  Narrative: EXAMINATION:  CT OF THE NECK SOFT TISSUE WITH CONTRAST  7/9/2024    TECHNIQUE:  CT of the neck was performed with the administration of intravenous contrast.  Multiplanar reformatted images are provided for review. Automated exposure  control, iterative reconstruction, and/or weight based adjustment of the  mA/kV was utilized to reduce the radiation dose to as low as     Sepsis (HCC)       IMPRESSION/RECOMMENDATIONS:    Leukocytosis   - unclear etiology; no obvious infectious source   - recent sialodenitis about 1 month ago and completed abx via PCP with resolution of symptoms   - labs unremarkable on last check 03/2024   - started on vanc + cefepime on admission   - peripheral smear indicates monocytosis possibly due to recovering infection   - ESR elevated at 27 on 07/09  - CRP elevated at 128.3 mg/L on 07/09/2024   - WBC 24.5 on admission 07/07/2024, remaining at 19.8 despite abx  - other cell lines unremarkable.   - CT CAP 07/07/2024 showed   - scarring suspected in the apices, scattered noncalcified pulmonary nodules measuring 6mm in size or less    - stable right adrenal nodule, likely benign given stability since 2020   - simple appearing cyst left adnexa 4.2cm  - CT Soft tissue neck 07/09 unremarkable   - infectious workup:  - HIV nonreactive   - procalcitonin unremarkable   - UA unremarkable   - flu swab negative   - blood cultures NGTD   - Lumbar puncture 07/09/2024 with cultures pending   - Workup:   - flow cytometry to peripheral blood   - BCR/ABL   - JAK2/MPL/CALR   - uric acid    - rheumatoid factor   - lupus anticoagulant    - diff shows 2% unid.mononu and 2% metamyelocytes  - Etiologies to consider include inflammatory process (autoimmune including collagen vascular disease, sarcoidosis), leukemia (possible CML), MPD, infectious source.   - Would recommend continued trending of CBC and bone marrow biopsy for further evaluation.   - Discussed the above at length with patient and  on phone. Patient would like to hold off on bone marrow biopsy for now given her pain level and discomfort from recent LP. Answered all questions at this time. Will continue trending labs and monitor for results of workup ordered today.     Chronic Pain Syndrome  - present prior to admission   - diagnosed with right biceps tendinitis 06/06/2024 and shoulder impingement   - on chart

## 2024-07-10 NOTE — PROGRESS NOTES
Hospital Medicine Progress Note      Date of Admission: 7/8/2024  Hospital Day: 3    Chief Admission Complaint:  generalized pain    Subjective: reports back pain, which is causing decreased appetite and loss of sleep    Presenting Admission History: 49 y.o. female with past medical history significant for hypertension, back pain, chronic pain, tachycardia, obesity.  Initially presented to outside facility for pain in the chest, arms, legs, head.  She states the pain woke her up Odilon morning.  It is located in her chest, arms, legs.  She describes the pain as squeezing, pulling.  This was associated with feeling hot/flushed, nausea, fatigue.  She took Tylenol, but it did not provide any relief.  She also reports a headache since Sunday morning, denies photo/phonophobia.  At transferring facility, troponin was within expected range x2, WBC was 18.8 with bands.  She was also tachycardic.  She was given vanc, cefepime.  Denies association with dyspnea.  Denies recent sick contacts.  States she had an infected salivary gland about one month ago and was treated with abx from her PCP  Transferred to [facility] for further evaluation and treatment    Assessment/Plan:      Current Principal Problem:  Sepsis (HCC)     Leukocytosis, with bandemia.  Unclear etiology at this time.  At previous facility she did have sepsis markers, those resolved prior to transfer to [facility].  Vitals have been within normal range since arrival to [facility].  Continue vanc, cefepime.  Closely following and personally reviewed serial labs as documented in this note for evidence of vanc induced nephrotoxicity.  Blood cultures, NGTD.  UA was unremarkable.  Negative flu/covid.  CT neck was unremarkable.  ID consulted, appreciate recommendations.  LP 7/9 was unremarkable.    Back pain.  Increased overnight and required multiple PRN medications.  MRI lumbar ordered, not yet performed.  Scheduled toradol, methocarbamol, lidocaine patch.  Added low-dose  07/08/24  1425   LABA1C 5.2     Recent Labs     07/10/24  0622   AST 31   ALT 25   BILITOT <0.2   ALKPHOS 62     Recent Labs     07/09/24  1012   INR 0.99       Urine Cultures:   Lab Results   Component Value Date/Time    LABURIN  06/15/2020 05:24 PM     <10,000 CFU/ml mixed skin/urogenital skip. No further workup     Blood Cultures:   Lab Results   Component Value Date/Time    BC  07/07/2024 08:19 AM     No Growth to date.  Any change in status will be called.     Lab Results   Component Value Date/Time    BLOODCULT2  07/07/2024 08:23 AM     No Growth to date.  Any change in status will be called.     Organism: No results found for: \"ORG\"      Luz Marina Whittaker, APRN - CNP

## 2024-07-11 ENCOUNTER — APPOINTMENT (OUTPATIENT)
Dept: CT IMAGING | Age: 49
End: 2024-07-11
Attending: INTERNAL MEDICINE
Payer: COMMERCIAL

## 2024-07-11 LAB
ANION GAP SERPL CALCULATED.3IONS-SCNC: 13 MMOL/L (ref 3–16)
BASOPHILS # BLD: 0.2 K/UL (ref 0–0.2)
BASOPHILS NFR BLD: 1 %
BILIRUB DIRECT SERPL-MCNC: <0.2 MG/DL (ref 0–0.3)
BILIRUB INDIRECT SERPL-MCNC: NORMAL MG/DL (ref 0–1)
BILIRUB SERPL-MCNC: <0.2 MG/DL (ref 0–1)
BUN SERPL-MCNC: 13 MG/DL (ref 7–20)
CALCIUM SERPL-MCNC: 8.8 MG/DL (ref 8.3–10.6)
CHLORIDE SERPL-SCNC: 97 MMOL/L (ref 99–110)
CO2 SERPL-SCNC: 24 MMOL/L (ref 21–32)
CREAT SERPL-MCNC: 0.6 MG/DL (ref 0.6–1.1)
DEPRECATED RDW RBC AUTO: 14.5 % (ref 12.4–15.4)
EOSINOPHIL # BLD: 0.2 K/UL (ref 0–0.6)
EOSINOPHIL NFR BLD: 1 %
FIBRINOGEN PPP-MCNC: 729 MG/DL (ref 227–534)
GFR SERPLBLD CREATININE-BSD FMLA CKD-EPI: >90 ML/MIN/{1.73_M2}
GLUCOSE BLD-MCNC: 101 MG/DL (ref 70–99)
GLUCOSE BLD-MCNC: 105 MG/DL (ref 70–99)
GLUCOSE BLD-MCNC: 86 MG/DL (ref 70–99)
GLUCOSE BLD-MCNC: 97 MG/DL (ref 70–99)
GLUCOSE SERPL-MCNC: 102 MG/DL (ref 70–99)
HCT VFR BLD AUTO: 41.7 % (ref 36–48)
HGB BLD-MCNC: 14.1 G/DL (ref 12–16)
LDH SERPL L TO P-CCNC: 873 U/L (ref 100–190)
LYMPHOCYTES # BLD: 5.7 K/UL (ref 1–5.1)
LYMPHOCYTES NFR BLD: 23 %
MCH RBC QN AUTO: 29.3 PG (ref 26–34)
MCHC RBC AUTO-ENTMCNC: 33.8 G/DL (ref 31–36)
MCV RBC AUTO: 86.5 FL (ref 80–100)
METAMYELOCYTES NFR BLD MANUAL: 3 %
MONOCYTES # BLD: 1.3 K/UL (ref 0–1.3)
MONOCYTES NFR BLD: 6 %
MYELOCYTES NFR BLD MANUAL: 8 %
NEUTROPHILS # BLD: 14.5 K/UL (ref 1.7–7.7)
NEUTROPHILS NFR BLD: 22 %
NEUTS BAND NFR BLD MANUAL: 33 % (ref 0–7)
NRBC BLD-RTO: 1 /100 WBC
PATH INTERP BLD-IMP: NORMAL
PATH INTERP BLD-IMP: YES
PERFORMED ON: ABNORMAL
PERFORMED ON: ABNORMAL
PERFORMED ON: NORMAL
PERFORMED ON: NORMAL
PLATELET # BLD AUTO: 231 K/UL (ref 135–450)
PLATELET BLD QL SMEAR: ADEQUATE
PMV BLD AUTO: 7.9 FL (ref 5–10.5)
POTASSIUM SERPL-SCNC: 3.9 MMOL/L (ref 3.5–5.1)
RBC # BLD AUTO: 4.82 M/UL (ref 4–5.2)
RBC MORPH BLD: NORMAL
RHEUMATOID FACT SER IA-ACNC: 17 IU/ML
SLIDE REVIEW: ABNORMAL
SODIUM SERPL-SCNC: 134 MMOL/L (ref 136–145)
URATE SERPL-MCNC: 4.5 MG/DL (ref 2.6–6)
VARIANT LYMPHS NFR BLD MANUAL: 3 % (ref 0–6)
WBC # BLD AUTO: 22 K/UL (ref 4–11)

## 2024-07-11 PROCEDURE — 85025 COMPLETE CBC W/AUTO DIFF WBC: CPT

## 2024-07-11 PROCEDURE — 99152 MOD SED SAME PHYS/QHP 5/>YRS: CPT

## 2024-07-11 PROCEDURE — 94761 N-INVAS EAR/PLS OXIMETRY MLT: CPT

## 2024-07-11 PROCEDURE — 88313 SPECIAL STAINS GROUP 2: CPT

## 2024-07-11 PROCEDURE — 6370000000 HC RX 637 (ALT 250 FOR IP): Performed by: INTERNAL MEDICINE

## 2024-07-11 PROCEDURE — 83010 ASSAY OF HAPTOGLOBIN QUANT: CPT

## 2024-07-11 PROCEDURE — 88342 IMHCHEM/IMCYTCHM 1ST ANTB: CPT

## 2024-07-11 PROCEDURE — 82248 BILIRUBIN DIRECT: CPT

## 2024-07-11 PROCEDURE — 6370000000 HC RX 637 (ALT 250 FOR IP)

## 2024-07-11 PROCEDURE — 1200000000 HC SEMI PRIVATE

## 2024-07-11 PROCEDURE — 82247 BILIRUBIN TOTAL: CPT

## 2024-07-11 PROCEDURE — 36415 COLL VENOUS BLD VENIPUNCTURE: CPT

## 2024-07-11 PROCEDURE — 83615 LACTATE (LD) (LDH) ENZYME: CPT

## 2024-07-11 PROCEDURE — 81219 CALR GENE COM VARIANTS: CPT

## 2024-07-11 PROCEDURE — 2500000003 HC RX 250 WO HCPCS: Performed by: RADIOLOGY

## 2024-07-11 PROCEDURE — 80048 BASIC METABOLIC PNL TOTAL CA: CPT

## 2024-07-11 PROCEDURE — 2700000000 HC OXYGEN THERAPY PER DAY

## 2024-07-11 PROCEDURE — 6360000002 HC RX W HCPCS

## 2024-07-11 PROCEDURE — 88341 IMHCHEM/IMCYTCHM EA ADD ANTB: CPT

## 2024-07-11 PROCEDURE — 6360000002 HC RX W HCPCS: Performed by: RADIOLOGY

## 2024-07-11 PROCEDURE — 88305 TISSUE EXAM BY PATHOLOGIST: CPT

## 2024-07-11 PROCEDURE — 2580000003 HC RX 258

## 2024-07-11 PROCEDURE — 85384 FIBRINOGEN ACTIVITY: CPT

## 2024-07-11 PROCEDURE — 88311 DECALCIFY TISSUE: CPT

## 2024-07-11 PROCEDURE — 84550 ASSAY OF BLOOD/URIC ACID: CPT

## 2024-07-11 RX ORDER — FENTANYL CITRATE 50 UG/ML
INJECTION, SOLUTION INTRAMUSCULAR; INTRAVENOUS PRN
Status: COMPLETED | OUTPATIENT
Start: 2024-07-11 | End: 2024-07-11

## 2024-07-11 RX ORDER — FLUCONAZOLE 100 MG/1
200 TABLET ORAL DAILY
Status: DISCONTINUED | OUTPATIENT
Start: 2024-07-11 | End: 2024-07-12 | Stop reason: HOSPADM

## 2024-07-11 RX ORDER — OXYCODONE HYDROCHLORIDE 5 MG/1
5 TABLET ORAL EVERY 4 HOURS PRN
Status: DISCONTINUED | OUTPATIENT
Start: 2024-07-11 | End: 2024-07-12 | Stop reason: HOSPADM

## 2024-07-11 RX ORDER — OXYCODONE HCL 10 MG/1
10 TABLET, FILM COATED, EXTENDED RELEASE ORAL EVERY 12 HOURS SCHEDULED
Status: DISCONTINUED | OUTPATIENT
Start: 2024-07-11 | End: 2024-07-12 | Stop reason: HOSPADM

## 2024-07-11 RX ORDER — ACYCLOVIR 200 MG/1
400 CAPSULE ORAL 2 TIMES DAILY
Status: DISCONTINUED | OUTPATIENT
Start: 2024-07-11 | End: 2024-07-12 | Stop reason: HOSPADM

## 2024-07-11 RX ORDER — MIDAZOLAM HYDROCHLORIDE 5 MG/ML
INJECTION, SOLUTION INTRAMUSCULAR; INTRAVENOUS PRN
Status: COMPLETED | OUTPATIENT
Start: 2024-07-11 | End: 2024-07-11

## 2024-07-11 RX ORDER — LIDOCAINE HYDROCHLORIDE 10 MG/ML
INJECTION, SOLUTION EPIDURAL; INFILTRATION; INTRACAUDAL; PERINEURAL PRN
Status: COMPLETED | OUTPATIENT
Start: 2024-07-11 | End: 2024-07-11

## 2024-07-11 RX ORDER — SULFAMETHOXAZOLE AND TRIMETHOPRIM 800; 160 MG/1; MG/1
1 TABLET ORAL
Status: DISCONTINUED | OUTPATIENT
Start: 2024-07-11 | End: 2024-07-12 | Stop reason: HOSPADM

## 2024-07-11 RX ORDER — GABAPENTIN 100 MG/1
100 CAPSULE ORAL 3 TIMES DAILY
Status: DISCONTINUED | OUTPATIENT
Start: 2024-07-11 | End: 2024-07-12 | Stop reason: HOSPADM

## 2024-07-11 RX ORDER — LEVOFLOXACIN 500 MG/1
500 TABLET, FILM COATED ORAL DAILY
Status: DISCONTINUED | OUTPATIENT
Start: 2024-07-11 | End: 2024-07-12 | Stop reason: HOSPADM

## 2024-07-11 RX ADMIN — CARVEDILOL 6.25 MG: 6.25 TABLET, FILM COATED ORAL at 08:23

## 2024-07-11 RX ADMIN — MIDAZOLAM HYDROCHLORIDE 1 MG: 5 INJECTION, SOLUTION INTRAMUSCULAR; INTRAVENOUS at 10:18

## 2024-07-11 RX ADMIN — METHOCARBAMOL 750 MG: 500 TABLET ORAL at 20:36

## 2024-07-11 RX ADMIN — OXYCODONE HYDROCHLORIDE 10 MG: 10 TABLET, FILM COATED, EXTENDED RELEASE ORAL at 20:36

## 2024-07-11 RX ADMIN — FENTANYL CITRATE 25 MCG: 50 INJECTION, SOLUTION INTRAMUSCULAR; INTRAVENOUS at 10:31

## 2024-07-11 RX ADMIN — METHOCARBAMOL 750 MG: 500 TABLET ORAL at 08:23

## 2024-07-11 RX ADMIN — MIDAZOLAM HYDROCHLORIDE 0.5 MG: 5 INJECTION, SOLUTION INTRAMUSCULAR; INTRAVENOUS at 10:31

## 2024-07-11 RX ADMIN — HYDROCHLOROTHIAZIDE 12.5 MG: 12.5 CAPSULE ORAL at 08:23

## 2024-07-11 RX ADMIN — GABAPENTIN 100 MG: 100 CAPSULE ORAL at 08:23

## 2024-07-11 RX ADMIN — GABAPENTIN 100 MG: 100 CAPSULE ORAL at 20:35

## 2024-07-11 RX ADMIN — OXYCODONE 5 MG: 5 TABLET ORAL at 16:53

## 2024-07-11 RX ADMIN — MIDAZOLAM HYDROCHLORIDE 0.5 MG: 5 INJECTION, SOLUTION INTRAMUSCULAR; INTRAVENOUS at 10:43

## 2024-07-11 RX ADMIN — KETOROLAC TROMETHAMINE 15 MG: 30 INJECTION INTRAMUSCULAR; INTRAVENOUS at 08:24

## 2024-07-11 RX ADMIN — LEVOFLOXACIN 500 MG: 500 TABLET, FILM COATED ORAL at 18:09

## 2024-07-11 RX ADMIN — FENTANYL CITRATE 25 MCG: 50 INJECTION, SOLUTION INTRAMUSCULAR; INTRAVENOUS at 10:29

## 2024-07-11 RX ADMIN — FENTANYL CITRATE 50 MCG: 50 INJECTION, SOLUTION INTRAMUSCULAR; INTRAVENOUS at 10:18

## 2024-07-11 RX ADMIN — MIDAZOLAM HYDROCHLORIDE 0.5 MG: 5 INJECTION, SOLUTION INTRAMUSCULAR; INTRAVENOUS at 10:29

## 2024-07-11 RX ADMIN — SODIUM CHLORIDE, PRESERVATIVE FREE 10 ML: 5 INJECTION INTRAVENOUS at 20:36

## 2024-07-11 RX ADMIN — METHOCARBAMOL 750 MG: 500 TABLET ORAL at 16:54

## 2024-07-11 RX ADMIN — METHOCARBAMOL 750 MG: 500 TABLET ORAL at 14:19

## 2024-07-11 RX ADMIN — FLUCONAZOLE 200 MG: 100 TABLET ORAL at 18:09

## 2024-07-11 RX ADMIN — OXYCODONE HYDROCHLORIDE 10 MG: 10 TABLET, FILM COATED, EXTENDED RELEASE ORAL at 12:12

## 2024-07-11 RX ADMIN — KETOROLAC TROMETHAMINE 15 MG: 30 INJECTION INTRAMUSCULAR; INTRAVENOUS at 01:46

## 2024-07-11 RX ADMIN — LOSARTAN POTASSIUM 50 MG: 25 TABLET, FILM COATED ORAL at 08:23

## 2024-07-11 RX ADMIN — ACETAMINOPHEN 650 MG: 325 TABLET ORAL at 18:28

## 2024-07-11 RX ADMIN — CARVEDILOL 6.25 MG: 6.25 TABLET, FILM COATED ORAL at 20:35

## 2024-07-11 RX ADMIN — SODIUM CHLORIDE, PRESERVATIVE FREE 10 ML: 5 INJECTION INTRAVENOUS at 08:24

## 2024-07-11 RX ADMIN — ACETAMINOPHEN 650 MG: 325 TABLET ORAL at 01:49

## 2024-07-11 RX ADMIN — LIDOCAINE HYDROCHLORIDE 20 ML: 10 INJECTION, SOLUTION EPIDURAL; INFILTRATION; INTRACAUDAL; PERINEURAL at 10:26

## 2024-07-11 RX ADMIN — FENTANYL CITRATE 25 MCG: 50 INJECTION, SOLUTION INTRAMUSCULAR; INTRAVENOUS at 10:43

## 2024-07-11 RX ADMIN — ACYCLOVIR 400 MG: 200 CAPSULE ORAL at 20:36

## 2024-07-11 ASSESSMENT — PAIN SCALES - GENERAL
PAINLEVEL_OUTOF10: 0
PAINLEVEL_OUTOF10: 7
PAINLEVEL_OUTOF10: 7
PAINLEVEL_OUTOF10: 6
PAINLEVEL_OUTOF10: 7
PAINLEVEL_OUTOF10: 6
PAINLEVEL_OUTOF10: 8
PAINLEVEL_OUTOF10: 7

## 2024-07-11 ASSESSMENT — PAIN DESCRIPTION - DESCRIPTORS
DESCRIPTORS: SHOOTING
DESCRIPTORS: SHOOTING
DESCRIPTORS: ACHING

## 2024-07-11 ASSESSMENT — PAIN DESCRIPTION - LOCATION
LOCATION: LEG
LOCATION: LEG
LOCATION: BACK
LOCATION: LEG

## 2024-07-11 ASSESSMENT — PAIN - FUNCTIONAL ASSESSMENT
PAIN_FUNCTIONAL_ASSESSMENT: PREVENTS OR INTERFERES SOME ACTIVE ACTIVITIES AND ADLS
PAIN_FUNCTIONAL_ASSESSMENT: ACTIVITIES ARE NOT PREVENTED

## 2024-07-11 ASSESSMENT — PAIN DESCRIPTION - ORIENTATION: ORIENTATION: MID

## 2024-07-11 NOTE — PLAN OF CARE
Problem: Discharge Planning  Goal: Discharge to home or other facility with appropriate resources  7/11/2024 1813 by Mitra Jackson, RN  Outcome: Progressing     Problem: Pain  Goal: Verbalizes/displays adequate comfort level or baseline comfort level  7/11/2024 1813 by Mitra Jackson, RN  Outcome: Progressing     Problem: ABCDS Injury Assessment  Goal: Absence of physical injury  7/11/2024 1813 by Mitra Jackson, RN  Outcome: Progressing

## 2024-07-11 NOTE — OR NURSING
Image guided bone marrow biopsy w/aspiration biopsy completed by Dr. Mena. Pt tolerated procedure without any signs or symptoms of distress. Vital signs stable. Report given  to RN. Pt transported back to 110 in stable condition via stretcher.     Total Biopsy: 1 core 2 aspirations   Received: Versed: 2.5 mg       Fentanyl: 125 mcg  Bandage to right hip that is clean dry and intact.   Patient to lay on back side for 1 hour.     Vital Signs  Vitals:    07/11/24 1047   BP: (!) 151/105   Pulse: 79   Resp: 18   Temp:    SpO2: 96%    (vital signs in table format)    Post Ada  2 - Able to move 4 extremities voluntarily on command  2 - BP+/- 20mmHg of normal  2 - Fully awake  2 - Able to maintain oxygen saturation >92% on room air  2 - Able to breathe deeply and cough freely

## 2024-07-11 NOTE — PROGRESS NOTES
Hospital Medicine Progress Note      Date of Admission: 7/8/2024  Hospital Day: 4    Chief Admission Complaint:  generalized pain    Subjective: reports ongoing pain - slightly improved    Presenting Admission History: 49 y.o. female with past medical history significant for hypertension, back pain, chronic pain, tachycardia, obesity.  Initially presented to outside facility for pain in the chest, arms, legs, head.  She states the pain woke her up [place] morning.  It is located in her chest, arms, legs.  She describes the pain as squeezing, pulling.  This was associated with feeling hot/flushed, nausea, fatigue.  She took Tylenol, but it did not provide any relief.  She also reports a headache since Sunday morning, denies photo/phonophobia.  At transferring facility, troponin was within expected range x2, WBC was 18.8 with bands.  She was also tachycardic.  She was given vanc, cefepime.  Denies association with dyspnea.  Denies recent sick contacts.  States she had an infected salivary gland about one month ago and was treated with abx from her PCP  Transferred to [facility] for further evaluation and treatment    Assessment/Plan:      Current Principal Problem:  Sepsis (HCC)     Leukocytosis, with bandemia.  Unclear etiology at this time.  At previous facility she did have sepsis markers, those resolved prior to transfer to [facility].  Vitals have been within normal range since arrival to [facility].  Continue vanc, cefepime.  Closely following and personally reviewed serial labs as documented in this note for evidence of vanc induced nephrotoxicity.  Blood cultures, NGTD.  JUAN was negative.  RF elevated at 17.  Lupus in process.  UA was unremarkable.  Negative flu/covid.  CT neck was unremarkable.  ID consulted, appreciate recommendations.  LP 7/9 was unremarkable.  Hem/onc consulted, appreciate recommendations.  Bone marrow biopsy performed 7/11    Acute on chronic.  Increased overnight and required multiple PRN medications.  MRI  []HHC  []SNF  []Acute Rehab  []LTAC  []Hospice  []Other -      Consults:      IP CONSULT TO PHARMACY  IP CONSULT TO INFECTIOUS DISEASES  IP CONSULT TO INTERVENTIONAL RADIOLOGY  IP CONSULT TO HEM/ONC      This patient has a high likelihood of being discharged tomorrow and is appropriate for A1 Discharge Unit in AM pending clinical course overnight: []Yes  [x]No    ------------------------------------------------------------------------------------------------------------------------------------------------------------------------    MDM  (this section is simply meant as an aid to accurate billing and does not necessarily reflect ongoing patient care which is documented elsewhere in the medical record)    [x] High (any 2)    A. Problems (any 1)  [x] Acute/Chronic Illness/injury posing threat to life or bodily function: infection of unknown source, possible CML  [] Severe exacerbation of chronic illness:    ---------------------------------------------------------------------  B. Risk of Treatment (any 1)   [x] Drugs/treatments that require intensive monitoring for toxicity include:    [] IV ABX requiring serial renal monitoring for nephrotoxicity: vanc  [] Post-Cath/Contrast study requiring serial renal monitoring for Contrast Induced Nephropathy  [x] IV Narcotic analgesia for ADR   [] Aggressive IV diuresis requiring serial monitoring for renal impairment and electrolyte derangements  [] Hypertonic Saline requiring serial renal monitoring for appropriate electrolyte correction rate   [] Critical electrolyte abnormalities requiring IV replacement and close serial monitoring  [] Insulin - monitoring FSBS for Hypoglycemic ADR  [] Anticoagulation requiring close serial monitoring and dose adjustments at high risk of ADR   [] HD requiring close serial monitoring of electrolytes and fluid status  [] Other -  [] Change in code status:    [] Decision to escalate care:    [] Major surgery/procedure with associated risk

## 2024-07-11 NOTE — OR NURSING
Pt arrived for image guided bone marrow biopsy w/aspiration right. Procedure explained including the risk and benefits of the procedure. All questions answered. Pt verbalizes understanding of the of procedure and states no more questions. Consent signed. Vital signs stable, labs, allergies, medications, and code status reviewed. No contraindications noted.     Vitals:    07/11/24 1012   BP: (!) 147/93   Pulse: 85   Resp: 16   Temp:    SpO2: 92%    (vital signs in table format)    Ada Score  2 - Able to move 4 extremities voluntarily on command  2 - BP+/- 20mmHg of normal  2 - Fully awake  2 - Able to maintain oxygen saturation >92% on room air  2 - Able to breathe deeply and cough freely    Allergies  Penicillins, Lisinopril, and Biaxin [clarithromycin]    Labs  Lab Results   Component Value Date    INR 0.99 07/09/2024    PROTIME 13.3 07/09/2024       Lab Results   Component Value Date    CREATININE 0.6 07/11/2024    BUN 13 07/11/2024     (L) 07/11/2024    K 3.9 07/11/2024    CL 97 (L) 07/11/2024    CO2 24 07/11/2024       Lab Results   Component Value Date    WBC 22.0 (H) 07/11/2024    HGB 14.1 07/11/2024    HCT 41.7 07/11/2024    MCV 86.5 07/11/2024     07/11/2024

## 2024-07-11 NOTE — CARE COORDINATION
CM update; LOS # 3; Followed by IM, ID, Hematology, Patient under went bone marrow biopsy this Am. Pain remains an issue at this time. Team working on pain control. Will follow.Katarina Kenny RN

## 2024-07-11 NOTE — PROGRESS NOTES
ID     Chart reviewed   She remains AF on RA     WBC is slightly higher today at 22 with broad differential   Note plan for BM bx today    The MRI L spine was negative  The BC collected on admission are negative   The procal was low   HIV screen was negative   JUAN was negative  RF is elevated at 17    No evidence of infection as cause of elevated WBC   Continue to monitor off of abx     Will follow peripherally   CARITO SELBY MD

## 2024-07-11 NOTE — PROGRESS NOTES
07/11/24 1728   Encounter Summary   Encounter Overview/Reason Initial Encounter   Service Provided For Patient and family together  (Pam's , Sunday, was on video call at time of encounter. AW)   Referral/Consult From [facility]   Support System Spouse;Family members   Last Encounter  07/11/24   Complexity of Encounter High   Begin Time 1657   End Time  1725   Total Time Calculated 28 min   Crisis   Type Emotional distress   Spiritual/Emotional needs   Type Spiritual Support   Rituals, Rites and Sacraments   Type Blessings  ( prayed with Pam (in room) and her  (on video call). AW)   Grief, Loss, and Adjustments   Type New Diagnosis;Adjustment to illness   Assessment/Intervention/Outcome   Assessment Anxious;Compromised coping;Fearful;Powerlessness;Sad;Shock;Stress overload;Tearful  (Pam received new diagnosis this afternoon and was emotionally processing the news. AW)   Intervention Active listening;Discussed relationship with God;Explored/Affirmed feelings, thoughts, concerns;Prayer (assurance of)/[place];Sustaining Presence/Ministry of presence  (Pam asked for Bible which was provided to her by the . AW)   Outcome Comfort;Engaged in conversation;Expressed feelings, needs, and concerns;Less anxious, Less agitated  ( provided spiritual support through prayer and pastoral presence. AW)   Plan and Referrals   Plan/Referrals Continue Support (comment)  (Pam will be transferred to another hospital for additional treatment. PRN follow-up while she remains at [facility]. AW)

## 2024-07-11 NOTE — ONCOLOGY
ONCOLOGY HEMATOLOGY CARE PROGRESS NOTE      SUBJECTIVE:    Afebrile and on room air.    She reports that she's NPO and has not eaten.    ROS:     Constitutional:  No weight loss, No fever, No chills, No night sweats.  Energy level good.  Eyes:  No impairment or change in vision  ENT / Mouth:  No pain, abnormal ulceration, bleeding, nasal drip or change in voice or hearing  Cardiovascular:  No chest pain, palpitations, new edema, or calf discomfort  Respiratory:  No pain, hemoptysis, change to breathing  Breast:  No pain, discharge, change in appearance or texture  Gastrointestinal:  No pain, cramping, jaundice, change to eating and bowel habits  Urinary:  No pain, bleeding or change in continence  Genitalia: No pain, bleeding or discharge  Musculoskeletal:  No redness, pain, edema or weakness  Skin:  No pruritus, rash, change to nodules or lesions  Neurologic:  No discomfort, change in mental status, speech, sensory or motor activity  Psychiatric:  No change in concentration or change to affect or mood  Endocrine:  No hot flashes, increased thirst, or change to urine production  Hematologic: No petechiae, ecchymosis or bleeding  Lymphatic:  No lymphadenopathy or lymphedema  Allergy / Immunologic:  No eczema, hives, frequent or recurrent infections    OBJECTIVE        Physical    VITALS:  Patient Vitals for the past 24 hrs:   BP Temp Temp src Pulse Resp SpO2 Weight   07/11/24 0535 -- -- -- -- -- -- 114 kg (251 lb 4.8 oz)   07/11/24 0430 (!) 150/94 98.6 °F (37 °C) Oral 71 16 98 % --   07/10/24 2315 136/86 98.6 °F (37 °C) Oral 76 16 94 % --   07/10/24 1947 (!) 147/84 99.6 °F (37.6 °C) Oral 85 16 96 % --   07/10/24 1743 -- -- -- -- 16 -- --   07/10/24 1543 (!) 155/89 98.4 °F (36.9 °C) Oral 81 18 93 % --   07/10/24 1105 134/77 99.4 °F (37.4 °C) Oral 80 18 92 % --   07/10/24 0826 (!) 157/86 98.5 °F (36.9 °C) Oral 91 18 91 % --       24HR INTAKE/OUTPUT:  No intake or output data in the  space narrowing with disc desiccation, disc bulge, and  mild bilateral facet hypertrophy.  Mild central canal stenosis.  AP dimension  of the thecal sac measures 8 mm.  No significant neural foraminal stenosis.    L3-L4: There is disc space narrowing with disc desiccation and mild disc  bulge.  Mild bilateral facet hypertrophy noted.  There is mild central canal  stenosis with AP dimension of the thecal sac measuring 8-9 mm.  Mild  bilateral neural foraminal stenosis.    L4-L5: Intervertebral disc height and signal intensity within normal limits.  Bilateral facet hypertrophy noted with mild fluid signal intensity within  bilateral facet joints.  Moderate bilateral neural foraminal stenosis.    L5-S1: Bilateral facet hypertrophy and hypertrophy of the ligamentum flavum,  left greater than right.  Mild/moderate bilateral neural foraminal stenosis,  right greater than left.  No significant central canal stenosis.  Impression: 1. Multilevel degenerative changes with mild central canal stenosis at L2-3  and L3-4.  2. Grade 1 anterolisthesis at L5-S1.  3. Questionable very small posterior epidural fluid collection from the T12  level through the L2 level. This is only appreciated on the T2 weighted  imaging where there is a thin area of increased T2 signal measuring only 1-2  mm in AP dimension.  This could represent a very small posterior epidural  hematoma.  This causes no mass effect and would thus be of doubtful clinical  significance at this time.  Close clinical follow-up advised and repeat  imaging could be performed as clinically warranted.  4. Ill-defined nonspecific edema on the basis of increased STIR signal is  noted within the subcutaneous adipose tissue posteriorly at the T12 through  L5-S1 level superficial to the dorsal thoracolumbar fascia.  No fluid  collection.  No osseous edema.  Radiology communication note was placed at 6:30 p.m. EST 07/10/2024 to relay  this information to the covering

## 2024-07-11 NOTE — PLAN OF CARE
Problem: Discharge Planning  Goal: Discharge to home or other facility with appropriate resources  7/10/2024 2221 by Kathryn Eller, RN  Outcome: Progressing  Flowsheets (Taken 7/10/2024 1600)  Discharge to home or other facility with appropriate resources:   Identify barriers to discharge with patient and caregiver   Arrange for needed discharge resources and transportation as appropriate   Identify discharge learning needs (meds, wound care, etc)  7/10/2024 1117 by Mitra Jackson, RN  Outcome: Progressing     Problem: Pain  Goal: Verbalizes/displays adequate comfort level or baseline comfort level  7/10/2024 2221 by Kathryn Eller, RN  Outcome: Progressing  7/10/2024 1117 by Mitra Jackson RN  Outcome: Not Progressing     Problem: ABCDS Injury Assessment  Goal: Absence of physical injury  7/10/2024 2221 by Kathryn Eller, RN  Outcome: Progressing  7/10/2024 1117 by Mitra Jackson, RN  Outcome: Progressing     Problem: Pain  Goal: Verbalizes/displays adequate comfort level or baseline comfort level  7/10/2024 2221 by Kathryn Eller, RN  Outcome: Progressing  7/10/2024 1117 by Mitra Jackson RN  Outcome: Not Progressing

## 2024-07-12 ENCOUNTER — HOSPITAL ENCOUNTER (INPATIENT)
Age: 49
LOS: 6 days | Discharge: HOME | DRG: 835 | End: 2024-07-18
Attending: STUDENT IN AN ORGANIZED HEALTH CARE EDUCATION/TRAINING PROGRAM | Admitting: STUDENT IN AN ORGANIZED HEALTH CARE EDUCATION/TRAINING PROGRAM
Payer: COMMERCIAL

## 2024-07-12 ENCOUNTER — TELEPHONE (OUTPATIENT)
Dept: FAMILY MEDICINE CLINIC | Age: 49
End: 2024-07-12

## 2024-07-12 ENCOUNTER — APPOINTMENT (OUTPATIENT)
Dept: CT IMAGING | Age: 49
DRG: 835 | End: 2024-07-12
Attending: STUDENT IN AN ORGANIZED HEALTH CARE EDUCATION/TRAINING PROGRAM
Payer: COMMERCIAL

## 2024-07-12 ENCOUNTER — APPOINTMENT (OUTPATIENT)
Dept: GENERAL RADIOLOGY | Age: 49
DRG: 835 | End: 2024-07-12
Attending: STUDENT IN AN ORGANIZED HEALTH CARE EDUCATION/TRAINING PROGRAM
Payer: COMMERCIAL

## 2024-07-12 VITALS
TEMPERATURE: 97.7 F | DIASTOLIC BLOOD PRESSURE: 96 MMHG | WEIGHT: 250 LBS | HEART RATE: 70 BPM | HEIGHT: 68 IN | OXYGEN SATURATION: 96 % | BODY MASS INDEX: 37.89 KG/M2 | SYSTOLIC BLOOD PRESSURE: 154 MMHG | RESPIRATION RATE: 22 BRPM

## 2024-07-12 DIAGNOSIS — C91.00 ACUTE LYMPHOCYTIC LEUKEMIA NOT HAVING ACHIEVED REMISSION (HCC): Primary | ICD-10-CM

## 2024-07-12 DIAGNOSIS — I10 PRIMARY HYPERTENSION: ICD-10-CM

## 2024-07-12 PROBLEM — C91.02 ALL (ACUTE LYMPHOID LEUKEMIA) IN RELAPSE (HCC): Status: ACTIVE | Noted: 2024-07-12

## 2024-07-12 LAB
ABO + RH BLD: NORMAL
ALBUMIN SERPL-MCNC: 4 G/DL (ref 3.4–5)
ALP SERPL-CCNC: 89 U/L (ref 40–129)
ALT SERPL-CCNC: 17 U/L (ref 10–40)
ANION GAP SERPL CALCULATED.3IONS-SCNC: 12 MMOL/L (ref 3–16)
ANION GAP SERPL CALCULATED.3IONS-SCNC: 14 MMOL/L (ref 3–16)
ANISOCYTOSIS BLD QL SMEAR: ABNORMAL
APTT BLD: 27.6 SEC (ref 22.1–36.4)
APTT SCREEN TO CONFIRM RATIO: 1.38 {RATIO}
AST SERPL-CCNC: 20 U/L (ref 15–37)
BACTERIA BLD CULT ORG #2: NORMAL
BACTERIA BLD CULT: NORMAL
BASOPHILS # BLD: 0 K/UL (ref 0–0.2)
BASOPHILS # BLD: 0.5 K/UL (ref 0–0.2)
BASOPHILS NFR BLD: 0 %
BASOPHILS NFR BLD: 2 %
BILIRUB DIRECT SERPL-MCNC: <0.2 MG/DL (ref 0–0.3)
BILIRUB INDIRECT SERPL-MCNC: NORMAL MG/DL (ref 0–1)
BILIRUB SERPL-MCNC: 0.3 MG/DL (ref 0–1)
BLASTS NFR BLD MANUAL: 4 %
BLD GP AB SCN SERPL QL: NORMAL
BUN SERPL-MCNC: 11 MG/DL (ref 7–20)
BUN SERPL-MCNC: 11 MG/DL (ref 7–20)
CALCIUM SERPL-MCNC: 8.8 MG/DL (ref 8.3–10.6)
CALCIUM SERPL-MCNC: 9.3 MG/DL (ref 8.3–10.6)
CHLORIDE SERPL-SCNC: 96 MMOL/L (ref 99–110)
CHLORIDE SERPL-SCNC: 98 MMOL/L (ref 99–110)
CO2 SERPL-SCNC: 25 MMOL/L (ref 21–32)
CO2 SERPL-SCNC: 28 MMOL/L (ref 21–32)
CONFIRM DRVVT STA-STACLOT: 20 S
CREAT SERPL-MCNC: 0.6 MG/DL (ref 0.6–1.1)
CREAT SERPL-MCNC: 0.6 MG/DL (ref 0.6–1.1)
D-DIMER QUANTITATIVE: 2.86 UG/ML FEU (ref 0–0.6)
DEPRECATED RDW RBC AUTO: 14.4 % (ref 12.4–15.4)
DEPRECATED RDW RBC AUTO: 14.8 % (ref 12.4–15.4)
DRVVT SCREEN TO CONFIRM RATIO: 1.12 {RATIO}
DRVVT SCREEN TO CONFIRM RATIO: ABNORMAL {RATIO}
DRVVT/DRVVT CFM P DOAC NEUT NORM PPP-RTO: ABNORMAL {RATIO}
EOSINOPHIL # BLD: 0 K/UL (ref 0–0.6)
EOSINOPHIL # BLD: 0.5 K/UL (ref 0–0.6)
EOSINOPHIL NFR BLD: 0 %
EOSINOPHIL NFR BLD: 2 %
GFR SERPLBLD CREATININE-BSD FMLA CKD-EPI: >90 ML/MIN/{1.73_M2}
GFR SERPLBLD CREATININE-BSD FMLA CKD-EPI: >90 ML/MIN/{1.73_M2}
GLUCOSE SERPL-MCNC: 103 MG/DL (ref 70–99)
GLUCOSE SERPL-MCNC: 98 MG/DL (ref 70–99)
HAPTOGLOB SERPL-MCNC: 314 MG/DL (ref 30–200)
HCT VFR BLD AUTO: 40.8 % (ref 36–48)
HCT VFR BLD AUTO: 42.6 % (ref 36–48)
HEPARIN NT PPP QL: ABNORMAL
HGB BLD-MCNC: 13.5 G/DL (ref 12–16)
HGB BLD-MCNC: 14.3 G/DL (ref 12–16)
HSV1 DNA CSF QL NAA+PROBE: NOT DETECTED
HSV2 DNA CSF QL NAA+PROBE: NOT DETECTED
INR PPP: 1.1 (ref 0.85–1.15)
LA 3 SCREEN W REFLEX-IMP: ABNORMAL
LDH SERPL L TO P-CCNC: 894 U/L (ref 100–190)
LDH SERPL L TO P-CCNC: 953 U/L (ref 100–190)
LMW HEPARIN IND PLT AB SER QL: PRESENT
LYMPHOCYTES # BLD: 11 K/UL (ref 1–5.1)
LYMPHOCYTES # BLD: 11.2 K/UL (ref 1–5.1)
LYMPHOCYTES NFR BLD: 32 %
LYMPHOCYTES NFR BLD: 37 %
MAGNESIUM SERPL-MCNC: 2.3 MG/DL (ref 1.8–2.4)
MAGNESIUM SERPL-MCNC: 2.3 MG/DL (ref 1.8–2.4)
MCH RBC QN AUTO: 28.8 PG (ref 26–34)
MCH RBC QN AUTO: 29.2 PG (ref 26–34)
MCHC RBC AUTO-ENTMCNC: 33.2 G/DL (ref 31–36)
MCHC RBC AUTO-ENTMCNC: 33.6 G/DL (ref 31–36)
MCV RBC AUTO: 86.9 FL (ref 80–100)
MCV RBC AUTO: 86.9 FL (ref 80–100)
METAMYELOCYTES NFR BLD MANUAL: 2 %
METAMYELOCYTES NFR BLD MANUAL: 4 %
MIXING DRVVT: ABNORMAL S
MONOCYTES # BLD: 0.3 K/UL (ref 0–1.3)
MONOCYTES # BLD: 1.2 K/UL (ref 0–1.3)
MONOCYTES NFR BLD: 1 %
MONOCYTES NFR BLD: 4 %
MYELOCYTES NFR BLD MANUAL: 2 %
MYELOCYTES NFR BLD MANUAL: 2 %
NEUTROPHILS # BLD: 15.1 K/UL (ref 1.7–7.7)
NEUTROPHILS # BLD: 16.7 K/UL (ref 1.7–7.7)
NEUTROPHILS NFR BLD: 33 %
NEUTROPHILS NFR BLD: 46 %
NEUTS BAND NFR BLD MANUAL: 16 % (ref 0–7)
NEUTS BAND NFR BLD MANUAL: 5 % (ref 0–7)
NRBC BLD-RTO: 1 /100 WBC
PATH INTERP BLD-IMP: NO
PATH INTERP BLD-IMP: YES
PHOSPHATE SERPL-MCNC: 3.8 MG/DL (ref 2.5–4.9)
PLATELET # BLD AUTO: 193 K/UL (ref 135–450)
PLATELET # BLD AUTO: 206 K/UL (ref 135–450)
PLATELET BLD QL SMEAR: ADEQUATE
PLATELET BLD QL SMEAR: ADEQUATE
PMV BLD AUTO: 7.6 FL (ref 5–10.5)
PMV BLD AUTO: 7.8 FL (ref 5–10.5)
POIKILOCYTOSIS BLD QL SMEAR: ABNORMAL
POTASSIUM SERPL-SCNC: 3.5 MMOL/L (ref 3.5–5.1)
POTASSIUM SERPL-SCNC: 4 MMOL/L (ref 3.5–5.1)
PROT SERPL-MCNC: 7.4 G/DL (ref 6.4–8.2)
PROTHROMBIN TIME: 14.4 SEC (ref 11.9–14.9)
PROTHROMBIN TIME: 14.6 S (ref 12–15.5)
RBC # BLD AUTO: 4.69 M/UL (ref 4–5.2)
RBC # BLD AUTO: 4.9 M/UL (ref 4–5.2)
SCREEN APTT: 1.43 S
SLIDE REVIEW: ABNORMAL
SLIDE REVIEW: ABNORMAL
SODIUM SERPL-SCNC: 136 MMOL/L (ref 136–145)
SODIUM SERPL-SCNC: 137 MMOL/L (ref 136–145)
SPECIMEN SOURCE: NORMAL
THROMBIN TIME: 16.2 S
URATE SERPL-MCNC: 4.1 MG/DL (ref 2.6–6)
URATE SERPL-MCNC: 4.4 MG/DL (ref 2.6–6)
VARIANT LYMPHS NFR BLD MANUAL: 8 % (ref 0–6)
WBC # BLD AUTO: 27.4 K/UL (ref 4–11)
WBC # BLD AUTO: 30.4 K/UL (ref 4–11)

## 2024-07-12 PROCEDURE — 83735 ASSAY OF MAGNESIUM: CPT

## 2024-07-12 PROCEDURE — 85610 PROTHROMBIN TIME: CPT

## 2024-07-12 PROCEDURE — 36569 INSJ PICC 5 YR+ W/O IMAGING: CPT

## 2024-07-12 PROCEDURE — 86833 HLA CLASS II HIGH DEFIN QUAL: CPT | Performed by: STUDENT IN AN ORGANIZED HEALTH CARE EDUCATION/TRAINING PROGRAM

## 2024-07-12 PROCEDURE — 81382 HLA II TYPING 1 LOC HR: CPT | Performed by: STUDENT IN AN ORGANIZED HEALTH CARE EDUCATION/TRAINING PROGRAM

## 2024-07-12 PROCEDURE — 6370000000 HC RX 637 (ALT 250 FOR IP): Performed by: INTERNAL MEDICINE

## 2024-07-12 PROCEDURE — 85670 THROMBIN TIME PLASMA: CPT

## 2024-07-12 PROCEDURE — 2500000003 HC RX 250 WO HCPCS

## 2024-07-12 PROCEDURE — 73700 CT LOWER EXTREMITY W/O DYE: CPT

## 2024-07-12 PROCEDURE — 80048 BASIC METABOLIC PNL TOTAL CA: CPT

## 2024-07-12 PROCEDURE — 36415 COLL VENOUS BLD VENIPUNCTURE: CPT

## 2024-07-12 PROCEDURE — 86901 BLOOD TYPING SEROLOGIC RH(D): CPT

## 2024-07-12 PROCEDURE — 86850 RBC ANTIBODY SCREEN: CPT

## 2024-07-12 PROCEDURE — 2060000000 HC ICU INTERMEDIATE R&B

## 2024-07-12 PROCEDURE — 86832 HLA CLASS I HIGH DEFIN QUAL: CPT | Performed by: STUDENT IN AN ORGANIZED HEALTH CARE EDUCATION/TRAINING PROGRAM

## 2024-07-12 PROCEDURE — 84100 ASSAY OF PHOSPHORUS: CPT

## 2024-07-12 PROCEDURE — 71046 X-RAY EXAM CHEST 2 VIEWS: CPT

## 2024-07-12 PROCEDURE — 84550 ASSAY OF BLOOD/URIC ACID: CPT

## 2024-07-12 PROCEDURE — C1751 CATH, INF, PER/CENT/MIDLINE: HCPCS

## 2024-07-12 PROCEDURE — 6360000002 HC RX W HCPCS: Performed by: INTERNAL MEDICINE

## 2024-07-12 PROCEDURE — 6370000000 HC RX 637 (ALT 250 FOR IP)

## 2024-07-12 PROCEDURE — 07DR3ZX EXTRACTION OF ILIAC BONE MARROW, PERCUTANEOUS APPROACH, DIAGNOSTIC: ICD-10-PCS | Performed by: STUDENT IN AN ORGANIZED HEALTH CARE EDUCATION/TRAINING PROGRAM

## 2024-07-12 PROCEDURE — 85025 COMPLETE CBC W/AUTO DIFF WBC: CPT

## 2024-07-12 PROCEDURE — 83615 LACTATE (LD) (LDH) ENZYME: CPT

## 2024-07-12 PROCEDURE — 2580000003 HC RX 258: Performed by: INTERNAL MEDICINE

## 2024-07-12 PROCEDURE — 36592 COLLECT BLOOD FROM PICC: CPT

## 2024-07-12 PROCEDURE — 85730 THROMBOPLASTIN TIME PARTIAL: CPT

## 2024-07-12 PROCEDURE — 85379 FIBRIN DEGRADATION QUANT: CPT

## 2024-07-12 PROCEDURE — 2580000003 HC RX 258

## 2024-07-12 PROCEDURE — 93005 ELECTROCARDIOGRAM TRACING: CPT

## 2024-07-12 PROCEDURE — 81379 HLA I TYPING COMPLETE HR: CPT | Performed by: STUDENT IN AN ORGANIZED HEALTH CARE EDUCATION/TRAINING PROGRAM

## 2024-07-12 PROCEDURE — 02HV33Z INSERTION OF INFUSION DEVICE INTO SUPERIOR VENA CAVA, PERCUTANEOUS APPROACH: ICD-10-PCS | Performed by: STUDENT IN AN ORGANIZED HEALTH CARE EDUCATION/TRAINING PROGRAM

## 2024-07-12 PROCEDURE — 80076 HEPATIC FUNCTION PANEL: CPT

## 2024-07-12 PROCEDURE — 85598 HEXAGNAL PHOSPH PLTLT NEUTRL: CPT

## 2024-07-12 PROCEDURE — 86900 BLOOD TYPING SEROLOGIC ABO: CPT

## 2024-07-12 RX ORDER — LIDOCAINE HYDROCHLORIDE 10 MG/ML
5 INJECTION, SOLUTION EPIDURAL; INFILTRATION; INTRACAUDAL; PERINEURAL ONCE
Status: COMPLETED | OUTPATIENT
Start: 2024-07-12 | End: 2024-07-12

## 2024-07-12 RX ORDER — OXYCODONE HYDROCHLORIDE 5 MG/1
5 TABLET ORAL EVERY 4 HOURS PRN
Status: DISCONTINUED | OUTPATIENT
Start: 2024-07-12 | End: 2024-07-18 | Stop reason: HOSPADM

## 2024-07-12 RX ORDER — OXYCODONE HCL 10 MG/1
10 TABLET, FILM COATED, EXTENDED RELEASE ORAL EVERY 12 HOURS SCHEDULED
Qty: 6 EACH | Refills: 0 | Status: ON HOLD
Start: 2024-07-12 | End: 2024-07-15

## 2024-07-12 RX ORDER — LEVOFLOXACIN 500 MG/1
500 TABLET, FILM COATED ORAL DAILY
Status: DISCONTINUED | OUTPATIENT
Start: 2024-07-12 | End: 2024-07-18 | Stop reason: HOSPADM

## 2024-07-12 RX ORDER — SODIUM CHLORIDE 9 MG/ML
INJECTION, SOLUTION INTRAVENOUS CONTINUOUS PRN
Status: DISCONTINUED | OUTPATIENT
Start: 2024-07-12 | End: 2024-07-18 | Stop reason: HOSPADM

## 2024-07-12 RX ORDER — LIDOCAINE 4 G/G
2 PATCH TOPICAL DAILY
Qty: 60 EACH | Refills: 0 | Status: ON HOLD
Start: 2024-07-12

## 2024-07-12 RX ORDER — GABAPENTIN 100 MG/1
100 CAPSULE ORAL 3 TIMES DAILY
Status: DISCONTINUED | OUTPATIENT
Start: 2024-07-12 | End: 2024-07-12

## 2024-07-12 RX ORDER — SODIUM CHLORIDE 0.9 % (FLUSH) 0.9 %
5-40 SYRINGE (ML) INJECTION PRN
Status: DISCONTINUED | OUTPATIENT
Start: 2024-07-12 | End: 2024-07-18 | Stop reason: HOSPADM

## 2024-07-12 RX ORDER — SODIUM CHLORIDE 0.9 % (FLUSH) 0.9 %
5-40 SYRINGE (ML) INJECTION EVERY 12 HOURS SCHEDULED
Status: DISCONTINUED | OUTPATIENT
Start: 2024-07-12 | End: 2024-07-18 | Stop reason: HOSPADM

## 2024-07-12 RX ORDER — METHOCARBAMOL 750 MG/1
750 TABLET, FILM COATED ORAL 4 TIMES DAILY
Qty: 40 TABLET | Refills: 0 | Status: ON HOLD
Start: 2024-07-12 | End: 2024-07-22

## 2024-07-12 RX ORDER — CARVEDILOL 3.12 MG/1
6.25 TABLET ORAL 2 TIMES DAILY WITH MEALS
Status: DISCONTINUED | OUTPATIENT
Start: 2024-07-12 | End: 2024-07-18 | Stop reason: HOSPADM

## 2024-07-12 RX ORDER — SODIUM CHLORIDE 9 MG/ML
INJECTION, SOLUTION INTRAVENOUS PRN
Status: DISCONTINUED | OUTPATIENT
Start: 2024-07-12 | End: 2024-07-18 | Stop reason: HOSPADM

## 2024-07-12 RX ORDER — POLYETHYLENE GLYCOL 3350 17 G/17G
17 POWDER, FOR SOLUTION ORAL DAILY
Status: DISCONTINUED | OUTPATIENT
Start: 2024-07-13 | End: 2024-07-18 | Stop reason: HOSPADM

## 2024-07-12 RX ORDER — FLUCONAZOLE 200 MG/1
200 TABLET ORAL DAILY
Status: DISCONTINUED | OUTPATIENT
Start: 2024-07-12 | End: 2024-07-18 | Stop reason: HOSPADM

## 2024-07-12 RX ORDER — FLUCONAZOLE 200 MG/1
200 TABLET ORAL DAILY
Qty: 7 TABLET | Refills: 0 | Status: ON HOLD
Start: 2024-07-12 | End: 2024-07-19

## 2024-07-12 RX ORDER — ALLOPURINOL 300 MG/1
300 TABLET ORAL DAILY
Status: DISCONTINUED | OUTPATIENT
Start: 2024-07-12 | End: 2024-07-18 | Stop reason: HOSPADM

## 2024-07-12 RX ORDER — SODIUM CHLORIDE 9 MG/ML
INJECTION, SOLUTION INTRAVENOUS CONTINUOUS
Status: DISCONTINUED | OUTPATIENT
Start: 2024-07-12 | End: 2024-07-18 | Stop reason: HOSPADM

## 2024-07-12 RX ORDER — LEVOFLOXACIN 500 MG/1
500 TABLET, FILM COATED ORAL DAILY
Qty: 10 TABLET | Refills: 0 | Status: ON HOLD
Start: 2024-07-12 | End: 2024-07-22

## 2024-07-12 RX ORDER — BISACODYL 5 MG/1
5 TABLET, DELAYED RELEASE ORAL ONCE
Status: COMPLETED | OUTPATIENT
Start: 2024-07-12 | End: 2024-07-12

## 2024-07-12 RX ORDER — GABAPENTIN 100 MG/1
100 CAPSULE ORAL 3 TIMES DAILY
Qty: 30 CAPSULE | Refills: 0 | Status: ON HOLD
Start: 2024-07-12 | End: 2024-07-22

## 2024-07-12 RX ORDER — HYDROCHLOROTHIAZIDE 12.5 MG/1
12.5 CAPSULE, GELATIN COATED ORAL EVERY MORNING
Status: DISCONTINUED | OUTPATIENT
Start: 2024-07-13 | End: 2024-07-18 | Stop reason: HOSPADM

## 2024-07-12 RX ORDER — SIMETHICONE 80 MG
80 TABLET,CHEWABLE ORAL EVERY 6 HOURS PRN
Status: DISCONTINUED | OUTPATIENT
Start: 2024-07-12 | End: 2024-07-18 | Stop reason: HOSPADM

## 2024-07-12 RX ORDER — OXYCODONE HCL 10 MG/1
10 TABLET, FILM COATED, EXTENDED RELEASE ORAL EVERY 12 HOURS SCHEDULED
Status: DISCONTINUED | OUTPATIENT
Start: 2024-07-12 | End: 2024-07-12

## 2024-07-12 RX ORDER — LOSARTAN POTASSIUM 50 MG/1
50 TABLET ORAL EVERY MORNING
Status: DISCONTINUED | OUTPATIENT
Start: 2024-07-13 | End: 2024-07-18 | Stop reason: HOSPADM

## 2024-07-12 RX ORDER — ACYCLOVIR 200 MG/1
400 CAPSULE ORAL 2 TIMES DAILY
Qty: 40 CAPSULE | Refills: 0 | Status: ON HOLD
Start: 2024-07-12 | End: 2024-07-22

## 2024-07-12 RX ORDER — LIDOCAINE 4 G/G
1 PATCH TOPICAL DAILY
Qty: 30 EACH | Refills: 0 | Status: ON HOLD
Start: 2024-07-12

## 2024-07-12 RX ORDER — MAGNESIUM SULFATE IN WATER 40 MG/ML
4000 INJECTION, SOLUTION INTRAVENOUS PRN
Status: DISCONTINUED | OUTPATIENT
Start: 2024-07-12 | End: 2024-07-18 | Stop reason: HOSPADM

## 2024-07-12 RX ORDER — OXYCODONE HYDROCHLORIDE 5 MG/1
5 TABLET ORAL EVERY 4 HOURS PRN
Qty: 18 TABLET | Refills: 0 | Status: ON HOLD
Start: 2024-07-12 | End: 2024-07-15

## 2024-07-12 RX ORDER — SULFAMETHOXAZOLE AND TRIMETHOPRIM 800; 160 MG/1; MG/1
1 TABLET ORAL
Qty: 12 TABLET | Refills: 0 | Status: ON HOLD
Start: 2024-07-15 | End: 2024-08-12

## 2024-07-12 RX ORDER — DOCUSATE SODIUM 100 MG/1
100 CAPSULE, LIQUID FILLED ORAL DAILY
Status: DISCONTINUED | OUTPATIENT
Start: 2024-07-12 | End: 2024-07-12 | Stop reason: HOSPADM

## 2024-07-12 RX ORDER — METHOCARBAMOL 750 MG/1
750 TABLET, FILM COATED ORAL 4 TIMES DAILY PRN
Status: DISCONTINUED | OUTPATIENT
Start: 2024-07-12 | End: 2024-07-18 | Stop reason: HOSPADM

## 2024-07-12 RX ORDER — VALACYCLOVIR HYDROCHLORIDE 500 MG/1
500 TABLET, FILM COATED ORAL 2 TIMES DAILY
Status: DISCONTINUED | OUTPATIENT
Start: 2024-07-12 | End: 2024-07-18 | Stop reason: HOSPADM

## 2024-07-12 RX ORDER — LIDOCAINE 4 G/G
2 PATCH TOPICAL DAILY
Status: DISCONTINUED | OUTPATIENT
Start: 2024-07-12 | End: 2024-07-12 | Stop reason: HOSPADM

## 2024-07-12 RX ORDER — GABAPENTIN 300 MG/1
300 CAPSULE ORAL 3 TIMES DAILY
Status: DISCONTINUED | OUTPATIENT
Start: 2024-07-12 | End: 2024-07-18 | Stop reason: HOSPADM

## 2024-07-12 RX ORDER — SENNA AND DOCUSATE SODIUM 50; 8.6 MG/1; MG/1
2 TABLET, FILM COATED ORAL 2 TIMES DAILY
Status: DISCONTINUED | OUTPATIENT
Start: 2024-07-12 | End: 2024-07-18 | Stop reason: HOSPADM

## 2024-07-12 RX ADMIN — LEVOFLOXACIN 500 MG: 500 TABLET, FILM COATED ORAL at 09:52

## 2024-07-12 RX ADMIN — VALACYCLOVIR HYDROCHLORIDE 500 MG: 500 TABLET, FILM COATED ORAL at 21:31

## 2024-07-12 RX ADMIN — SENNOSIDES AND DOCUSATE SODIUM 2 TABLET: 50; 8.6 TABLET ORAL at 21:31

## 2024-07-12 RX ADMIN — GABAPENTIN 100 MG: 100 CAPSULE ORAL at 09:53

## 2024-07-12 RX ADMIN — METHOCARBAMOL 750 MG: 750 TABLET ORAL at 13:31

## 2024-07-12 RX ADMIN — CARVEDILOL 6.25 MG: 3.12 TABLET, FILM COATED ORAL at 17:54

## 2024-07-12 RX ADMIN — VALACYCLOVIR HYDROCHLORIDE 500 MG: 500 TABLET, FILM COATED ORAL at 16:10

## 2024-07-12 RX ADMIN — SODIUM CHLORIDE: 9 INJECTION, SOLUTION INTRAVENOUS at 16:19

## 2024-07-12 RX ADMIN — FLUCONAZOLE 200 MG: 100 TABLET ORAL at 09:52

## 2024-07-12 RX ADMIN — BISACODYL 5 MG: 5 TABLET, COATED ORAL at 16:10

## 2024-07-12 RX ADMIN — ACYCLOVIR 400 MG: 200 CAPSULE ORAL at 09:52

## 2024-07-12 RX ADMIN — OXYCODONE 5 MG: 5 TABLET ORAL at 03:12

## 2024-07-12 RX ADMIN — HYDROCHLOROTHIAZIDE 12.5 MG: 12.5 CAPSULE ORAL at 09:53

## 2024-07-12 RX ADMIN — ACETAMINOPHEN 650 MG: 325 TABLET ORAL at 07:05

## 2024-07-12 RX ADMIN — OXYCODONE 5 MG: 5 TABLET ORAL at 13:28

## 2024-07-12 RX ADMIN — LOSARTAN POTASSIUM 50 MG: 25 TABLET, FILM COATED ORAL at 09:53

## 2024-07-12 RX ADMIN — DEXAMETHASONE SODIUM PHOSPHATE 20 MG: 4 INJECTION, SOLUTION INTRAMUSCULAR; INTRAVENOUS at 18:06

## 2024-07-12 RX ADMIN — GABAPENTIN 300 MG: 300 CAPSULE ORAL at 16:10

## 2024-07-12 RX ADMIN — CARVEDILOL 6.25 MG: 6.25 TABLET, FILM COATED ORAL at 09:53

## 2024-07-12 RX ADMIN — LIDOCAINE HYDROCHLORIDE ANHYDROUS 5 ML: 10 INJECTION, SOLUTION INFILTRATION at 14:39

## 2024-07-12 RX ADMIN — SODIUM CHLORIDE, PRESERVATIVE FREE 10 ML: 5 INJECTION INTRAVENOUS at 21:31

## 2024-07-12 RX ADMIN — METHOCARBAMOL 750 MG: 750 TABLET ORAL at 16:26

## 2024-07-12 RX ADMIN — GABAPENTIN 300 MG: 300 CAPSULE ORAL at 21:31

## 2024-07-12 RX ADMIN — ACETAMINOPHEN 650 MG: 325 TABLET ORAL at 00:12

## 2024-07-12 RX ADMIN — OXYCODONE 5 MG: 5 TABLET ORAL at 19:01

## 2024-07-12 RX ADMIN — METHOCARBAMOL 750 MG: 500 TABLET ORAL at 09:53

## 2024-07-12 RX ADMIN — OXYCODONE HYDROCHLORIDE 10 MG: 10 TABLET, FILM COATED, EXTENDED RELEASE ORAL at 09:52

## 2024-07-12 ASSESSMENT — PAIN DESCRIPTION - LOCATION
LOCATION: LEG

## 2024-07-12 ASSESSMENT — PAIN - FUNCTIONAL ASSESSMENT
PAIN_FUNCTIONAL_ASSESSMENT: PREVENTS OR INTERFERES SOME ACTIVE ACTIVITIES AND ADLS
PAIN_FUNCTIONAL_ASSESSMENT: PREVENTS OR INTERFERES WITH MANY ACTIVE NOT PASSIVE ACTIVITIES
PAIN_FUNCTIONAL_ASSESSMENT: PREVENTS OR INTERFERES SOME ACTIVE ACTIVITIES AND ADLS

## 2024-07-12 ASSESSMENT — PAIN DESCRIPTION - ORIENTATION
ORIENTATION: MID
ORIENTATION: RIGHT;LEFT
ORIENTATION: LEFT
ORIENTATION: LEFT
ORIENTATION: LEFT;RIGHT
ORIENTATION: LEFT

## 2024-07-12 ASSESSMENT — PAIN SCALES - GENERAL
PAINLEVEL_OUTOF10: 2
PAINLEVEL_OUTOF10: 8
PAINLEVEL_OUTOF10: 6
PAINLEVEL_OUTOF10: 7
PAINLEVEL_OUTOF10: 0
PAINLEVEL_OUTOF10: 8
PAINLEVEL_OUTOF10: 8
PAINLEVEL_OUTOF10: 7
PAINLEVEL_OUTOF10: 5
PAINLEVEL_OUTOF10: 1
PAINLEVEL_OUTOF10: 6

## 2024-07-12 ASSESSMENT — PAIN DESCRIPTION - ONSET
ONSET: ON-GOING
ONSET: ON-GOING
ONSET: PROGRESSIVE

## 2024-07-12 ASSESSMENT — PAIN DESCRIPTION - DESCRIPTORS
DESCRIPTORS: ACHING
DESCRIPTORS: PATIENT UNABLE TO DESCRIBE
DESCRIPTORS: THROBBING

## 2024-07-12 ASSESSMENT — PAIN DESCRIPTION - PAIN TYPE
TYPE: CHRONIC PAIN
TYPE: ACUTE PAIN
TYPE: ACUTE PAIN

## 2024-07-12 ASSESSMENT — PAIN DESCRIPTION - FREQUENCY
FREQUENCY: CONTINUOUS

## 2024-07-12 NOTE — PROGRESS NOTES
4 Eyes Skin Assessment     NAME:  Pam Guido  YOB: 1975  MEDICAL RECORD NUMBER:  [id number]    The patient is being assessed for  Admission    I agree that at least one RN has performed a thorough Head to Toe Skin Assessment on the patient. ALL assessment sites listed below have been assessed.      Areas assessed by both nurses:    Head, Face, Ears, Shoulders, Back, Chest, Arms, Elbows, Hands, Sacrum. Buttock, Coccyx, Ischium, Legs. Feet and Heels, and Under Medical Devices         Does the Patient have a Wound? No noted wound(s)       Jim Prevention initiated by RN: No  Wound Care Orders initiated by RN: No    Pressure Injury (Stage 3,4, Unstageable, DTI, NWPT, and Complex wounds) if present, place Wound referral order by RN under : No    New Ostomies, if present place, Ostomy referral order under : No     Nurse 1 eSignature: Electronically signed by Fiona Madera RN on 7/12/24 at 4:37 PM EDT    **SHARE this note so that the co-signing nurse can place an eSignature**    Nurse 2 eSignature: Electronically signed by RACHEAL AC RN on 7/12/24 at 4:38 PM EDT

## 2024-07-12 NOTE — PLAN OF CARE
Problem: Discharge Planning  Goal: Discharge to home or other facility with appropriate resources  7/12/2024 1112 by Malini Patel, RN  Outcome: Completed     Problem: Pain  Goal: Verbalizes/displays adequate comfort level or baseline comfort level  7/12/2024 1112 by Malini Patel, ROBBY  Outcome: Completed     Problem: ABCDS Injury Assessment  Goal: Absence of physical injury  7/12/2024 1112 by Malini Patel, RN  Outcome: Completed     Problem: Safety - Adult  Goal: Free from fall injury  Outcome: Completed

## 2024-07-12 NOTE — CARE COORDINATION
Order to  noted. Attempted meeting with the pt and she was off the floor.     RN aware writer will see pt Monday.     Ronit HUNTLEY, ALMA   for [facility] ([facility])  [employer] Mobile: [phone]

## 2024-07-12 NOTE — PROGRESS NOTES
07/12/24 1026   Encounter Summary   Encounter Overview/Reason Follow-up   Service Provided For Patient   Referral/Consult From Other    Support System Spouse;Children;Hinduism/[place] [facility]   Last Encounter  07/12/24  ( visited with patient (Pam); provided pastoral support and prayer)   Assessment/Intervention/Outcome   Intervention Sustaining Presence/Ministry of presence;Prayer (assurance of)/[place];Nurtured Hope

## 2024-07-12 NOTE — PROGRESS NOTES
Patient to transfer to [facility] room 3508. Patient discharge orders in. Patient to be taken by transport company on stretcher. Patient with personal belongings including purse, clothes dentures, cell phone and . Patient paperwork given to transport company, patient right AC IV left in place. Spoke with [facility] nurse for short report, will follow up with detailed report. Patient left by stretcher, discharge complete.

## 2024-07-12 NOTE — CARE COORDINATION
CM update; patient is being transferred to [facility] for workup with Bone Marrow team.Will follow.Katarina Kenny RN      1123 Patient discharging to [facility] per transfer team. Pick-up 1145. All paperwork accompanying patient.Katarina Kenny RN

## 2024-07-12 NOTE — DISCHARGE SUMMARY
recommendations.  LP 7/9 was unremarkable.  Hem/onc consulted, appreciate recommendations.  Bone marrow biopsy performed 7/11.  Flow cytometry with 13% blasts.  Continue prophylactic medications as ordered by hem/onc.  Continue current pain regimen: oxycontin 10mg BID, oxycodone 5mg q4h PRN, gabapentin 100mg TID, methocarbamol 750mg QID.     Acute on chronic pain.  Appears better controlled at this time.  Likely due to above.  MRI lumbar: multilevel degenerative changes;  ordered, not yet performed.  Continue gabapentin, methocarbamol, oxycontin.  PRN oxycodone.      Chest pain.  Possible etiologies include: GI, MSK, cardiac.  HS trop within expected range on arrival to [facility], it was normal x2 at previous hospital.  EKG with no acute ischemic changes.  Cardiology consulted, appreciate recommendations.  Telemetry was personally reviewed and noted to show NSR.      Hypokalemia/magnesemia, resolved.  Monitored and replaced as needed.      Essential hypertension.  Continue losartan, carvedilol, hctz.  Monitored BP during admission     Obesity.  Body mass index is 38.02 kg/m².  Complicating assessment and treatment.  Placing patient at risk for multiple co-morbidities as well as early death and contributing to the patient's presentation.  Counseled on weight loss.  [] Class I - 30.0 to 34.9 kg/m2  [x] Class II - 35.0 to 39.9 kg/m2  [] Class III - ?40 kg/m2 (AKA severe/morbid/massive)   [] Super Obesity  - > 50% kg/m2  [] Super Super Obesity  - > 60% kg/m2       Physical Exam Performed:      BP (!) 154/96   Pulse 70   Temp 97.7 °F (36.5 °C) (Oral)   Resp 22   Ht 1.727 m (5' 7.99\")   Wt 113.4 kg (250 lb)   LMP 03/01/2019   SpO2 96%   BMI 38.02 kg/m²     General appearance:  No apparent distress, appears stated age and cooperative.  Respiratory:  Normal respiratory effort.  Room air  Cardiovascular:  Regular rate and rhythm.  Abdomen:  Soft, non-tender, non-distended.  Musculoskeletal:  No edema  Neurologic:   by mouth three times a week for 28 days  Qty: 12 tablet, Refills: 0      methocarbamol (ROBAXIN) 750 MG tablet Take 1 tablet by mouth 4 times daily for 10 days  Qty: 40 tablet, Refills: 0      !! lidocaine 4 % external patch Place 2 patches onto the skin daily  Qty: 60 each, Refills: 0       !! - Potential duplicate medications found. Please discuss with provider.        Current Discharge Medication List        Current Discharge Medication List        CONTINUE these medications which have NOT CHANGED    Details   carvedilol (COREG) 6.25 MG tablet Take 1 tablet by mouth 2 times daily  Qty: 180 tablet, Refills: 1    Associated Diagnoses: Primary hypertension; Tachycardia      losartan (COZAAR) 50 MG tablet Take 1 tablet by mouth every morning  Qty: 90 tablet, Refills: 1    Associated Diagnoses: Primary hypertension      hydroCHLOROthiazide 12.5 MG capsule TAKE ONE CAPSULE BY MOUTH EVERY MORNING  Qty: 90 capsule, Refills: 0    Associated Diagnoses: Primary hypertension           Current Discharge Medication List        STOP taking these medications       diclofenac (VOLTAREN) 75 MG EC tablet Comments:   Reason for Stopping:         famotidine (PEPCID) 40 MG tablet Comments:   Reason for Stopping:         ondansetron (ZOFRAN) 4 MG tablet Comments:   Reason for Stopping:               Significant Test Results    CT BIOPSY BONE MARROW    Result Date: 7/11/2024  PROCEDURE: CT GUIDED BONE MARROW ASPIRATION AND CORE NEEDLE BONE BIOPSY OF THE RIGHT ILIAC BONE. MODERATE CONSCIOUS SEDATION 7/11/2024 HISTORY: ORDERING SYSTEM PROVIDED HISTORY: leukocytosis - CML? - send for flow, cytogenetics, qPCR for BCR/ABL, myeloid NGS panel TECHNOLOGIST PROVIDED HISTORY: Reason for exam:->leukocytosis - CML? - send for flow, cytogenetics, qPCR for BCR/ABL, myeloid NGS panel Reason for Exam: right iliac bone marrow bx per  SEDATION: Moderate sedation was ordered and supervised by the attending with physician face-to-face  monitoring. Medications were provided and recorded by Radiology nurses.  A total of 2.5 mg Versed and 125 mcg fentanyl was used for conscious sedation during the procedure.  Total conscious sedation time was 29 minutes. TECHNIQUE: Informed consent was obtained following a detailed explanation of the procedure including risks, benefits, and alternatives.  Universal protocol was followed. Sterile gowns, masks, hats and gloves utilized for maximal sterile barrier. Axial images were obtained through the iliac bones using CT guidance and a suitable skin site was prepped and draped in sterile fashion. Local anesthesia was achieved with lidocaine.  An 11 gauge [employer] bone marrow biopsy needle was advanced into the right iliac bone and approximately 8 mL of bone marrow aspirate was obtained.  A single core biopsy specimen was obtained and the patient tolerated the procedure well. Dose modulation, iterative reconstruction, and/or weight based adjustment of the mA/kV was utilized to reduce the radiation dose to as low as reasonably achievable.  Total exam dose linear product was 1184.64 mGy per cm.     Successful CT guided bone marrow aspiration and core biopsy of the right iliac bone.     MRI LUMBAR SPINE WO CONTRAST    Result Date: 7/10/2024  EXAMINATION: MRI OF THE LUMBAR SPINE WITHOUT CONTRAST, 7/10/2024 2:07 pm TECHNIQUE: Multiplanar multisequence MRI of the lumbar spine was performed without the administration of intravenous contrast. COMPARISON: None. HISTORY: ORDERING SYSTEM PROVIDED HISTORY: increased pain, shooting into legs TECHNOLOGIST PROVIDED HISTORY: Reason for exam:->increased pain, shooting into legs Reason for Exam: increased pain, shooting into legs FINDINGS: BONES/ALIGNMENT: There is anterolisthesis at L5-S1 measuring 5 mm.  No osseous marrow edema.  No lumbar spine fracture. No suspicious marrow signal abnormality. SPINAL CORD: The conus medullaris terminates at the L2 level. There is a questionable

## 2024-07-12 NOTE — PROGRESS NOTES
Peripheral flow cytometry and preliminary report from bone marrow biopsy consistent with findings of acute lymphocytic leukemia. Awaiting final bone marrow biopsy report.   Reviewed findings at length with patient and family. Answered all questions at this time.   Patient transferred to [facility] for further evaluation and treatment.

## 2024-07-12 NOTE — PROGRESS NOTES
Pt Aox4 VSS pt continues with pain PRN given. Pt remain clam during shift. NSR on the monitor. Call light in reach.

## 2024-07-12 NOTE — H&P
7/12/24 7/22/24  Luz Marina Whittaker APRN - CNP   lidocaine 4 % external patch Place 1 patch onto the skin daily 7/12/24   Luz Marina Whittaker APRN - CNP   oxyCODONE (OXYCONTIN) 10 MG extended release tablet Take 1 tablet by mouth every 12 hours for 3 days. Max Daily Amount: 20 mg 7/12/24 7/15/24  Luz Marina Whittaker APRN - CNP   oxyCODONE (ROXICODONE) 5 MG immediate release tablet Take 1 tablet by mouth every 4 hours as needed (breakthrough pain) for up to 3 days. Max Daily Amount: 30 mg 7/12/24 7/15/24  Luz Marina Whittaker APRN - CNP   sulfamethoxazole-trimethoprim (BACTRIM DS;SEPTRA DS) 800-160 MG per tablet Take 1 tablet by mouth three times a week for 28 days 7/15/24 8/12/24  Luz Marina Whittaker APRN - CNP   methocarbamol (ROBAXIN) 750 MG tablet Take 1 tablet by mouth 4 times daily for 10 days 7/12/24 7/22/24  Luz Marina Whittaker APRN - CNP   lidocaine 4 % external patch Place 2 patches onto the skin daily 7/12/24   Luz Marina Whittaker APRN - CNP   carvedilol (COREG) 6.25 MG tablet Take 1 tablet by mouth 2 times daily 5/23/24   Johanna Jensen APRN - CNP   losartan (COZAAR) 50 MG tablet Take 1 tablet by mouth every morning 5/23/24   Johanna Jensen APRN - CNP   hydroCHLOROthiazide 12.5 MG capsule TAKE ONE CAPSULE BY MOUTH EVERY MORNING 4/8/24   Johanna Jensen APRN - CNP       Allergies   Allergen Reactions    Penicillins Anaphylaxis and Hives    Lisinopril Cough    Biaxin [Clarithromycin] Rash       Family History   Problem Relation Age of Onset    Heart Disease Mother     Heart Disease Maternal Grandmother     Diabetes Maternal Grandmother     Ovarian Cancer Maternal Grandmother     Heart Failure Maternal Grandfather     High Blood Pressure Maternal Grandfather     High Blood Pressure Sister     Bipolar Disorder Sister     No Known Problems Maternal Aunt     High Blood Pressure Maternal Uncle     High Blood Pressure Maternal Uncle         Social History     Socioeconomic History    Marital status:      Spouse  not apply to immunocompromised patients and patients with cancer. Follow up in patients with significant comorbidities as clinically warranted. For lung cancer screening, adhere to Lung-RADS guidelines. Reference: Radiology. 2017; 284(1):228-43.       CT A/P 7/7/24: Focal nodule seen in the right adrenal gland measuring 1.6 cm medial-lateral, similar to prior study May 2020 when measured in a similar fashion. Gallbladder is distended. Cyst seen in left adnexa measuring 4.2 cm. There is diastasis of the rectus muscles.  Tiny  periumbilical hernia containing fat is seen.  Spurring is seen in the spine.  Spurring is seen in the hips.      CT Soft tissue Neck W/Contrast 7/9/24: No acute abnormality of the soft tissue structures of the neck.     MRI Lumbar Spine:     PATHOLOGY:  Lumbar Puncture 7/9/24: Labs Pending  BMBx/Asp 7/11/24: Pending    PROBLEM LIST:           Acute lymphocytic leukemia  HTN  GERD  Chronic Pain    TREATMENT:            Acute lymphocytic leukemia  TBD      ASSESSMENT AND PLAN:           1. Acute Leukemia: BMBX and Aspiration 7/11/24: Pending, preliminary results consistent with ALL  BCR/ABL 7/10/24: Pending  12% blasts by peripheral flow cytometry     PLAN:   TBD    2. ID:   Concern for sepsis on admission to ED (7/7/24)  - Patient is currently afebrile without evidence of infection  - Presented to ED with diffuse body aches and leukocytosis  - Blood Cultures 7/7/24: NGTD  - CoVID/Flu swab 7/7/24: negative   - Lumbar Puncture 7/9/24: unremarkable   - HIV screen  7/10/24: Non reactive  - History of infected salivary gland in June 2024, CT soft tissue neck 7/9/24 negative    Current Prophylaxis:  - Diflucan, Valtrex and Levaquin ppx    3. Heme:   Leukocytosis 2/2 leukemia  - Transfuse for Hgb < 7 and Platelets < 10 K.   - No transfusion today   Risk for DIC:  - Check coags & fibrinogen on admit    4. Metabolic:   Risk for TLS:  - Start allopurinol ppx on admit  - Follow TLS labs daily   - Start

## 2024-07-12 NOTE — PLAN OF CARE
Problem: Discharge Planning  Goal: Discharge to home or other facility with appropriate resources  7/11/2024 2140 by Kathryn Eller, RN  Outcome: Progressing  Flowsheets (Taken 7/11/2024 2042)  Discharge to home or other facility with appropriate resources:   Identify barriers to discharge with patient and caregiver   Arrange for needed discharge resources and transportation as appropriate   Identify discharge learning needs (meds, wound care, etc)  7/11/2024 1813 by Mitra Jackson, RN  Outcome: Progressing     Problem: Pain  Goal: Verbalizes/displays adequate comfort level or baseline comfort level  7/11/2024 2140 by Kathryn Eller, RN  Outcome: Progressing  7/11/2024 1813 by Mitra Jackson, RN  Outcome: Progressing     Problem: ABCDS Injury Assessment  Goal: Absence of physical injury  7/11/2024 2140 by Kathryn Eller, RN  Outcome: Progressing  7/11/2024 1813 by Mitra Jackson, RN  Outcome: Progressing

## 2024-07-12 NOTE — PROGRESS NOTES
Observations:  PT was found alone in bed resting but tearful with the TV on. She expressed that she was just given an IV.  Interventions:   PT was provided a [place] packet and some basic explanation. PT understood and seem to have some familiarity with the packet already. PT then shared that she is Ep[employer] and her  is a retired . As she shared, she be came tearful, \"I am going to fight this.\" Chapl;elenitan then offered prayer and prayer was provided.   Outcomes:  PT expressed gratitude and that she would look at the HCPOA packet at another time.   No other needs,    Staff , Murphy Colbert MA, [facility]       07/12/24 1511   Encounter Summary   Encounter Overview/Reason Initial Encounter   Service Provided For Patient   Referral/Consult From Nurse  (Bernie Lund, APRN - CNP)   Support System Spouse   Last Encounter  07/12/24  (yasmin)   Complexity of Encounter Moderate   Begin Time 1430   End Time  1450   Total Time Calculated 20 min   Grief, Loss, and Adjustments   Type New Diagnosis   Advance Care Planning   Type ACP conversation   Assessment/Intervention/Outcome   Assessment Anxious;Hopeful   Intervention Active listening;Discussed belief system/Hoahaoism practices/miles;Discussed illness injury and it’s impact;Discussed relationship with God;Empowerment;Explored/Affirmed feelings, thoughts, concerns;Nurtured Hope;Prayer (assurance of)/[place]   Outcome Comfort;Engaged in conversation;Expressed feelings, needs, and concerns;Expressed Gratitude;Receptive;Venting emotion

## 2024-07-12 NOTE — DISCHARGE INSTRUCTIONS
Caring for Your Peripherally Inserted Central Catheter (PICC)      You are going home with a peripherally inserted catheter (PICC). This small, soft tube has been placed in a vein in your arm. It is often used when treatment requires medications or nutrition for weeks or months. At home, you need to take care of your PICC to keep it working. And since a PICC line has such a high infection risk, you must take extra care washing your hands and preventing the spread of germs. This sheet will help you remember what to do to care for your PICC at home.    Understanding Your Role  . A nurse or other healthcare provider will teach you and your caregivers how to care for the PICC. Before leaving the hospital, make sure that you understand what to do at home, how long you may need the PICC, and when to have a follow up visit.  . You will likely be told to flush the PICC with saline or heparin solution. You may also be told to change the catheter’s injection caps and change the dressing (Bandage). Or, a nurse may do this for you during a follow-up visit. Only do these things if you are told to, following the instructions you were given.    Protecting the PICC  If the PICC gets damaged, it won’t work right and could raise you chance of infection. Call your healthcare team right away if any damage occurs. To protect your PICC at home:  . Prevent infection. Use good hand hygiene by following the guidelines on this sheet. Don’t touch the catheter or the dressing unless you need to. Always clean your hands before and after you come in contact with any part of the PICC. Your caregivers, family members, any visitors should use good hand hygiene also.  . Keep the PICC dry. The catheter and dressing must stay dry. Don’t take baths, go swimming, use a hot tub, or do other activities that could get the PICC wet. When showering, cover the area with plastic wrap or another cover as recommended by your healthcare provider. Keep the area  sometimes deadly infections. To prevent infection, it’s very important that you, your caregivers and others around you use good hand hygiene. This means washing your hands well with soap and water, and cleaning them with alcohol based hand gel as directed. Never touch the PICC or dressing without first using one of the methods.   To wash your hands with soap and water:  . Wet your hands with warm water. (Avoid hot water, which can cause skin irritation when you wash your hands often).  . Apply enough soap to cover the entire surface of your hands, including fingers.  . Rub your hands together vigorously for at least 15 seconds. Make sure to rub the front and back of each hand up to the wrist, your fingers and fingernails, between the fingers, and each thumb.  . Rinse your hands with warm water  . Dry your hands completely with a new paper towel. Don’t use a cloth towel or other reusable towel. These can harbor germs.  . Use the paper towel to turn off the faucet, then throw it away. If you are in a bathroom, also use a paper towel to open the door instead of touching the handle.  When you don’t have access to soap and water: Use alcohol-based hand gel. The gel should have at least 60% alcohol. Follow the instructions on the package. Your healthcare team can answer any questions you have about when to use hand gel, or when it’s better to wash with soap and water.    When to Call Your Healthcare Provider  Call your provider right away if you have any of the following:  . Pain or burning in your shoulder, chest, back, arm, or leg  . Fever of 100.4 F or higher  . Chills  . Signs of infection at the catheter site (pain, redness, drainage, burning, or stinging  . Coughing, wheezing, or shortness of breath  . A racing or irregular heartbeat  . Muscle stiffness or trouble moving  . Tightness in your arm, above the catheter site  . Gurgling noises coming from the catheter  . The catheter falls out, breaks, cracks, leaks, or

## 2024-07-12 NOTE — ACP (ADVANCE CARE PLANNING)
Advance Care Planning     Advance Care Planning Inpatient Note  Spiritual Care Department    Today's Date: 7/12/2024  Unit: [address]    Received request from HealthCare Provider.  Upon review of chart and communication with care team, patient's decision making abilities are not in question.. Patient was/were present in the room during visit.    Goals of ACP Conversation:  Discuss advance care planning documents    Health Care Decision Makers:       Primary Decision Maker: Sunday Guido - Spouse - [phone]  Summary:  Documented Next of Kin, per patient report    NOK is the PTs PDM as listed above.    Advance Care Planning Documents (Patient Wishes):  Healthcare Power of /Advance Directive Appointment of Health Care Agent     Assessment:  PT is a Mu-ism and seems to be experiencing some anxiety with her new diagnosis.  PT values prayer as one of the way for her to cope. She remains hopeful but is adjusting to her diagnosis.     Interventions:  Provided education on documents for clarity and greater understanding  Discussed and provided education on state decision maker hierarchy    Care Preferences Communicated:   No    Outcomes/Plan:  ACP Discussion: Completed    Electronically signed by JUANY Guevara on 7/12/2024 at 3:19 PM

## 2024-07-12 NOTE — CONSENT
Informed Consent for Blood Component Transfusion Note    I have discussed with the patient the rationale for blood component transfusion; its benefits in treating or preventing fatigue, organ damage, or death; and its risk which includes mild transfusion reactions, rare risk of blood borne infection, or more serious but rare reactions. I have discussed the alternatives to transfusion, including the risk and consequences of not receiving transfusion. The patient had an opportunity to ask questions and had agreed to proceed with transfusion of blood components.    Electronically signed by ALLY Concepcion CNP on 7/12/24 at 3:40 PM EDT

## 2024-07-12 NOTE — PROCEDURES
PROCEDURE NOTE  Date: 2024   Name: Pam Guido  YOB: 1975    Procedures                                                                     DOUBLE PICC PROCEDURE NOTE  Chart reviewed for allergies, diagnosis, labs, known contraindications, reason for line placement and planned length of treatment.  Informed consent noted to be signed and on chart.  Insertion procedure discussed with patient/family member.  Three patient identifiers - Patient name,   and MRN -  completed &  confirmed verbally.         Time out performed.  Hat, mask and eye shield donned.  PICC site cleaned with chlorhexidine wipes then scrubbed with Chloraprep one-step applicator for 30 seconds x 1.   Hand Hygiene  performed with 3% Chlorhexidine surgical scrub x1 min prior to  sterile gloves, sterile gown being donned.  Patient draped using maximal sterile barrier technique ( head to toe ).  PICC site scrubbed a 2nd time with Chloraprep One-Step applicator x 30 sec. Modified Seldinger technique/ultrasound assisted and tip locating system utilized for insertion and 1% Lidocaine 5 ml injected intradermal pre-insertion.  PICC tip location in the SVC confirmed by ECG technology.   Positive brisk blood return obtained from all lumens.  Valves placed to all lumens and  flushed with 10 mls 0.9% Sterile Sodium Chloride.  All lumens flush easily with no resistance.  Skin prep applied to site.   Catheter secured with non-sutured locking device per hospital protocol. Bio-patch/CHG impregnated sterile tegaderm dressing applied.  Alcohol Swab Caps applied to each valve.  Sterile field maintained during procedure.  PICC insertion, rhythm and positioning wire (utilized prn) accounted  for post procedure and disposed of in sharps.  Appearance of site is Clean dry and intact without bleeding or edema. All edges of Tegaderm occlusive.   Site marked with date and initials of RN placing line. Teaching performed to pt/family and noted in

## 2024-07-13 ENCOUNTER — APPOINTMENT (OUTPATIENT)
Age: 49
DRG: 835 | End: 2024-07-13
Attending: STUDENT IN AN ORGANIZED HEALTH CARE EDUCATION/TRAINING PROGRAM
Payer: COMMERCIAL

## 2024-07-13 LAB
ALBUMIN SERPL-MCNC: 3.6 G/DL (ref 3.4–5)
ALP SERPL-CCNC: 90 U/L (ref 40–129)
ALT SERPL-CCNC: 22 U/L (ref 10–40)
ANION GAP SERPL CALCULATED.3IONS-SCNC: 12 MMOL/L (ref 3–16)
AST SERPL-CCNC: 23 U/L (ref 15–37)
BASOPHILS # BLD: 0 K/UL (ref 0–0.2)
BASOPHILS NFR BLD: 0 %
BILIRUB DIRECT SERPL-MCNC: <0.2 MG/DL (ref 0–0.3)
BILIRUB INDIRECT SERPL-MCNC: NORMAL MG/DL (ref 0–1)
BILIRUB SERPL-MCNC: <0.2 MG/DL (ref 0–1)
BILIRUB UR QL STRIP.AUTO: NEGATIVE
BLASTS NFR BLD MANUAL: 2 %
BUN SERPL-MCNC: 11 MG/DL (ref 7–20)
CALCIUM SERPL-MCNC: 9.3 MG/DL (ref 8.3–10.6)
CHLORIDE SERPL-SCNC: 101 MMOL/L (ref 99–110)
CLARITY UR: CLEAR
CO2 SERPL-SCNC: 25 MMOL/L (ref 21–32)
COLOR UR: YELLOW
CREAT SERPL-MCNC: 0.5 MG/DL (ref 0.6–1.1)
DEPRECATED RDW RBC AUTO: 14.9 % (ref 12.4–15.4)
ECHO AO ASC DIAM: 3.3 CM
ECHO AO ASCENDING AORTA INDEX: 1.47 CM/M2
ECHO AO ROOT DIAM: 3 CM
ECHO AO ROOT INDEX: 1.34 CM/M2
ECHO AV AREA PEAK VELOCITY: 3.8 CM2
ECHO AV AREA/BSA PEAK VELOCITY: 1.7 CM2/M2
ECHO AV PEAK GRADIENT: 7 MMHG
ECHO AV PEAK VELOCITY: 1.3 M/S
ECHO AV VELOCITY RATIO: 0.92
ECHO BSA: 2.32 M2
ECHO LA AREA 2C: 15.1 CM2
ECHO LA AREA 4C: 13.7 CM2
ECHO LA DIAMETER INDEX: 1.34 CM/M2
ECHO LA DIAMETER: 3 CM
ECHO LA MAJOR AXIS: 4.5 CM
ECHO LA MINOR AXIS: 4.8 CM
ECHO LA TO AORTIC ROOT RATIO: 1
ECHO LA VOL BP: 37 ML (ref 22–52)
ECHO LA VOL MOD A2C: 38 ML (ref 22–52)
ECHO LA VOL MOD A4C: 34 ML (ref 22–52)
ECHO LA VOL/BSA BIPLANE: 17 ML/M2 (ref 16–34)
ECHO LA VOLUME INDEX MOD A2C: 17 ML/M2 (ref 16–34)
ECHO LA VOLUME INDEX MOD A4C: 15 ML/M2 (ref 16–34)
ECHO LV E' LATERAL VELOCITY: 9 CM/S
ECHO LV E' SEPTAL VELOCITY: 9 CM/S
ECHO LV FRACTIONAL SHORTENING: 42 % (ref 28–44)
ECHO LV INTERNAL DIMENSION DIASTOLE INDEX: 2.23 CM/M2
ECHO LV INTERNAL DIMENSION DIASTOLIC: 5 CM (ref 3.9–5.3)
ECHO LV INTERNAL DIMENSION SYSTOLIC INDEX: 1.29 CM/M2
ECHO LV INTERNAL DIMENSION SYSTOLIC: 2.9 CM
ECHO LV IVSD: 1.2 CM (ref 0.6–0.9)
ECHO LV MASS 2D: 207.1 G (ref 67–162)
ECHO LV MASS INDEX 2D: 92.5 G/M2 (ref 43–95)
ECHO LV POSTERIOR WALL DIASTOLIC: 1 CM (ref 0.6–0.9)
ECHO LV RELATIVE WALL THICKNESS RATIO: 0.4
ECHO LVOT AREA: 4.2 CM2
ECHO LVOT DIAM: 2.3 CM
ECHO LVOT PEAK GRADIENT: 6 MMHG
ECHO LVOT PEAK VELOCITY: 1.2 M/S
ECHO MV A VELOCITY: 0.81 M/S
ECHO MV E DECELERATION TIME (DT): 208 MS
ECHO MV E VELOCITY: 0.9 M/S
ECHO MV E/A RATIO: 1.11
ECHO MV E/E' LATERAL: 10
ECHO MV E/E' RATIO (AVERAGED): 10
ECHO MV E/E' SEPTAL: 10
ECHO PV MAX VELOCITY: 1 M/S
ECHO PV PEAK GRADIENT: 4 MMHG
ECHO RA AREA 4C: 12.5 CM2
ECHO RA END SYSTOLIC VOLUME APICAL 4 CHAMBER INDEX BSA: 13 ML/M2
ECHO RA VOLUME: 29 ML
ECHO RV FREE WALL PEAK S': 11 CM/S
ECHO RV TAPSE: 2.3 CM (ref 1.7–?)
EKG ATRIAL RATE: 71 BPM
EKG DIAGNOSIS: NORMAL
EKG P AXIS: 58 DEGREES
EKG P-R INTERVAL: 144 MS
EKG Q-T INTERVAL: 364 MS
EKG QRS DURATION: 86 MS
EKG QTC CALCULATION (BAZETT): 395 MS
EKG R AXIS: -24 DEGREES
EKG T AXIS: 46 DEGREES
EKG VENTRICULAR RATE: 71 BPM
EOSINOPHIL # BLD: 0.4 K/UL (ref 0–0.6)
EOSINOPHIL NFR BLD: 1 %
EPI CELLS #/AREA URNS HPF: NORMAL /HPF (ref 0–5)
FIBRINOGEN PPP-MCNC: 662 MG/DL (ref 227–534)
GFR SERPLBLD CREATININE-BSD FMLA CKD-EPI: >90 ML/MIN/{1.73_M2}
GLUCOSE SERPL-MCNC: 144 MG/DL (ref 70–99)
GLUCOSE UR STRIP.AUTO-MCNC: NEGATIVE MG/DL
HCT VFR BLD AUTO: 42.9 % (ref 36–48)
HGB BLD-MCNC: 14.3 G/DL (ref 12–16)
HGB UR QL STRIP.AUTO: ABNORMAL
KETONES UR STRIP.AUTO-MCNC: NEGATIVE MG/DL
LDH SERPL L TO P-CCNC: 1031 U/L (ref 100–190)
LEUKOCYTE ESTERASE UR QL STRIP.AUTO: NEGATIVE
LYMPHOCYTES # BLD: 18.3 K/UL (ref 1–5.1)
LYMPHOCYTES NFR BLD: 50 %
MAGNESIUM SERPL-MCNC: 2.4 MG/DL (ref 1.8–2.4)
MCH RBC QN AUTO: 28.8 PG (ref 26–34)
MCHC RBC AUTO-ENTMCNC: 33.3 G/DL (ref 31–36)
MCV RBC AUTO: 86.5 FL (ref 80–100)
METAMYELOCYTES NFR BLD MANUAL: 3 %
MONOCYTES # BLD: 0.7 K/UL (ref 0–1.3)
MONOCYTES NFR BLD: 2 %
MYELOCYTES NFR BLD MANUAL: 6 %
NEUTROPHILS # BLD: 16.5 K/UL (ref 1.7–7.7)
NEUTROPHILS NFR BLD: 35 %
NEUTS BAND NFR BLD MANUAL: 1 % (ref 0–7)
NITRITE UR QL STRIP.AUTO: NEGATIVE
PATH INTERP BLD-IMP: NO
PH UR STRIP.AUTO: 6.5 [PH] (ref 5–8)
PHOSPHATE SERPL-MCNC: 3.9 MG/DL (ref 2.5–4.9)
PLATELET # BLD AUTO: 205 K/UL (ref 135–450)
PLATELET BLD QL SMEAR: ADEQUATE
PMV BLD AUTO: 8 FL (ref 5–10.5)
POTASSIUM SERPL-SCNC: 3.9 MMOL/L (ref 3.5–5.1)
PROT SERPL-MCNC: 7.1 G/DL (ref 6.4–8.2)
PROT UR STRIP.AUTO-MCNC: NEGATIVE MG/DL
RBC # BLD AUTO: 4.96 M/UL (ref 4–5.2)
RBC #/AREA URNS HPF: NORMAL /HPF (ref 0–4)
RBC MORPH BLD: NORMAL
SODIUM SERPL-SCNC: 138 MMOL/L (ref 136–145)
SP GR UR STRIP.AUTO: 1.01 (ref 1–1.03)
UA DIPSTICK W REFLEX MICRO PNL UR: YES
URATE SERPL-MCNC: 3.8 MG/DL (ref 2.6–6)
URN SPEC COLLECT METH UR: ABNORMAL
UROBILINOGEN UR STRIP-ACNC: 0.2 E.U./DL
WBC # BLD AUTO: 36.6 K/UL (ref 4–11)
WBC #/AREA URNS HPF: NORMAL /HPF (ref 0–5)

## 2024-07-13 PROCEDURE — 85025 COMPLETE CBC W/AUTO DIFF WBC: CPT

## 2024-07-13 PROCEDURE — 93306 TTE W/DOPPLER COMPLETE: CPT

## 2024-07-13 PROCEDURE — 83735 ASSAY OF MAGNESIUM: CPT

## 2024-07-13 PROCEDURE — 84100 ASSAY OF PHOSPHORUS: CPT

## 2024-07-13 PROCEDURE — 81001 URINALYSIS AUTO W/SCOPE: CPT

## 2024-07-13 PROCEDURE — 93306 TTE W/DOPPLER COMPLETE: CPT | Performed by: INTERNAL MEDICINE

## 2024-07-13 PROCEDURE — 6370000000 HC RX 637 (ALT 250 FOR IP)

## 2024-07-13 PROCEDURE — 93010 ELECTROCARDIOGRAM REPORT: CPT | Performed by: INTERNAL MEDICINE

## 2024-07-13 PROCEDURE — 6370000000 HC RX 637 (ALT 250 FOR IP): Performed by: STUDENT IN AN ORGANIZED HEALTH CARE EDUCATION/TRAINING PROGRAM

## 2024-07-13 PROCEDURE — 2580000003 HC RX 258

## 2024-07-13 PROCEDURE — 85384 FIBRINOGEN ACTIVITY: CPT

## 2024-07-13 PROCEDURE — 6370000000 HC RX 637 (ALT 250 FOR IP): Performed by: NURSE PRACTITIONER

## 2024-07-13 PROCEDURE — 80076 HEPATIC FUNCTION PANEL: CPT

## 2024-07-13 PROCEDURE — 80048 BASIC METABOLIC PNL TOTAL CA: CPT

## 2024-07-13 PROCEDURE — 83615 LACTATE (LD) (LDH) ENZYME: CPT

## 2024-07-13 PROCEDURE — 84550 ASSAY OF BLOOD/URIC ACID: CPT

## 2024-07-13 PROCEDURE — 2060000000 HC ICU INTERMEDIATE R&B

## 2024-07-13 PROCEDURE — 36592 COLLECT BLOOD FROM PICC: CPT

## 2024-07-13 PROCEDURE — 6360000002 HC RX W HCPCS: Performed by: NURSE PRACTITIONER

## 2024-07-13 RX ORDER — HYDROXYUREA 500 MG/1
500 CAPSULE ORAL 2 TIMES DAILY
Status: DISCONTINUED | OUTPATIENT
Start: 2024-07-13 | End: 2024-07-14

## 2024-07-13 RX ORDER — ACETAMINOPHEN 325 MG/1
650 TABLET ORAL EVERY 4 HOURS PRN
Status: DISCONTINUED | OUTPATIENT
Start: 2024-07-13 | End: 2024-07-18 | Stop reason: HOSPADM

## 2024-07-13 RX ORDER — DEXAMETHASONE 4 MG/1
20 TABLET ORAL DAILY
Status: DISCONTINUED | OUTPATIENT
Start: 2024-07-13 | End: 2024-07-18

## 2024-07-13 RX ADMIN — SENNOSIDES AND DOCUSATE SODIUM 2 TABLET: 50; 8.6 TABLET ORAL at 09:33

## 2024-07-13 RX ADMIN — SODIUM CHLORIDE: 9 INJECTION, SOLUTION INTRAVENOUS at 11:54

## 2024-07-13 RX ADMIN — ALLOPURINOL 300 MG: 300 TABLET ORAL at 09:34

## 2024-07-13 RX ADMIN — CARVEDILOL 6.25 MG: 3.12 TABLET, FILM COATED ORAL at 09:35

## 2024-07-13 RX ADMIN — CARVEDILOL 6.25 MG: 3.12 TABLET, FILM COATED ORAL at 16:08

## 2024-07-13 RX ADMIN — GABAPENTIN 300 MG: 300 CAPSULE ORAL at 09:35

## 2024-07-13 RX ADMIN — LEVOFLOXACIN 500 MG: 500 TABLET, FILM COATED ORAL at 09:33

## 2024-07-13 RX ADMIN — ACETAMINOPHEN 650 MG: 325 TABLET ORAL at 09:34

## 2024-07-13 RX ADMIN — LOSARTAN POTASSIUM 50 MG: 50 TABLET, FILM COATED ORAL at 09:34

## 2024-07-13 RX ADMIN — SODIUM CHLORIDE, PRESERVATIVE FREE 10 ML: 5 INJECTION INTRAVENOUS at 09:37

## 2024-07-13 RX ADMIN — VALACYCLOVIR HYDROCHLORIDE 500 MG: 500 TABLET, FILM COATED ORAL at 20:31

## 2024-07-13 RX ADMIN — SODIUM CHLORIDE: 9 INJECTION, SOLUTION INTRAVENOUS at 03:15

## 2024-07-13 RX ADMIN — METHOCARBAMOL 750 MG: 750 TABLET ORAL at 11:53

## 2024-07-13 RX ADMIN — SODIUM CHLORIDE, PRESERVATIVE FREE 10 ML: 5 INJECTION INTRAVENOUS at 20:35

## 2024-07-13 RX ADMIN — HYDROXYUREA 500 MG: 500 CAPSULE ORAL at 09:33

## 2024-07-13 RX ADMIN — SENNOSIDES AND DOCUSATE SODIUM 2 TABLET: 50; 8.6 TABLET ORAL at 20:31

## 2024-07-13 RX ADMIN — FLUCONAZOLE 200 MG: 200 TABLET ORAL at 09:35

## 2024-07-13 RX ADMIN — HYDROXYUREA 500 MG: 500 CAPSULE ORAL at 20:31

## 2024-07-13 RX ADMIN — POLYETHYLENE GLYCOL 3350 17 G: 17 POWDER, FOR SOLUTION ORAL at 09:33

## 2024-07-13 RX ADMIN — DEXAMETHASONE 20 MG: 4 TABLET ORAL at 09:35

## 2024-07-13 RX ADMIN — GABAPENTIN 300 MG: 300 CAPSULE ORAL at 14:18

## 2024-07-13 RX ADMIN — GABAPENTIN 300 MG: 300 CAPSULE ORAL at 20:31

## 2024-07-13 RX ADMIN — VALACYCLOVIR HYDROCHLORIDE 500 MG: 500 TABLET, FILM COATED ORAL at 09:33

## 2024-07-13 RX ADMIN — HYDROCHLOROTHIAZIDE 12.5 MG: 12.5 CAPSULE ORAL at 09:33

## 2024-07-13 ASSESSMENT — PAIN DESCRIPTION - DESCRIPTORS
DESCRIPTORS: ACHING
DESCRIPTORS: ACHING

## 2024-07-13 ASSESSMENT — PAIN - FUNCTIONAL ASSESSMENT
PAIN_FUNCTIONAL_ASSESSMENT: PREVENTS OR INTERFERES SOME ACTIVE ACTIVITIES AND ADLS
PAIN_FUNCTIONAL_ASSESSMENT: PREVENTS OR INTERFERES SOME ACTIVE ACTIVITIES AND ADLS

## 2024-07-13 ASSESSMENT — PAIN DESCRIPTION - FREQUENCY
FREQUENCY: CONTINUOUS
FREQUENCY: CONTINUOUS

## 2024-07-13 ASSESSMENT — PAIN SCALES - GENERAL
PAINLEVEL_OUTOF10: 3
PAINLEVEL_OUTOF10: 2
PAINLEVEL_OUTOF10: 3
PAINLEVEL_OUTOF10: 5

## 2024-07-13 ASSESSMENT — PAIN DESCRIPTION - ONSET
ONSET: ON-GOING
ONSET: ON-GOING

## 2024-07-13 ASSESSMENT — PAIN DESCRIPTION - LOCATION
LOCATION: KNEE
LOCATION: LEG

## 2024-07-13 ASSESSMENT — PAIN DESCRIPTION - ORIENTATION
ORIENTATION: RIGHT;LEFT
ORIENTATION: LEFT;INNER

## 2024-07-13 ASSESSMENT — PAIN DESCRIPTION - PAIN TYPE
TYPE: CHRONIC PAIN
TYPE: CHRONIC PAIN

## 2024-07-13 NOTE — PROGRESS NOTES
Comprehensive Nutrition Assessment    RECOMMENDATIONS:  PO Diet: Continue Regular; Low Microbial  Nutrition Supplement: pt declined at present  Nutrition Education: Education completed (Low Microbial Diet)     NUTRITION ASSESSMENT:   Nutritional summary & status: Consult for new dx of ALL. RD reviewed low microbial diet education with pt at bedside. Provided Low Microbial Handout from [facility]. Pt voiced understanding of material. Reports decreased appetite in past week due to multiple tests, however, now improving. No wt loss pta per EMR. Pt declined offer of oral nutrition supplement at this time. Will continue to monitor nutrition status and intake adequacy.   Admission // PMH: ALL//anemia, back pain, GERD, HTN    MALNUTRITION ASSESSMENT  Context of Malnutrition: Chronic Illness   Malnutrition Status: At risk for malnutrition   Findings of the 6 clinical characteristics of malnutrition (Minimum of 2 out of 6 clinical characteristics is required to make the diagnosis of moderate or severe Protein Calorie Malnutrition based on AND/ASPEN Guidelines):  Energy Intake:  Mild decrease in energy intake  Weight Loss:  No significant weight loss     Body Fat Loss:  No significant body fat loss     Muscle Mass Loss:  No significant muscle mass loss      NUTRITION DIAGNOSIS   Increased nutrient needs related to catabolic illness as evidenced by intake 0-25% (new dx of ALL)    Nutrition Monitoring and Evaluation:   Food/Nutrient Intake Outcomes:  Food and Nutrient Intake  Physical Signs/Symptoms Outcomes:  Biochemical Data, Nutrition Focused Physical Findings, Weight     OBJECTIVE DATA: Significant to nutrition assessment  Nutrition Related Findings: BLE trace edema.LBM7/10. Glu 144.  Wounds: None  Nutrition Goals: PO intake 75% or greater, by next RD assessment     CURRENT NUTRITION THERAPIES  ADULT DIET; Regular; Low Microbial  PO Intake: 1-25%, %   PO Supplement Intake:None Ordered  Additional Sources of

## 2024-07-13 NOTE — PROGRESS NOTES
113.4 kg (250 lb)         General: Awake, alert and oriented.  HEENT: normocephalic, PERRL, no scleral erythema or icterus, Oral mucosa moist and intact, throat clear  NECK: supple  BACK: Straight   SKIN: warm dry and intact without lesions rashes or masses  CHEST: CTA bilaterally without use of accessory muscles  CV: Normal S1 S2, RRR, no MRG  ABD: NT ND normoactive BS  EXTREMITIES: without edema, denies calf tenderness  NEURO: CN II - XII grossly intact  CATHETER: picc line    Data    CBC:   Recent Labs     07/12/24  0619 07/12/24  1345 07/13/24  0410   WBC 27.4* 30.4* 36.6*   HGB 13.5 14.3 14.3   HCT 40.8 42.6 42.9   MCV 86.9 86.9 86.5    206 205     BMP/Mag:  Recent Labs     07/12/24  0619 07/12/24  1345 07/13/24  0410    136 138   K 3.5 4.0 3.9   CL 98* 96* 101   CO2 25 28 25   PHOS  --  3.8 3.9   BUN 11 11 11   CREATININE 0.6 0.6 0.5*   MG 2.30 2.30 2.40     LIVP:   Recent Labs     07/11/24  2204 07/12/24  1345 07/13/24  0410   AST  --  20 23   ALT  --  17 22   BILIDIR <0.2 <0.2 <0.2   BILITOT <0.2 0.3 <0.2   ALKPHOS  --  89 90     Coags:   Recent Labs     07/10/24  1700 07/12/24  1345   PROTIME 14.6 14.4   INR  --  1.10   APTT  --  27.6     Uric Acid No results for input(s): \"LABURIC\" in the last 72 hours.    PROBLEM LIST:           Acute lymphocytic leukemia  HTN  GERD  Chronic Pain      TREATMENT:            Acute lymphocytic leukemia  TBD     ASSESSMENT AND PLAN:           1. 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 ID:   Concern for sepsis on admission to ED (7/7/24)  - Patient is currently afebrile without evidence of infection  - Presented to ED with diffuse body aches and leukocytosis  - Blood Cultures 7/7/24: NGTD  - CoVID/Flu swab 7/7/24: negative   - Lumbar Puncture 7/9/24:  meals     9. IV Access: PICC line ordered on admission        - DVT Prophylaxis: Platelets >50,000 cells/dL, - daily lovenox prophylaxis ordered  Contraindications to pharmacologic prophylaxis: None  Contraindications to mechanical prophylaxis: None    - Disposition: Uncertain    The patient was seen and examined by Dr. Kruti Patel Tracy Schoenhoft, APRN - CNP

## 2024-07-13 NOTE — PLAN OF CARE
Problem: Safety - Adult  Goal: Free from fall injury  7/13/2024 1446 by Carlene Hughes, RN  Outcome: Progressing- patient SBA during shift. Ambulation can be unsteady due to chronic knee pain. Care continues.     Problem: Pain  Goal: Verbalizes/displays adequate comfort level or baseline comfort level  7/13/2024 1446 by Carlene Hughes, RN  Outcome: Progressing- patient with complaints of pain in legs/knees during shift, in addition to headache. PRN medication administered, relief of pain reported upon reassessment.      Problem: Hematologic - Adult  Goal: Maintains hematologic stability  Outcome: Progressing  Patient's hemoglobin this AM:   Recent Labs     07/13/24  0410   HGB 14.3     Patient's platelet count this AM:   Recent Labs     07/13/24  0410       Thrombocytopenia not present at this time.  Patient showing no signs or symptoms of active bleeding.  Transfusion not indicated at this time.  Patient verbalizes understanding of all instructions. Will continue to assess and implement POC. Call light within reach and hourly rounding in place.

## 2024-07-13 NOTE — PLAN OF CARE
Problem: Safety - Adult  Goal: Free from fall injury  7/13/2024 0635 by Dana Jon, RN  Outcome: Progressing  Note: Patient remained free of falls during shift. Patient remains a Bhat Fall Risk: High (45 and higher). Patient makes no attempt to get out of bed without help from hospital staff. Will continue to encourage call light usage prior to ambulating. Call light within reach and hourly rounding in place.      Problem: Pain  Goal: Verbalizes/displays adequate comfort level or baseline comfort level  7/13/2024 0635 by Dana Jon, RN  Outcome: Not Progressing  Note: Patient with chronic pain to bradford knees. Gait is slightly unsteady as a result.     Problem: Pain  Goal: Verbalizes/displays adequate comfort level or baseline comfort level  7/13/2024 0635 by Dana Jon, RN  Outcome: Not Progressing  Note: Patient with chronic pain to bradford knees. Gait is slightly unsteady as a result.  7/12/2024 1833 by Fiona Madera, RN  Outcome: Progressing

## 2024-07-14 LAB
ALBUMIN SERPL-MCNC: 3.5 G/DL (ref 3.4–5)
ALP SERPL-CCNC: 88 U/L (ref 40–129)
ALT SERPL-CCNC: 27 U/L (ref 10–40)
ANION GAP SERPL CALCULATED.3IONS-SCNC: 11 MMOL/L (ref 3–16)
ANION GAP SERPL CALCULATED.3IONS-SCNC: 11 MMOL/L (ref 3–16)
AST SERPL-CCNC: 30 U/L (ref 15–37)
BASOPHILS # BLD: 0 K/UL (ref 0–0.2)
BASOPHILS # BLD: 0 K/UL (ref 0–0.2)
BASOPHILS NFR BLD: 0 %
BASOPHILS NFR BLD: 0 %
BILIRUB DIRECT SERPL-MCNC: <0.2 MG/DL (ref 0–0.3)
BILIRUB INDIRECT SERPL-MCNC: NORMAL MG/DL (ref 0–1)
BILIRUB SERPL-MCNC: <0.2 MG/DL (ref 0–1)
BILIRUB UR QL STRIP.AUTO: NEGATIVE
BLASTS NFR BLD MANUAL: 31 %
BLASTS NFR BLD MANUAL: 9 %
BUN SERPL-MCNC: 17 MG/DL (ref 7–20)
BUN SERPL-MCNC: 20 MG/DL (ref 7–20)
CALCIUM SERPL-MCNC: 8.5 MG/DL (ref 8.3–10.6)
CALCIUM SERPL-MCNC: 8.8 MG/DL (ref 8.3–10.6)
CHLORIDE SERPL-SCNC: 102 MMOL/L (ref 99–110)
CHLORIDE SERPL-SCNC: 105 MMOL/L (ref 99–110)
CLARITY UR: CLEAR
CO2 SERPL-SCNC: 25 MMOL/L (ref 21–32)
CO2 SERPL-SCNC: 25 MMOL/L (ref 21–32)
COLOR UR: YELLOW
CREAT SERPL-MCNC: 0.6 MG/DL (ref 0.6–1.1)
CREAT SERPL-MCNC: 0.6 MG/DL (ref 0.6–1.1)
DEPRECATED RDW RBC AUTO: 15.1 % (ref 12.4–15.4)
DEPRECATED RDW RBC AUTO: 15.2 % (ref 12.4–15.4)
EOSINOPHIL # BLD: 0 K/UL (ref 0–0.6)
EOSINOPHIL # BLD: 0.7 K/UL (ref 0–0.6)
EOSINOPHIL NFR BLD: 0 %
EOSINOPHIL NFR BLD: 1 %
FIBRINOGEN PPP-MCNC: 435 MG/DL (ref 227–534)
GFR SERPLBLD CREATININE-BSD FMLA CKD-EPI: >90 ML/MIN/{1.73_M2}
GFR SERPLBLD CREATININE-BSD FMLA CKD-EPI: >90 ML/MIN/{1.73_M2}
GLUCOSE SERPL-MCNC: 127 MG/DL (ref 70–99)
GLUCOSE SERPL-MCNC: 131 MG/DL (ref 70–99)
GLUCOSE UR STRIP.AUTO-MCNC: NEGATIVE MG/DL
HCT VFR BLD AUTO: 40.7 % (ref 36–48)
HCT VFR BLD AUTO: 41.4 % (ref 36–48)
HGB BLD-MCNC: 13.3 G/DL (ref 12–16)
HGB BLD-MCNC: 13.5 G/DL (ref 12–16)
HGB UR QL STRIP.AUTO: NEGATIVE
KETONES UR STRIP.AUTO-MCNC: NEGATIVE MG/DL
LDH SERPL L TO P-CCNC: 1227 U/L (ref 100–190)
LEUKOCYTE ESTERASE UR QL STRIP.AUTO: NEGATIVE
LYMPHOCYTES # BLD: 32.4 K/UL (ref 1–5.1)
LYMPHOCYTES # BLD: 44.1 K/UL (ref 1–5.1)
LYMPHOCYTES NFR BLD: 15 %
LYMPHOCYTES NFR BLD: 48 %
MAGNESIUM SERPL-MCNC: 2.5 MG/DL (ref 1.8–2.4)
MCH RBC QN AUTO: 28.6 PG (ref 26–34)
MCH RBC QN AUTO: 28.8 PG (ref 26–34)
MCHC RBC AUTO-ENTMCNC: 32.7 G/DL (ref 31–36)
MCHC RBC AUTO-ENTMCNC: 32.7 G/DL (ref 31–36)
MCV RBC AUTO: 87.5 FL (ref 80–100)
MCV RBC AUTO: 88.1 FL (ref 80–100)
METAMYELOCYTES NFR BLD MANUAL: 2 %
MONOCYTES # BLD: 0 K/UL (ref 0–1.3)
MONOCYTES # BLD: 1.3 K/UL (ref 0–1.3)
MONOCYTES NFR BLD: 0 %
MONOCYTES NFR BLD: 2 %
MYELOCYTES NFR BLD MANUAL: 2 %
NEUTROPHILS # BLD: 12 K/UL (ref 1.7–7.7)
NEUTROPHILS # BLD: 13.5 K/UL (ref 1.7–7.7)
NEUTROPHILS NFR BLD: 13 %
NEUTROPHILS NFR BLD: 19 %
NEUTS BAND NFR BLD MANUAL: 3 % (ref 0–7)
NITRITE UR QL STRIP.AUTO: NEGATIVE
PATH INTERP BLD-IMP: NO
PATH INTERP BLD-IMP: NO
PH UR STRIP.AUTO: 6.5 [PH] (ref 5–8)
PHOSPHATE SERPL-MCNC: 3.3 MG/DL (ref 2.5–4.9)
PHOSPHATE SERPL-MCNC: 3.3 MG/DL (ref 2.5–4.9)
PLATELET # BLD AUTO: 171 K/UL (ref 135–450)
PLATELET # BLD AUTO: 175 K/UL (ref 135–450)
PLATELET BLD QL SMEAR: ADEQUATE
PMV BLD AUTO: 7.8 FL (ref 5–10.5)
PMV BLD AUTO: 8 FL (ref 5–10.5)
POTASSIUM SERPL-SCNC: 4.2 MMOL/L (ref 3.5–5.1)
POTASSIUM SERPL-SCNC: 4.5 MMOL/L (ref 3.5–5.1)
PROT SERPL-MCNC: 6.6 G/DL (ref 6.4–8.2)
PROT UR STRIP.AUTO-MCNC: NEGATIVE MG/DL
RBC # BLD AUTO: 4.62 M/UL (ref 4–5.2)
RBC # BLD AUTO: 4.73 M/UL (ref 4–5.2)
RBC MORPH BLD: NORMAL
SLIDE REVIEW: ABNORMAL
SODIUM SERPL-SCNC: 138 MMOL/L (ref 136–145)
SODIUM SERPL-SCNC: 141 MMOL/L (ref 136–145)
SP GR UR STRIP.AUTO: 1.02 (ref 1–1.03)
UA DIPSTICK W REFLEX MICRO PNL UR: NORMAL
URATE SERPL-MCNC: 3.1 MG/DL (ref 2.6–6)
URATE SERPL-MCNC: 3.4 MG/DL (ref 2.6–6)
URN SPEC COLLECT METH UR: NORMAL
UROBILINOGEN UR STRIP-ACNC: 0.2 E.U./DL
VARIANT LYMPHS NFR BLD MANUAL: 55 % (ref 0–6)
WBC # BLD AUTO: 63 K/UL (ref 4–11)
WBC # BLD AUTO: 67.6 K/UL (ref 4–11)

## 2024-07-14 PROCEDURE — 83735 ASSAY OF MAGNESIUM: CPT

## 2024-07-14 PROCEDURE — 85025 COMPLETE CBC W/AUTO DIFF WBC: CPT

## 2024-07-14 PROCEDURE — 85384 FIBRINOGEN ACTIVITY: CPT

## 2024-07-14 PROCEDURE — 3E04305 INTRODUCTION OF OTHER ANTINEOPLASTIC INTO CENTRAL VEIN, PERCUTANEOUS APPROACH: ICD-10-PCS | Performed by: STUDENT IN AN ORGANIZED HEALTH CARE EDUCATION/TRAINING PROGRAM

## 2024-07-14 PROCEDURE — 97116 GAIT TRAINING THERAPY: CPT

## 2024-07-14 PROCEDURE — 80048 BASIC METABOLIC PNL TOTAL CA: CPT

## 2024-07-14 PROCEDURE — 97162 PT EVAL MOD COMPLEX 30 MIN: CPT

## 2024-07-14 PROCEDURE — 6360000002 HC RX W HCPCS: Performed by: NURSE PRACTITIONER

## 2024-07-14 PROCEDURE — 6370000000 HC RX 637 (ALT 250 FOR IP): Performed by: STUDENT IN AN ORGANIZED HEALTH CARE EDUCATION/TRAINING PROGRAM

## 2024-07-14 PROCEDURE — 80076 HEPATIC FUNCTION PANEL: CPT

## 2024-07-14 PROCEDURE — 84100 ASSAY OF PHOSPHORUS: CPT

## 2024-07-14 PROCEDURE — 83615 LACTATE (LD) (LDH) ENZYME: CPT

## 2024-07-14 PROCEDURE — 84550 ASSAY OF BLOOD/URIC ACID: CPT

## 2024-07-14 PROCEDURE — 36592 COLLECT BLOOD FROM PICC: CPT

## 2024-07-14 PROCEDURE — 81003 URINALYSIS AUTO W/O SCOPE: CPT

## 2024-07-14 PROCEDURE — 6360000002 HC RX W HCPCS: Performed by: STUDENT IN AN ORGANIZED HEALTH CARE EDUCATION/TRAINING PROGRAM

## 2024-07-14 PROCEDURE — 2580000003 HC RX 258: Performed by: STUDENT IN AN ORGANIZED HEALTH CARE EDUCATION/TRAINING PROGRAM

## 2024-07-14 PROCEDURE — 2580000003 HC RX 258

## 2024-07-14 PROCEDURE — 2060000000 HC ICU INTERMEDIATE R&B

## 2024-07-14 PROCEDURE — 6370000000 HC RX 637 (ALT 250 FOR IP): Performed by: NURSE PRACTITIONER

## 2024-07-14 PROCEDURE — 6370000000 HC RX 637 (ALT 250 FOR IP)

## 2024-07-14 RX ORDER — ONDANSETRON 2 MG/ML
24 INJECTION INTRAMUSCULAR; INTRAVENOUS ONCE
Status: DISCONTINUED | OUTPATIENT
Start: 2024-07-14 | End: 2024-07-14

## 2024-07-14 RX ORDER — DEXAMETHASONE SODIUM PHOSPHATE 1 MG/ML
2 SOLUTION/ DROPS OPHTHALMIC EVERY 6 HOURS SCHEDULED
Status: DISCONTINUED | OUTPATIENT
Start: 2024-07-14 | End: 2024-07-14

## 2024-07-14 RX ORDER — DEXAMETHASONE SODIUM PHOSPHATE 1 MG/ML
2 SOLUTION/ DROPS OPHTHALMIC EVERY 6 HOURS
Status: COMPLETED | OUTPATIENT
Start: 2024-07-14 | End: 2024-07-15

## 2024-07-14 RX ORDER — HYDROXYUREA 500 MG/1
1000 CAPSULE ORAL 2 TIMES DAILY
Status: DISCONTINUED | OUTPATIENT
Start: 2024-07-14 | End: 2024-07-18

## 2024-07-14 RX ORDER — HYDRALAZINE HYDROCHLORIDE 20 MG/ML
10 INJECTION INTRAMUSCULAR; INTRAVENOUS ONCE
Status: COMPLETED | OUTPATIENT
Start: 2024-07-14 | End: 2024-07-14

## 2024-07-14 RX ORDER — ONDANSETRON HYDROCHLORIDE 8 MG/1
24 TABLET, FILM COATED ORAL ONCE
Status: COMPLETED | OUTPATIENT
Start: 2024-07-14 | End: 2024-07-14

## 2024-07-14 RX ORDER — BISACODYL 5 MG/1
5 TABLET, DELAYED RELEASE ORAL DAILY PRN
Status: DISCONTINUED | OUTPATIENT
Start: 2024-07-14 | End: 2024-07-18 | Stop reason: HOSPADM

## 2024-07-14 RX ORDER — FUROSEMIDE 10 MG/ML
40 INJECTION INTRAMUSCULAR; INTRAVENOUS ONCE
Status: COMPLETED | OUTPATIENT
Start: 2024-07-14 | End: 2024-07-14

## 2024-07-14 RX ADMIN — SODIUM CHLORIDE: 9 INJECTION, SOLUTION INTRAVENOUS at 07:57

## 2024-07-14 RX ADMIN — SENNOSIDES AND DOCUSATE SODIUM 2 TABLET: 50; 8.6 TABLET ORAL at 08:00

## 2024-07-14 RX ADMIN — VALACYCLOVIR HYDROCHLORIDE 500 MG: 500 TABLET, FILM COATED ORAL at 08:00

## 2024-07-14 RX ADMIN — HYDRALAZINE HYDROCHLORIDE 10 MG: 20 INJECTION, SOLUTION INTRAMUSCULAR; INTRAVENOUS at 17:15

## 2024-07-14 RX ADMIN — GABAPENTIN 300 MG: 300 CAPSULE ORAL at 20:06

## 2024-07-14 RX ADMIN — BISACODYL 5 MG: 5 TABLET, COATED ORAL at 09:58

## 2024-07-14 RX ADMIN — METHOCARBAMOL 750 MG: 750 TABLET ORAL at 03:07

## 2024-07-14 RX ADMIN — FLUCONAZOLE 200 MG: 200 TABLET ORAL at 08:00

## 2024-07-14 RX ADMIN — LEVOFLOXACIN 500 MG: 500 TABLET, FILM COATED ORAL at 08:00

## 2024-07-14 RX ADMIN — GABAPENTIN 300 MG: 300 CAPSULE ORAL at 14:05

## 2024-07-14 RX ADMIN — SENNOSIDES AND DOCUSATE SODIUM 2 TABLET: 50; 8.6 TABLET ORAL at 20:06

## 2024-07-14 RX ADMIN — HYDROCHLOROTHIAZIDE 12.5 MG: 12.5 CAPSULE ORAL at 08:01

## 2024-07-14 RX ADMIN — CYTARABINE 1000 MG: 100 INJECTION, SOLUTION INTRATHECAL; INTRAVENOUS; SUBCUTANEOUS at 11:44

## 2024-07-14 RX ADMIN — HYDROXYUREA 1000 MG: 500 CAPSULE ORAL at 20:07

## 2024-07-14 RX ADMIN — DEXAMETHASONE SODIUM PHOSPHATE 2 DROP: 1 SOLUTION/ DROPS OPHTHALMIC at 20:06

## 2024-07-14 RX ADMIN — ONDANSETRON HYDROCHLORIDE 24 MG: 8 TABLET, FILM COATED ORAL at 10:54

## 2024-07-14 RX ADMIN — DEXAMETHASONE SODIUM PHOSPHATE 2 DROP: 1 SOLUTION/ DROPS OPHTHALMIC at 16:11

## 2024-07-14 RX ADMIN — DEXAMETHASONE 20 MG: 4 TABLET ORAL at 08:01

## 2024-07-14 RX ADMIN — ALLOPURINOL 300 MG: 300 TABLET ORAL at 08:00

## 2024-07-14 RX ADMIN — GABAPENTIN 300 MG: 300 CAPSULE ORAL at 08:00

## 2024-07-14 RX ADMIN — OXYCODONE 5 MG: 5 TABLET ORAL at 08:00

## 2024-07-14 RX ADMIN — CARVEDILOL 6.25 MG: 3.12 TABLET, FILM COATED ORAL at 08:01

## 2024-07-14 RX ADMIN — CARVEDILOL 6.25 MG: 3.12 TABLET, FILM COATED ORAL at 16:11

## 2024-07-14 RX ADMIN — SODIUM CHLORIDE, PRESERVATIVE FREE 10 ML: 5 INJECTION INTRAVENOUS at 08:01

## 2024-07-14 RX ADMIN — LOSARTAN POTASSIUM 50 MG: 50 TABLET, FILM COATED ORAL at 08:00

## 2024-07-14 RX ADMIN — DEXAMETHASONE SODIUM PHOSPHATE 2 DROP: 1 SOLUTION/ DROPS OPHTHALMIC at 10:54

## 2024-07-14 RX ADMIN — ACETAMINOPHEN 650 MG: 325 TABLET ORAL at 06:29

## 2024-07-14 RX ADMIN — VALACYCLOVIR HYDROCHLORIDE 500 MG: 500 TABLET, FILM COATED ORAL at 20:06

## 2024-07-14 RX ADMIN — ACETAMINOPHEN 650 MG: 325 TABLET ORAL at 23:56

## 2024-07-14 RX ADMIN — POLYETHYLENE GLYCOL 3350 17 G: 17 POWDER, FOR SOLUTION ORAL at 08:01

## 2024-07-14 RX ADMIN — FUROSEMIDE 40 MG: 10 INJECTION, SOLUTION INTRAMUSCULAR; INTRAVENOUS at 09:58

## 2024-07-14 RX ADMIN — HYDROXYUREA 1000 MG: 500 CAPSULE ORAL at 08:01

## 2024-07-14 ASSESSMENT — PAIN SCALES - GENERAL
PAINLEVEL_OUTOF10: 3
PAINLEVEL_OUTOF10: 5
PAINLEVEL_OUTOF10: 3
PAINLEVEL_OUTOF10: 3
PAINLEVEL_OUTOF10: 6
PAINLEVEL_OUTOF10: 3
PAINLEVEL_OUTOF10: 5

## 2024-07-14 ASSESSMENT — PAIN SCALES - WONG BAKER
WONGBAKER_NUMERICALRESPONSE: HURTS LITTLE MORE
WONGBAKER_NUMERICALRESPONSE: NO HURT
WONGBAKER_NUMERICALRESPONSE: NO HURT

## 2024-07-14 ASSESSMENT — PAIN DESCRIPTION - FREQUENCY
FREQUENCY: CONTINUOUS

## 2024-07-14 ASSESSMENT — PAIN DESCRIPTION - DESCRIPTORS
DESCRIPTORS: ACHING
DESCRIPTORS: ACHING
DESCRIPTORS: THROBBING
DESCRIPTORS: THROBBING;ACHING

## 2024-07-14 ASSESSMENT — PAIN DESCRIPTION - PAIN TYPE
TYPE: CHRONIC PAIN

## 2024-07-14 ASSESSMENT — PAIN DESCRIPTION - ORIENTATION
ORIENTATION: LEFT

## 2024-07-14 ASSESSMENT — PAIN DESCRIPTION - ONSET
ONSET: ON-GOING

## 2024-07-14 ASSESSMENT — PAIN DESCRIPTION - LOCATION
LOCATION: LEG

## 2024-07-14 NOTE — PROGRESS NOTES
Patient BP elevated upon afternoon assessment, see flowsheets. Dr. ROSALINDA Mccormick notified, new orders placed. One time dose hydralazine given. Care continues.

## 2024-07-14 NOTE — PROGRESS NOTES
[facility] Progress Note    2024     Pam Guido    MRN: [id number]    : 1975    Referring MD: Luz Marina Whittaker, APRN - CNP  [address] [place] [address]      SUBJECTIVE:  worsening WBC, despite dex and hydrea. Discussed a dose of 1g of cytarabine for leukoreduction until BCR-ABL status . Pt is in agreement     ECOG PS:  (1) Restricted in physically strenuous activity, ambulatory and able to do work of light nature    KPS: 80% Normal activity with effort; some signs or symptoms of disease    Isolation: None    Medications    Scheduled Meds:   hydroxyurea  1,000 mg Oral BID    dexAMETHasone  20 mg Oral Daily    sodium chloride flush  5-40 mL IntraVENous 2 times per day    Saline Mouthwash  15 mL Swish & Spit 4x Daily AC & HS    valACYclovir  500 mg Oral BID    fluconazole  200 mg Oral Daily    levoFLOXacin  500 mg Oral Daily    allopurinol  300 mg Oral Daily    sodium chloride flush  5-40 mL IntraVENous 2 times per day    carvedilol  6.25 mg Oral BID WC    hydroCHLOROthiazide  12.5 mg Oral QAM    losartan  50 mg Oral QAM    gabapentin  300 mg Oral TID    sennosides-docusate sodium  2 tablet Oral BID    polyethylene glycol  17 g Oral Daily     Continuous Infusions:   sodium chloride      sodium chloride      sodium chloride 100 mL/hr at 24 1154    sodium chloride       PRN Meds:.acetaminophen, sodium chloride, sodium chloride flush, sodium chloride, magnesium sulfate, Saline Mouthwash, sodium chloride flush, sodium chloride, ALTEplase, perflutren lipid microspheres, oxyCODONE, methocarbamol, simethicone    ROS:  As noted above, otherwise remainder of 10-point ROS negative    Physical Exam:     I&O:    Intake/Output Summary (Last 24 hours) at 2024 0711  Last data filed at 2024 0626  Gross per 24 hour   Intake 2044 ml   Output 2150 ml   Net -106 ml         Vital Signs:  BP (!) 154/98   Pulse 76   Temp 98.4 °F (36.9 °C) (Oral)   Resp 17   Ht 1.727 m (5' 8\")   Wt 113.8 kg (250 lb

## 2024-07-14 NOTE — PROGRESS NOTES
tolerated evaluation without incident     Plan   Physical Therapy Plan  General Plan: Discharge  Safety Devices  Type of Devices: Chair alarm in place, Call light within reach, Left in chair, Nurse notified     Restrictions  Position Activity Restriction  Other position/activity restrictions: up as tolerated     Subjective   Pain: pt reports 5/10 pain in knees; RN aware.  General  Chart Reviewed: Yes  Patient assessed for rehabilitation services?: Yes  Additional Pertinent Hx: Pt is a 49 y.o. female admitted with substernal chest pain and body aches. EKG negative, CTPE negative. 7/11 Image guided bone marrow biopsy with aspiration: Peripheral flow cytometry and preliminary report from bone marrow biopsy consistent with findings of acute lymphocytic leukemia. PMH essential hypertension, obesity, chronic pain, and GERD.  Family / Caregiver Present: No  Referring Practitioner: Shaylee Mccormick  Referral Date : 07/12/24  Diagnosis: ALL  Follows Commands: Within Functional Limits  Subjective  Subjective: Pt sitting upright in chair upon arrival of PT. pt agreeable to therapy session.         Social/Functional History  Social/Functional History  Lives With: Spouse  Type of Home: Senior housing apartment  Home Layout: One level  Bathroom Shower/Tub: Tub/Shower unit, Walk-in shower  Bathroom Toilet: Handicap height  Bathroom Equipment: Grab bars in shower, Tub transfer bench, 3-in-1 commode, Toilet raiser, Shower chair  Bathroom Accessibility: Accessible  Home Equipment: Cane, Walker - Rolling  Has the patient had two or more falls in the past year or any fall with injury in the past year?: No  Receives Help From: Family  ADL Assistance: Independent  Homemaking Assistance: Independent  Homemaking Responsibilities: Yes  Ambulation Assistance: Independent  Transfer Assistance: Independent  Active : Yes  Occupation: Full time employment  Type of Occupation: works in [employer] at [employer]  Vision/Hearing  Vision  Vision:  Provided: Role of Therapy;Plan of Care  Education Method: Verbal  Barriers to Learning: None  Education Outcome: Verbalized understanding    Therapy Time   Individual Concurrent Group Co-treatment   Time In 0808         Time Out 0838         Minutes 30            Timed Code Treatment Minutes:  15 minute eval + 15 minute treatment    Total Treatment Minutes:  30 minutes      Dayana Redmond, PT

## 2024-07-14 NOTE — ONCOLOGY
Verification:  Original chemotherapy orders reviewed and acknowledged. ORDERS PLACED BY DR. CHAZ CONWAY, NO PAPER ORDERS PER DO ENTERING ORDERS DIRECTLY. Appropriateness of chemotherapy treatment regimen cytarabine for diagnosis of ALL was verified.  Patient educated on chemotherapy regimen.  Acknowledgement of informed consent for chemotherapy obtained.      Estimated body surface area is 2.35 meters squared as calculated from the following:    Height as of this encounter: 1.727 m (5' 8\").    Weight as of this encounter: 115.2 kg (254 lb). verified.  Appropriate dosing calculations of chemotherapy based on above height, weight, and BSA verified.        Administration: Chemotherapy drug cytarabine independently verified with Maggie Murillo RN prior to administration.  Acknowledgement of informed consent for chemotherapy administration verified.  Original order, appropriateness of regimen, drug supplied, height, weight, BSA, dose calculations, expiration dates/times, drug appearance, and two patient identifiers were verified by both RNs.  Drug checked for vesicant/irritant status and for risk of hypersensitivity.  Most recent laboratory values and allergies, were reviewed.  Positive, brisk blood return via CVC was confirmed prior to administration. Chest x-ray for correct line placement reviewed. Carlene Hughes, RN, RN and Maggie Murillo RN verified correct rate of chemotherapy and maintenance IV fluids.  Patient was educated on chemotherapy regimen prior to administration including indication for treatment related to disease & side effects of chemotherapy drug.  Patient verbalizes understanding of all instructions.        Completion of Chemotherapy: Monitoring during infusion done per policy, see Flowsheets.  Blood return verified before, during, and after infusion per policy; no signs of extravasation.  Pt {tolerating chemotherapy well and without incident.  Chemotherapy infusion end time on the MAR.

## 2024-07-14 NOTE — PLAN OF CARE
Problem: Safety - Adult  Goal: Free from fall injury  Outcome: Progressing- patient SBA. Ambulated in halls during shift. To and from bathroom, stiffness in legs but steady. Care continues.     Problem: Pain  Goal: Verbalizes/displays adequate comfort level or baseline comfort level  Outcome: Progressing- patient with complaints of leg pain during shift. PRN pain medication administered, relief of pain reported upon reassessment. Non pharmacologic alternatives utilized, like heat packs.      Problem: Hematologic - Adult  Goal: Maintains hematologic stability  Outcome: Progressing  Patient's hemoglobin this AM:   Recent Labs     07/14/24  0302   HGB 13.3     Patient's platelet count this AM:   Recent Labs     07/14/24  0302       Thrombocytopenia not present at this time.  Patient showing no signs or symptoms of active bleeding.  Transfusion not indicated at this time.  Patient verbalizes understanding of all instructions. Will continue to assess and implement POC. Call light within reach and hourly rounding in place.

## 2024-07-15 ENCOUNTER — APPOINTMENT (OUTPATIENT)
Dept: GENERAL RADIOLOGY | Age: 49
DRG: 835 | End: 2024-07-15
Attending: STUDENT IN AN ORGANIZED HEALTH CARE EDUCATION/TRAINING PROGRAM
Payer: COMMERCIAL

## 2024-07-15 LAB
ALBUMIN SERPL-MCNC: 3.3 G/DL (ref 3.4–5)
ALP SERPL-CCNC: 76 U/L (ref 40–129)
ALT SERPL-CCNC: 22 U/L (ref 10–40)
ANION GAP SERPL CALCULATED.3IONS-SCNC: 8 MMOL/L (ref 3–16)
ANION GAP SERPL CALCULATED.3IONS-SCNC: 9 MMOL/L (ref 3–16)
AST SERPL-CCNC: 40 U/L (ref 15–37)
BASOPHILS # BLD: 0.2 K/UL (ref 0–0.2)
BASOPHILS # BLD: 0.4 K/UL (ref 0–0.2)
BASOPHILS NFR BLD: 1 %
BASOPHILS NFR BLD: 1 %
BILIRUB DIRECT SERPL-MCNC: <0.2 MG/DL (ref 0–0.3)
BILIRUB INDIRECT SERPL-MCNC: ABNORMAL MG/DL (ref 0–1)
BILIRUB SERPL-MCNC: <0.2 MG/DL (ref 0–1)
BUN SERPL-MCNC: 19 MG/DL (ref 7–20)
BUN SERPL-MCNC: 20 MG/DL (ref 7–20)
CALCIUM SERPL-MCNC: 8.4 MG/DL (ref 8.3–10.6)
CALCIUM SERPL-MCNC: 8.4 MG/DL (ref 8.3–10.6)
CHLORIDE SERPL-SCNC: 102 MMOL/L (ref 99–110)
CHLORIDE SERPL-SCNC: 104 MMOL/L (ref 99–110)
CO2 SERPL-SCNC: 25 MMOL/L (ref 21–32)
CO2 SERPL-SCNC: 26 MMOL/L (ref 21–32)
CREAT SERPL-MCNC: 0.6 MG/DL (ref 0.6–1.1)
CREAT SERPL-MCNC: <0.5 MG/DL (ref 0.6–1.1)
DEPRECATED RDW RBC AUTO: 15.5 % (ref 12.4–15.4)
DEPRECATED RDW RBC AUTO: 15.7 % (ref 12.4–15.4)
EOSINOPHIL # BLD: 0.1 K/UL (ref 0–0.6)
EOSINOPHIL # BLD: 0.2 K/UL (ref 0–0.6)
EOSINOPHIL NFR BLD: 0.5 %
EOSINOPHIL NFR BLD: 0.8 %
FIBRINOGEN PPP-MCNC: 390 MG/DL (ref 227–534)
GFR SERPLBLD CREATININE-BSD FMLA CKD-EPI: >90 ML/MIN/{1.73_M2}
GFR SERPLBLD CREATININE-BSD FMLA CKD-EPI: >90 ML/MIN/{1.73_M2}
GLUCOSE SERPL-MCNC: 115 MG/DL (ref 70–99)
GLUCOSE SERPL-MCNC: 148 MG/DL (ref 70–99)
HCT VFR BLD AUTO: 37.5 % (ref 36–48)
HCT VFR BLD AUTO: 40.4 % (ref 36–48)
HGB BLD-MCNC: 12.6 G/DL (ref 12–16)
HGB BLD-MCNC: 13.5 G/DL (ref 12–16)
LDH SERPL L TO P-CCNC: 1563 U/L (ref 100–190)
LYMPHOCYTES # BLD: 31.6 K/UL (ref 1–5.1)
LYMPHOCYTES # BLD: 7.8 K/UL (ref 1–5.1)
LYMPHOCYTES NFR BLD: 49 %
LYMPHOCYTES NFR BLD: 73.2 %
MAGNESIUM SERPL-MCNC: 2.5 MG/DL (ref 1.8–2.4)
MCH RBC QN AUTO: 29.1 PG (ref 26–34)
MCH RBC QN AUTO: 29.2 PG (ref 26–34)
MCHC RBC AUTO-ENTMCNC: 33.3 G/DL (ref 31–36)
MCHC RBC AUTO-ENTMCNC: 33.7 G/DL (ref 31–36)
MCV RBC AUTO: 86.7 FL (ref 80–100)
MCV RBC AUTO: 87.4 FL (ref 80–100)
MONOCYTES # BLD: 0.3 K/UL (ref 0–1.3)
MONOCYTES # BLD: 0.4 K/UL (ref 0–1.3)
MONOCYTES NFR BLD: 0.8 %
MONOCYTES NFR BLD: 1.9 %
NEUTROPHILS # BLD: 10.6 K/UL (ref 1.7–7.7)
NEUTROPHILS # BLD: 7.5 K/UL (ref 1.7–7.7)
NEUTROPHILS NFR BLD: 24.5 %
NEUTROPHILS NFR BLD: 47.3 %
PATH INTERP BLD-IMP: NORMAL
PHOSPHATE SERPL-MCNC: 3.6 MG/DL (ref 2.5–4.9)
PHOSPHATE SERPL-MCNC: 4 MG/DL (ref 2.5–4.9)
PLATELET # BLD AUTO: 126 K/UL (ref 135–450)
PLATELET # BLD AUTO: 138 K/UL (ref 135–450)
PMV BLD AUTO: 7.4 FL (ref 5–10.5)
PMV BLD AUTO: 7.6 FL (ref 5–10.5)
POTASSIUM SERPL-SCNC: 4.4 MMOL/L (ref 3.5–5.1)
POTASSIUM SERPL-SCNC: 4.8 MMOL/L (ref 3.5–5.1)
PROT SERPL-MCNC: 6.1 G/DL (ref 6.4–8.2)
RBC # BLD AUTO: 4.32 M/UL (ref 4–5.2)
RBC # BLD AUTO: 4.62 M/UL (ref 4–5.2)
SODIUM SERPL-SCNC: 135 MMOL/L (ref 136–145)
SODIUM SERPL-SCNC: 139 MMOL/L (ref 136–145)
URATE SERPL-MCNC: 2.8 MG/DL (ref 2.6–6)
URATE SERPL-MCNC: 3.3 MG/DL (ref 2.6–6)
WBC # BLD AUTO: 15.9 K/UL (ref 4–11)
WBC # BLD AUTO: 43.2 K/UL (ref 4–11)

## 2024-07-15 PROCEDURE — 2060000000 HC ICU INTERMEDIATE R&B

## 2024-07-15 PROCEDURE — 83615 LACTATE (LD) (LDH) ENZYME: CPT

## 2024-07-15 PROCEDURE — 83735 ASSAY OF MAGNESIUM: CPT

## 2024-07-15 PROCEDURE — A4217 STERILE WATER/SALINE, 500 ML: HCPCS

## 2024-07-15 PROCEDURE — 80048 BASIC METABOLIC PNL TOTAL CA: CPT

## 2024-07-15 PROCEDURE — 84100 ASSAY OF PHOSPHORUS: CPT

## 2024-07-15 PROCEDURE — 6370000000 HC RX 637 (ALT 250 FOR IP)

## 2024-07-15 PROCEDURE — 6370000000 HC RX 637 (ALT 250 FOR IP): Performed by: STUDENT IN AN ORGANIZED HEALTH CARE EDUCATION/TRAINING PROGRAM

## 2024-07-15 PROCEDURE — 85384 FIBRINOGEN ACTIVITY: CPT

## 2024-07-15 PROCEDURE — 6360000002 HC RX W HCPCS: Performed by: NURSE PRACTITIONER

## 2024-07-15 PROCEDURE — 2580000003 HC RX 258

## 2024-07-15 PROCEDURE — 80076 HEPATIC FUNCTION PANEL: CPT

## 2024-07-15 PROCEDURE — 84550 ASSAY OF BLOOD/URIC ACID: CPT

## 2024-07-15 PROCEDURE — 71045 X-RAY EXAM CHEST 1 VIEW: CPT

## 2024-07-15 PROCEDURE — 85025 COMPLETE CBC W/AUTO DIFF WBC: CPT

## 2024-07-15 PROCEDURE — 36592 COLLECT BLOOD FROM PICC: CPT

## 2024-07-15 RX ADMIN — ACETAMINOPHEN 650 MG: 325 TABLET ORAL at 13:35

## 2024-07-15 RX ADMIN — SODIUM CHLORIDE: 9 INJECTION, SOLUTION INTRAVENOUS at 18:52

## 2024-07-15 RX ADMIN — GABAPENTIN 300 MG: 300 CAPSULE ORAL at 21:48

## 2024-07-15 RX ADMIN — DEXAMETHASONE SODIUM PHOSPHATE 2 DROP: 1 SOLUTION/ DROPS OPHTHALMIC at 03:35

## 2024-07-15 RX ADMIN — SODIUM CHLORIDE 15 ML: 900 IRRIGANT IRRIGATION at 16:20

## 2024-07-15 RX ADMIN — SENNOSIDES AND DOCUSATE SODIUM 2 TABLET: 50; 8.6 TABLET ORAL at 08:37

## 2024-07-15 RX ADMIN — CARVEDILOL 6.25 MG: 3.12 TABLET, FILM COATED ORAL at 08:37

## 2024-07-15 RX ADMIN — VALACYCLOVIR HYDROCHLORIDE 500 MG: 500 TABLET, FILM COATED ORAL at 21:48

## 2024-07-15 RX ADMIN — SODIUM CHLORIDE 15 ML: 900 IRRIGANT IRRIGATION at 21:51

## 2024-07-15 RX ADMIN — DEXAMETHASONE 20 MG: 4 TABLET ORAL at 08:38

## 2024-07-15 RX ADMIN — HYDROXYUREA 1000 MG: 500 CAPSULE ORAL at 21:48

## 2024-07-15 RX ADMIN — FLUCONAZOLE 200 MG: 200 TABLET ORAL at 08:38

## 2024-07-15 RX ADMIN — GABAPENTIN 300 MG: 300 CAPSULE ORAL at 13:35

## 2024-07-15 RX ADMIN — POLYETHYLENE GLYCOL 3350 17 G: 17 POWDER, FOR SOLUTION ORAL at 08:38

## 2024-07-15 RX ADMIN — METHOCARBAMOL 750 MG: 750 TABLET ORAL at 03:34

## 2024-07-15 RX ADMIN — HYDROCHLOROTHIAZIDE 12.5 MG: 12.5 CAPSULE ORAL at 08:37

## 2024-07-15 RX ADMIN — SODIUM CHLORIDE, PRESERVATIVE FREE 10 ML: 5 INJECTION INTRAVENOUS at 21:52

## 2024-07-15 RX ADMIN — ALLOPURINOL 300 MG: 300 TABLET ORAL at 08:37

## 2024-07-15 RX ADMIN — OXYCODONE 5 MG: 5 TABLET ORAL at 05:33

## 2024-07-15 RX ADMIN — HYDROXYUREA 1000 MG: 500 CAPSULE ORAL at 08:38

## 2024-07-15 RX ADMIN — GABAPENTIN 300 MG: 300 CAPSULE ORAL at 08:38

## 2024-07-15 RX ADMIN — SODIUM CHLORIDE, PRESERVATIVE FREE 10 ML: 5 INJECTION INTRAVENOUS at 21:50

## 2024-07-15 RX ADMIN — ACETAMINOPHEN 650 MG: 325 TABLET ORAL at 21:48

## 2024-07-15 RX ADMIN — LOSARTAN POTASSIUM 50 MG: 50 TABLET, FILM COATED ORAL at 08:37

## 2024-07-15 RX ADMIN — ACETAMINOPHEN 650 MG: 325 TABLET ORAL at 08:58

## 2024-07-15 RX ADMIN — CARVEDILOL 6.25 MG: 3.12 TABLET, FILM COATED ORAL at 16:20

## 2024-07-15 RX ADMIN — LEVOFLOXACIN 500 MG: 500 TABLET, FILM COATED ORAL at 08:36

## 2024-07-15 RX ADMIN — VALACYCLOVIR HYDROCHLORIDE 500 MG: 500 TABLET, FILM COATED ORAL at 08:37

## 2024-07-15 RX ADMIN — METHOCARBAMOL 750 MG: 750 TABLET ORAL at 08:36

## 2024-07-15 ASSESSMENT — PAIN DESCRIPTION - LOCATION
LOCATION: LEG
LOCATION: LEG;HIP
LOCATION: KNEE

## 2024-07-15 ASSESSMENT — PAIN SCALES - GENERAL
PAINLEVEL_OUTOF10: 0
PAINLEVEL_OUTOF10: 2
PAINLEVEL_OUTOF10: 3
PAINLEVEL_OUTOF10: 6
PAINLEVEL_OUTOF10: 2
PAINLEVEL_OUTOF10: 2
PAINLEVEL_OUTOF10: 3
PAINLEVEL_OUTOF10: 2
PAINLEVEL_OUTOF10: 6
PAINLEVEL_OUTOF10: 0

## 2024-07-15 ASSESSMENT — PAIN DESCRIPTION - FREQUENCY
FREQUENCY: CONTINUOUS
FREQUENCY: INTERMITTENT
FREQUENCY: CONTINUOUS

## 2024-07-15 ASSESSMENT — PAIN DESCRIPTION - PAIN TYPE
TYPE: ACUTE PAIN
TYPE: CHRONIC PAIN
TYPE: ACUTE PAIN

## 2024-07-15 ASSESSMENT — PAIN - FUNCTIONAL ASSESSMENT
PAIN_FUNCTIONAL_ASSESSMENT: PREVENTS OR INTERFERES WITH MANY ACTIVE NOT PASSIVE ACTIVITIES
PAIN_FUNCTIONAL_ASSESSMENT: PREVENTS OR INTERFERES SOME ACTIVE ACTIVITIES AND ADLS
PAIN_FUNCTIONAL_ASSESSMENT: PREVENTS OR INTERFERES WITH MANY ACTIVE NOT PASSIVE ACTIVITIES
PAIN_FUNCTIONAL_ASSESSMENT: ACTIVITIES ARE NOT PREVENTED
PAIN_FUNCTIONAL_ASSESSMENT: PREVENTS OR INTERFERES SOME ACTIVE ACTIVITIES AND ADLS

## 2024-07-15 ASSESSMENT — PAIN DESCRIPTION - DESCRIPTORS
DESCRIPTORS: ACHING
DESCRIPTORS: CRUSHING
DESCRIPTORS: ACHING

## 2024-07-15 ASSESSMENT — PAIN DESCRIPTION - ORIENTATION
ORIENTATION: LEFT
ORIENTATION: LEFT;LOWER

## 2024-07-15 ASSESSMENT — PAIN DESCRIPTION - ONSET
ONSET: ON-GOING
ONSET: GRADUAL
ONSET: ON-GOING

## 2024-07-15 NOTE — PROGRESS NOTES
Patient seen and assessed for standard line care needs.  CVC site remains free of visible signs and symptoms of infection. No drainage, edema, erythema, pain, itching or warmth noted at and around the insertion site.  Line care performed by Steff Watts RN.  The need for continued use of the CVC is due to ongoing therapy.  Patient verbalizes understanding of line care education provided by line care RN.  Staff RNs will continue to monitor and assess the CVC site throughout the patient's hospital stay.  Sterile dressing changes will continue to be changed per policy.    Steff Watts RN

## 2024-07-15 NOTE — CARE COORDINATION
Case Management Assessment  Initial Evaluation    Date/Time of Evaluation: 7/15/2024 12:11 PM  Assessment Completed by: ALMA Ivey   for [facility] [place] ([facility])  [place] Mobile: [phone]    If patient is discharged prior to next notation, then this note serves as note for discharge by case management.    Patient Name: Pam Guido                   YOB: 1975  Diagnosis: ALL (acute lymphoid leukemia) in relapse (HCC) [C91.02]  Acute lymphocytic leukemia not having achieved remission (HCC) [C91.00]                   Date / Time: 7/12/2024 12:33 PM    Patient Admission Status: Inpatient   Readmission Risk (Low < 19, Mod (19-27), High > 27): Readmission Risk Score: 16.1    Current PCP: Johanna Jensen APRN - CNP  PCP verified by CM? Yes    Chart Reviewed: Yes      History Provided by: Patient  Patient Orientation: Alert and Oriented    Patient Cognition: Alert    Hospitalization in the last 30 days (Readmission):  No    If yes, Readmission Assessment in CM Navigator will be completed.    Advance Directives:      Code Status: Full Code   Patient's Primary Decision Maker is: Legal Next of Kin    Primary Decision Maker: Sunday Guido - Spouse - [phone]    Discharge Planning:    Patient lives with: Spouse/Significant Other Type of Home: Apartment  Primary Care Giver: Self  Patient Support Systems include: Spouse/Significant Other   Current Financial resources: Other (Comment) ([place])  Current community resources: None  Current services prior to admission: None            Current DME:              Type of Home Care services:  None    ADLS  Prior functional level: Independent in ADLs/IADLs  Current functional level: Independent in ADLs/IADLs    PT AM-PAC: 24 /24  OT AM-PAC:   /24    Family can provide assistance at DC: Yes  Would you like Case Management to discuss the discharge plan with any other family members/significant others, and

## 2024-07-15 NOTE — PLAN OF CARE
Problem: Safety - Adult  Goal: Free from fall injury  Outcome: Progressing     Problem: Pain  Goal: Verbalizes/displays adequate comfort level or baseline comfort level  Outcome: Progressing     Problem: Nutrition Deficit:  Goal: Optimize nutritional status  Outcome: Progressing     Problem: Hematologic - Adult  Goal: Maintains hematologic stability  Outcome: Progressing

## 2024-07-15 NOTE — PROGRESS NOTES
[facility] Progress Note    7/15/2024     Pam Guido    MRN: [id number]    : 1975    Referring MD: Luz Marina Whittaker, APRN - CNP  [address] [place] [address]      SUBJECTIVE:  WBC down following cytarabine yesterday.   Still waiting for final path. Having intermittent leg pain but still able to walk.    ECOG PS:  (1) Restricted in physically strenuous activity, ambulatory and able to do work of light nature    KPS: 80% Normal activity with effort; some signs or symptoms of disease    Isolation: None    Medications    Scheduled Meds:   hydroxyurea  1,000 mg Oral BID    dexAMETHasone  20 mg Oral Daily    sodium chloride flush  5-40 mL IntraVENous 2 times per day    Saline Mouthwash  15 mL Swish & Spit 4x Daily AC & HS    valACYclovir  500 mg Oral BID    fluconazole  200 mg Oral Daily    levoFLOXacin  500 mg Oral Daily    allopurinol  300 mg Oral Daily    sodium chloride flush  5-40 mL IntraVENous 2 times per day    carvedilol  6.25 mg Oral BID WC    hydroCHLOROthiazide  12.5 mg Oral QAM    losartan  50 mg Oral QAM    gabapentin  300 mg Oral TID    sennosides-docusate sodium  2 tablet Oral BID    polyethylene glycol  17 g Oral Daily     Continuous Infusions:   sodium chloride      sodium chloride      sodium chloride 100 mL/hr at 24 0757    sodium chloride       PRN Meds:.bisacodyl, acetaminophen, sodium chloride, sodium chloride flush, sodium chloride, magnesium sulfate, Saline Mouthwash, sodium chloride flush, sodium chloride, ALTEplase, perflutren lipid microspheres, oxyCODONE, methocarbamol, simethicone    ROS:  As noted above, otherwise remainder of 10-point ROS negative    Physical Exam:     I&O:    Intake/Output Summary (Last 24 hours) at 7/15/2024 0749  Last data filed at 7/15/2024 0548  Gross per 24 hour   Intake 3670 ml   Output 4075 ml   Net -405 ml         Vital Signs:  /82   Pulse 68   Temp 98.4 °F (36.9 °C) (Oral)   Resp 18   Ht 1.727 m (5' 8\")   Wt 115.2 kg (254 lb)

## 2024-07-15 NOTE — PROGRESS NOTES
Occupational Therapy  Sign off   Pt independent with mobility and ADLs. Reporting no needs at this time. Pt eval yesterday indicates IND. No acute OT needs. Will sign off.   Millie Tobin, MOT, OTR/L, CNS

## 2024-07-15 NOTE — PLAN OF CARE
Problem: Safety - Adult  Goal: Free from fall injury  Outcome: Progressing  Flowsheets (Taken 7/15/2024 0359)  Free From Fall Injury: Instruct family/caregiver on patient safety  Note: Pt is a High fall risk. See [place] Fall Score and ABCDS Injury Risk assessments.   Explained fall risk precautions to pt and family and rationale behind their use to keep the patient safe. Pt bed is in low position, side rails up, call light and belongings are in reach. Fall wristband applied and present on pts wrist.  Bed alarm on.  Pt encouraged to call for assistance. Will continue with hourly rounds for PO intake, pain needs, toileting and repositioning as needed.          Problem: Pain  Goal: Verbalizes/displays adequate comfort level or baseline comfort level  Outcome: Progressing  Flowsheets (Taken 7/15/2024 0359)  Verbalizes/displays adequate comfort level or baseline comfort level:   Encourage patient to monitor pain and request assistance   Assess pain using appropriate pain scale   Administer analgesics based on type and severity of pain and evaluate response   Implement non-pharmacological measures as appropriate and evaluate response   Consider cultural and social influences on pain and pain management   Notify Licensed Independent Practitioner if interventions unsuccessful or patient reports new pain  Note: Pt with complaints of chronic leg pain during shift. Medicated with  PRN tylenol and robaxin  per MAR with good response per pt.  Pt denies further needs at this time. Will continue to monitor and implement plan of care.

## 2024-07-16 LAB
ALBUMIN SERPL-MCNC: 3.2 G/DL (ref 3.4–5)
ALP SERPL-CCNC: 64 U/L (ref 40–129)
ALT SERPL-CCNC: 17 U/L (ref 10–40)
ANION GAP SERPL CALCULATED.3IONS-SCNC: 11 MMOL/L (ref 3–16)
ANION GAP SERPL CALCULATED.3IONS-SCNC: 8 MMOL/L (ref 3–16)
AST SERPL-CCNC: 14 U/L (ref 15–37)
BACTERIA CSF CULT: NORMAL
BASOPHILS # BLD: 0.1 K/UL (ref 0–0.2)
BASOPHILS # BLD: 0.1 K/UL (ref 0–0.2)
BASOPHILS NFR BLD: 0.5 %
BASOPHILS NFR BLD: 1.1 %
BILIRUB DIRECT SERPL-MCNC: <0.2 MG/DL (ref 0–0.3)
BILIRUB INDIRECT SERPL-MCNC: ABNORMAL MG/DL (ref 0–1)
BILIRUB SERPL-MCNC: <0.2 MG/DL (ref 0–1)
BUN SERPL-MCNC: 15 MG/DL (ref 7–20)
BUN SERPL-MCNC: 18 MG/DL (ref 7–20)
CALCIUM SERPL-MCNC: 8.3 MG/DL (ref 8.3–10.6)
CALCIUM SERPL-MCNC: 8.5 MG/DL (ref 8.3–10.6)
CHLORIDE SERPL-SCNC: 100 MMOL/L (ref 99–110)
CHLORIDE SERPL-SCNC: 104 MMOL/L (ref 99–110)
CO2 SERPL-SCNC: 24 MMOL/L (ref 21–32)
CO2 SERPL-SCNC: 24 MMOL/L (ref 21–32)
CREAT SERPL-MCNC: <0.5 MG/DL (ref 0.6–1.1)
CREAT SERPL-MCNC: <0.5 MG/DL (ref 0.6–1.1)
DEPRECATED RDW RBC AUTO: 15.1 % (ref 12.4–15.4)
DEPRECATED RDW RBC AUTO: 15.3 % (ref 12.4–15.4)
EOSINOPHIL # BLD: 0 K/UL (ref 0–0.6)
EOSINOPHIL # BLD: 0.1 K/UL (ref 0–0.6)
EOSINOPHIL NFR BLD: 0.3 %
EOSINOPHIL NFR BLD: 0.4 %
FIBRINOGEN PPP-MCNC: 379 MG/DL (ref 227–534)
GFR SERPLBLD CREATININE-BSD FMLA CKD-EPI: >90 ML/MIN/{1.73_M2}
GFR SERPLBLD CREATININE-BSD FMLA CKD-EPI: >90 ML/MIN/{1.73_M2}
GLUCOSE SERPL-MCNC: 118 MG/DL (ref 70–99)
GLUCOSE SERPL-MCNC: 122 MG/DL (ref 70–99)
GRAM STN SPEC: NORMAL
HCT VFR BLD AUTO: 38.9 % (ref 36–48)
HCT VFR BLD AUTO: 41.2 % (ref 36–48)
HGB BLD-MCNC: 12.9 G/DL (ref 12–16)
HGB BLD-MCNC: 13.7 G/DL (ref 12–16)
JAK2 P.V617F BLD/T QL: NOT DETECTED
JAK2 P.V617F MUT/NOR BLD/T: 0 %
LDH SERPL L TO P-CCNC: 933 U/L (ref 100–190)
LYMPHOCYTES # BLD: 4.2 K/UL (ref 1–5.1)
LYMPHOCYTES # BLD: 6.3 K/UL (ref 1–5.1)
LYMPHOCYTES NFR BLD: 41.6 %
LYMPHOCYTES NFR BLD: 49.9 %
MAGNESIUM SERPL-MCNC: 2.6 MG/DL (ref 1.8–2.4)
MCH RBC QN AUTO: 28.9 PG (ref 26–34)
MCH RBC QN AUTO: 29 PG (ref 26–34)
MCHC RBC AUTO-ENTMCNC: 33.1 G/DL (ref 31–36)
MCHC RBC AUTO-ENTMCNC: 33.3 G/DL (ref 31–36)
MCV RBC AUTO: 87 FL (ref 80–100)
MCV RBC AUTO: 87.2 FL (ref 80–100)
MONOCYTES # BLD: 0.2 K/UL (ref 0–1.3)
MONOCYTES # BLD: 0.2 K/UL (ref 0–1.3)
MONOCYTES NFR BLD: 1.8 %
MONOCYTES NFR BLD: 1.9 %
NEUTROPHILS # BLD: 5.7 K/UL (ref 1.7–7.7)
NEUTROPHILS # BLD: 5.9 K/UL (ref 1.7–7.7)
NEUTROPHILS NFR BLD: 46.7 %
NEUTROPHILS NFR BLD: 55.8 %
PHOSPHATE SERPL-MCNC: 3.4 MG/DL (ref 2.5–4.9)
PHOSPHATE SERPL-MCNC: 3.5 MG/DL (ref 2.5–4.9)
PLATELET # BLD AUTO: 106 K/UL (ref 135–450)
PLATELET # BLD AUTO: 110 K/UL (ref 135–450)
PMV BLD AUTO: 7.7 FL (ref 5–10.5)
PMV BLD AUTO: 7.9 FL (ref 5–10.5)
POTASSIUM SERPL-SCNC: 4.7 MMOL/L (ref 3.5–5.1)
POTASSIUM SERPL-SCNC: 4.9 MMOL/L (ref 3.5–5.1)
PROT SERPL-MCNC: 6 G/DL (ref 6.4–8.2)
RBC # BLD AUTO: 4.46 M/UL (ref 4–5.2)
RBC # BLD AUTO: 4.73 M/UL (ref 4–5.2)
SODIUM SERPL-SCNC: 135 MMOL/L (ref 136–145)
SODIUM SERPL-SCNC: 136 MMOL/L (ref 136–145)
SPECIMEN SOURCE: NORMAL
URATE SERPL-MCNC: 2.4 MG/DL (ref 2.6–6)
URATE SERPL-MCNC: 2.9 MG/DL (ref 2.6–6)
WBC # BLD AUTO: 10.1 K/UL (ref 4–11)
WBC # BLD AUTO: 12.7 K/UL (ref 4–11)

## 2024-07-16 PROCEDURE — 80048 BASIC METABOLIC PNL TOTAL CA: CPT

## 2024-07-16 PROCEDURE — 6360000002 HC RX W HCPCS: Performed by: NURSE PRACTITIONER

## 2024-07-16 PROCEDURE — 84550 ASSAY OF BLOOD/URIC ACID: CPT

## 2024-07-16 PROCEDURE — 36592 COLLECT BLOOD FROM PICC: CPT

## 2024-07-16 PROCEDURE — 2060000000 HC ICU INTERMEDIATE R&B

## 2024-07-16 PROCEDURE — 80076 HEPATIC FUNCTION PANEL: CPT

## 2024-07-16 PROCEDURE — 84100 ASSAY OF PHOSPHORUS: CPT

## 2024-07-16 PROCEDURE — 85384 FIBRINOGEN ACTIVITY: CPT

## 2024-07-16 PROCEDURE — 83615 LACTATE (LD) (LDH) ENZYME: CPT

## 2024-07-16 PROCEDURE — 83735 ASSAY OF MAGNESIUM: CPT

## 2024-07-16 PROCEDURE — 85025 COMPLETE CBC W/AUTO DIFF WBC: CPT

## 2024-07-16 PROCEDURE — 6370000000 HC RX 637 (ALT 250 FOR IP): Performed by: STUDENT IN AN ORGANIZED HEALTH CARE EDUCATION/TRAINING PROGRAM

## 2024-07-16 PROCEDURE — 2580000003 HC RX 258

## 2024-07-16 PROCEDURE — 6370000000 HC RX 637 (ALT 250 FOR IP)

## 2024-07-16 RX ADMIN — SODIUM CHLORIDE, PRESERVATIVE FREE 10 ML: 5 INJECTION INTRAVENOUS at 08:24

## 2024-07-16 RX ADMIN — POLYETHYLENE GLYCOL 3350 17 G: 17 POWDER, FOR SOLUTION ORAL at 08:20

## 2024-07-16 RX ADMIN — SODIUM CHLORIDE, PRESERVATIVE FREE 10 ML: 5 INJECTION INTRAVENOUS at 20:55

## 2024-07-16 RX ADMIN — FLUCONAZOLE 200 MG: 200 TABLET ORAL at 08:20

## 2024-07-16 RX ADMIN — DEXAMETHASONE 20 MG: 4 TABLET ORAL at 08:19

## 2024-07-16 RX ADMIN — CARVEDILOL 6.25 MG: 3.12 TABLET, FILM COATED ORAL at 08:19

## 2024-07-16 RX ADMIN — SODIUM CHLORIDE: 9 INJECTION, SOLUTION INTRAVENOUS at 03:14

## 2024-07-16 RX ADMIN — HYDROXYUREA 1000 MG: 500 CAPSULE ORAL at 20:52

## 2024-07-16 RX ADMIN — ALLOPURINOL 300 MG: 300 TABLET ORAL at 08:18

## 2024-07-16 RX ADMIN — SODIUM CHLORIDE, PRESERVATIVE FREE 10 ML: 5 INJECTION INTRAVENOUS at 20:52

## 2024-07-16 RX ADMIN — GABAPENTIN 300 MG: 300 CAPSULE ORAL at 08:20

## 2024-07-16 RX ADMIN — SODIUM CHLORIDE: 9 INJECTION, SOLUTION INTRAVENOUS at 11:47

## 2024-07-16 RX ADMIN — GABAPENTIN 300 MG: 300 CAPSULE ORAL at 14:29

## 2024-07-16 RX ADMIN — ACETAMINOPHEN 650 MG: 325 TABLET ORAL at 11:47

## 2024-07-16 RX ADMIN — LOSARTAN POTASSIUM 50 MG: 50 TABLET, FILM COATED ORAL at 08:19

## 2024-07-16 RX ADMIN — LEVOFLOXACIN 500 MG: 500 TABLET, FILM COATED ORAL at 08:18

## 2024-07-16 RX ADMIN — SODIUM CHLORIDE, PRESERVATIVE FREE 10 ML: 5 INJECTION INTRAVENOUS at 08:21

## 2024-07-16 RX ADMIN — SODIUM CHLORIDE 15 ML: 900 IRRIGANT IRRIGATION at 20:55

## 2024-07-16 RX ADMIN — VALACYCLOVIR HYDROCHLORIDE 500 MG: 500 TABLET, FILM COATED ORAL at 20:52

## 2024-07-16 RX ADMIN — SODIUM CHLORIDE: 9 INJECTION, SOLUTION INTRAVENOUS at 20:51

## 2024-07-16 RX ADMIN — HYDROCHLOROTHIAZIDE 12.5 MG: 12.5 CAPSULE ORAL at 08:18

## 2024-07-16 RX ADMIN — GABAPENTIN 300 MG: 300 CAPSULE ORAL at 20:52

## 2024-07-16 RX ADMIN — SODIUM CHLORIDE 15 ML: 900 IRRIGANT IRRIGATION at 11:48

## 2024-07-16 RX ADMIN — CARVEDILOL 6.25 MG: 3.12 TABLET, FILM COATED ORAL at 16:27

## 2024-07-16 RX ADMIN — VALACYCLOVIR HYDROCHLORIDE 500 MG: 500 TABLET, FILM COATED ORAL at 08:19

## 2024-07-16 RX ADMIN — HYDROXYUREA 1000 MG: 500 CAPSULE ORAL at 08:22

## 2024-07-16 ASSESSMENT — PAIN DESCRIPTION - ONSET: ONSET: ON-GOING

## 2024-07-16 ASSESSMENT — PAIN DESCRIPTION - PAIN TYPE: TYPE: CHRONIC PAIN

## 2024-07-16 ASSESSMENT — PAIN DESCRIPTION - LOCATION: LOCATION: KNEE;LEG

## 2024-07-16 ASSESSMENT — PAIN DESCRIPTION - DESCRIPTORS: DESCRIPTORS: ACHING

## 2024-07-16 ASSESSMENT — PAIN DESCRIPTION - ORIENTATION: ORIENTATION: LEFT

## 2024-07-16 ASSESSMENT — PAIN - FUNCTIONAL ASSESSMENT: PAIN_FUNCTIONAL_ASSESSMENT: ACTIVITIES ARE NOT PREVENTED

## 2024-07-16 ASSESSMENT — PAIN SCALES - GENERAL
PAINLEVEL_OUTOF10: 3
PAINLEVEL_OUTOF10: 2

## 2024-07-16 ASSESSMENT — PAIN DESCRIPTION - FREQUENCY: FREQUENCY: INTERMITTENT

## 2024-07-16 NOTE — PROGRESS NOTES
07/16/24 1358   Encounter Summary   Encounter Overview/Reason Initial Encounter   Service Provided For Patient   Referral/Consult From Nurse   Support System Spouse   Last Encounter  07/16/24  (MKS-pt asked  to return at another time)   Complexity of Encounter Low   Begin Time 1356   End Time  1400   Total Time Calculated 4 min   Assessment/Intervention/Outcome   Assessment Calm   Intervention Active listening   Outcome Connection/Belonging   Plan and Referrals   Plan/Referrals Continue to visit, (comment)     Pt on telephone when  entered room. Pt asked  to return at a different time.  will follow up as schedule allows    Student chaplain Mayra Hopson   denies pain/discomfort (Rating = 0)

## 2024-07-16 NOTE — PROGRESS NOTES
[facility] Progress Note    2024     Pam Guido    MRN: [id number]    : 1975    Referring MD: Luz Marina Whittaker, APRN - CNP  [address] [place] [address]      SUBJECTIVE:    Still waiting for bcr-abl. Having intermittent leg pain but still able to walk.  Discussed situation with pat and .      ECOG PS:  (1) Restricted in physically strenuous activity, ambulatory and able to do work of light nature    KPS: 80% Normal activity with effort; some signs or symptoms of disease    Isolation: None    Medications    Scheduled Meds:   hydroxyurea  1,000 mg Oral BID    dexAMETHasone  20 mg Oral Daily    sodium chloride flush  5-40 mL IntraVENous 2 times per day    Saline Mouthwash  15 mL Swish & Spit 4x Daily AC & HS    valACYclovir  500 mg Oral BID    fluconazole  200 mg Oral Daily    levoFLOXacin  500 mg Oral Daily    allopurinol  300 mg Oral Daily    sodium chloride flush  5-40 mL IntraVENous 2 times per day    carvedilol  6.25 mg Oral BID WC    hydroCHLOROthiazide  12.5 mg Oral QAM    losartan  50 mg Oral QAM    gabapentin  300 mg Oral TID    sennosides-docusate sodium  2 tablet Oral BID    polyethylene glycol  17 g Oral Daily     Continuous Infusions:   sodium chloride      sodium chloride      sodium chloride 100 mL/hr at 24 0314    sodium chloride       PRN Meds:.bisacodyl, acetaminophen, sodium chloride, sodium chloride flush, sodium chloride, magnesium sulfate, Saline Mouthwash, sodium chloride flush, sodium chloride, ALTEplase, perflutren lipid microspheres, oxyCODONE, methocarbamol, simethicone    ROS:  As noted above, otherwise remainder of 10-point ROS negative    Physical Exam:     I&O:    Intake/Output Summary (Last 24 hours) at 2024 0820  Last data filed at 2024 0651  Gross per 24 hour   Intake 2842 ml   Output 3575 ml   Net -733 ml         Vital Signs:  /74   Pulse 88   Temp 98.4 °F (36.9 °C) (Oral)   Resp 18   Ht 1.727 m (5' 8\")   Wt 114.8 kg (253 lb)    awaiting BCR/ABL results  - dex 20 mg PO daily 7/13/24  - Hydrea 1000 mg BID  -Cytarabine 1g for leukoreduction on 7/14/24     2. ID:   Concern for sepsis on admission to ED (7/7/24)  - Patient is currently afebrile without evidence of infection  - Presented to ED with diffuse body aches and leukocytosis  - Blood Cultures 7/7/24: NG   - CoVID/Flu swab 7/7/24: negative   - Lumbar Puncture 7/9/24: unremarkable   - HIV screen  7/10/24: Non reactive  - History of infected salivary gland in June 2024, CT soft tissue neck 7/9/24 negative     Current Prophylaxis:  - Diflucan, Valtrex and Levaquin ppx     3. Heme:   Leukocytosis 2/2 leukemia  - Transfuse for Hgb < 7 and Platelets < 10 K.   - No transfusion today   Risk for DIC:  - Check coags & fibrinogen on admit     4. Metabolic:   Risk for TLS:  - Continue allopurinol ppx  - Follow TLS labs daily   - IVFs: NS@ 100 mls/hr  - Replace Mg per PRN orders     5. GI / Nutrition:    Nutrition:    - low microbial diet  - Dietician to follow closely      6. Cardiac:   Essential HTN  - Cont Coreg 6.25 mg BID  - Cont Hydrochlorothiazide 12.5 mg daily  - Cont Cozaar 50 mg daily  - Check echo & EKG on admit  Chest Pain   - Presented to OSH with chest pain  - Serial troponins at OSH negative  - EKG with no ischemic changes  - EKG (7/12/24): Sinus tachycardia, possible septal infarct age undtermined  - ECHO on admission: LVEF 55-60%     7. Pulmonary:   - Encourage IS & ambulation  - Cont supplemental O2 to maintain SpO2 >92% - currently on room air     8. MSK:   Chronic Pain  - Manages with Tylenol and Ibuprofen at home  - Does not use narcotics at home  Acute Left Leg Pain probably radiculopathy from epidural hematoma post LP.   - Developed lower back pain that radiates to left leg 7/10/24 after lumbar puncture  - MRI lumbar spine 7/10/24: small epidural hematoma post LP,   - s/p Dex 20 mg IV x 1, will help with leukocytosis too.   Pain Control:  - Cont Gabapentin 300 mg TID (new

## 2024-07-16 NOTE — CARE COORDINATION
Discussed plan of care with Landy BUSTAMANTE.     SW will follow and assist with making appropriate referrals.    Ronit HUNTLEY, ALMA   for [facility] ([facility])  [employer] Mobile: [phone]

## 2024-07-16 NOTE — PLAN OF CARE
Problem: Safety - Adult  Goal: Free from fall injury  7/16/2024 0831 by Mary Arana, RN  Outcome: Progressing  Flowsheets (Taken 7/16/2024 0431)  Free From Fall Injury:   Instruct family/caregiver on patient safety   Based on caregiver fall risk screen, instruct family/caregiver to ask for assistance with transferring infant if caregiver noted to have fall risk factors  Note: Orthostatic vital signs obtained at start of shift - see flowsheet for details.  Pt does not meet criteria for orthostasis.  Pt is a Med fall risk. See [place] Fall Score and ABCDS Injury Risk assessments.   - Screening for Orthostasis AND not a [place] Risk per [place]/ABCDS: Pt bed is in low position, side rails up, call light and belongings are in reach.  Fall risk light is on outside pts room.  Pt encouraged to call for assistance as needed. Will continue with hourly rounds for PO intake, pain needs, toileting and repositioning as needed.      Problem: Pain  Goal: Verbalizes/displays adequate comfort level or baseline comfort level  7/16/2024 0831 by Mary Arana, RN  Outcome: Progressing  Flowsheets (Taken 7/16/2024 0431)  Verbalizes/displays adequate comfort level or baseline comfort level:   Encourage patient to monitor pain and request assistance   Administer analgesics based on type and severity of pain and evaluate response   Assess pain using appropriate pain scale   Implement non-pharmacological measures as appropriate and evaluate response  Note: Patient reported mild pain in knee and received PRN acetaminophen. Pt satisfied with intervention.     Problem: ABCDS Injury Assessment  Goal: Absence of physical injury  Outcome: Progressing  Flowsheets (Taken 7/16/2024 0431)  Absence of Physical Injury: Implement safety measures based on patient assessment     Problem: Nutrition Deficit:  Goal: Optimize nutritional status  7/16/2024 0831 by Mary Arana, RN  Outcome: Progressing  Flowsheets (Taken 7/16/2024 0431)  Nutrient  intake appropriate for improving, restoring, or maintaining nutritional needs:   Assess nutritional status and recommend course of action   Monitor oral intake, labs, and treatment plans     Problem: Hematologic - Adult  Goal: Maintains hematologic stability  7/16/2024 0831 by Mary Arana, RN  Outcome: Progressing  Flowsheets (Taken 7/16/2024 0431)  Maintains hematologic stability:   Assess for signs and symptoms of bleeding or hemorrhage   Monitor labs for bleeding or clotting disorders   Administer blood products/factors as ordered  Note: Patient's hemoglobin this AM:   Recent Labs     07/16/24  0355   HGB 12.9     Patient's platelet count this AM:   Recent Labs     07/16/24  0355   *    Thrombocytopenia not present at this time.  Patient showing no signs or symptoms of active bleeding.  Transfusion not indicated at this time.  Patient verbalizes understanding of all instructions. Will continue to assess and implement POC. Call light within reach and hourly rounding in place.

## 2024-07-16 NOTE — PLAN OF CARE
Problem: Safety - Adult  Goal: Free from fall injury  7/16/2024 1758 by Carlene Hughes, RN  Outcome: Progressing  Orthostatic vital signs obtained at start of shift - see flowsheet for details.  Pt does not meet criteria for orthostasis.  Pt is a Med fall risk. See [place] Fall Score and ABCDS Injury Risk assessments.   - Screening for Orthostasis AND not a [place] Risk per [place]/ABCDS: Pt bed is in low position, side rails up, call light and belongings are in reach.  Fall risk light is on outside pts room.  Pt encouraged to call for assistance as needed. Will continue with hourly rounds for PO intake, pain needs, toileting and repositioning as needed.      Problem: Pain  Goal: Verbalizes/displays adequate comfort level or baseline comfort level  7/16/2024 1758 by Carlene Hughes, RN  Outcome: Progressing- patient with complaint of pain in leg. PRN pain medication administered, relief of pain reported upon reassessment. Care continues.     Problem: Hematologic - Adult  Goal: Maintains hematologic stability  7/16/2024 1758 by Carlene Hughes, RN  Outcome: Progressing  Patient's hemoglobin this AM:   Recent Labs     07/16/24  1524   HGB 13.7     Patient's platelet count this AM:   Recent Labs     07/16/24  1524   *    Thrombocytopenia Precautions in place.  Patient showing no signs or symptoms of active bleeding.  Transfusion not indicated at this time.  Patient verbalizes understanding of all instructions. Will continue to assess and implement POC. Call light within reach and hourly rounding in place.

## 2024-07-16 NOTE — PSYCHOTHERAPY
Psychology Initial Consultation    Patient Name: Pam Guido  MRN: [id number]  Admission Date: 7/12/2024  Today's date: 7/16/2024    Reason for Consult: Initial behavioral health consultation.     HPI:   Pam Guido is a 49 y.o. female with a past medical history significant for essential hypertension, obesity, chronic pain, and GERD, who initially presented on 7/7/2024 for experiences of substernal chest pain, generalized body aches, headache, and nausea . Has newly diagnosed ALL.    Subjective History:    The patient reported doing well despite diagnosis of acute lymphocytic leukemia. Patient stated she has a substantial amount of support around her if needed (e.g., , children, in-laws).      Collateral information: The patient's  was present via facetime for the consultation and added information as patient responded.     Other relevant medications: Gabapentin 300 mg     History obtained 7.16.2024    Psychiatric History:  Depression: Denies  Anxiety: Denies  SI/HI: Denies  Catia: Denies  Psychosis: Denies  Alcohol: Denies current use. Reported most recent alcohol use was ~4 years ago.   Tobacco: Denies  Illicit Drugs: Denies   Past psychiatric treatment: Denies    Social History:  Marital status:    Employment: [employer] (20 Years)   Social supports:  , 3 adult children, grandchildren, in-laws, most family is local  Housing: Lives with her  in an apartment, states that she would have family who could drive her to appointments    Past Medical History:  Past Medical History:   Diagnosis Date    Anemia     Back pain     Chest pain 6/2014    stress myoview normal    Chronic pain syndrome 2017    Dr. Garcia pain management    Fibroid uterus     fibroid uterus ad abnormal uterine bleeding with uterine fibroids    GERD (gastroesophageal reflux disease)     Hypertension     Menopausal symptoms     Overactive bladder     Pelvic pain     Right lateral epicondylitis     Rotator  cuff syndrome of right shoulder     Stress incontinence     Tachycardia     on coreg     Allergies   Allergen Reactions    Penicillins Anaphylaxis and Hives    Lisinopril Cough    Biaxin [Clarithromycin] Rash     Home Medications:  Prior to Admission medications    Medication Sig Start Date End Date Taking? Authorizing Provider   acyclovir (ZOVIRAX) 200 MG capsule Take 2 capsules by mouth 2 times daily for 10 days 7/12/24 7/22/24  Luz Marina Whittaker APRN - CNP   fluconazole (DIFLUCAN) 200 MG tablet Take 1 tablet by mouth daily for 7 days 7/12/24 7/19/24  Luz Marina Whittaker APRN - CNP   gabapentin (NEURONTIN) 100 MG capsule Take 1 capsule by mouth 3 times daily for 10 days. 7/12/24 7/22/24  Luz Marina Whittaker APRN - CNP   levoFLOXacin (LEVAQUIN) 500 MG tablet Take 1 tablet by mouth daily for 10 days 7/12/24 7/22/24  Luz Marina Whittaker APRN - CNP   lidocaine 4 % external patch Place 1 patch onto the skin daily 7/12/24   Luz Marina Whittaker APRN - CNP   sulfamethoxazole-trimethoprim (BACTRIM DS;SEPTRA DS) 800-160 MG per tablet Take 1 tablet by mouth three times a week for 28 days  Patient not taking: Reported on 7/12/2024 7/15/24 8/12/24  Luz Marina Whittaker APRN - CNP   methocarbamol (ROBAXIN) 750 MG tablet Take 1 tablet by mouth 4 times daily for 10 days 7/12/24 7/22/24  Luz Marina Whittaker APRN - CNP   lidocaine 4 % external patch Place 2 patches onto the skin daily 7/12/24   Luz Marina Whittaker APRN - CNP   carvedilol (COREG) 6.25 MG tablet Take 1 tablet by mouth 2 times daily 5/23/24   Johanna Jensen APRN - CNP   losartan (COZAAR) 50 MG tablet Take 1 tablet by mouth every morning 5/23/24   Johanna Jensen APRN - CNP   hydroCHLOROthiazide 12.5 MG capsule TAKE ONE CAPSULE BY MOUTH EVERY MORNING 4/8/24   Johanna Jensen APRN - CNP     Current Medications Ordered:   hydroxyurea  1,000 mg Oral BID    dexAMETHasone  20 mg Oral Daily    sodium chloride flush  5-40 mL IntraVENous 2 times per day    Saline Mouthwash  15 mL Swish &

## 2024-07-17 ENCOUNTER — APPOINTMENT (OUTPATIENT)
Dept: PHYSICAL THERAPY | Age: 49
End: 2024-07-17
Payer: COMMERCIAL

## 2024-07-17 LAB
ALBUMIN SERPL-MCNC: 3.2 G/DL (ref 3.4–5)
ALP SERPL-CCNC: 57 U/L (ref 40–129)
ALT SERPL-CCNC: 17 U/L (ref 10–40)
ANION GAP SERPL CALCULATED.3IONS-SCNC: 10 MMOL/L (ref 3–16)
ANION GAP SERPL CALCULATED.3IONS-SCNC: 8 MMOL/L (ref 3–16)
AST SERPL-CCNC: 12 U/L (ref 15–37)
BASOPHILS # BLD: 0 K/UL (ref 0–0.2)
BASOPHILS # BLD: 0.1 K/UL (ref 0–0.2)
BASOPHILS NFR BLD: 0.7 %
BASOPHILS NFR BLD: 1.4 %
BILIRUB DIRECT SERPL-MCNC: <0.2 MG/DL (ref 0–0.3)
BILIRUB INDIRECT SERPL-MCNC: ABNORMAL MG/DL (ref 0–1)
BILIRUB SERPL-MCNC: <0.2 MG/DL (ref 0–1)
BUN SERPL-MCNC: 15 MG/DL (ref 7–20)
BUN SERPL-MCNC: 20 MG/DL (ref 7–20)
CALCIUM SERPL-MCNC: 8.2 MG/DL (ref 8.3–10.6)
CALCIUM SERPL-MCNC: 8.4 MG/DL (ref 8.3–10.6)
CHLORIDE SERPL-SCNC: 103 MMOL/L (ref 99–110)
CHLORIDE SERPL-SCNC: 99 MMOL/L (ref 99–110)
CO2 SERPL-SCNC: 24 MMOL/L (ref 21–32)
CO2 SERPL-SCNC: 24 MMOL/L (ref 21–32)
CREAT SERPL-MCNC: <0.5 MG/DL (ref 0.6–1.1)
CREAT SERPL-MCNC: <0.5 MG/DL (ref 0.6–1.1)
DEPRECATED RDW RBC AUTO: 15.1 % (ref 12.4–15.4)
DEPRECATED RDW RBC AUTO: 15.2 % (ref 12.4–15.4)
EOSINOPHIL # BLD: 0 K/UL (ref 0–0.6)
EOSINOPHIL # BLD: 0 K/UL (ref 0–0.6)
EOSINOPHIL NFR BLD: 0.2 %
EOSINOPHIL NFR BLD: 0.2 %
FIBRINOGEN PPP-MCNC: 301 MG/DL (ref 227–534)
GFR SERPLBLD CREATININE-BSD FMLA CKD-EPI: >90 ML/MIN/{1.73_M2}
GFR SERPLBLD CREATININE-BSD FMLA CKD-EPI: >90 ML/MIN/{1.73_M2}
GLUCOSE SERPL-MCNC: 122 MG/DL (ref 70–99)
GLUCOSE SERPL-MCNC: 125 MG/DL (ref 70–99)
HCT VFR BLD AUTO: 38.1 % (ref 36–48)
HCT VFR BLD AUTO: 40.4 % (ref 36–48)
HGB BLD-MCNC: 12.8 G/DL (ref 12–16)
HGB BLD-MCNC: 13.5 G/DL (ref 12–16)
LDH SERPL L TO P-CCNC: 666 U/L (ref 100–190)
LYMPHOCYTES # BLD: 1.9 K/UL (ref 1–5.1)
LYMPHOCYTES # BLD: 2.8 K/UL (ref 1–5.1)
LYMPHOCYTES NFR BLD: 35.1 %
LYMPHOCYTES NFR BLD: 38.3 %
MAGNESIUM SERPL-MCNC: 2.5 MG/DL (ref 1.8–2.4)
MCH RBC QN AUTO: 28.8 PG (ref 26–34)
MCH RBC QN AUTO: 29.1 PG (ref 26–34)
MCHC RBC AUTO-ENTMCNC: 33.5 G/DL (ref 31–36)
MCHC RBC AUTO-ENTMCNC: 33.5 G/DL (ref 31–36)
MCV RBC AUTO: 86.1 FL (ref 80–100)
MCV RBC AUTO: 86.9 FL (ref 80–100)
MONOCYTES # BLD: 0.1 K/UL (ref 0–1.3)
MONOCYTES # BLD: 0.2 K/UL (ref 0–1.3)
MONOCYTES NFR BLD: 1.5 %
MONOCYTES NFR BLD: 2.7 %
NEUTROPHILS # BLD: 3.4 K/UL (ref 1.7–7.7)
NEUTROPHILS # BLD: 4.2 K/UL (ref 1.7–7.7)
NEUTROPHILS NFR BLD: 57.4 %
NEUTROPHILS NFR BLD: 62.5 %
PHOSPHATE SERPL-MCNC: 3.6 MG/DL (ref 2.5–4.9)
PHOSPHATE SERPL-MCNC: 3.8 MG/DL (ref 2.5–4.9)
PLATELET # BLD AUTO: 88 K/UL (ref 135–450)
PLATELET # BLD AUTO: 89 K/UL (ref 135–450)
PMV BLD AUTO: 7.7 FL (ref 5–10.5)
PMV BLD AUTO: 7.8 FL (ref 5–10.5)
POTASSIUM SERPL-SCNC: 5 MMOL/L (ref 3.5–5.1)
POTASSIUM SERPL-SCNC: 5 MMOL/L (ref 3.5–5.1)
PROT SERPL-MCNC: 5.8 G/DL (ref 6.4–8.2)
RBC # BLD AUTO: 4.39 M/UL (ref 4–5.2)
RBC # BLD AUTO: 4.7 M/UL (ref 4–5.2)
SODIUM SERPL-SCNC: 133 MMOL/L (ref 136–145)
SODIUM SERPL-SCNC: 135 MMOL/L (ref 136–145)
URATE SERPL-MCNC: 2.5 MG/DL (ref 2.6–6)
URATE SERPL-MCNC: 2.8 MG/DL (ref 2.6–6)
WBC # BLD AUTO: 5.5 K/UL (ref 4–11)
WBC # BLD AUTO: 7.3 K/UL (ref 4–11)

## 2024-07-17 PROCEDURE — 36592 COLLECT BLOOD FROM PICC: CPT

## 2024-07-17 PROCEDURE — 84100 ASSAY OF PHOSPHORUS: CPT

## 2024-07-17 PROCEDURE — 2580000003 HC RX 258

## 2024-07-17 PROCEDURE — 80048 BASIC METABOLIC PNL TOTAL CA: CPT

## 2024-07-17 PROCEDURE — 83735 ASSAY OF MAGNESIUM: CPT

## 2024-07-17 PROCEDURE — 85384 FIBRINOGEN ACTIVITY: CPT

## 2024-07-17 PROCEDURE — 6370000000 HC RX 637 (ALT 250 FOR IP): Performed by: STUDENT IN AN ORGANIZED HEALTH CARE EDUCATION/TRAINING PROGRAM

## 2024-07-17 PROCEDURE — 6370000000 HC RX 637 (ALT 250 FOR IP)

## 2024-07-17 PROCEDURE — 80076 HEPATIC FUNCTION PANEL: CPT

## 2024-07-17 PROCEDURE — 83615 LACTATE (LD) (LDH) ENZYME: CPT

## 2024-07-17 PROCEDURE — 85025 COMPLETE CBC W/AUTO DIFF WBC: CPT

## 2024-07-17 PROCEDURE — 2060000000 HC ICU INTERMEDIATE R&B

## 2024-07-17 PROCEDURE — 6360000002 HC RX W HCPCS: Performed by: NURSE PRACTITIONER

## 2024-07-17 PROCEDURE — 84550 ASSAY OF BLOOD/URIC ACID: CPT

## 2024-07-17 RX ADMIN — SODIUM CHLORIDE, PRESERVATIVE FREE 10 ML: 5 INJECTION INTRAVENOUS at 09:28

## 2024-07-17 RX ADMIN — GABAPENTIN 300 MG: 300 CAPSULE ORAL at 13:41

## 2024-07-17 RX ADMIN — LOSARTAN POTASSIUM 50 MG: 50 TABLET, FILM COATED ORAL at 09:27

## 2024-07-17 RX ADMIN — HYDROXYUREA 1000 MG: 500 CAPSULE ORAL at 21:19

## 2024-07-17 RX ADMIN — SODIUM CHLORIDE, PRESERVATIVE FREE 10 ML: 5 INJECTION INTRAVENOUS at 09:27

## 2024-07-17 RX ADMIN — SODIUM CHLORIDE: 9 INJECTION, SOLUTION INTRAVENOUS at 16:04

## 2024-07-17 RX ADMIN — FLUCONAZOLE 200 MG: 200 TABLET ORAL at 09:27

## 2024-07-17 RX ADMIN — VALACYCLOVIR HYDROCHLORIDE 500 MG: 500 TABLET, FILM COATED ORAL at 21:19

## 2024-07-17 RX ADMIN — CARVEDILOL 6.25 MG: 3.12 TABLET, FILM COATED ORAL at 16:04

## 2024-07-17 RX ADMIN — HYDROCHLOROTHIAZIDE 12.5 MG: 12.5 CAPSULE ORAL at 09:27

## 2024-07-17 RX ADMIN — ALLOPURINOL 300 MG: 300 TABLET ORAL at 09:27

## 2024-07-17 RX ADMIN — GABAPENTIN 300 MG: 300 CAPSULE ORAL at 09:27

## 2024-07-17 RX ADMIN — CARVEDILOL 6.25 MG: 3.12 TABLET, FILM COATED ORAL at 09:26

## 2024-07-17 RX ADMIN — HYDROXYUREA 1000 MG: 500 CAPSULE ORAL at 09:25

## 2024-07-17 RX ADMIN — SODIUM CHLORIDE: 9 INJECTION, SOLUTION INTRAVENOUS at 05:22

## 2024-07-17 RX ADMIN — SODIUM CHLORIDE, PRESERVATIVE FREE 10 ML: 5 INJECTION INTRAVENOUS at 21:19

## 2024-07-17 RX ADMIN — GABAPENTIN 300 MG: 300 CAPSULE ORAL at 21:19

## 2024-07-17 RX ADMIN — DEXAMETHASONE 20 MG: 4 TABLET ORAL at 09:26

## 2024-07-17 RX ADMIN — POLYETHYLENE GLYCOL 3350 17 G: 17 POWDER, FOR SOLUTION ORAL at 09:27

## 2024-07-17 RX ADMIN — LEVOFLOXACIN 500 MG: 500 TABLET, FILM COATED ORAL at 09:26

## 2024-07-17 RX ADMIN — VALACYCLOVIR HYDROCHLORIDE 500 MG: 500 TABLET, FILM COATED ORAL at 09:26

## 2024-07-17 RX ADMIN — ACETAMINOPHEN 650 MG: 325 TABLET ORAL at 09:26

## 2024-07-17 ASSESSMENT — PAIN DESCRIPTION - PAIN TYPE: TYPE: CHRONIC PAIN

## 2024-07-17 ASSESSMENT — PAIN SCALES - GENERAL
PAINLEVEL_OUTOF10: 2
PAINLEVEL_OUTOF10: 3

## 2024-07-17 ASSESSMENT — PAIN DESCRIPTION - LOCATION: LOCATION: LEG

## 2024-07-17 ASSESSMENT — PAIN DESCRIPTION - FREQUENCY: FREQUENCY: INTERMITTENT

## 2024-07-17 ASSESSMENT — PAIN DESCRIPTION - ONSET: ONSET: GRADUAL

## 2024-07-17 ASSESSMENT — PAIN - FUNCTIONAL ASSESSMENT: PAIN_FUNCTIONAL_ASSESSMENT: ACTIVITIES ARE NOT PREVENTED

## 2024-07-17 ASSESSMENT — PAIN DESCRIPTION - DESCRIPTORS: DESCRIPTORS: ACHING

## 2024-07-17 ASSESSMENT — PAIN DESCRIPTION - ORIENTATION: ORIENTATION: LEFT

## 2024-07-17 NOTE — PLAN OF CARE
Problem: Safety - Adult  Goal: Free from fall injury  7/17/2024 0437 by Monica Feng RN  Outcome: Progressing  Note: Orthostatic vital signs obtained at start of shift - see flowsheet for details.  Pt does not meet criteria for orthostasis.  Pt is a Med fall risk. See [place] Fall Score and ABCDS Injury Risk assessments.   - Screening for Orthostasis AND not a [place] Risk per [place]/ABCDS: Pt bed is in low position, side rails up, call light and belongings are in reach.  Fall risk light is on outside pts room.  Pt encouraged to call for assistance as needed. Will continue with hourly rounds for PO intake, pain needs, toileting and repositioning as needed.       Problem: Pain  Goal: Verbalizes/displays adequate comfort level or baseline comfort level  7/17/2024 0437 by Monica Feng RN  Outcome: Progressing  Note: Patient denies pain.      Problem: Hematologic - Adult  Goal: Maintains hematologic stability  7/17/2024 0437 by Monica Feng RN  Outcome: Progressing  Note: Patient's hemoglobin this AM:   Recent Labs     07/17/24  0350   HGB 12.8     Patient's platelet count this AM:   Recent Labs     07/17/24  0350   PLT 89*    Thrombocytopenia Precautions in place.  Patient showing no signs or symptoms of active bleeding.  Transfusion not indicated at this time.  Patient verbalizes understanding of all instructions. Will continue to assess and implement POC. Call light within reach and hourly rounding in place.

## 2024-07-17 NOTE — CARE COORDINATION
Discussed plan of care with treatment team during morning meeting this AM. Per MD, no referrals needed at this time.     SW will follow    ALMA Ivey   for [facility] ([facility])  [employer] Mobile: [phone]

## 2024-07-17 NOTE — PROGRESS NOTES
[facility] Progress Note    2024     Pam Guido    MRN: [id number]    : 1975    Referring MD: Luz Marina Whittaker, APRN - CNP  [address] [place] [address]      SUBJECTIVE:    Is bcr-abl positive.   Having intermittent leg pain but still able to walk.  Discussed situation with pat, will start blincyto and ponatinib as soon as available.      ECOG PS:  (1) Restricted in physically strenuous activity, ambulatory and able to do work of light nature    KPS: 80% Normal activity with effort; some signs or symptoms of disease    Isolation: None    Medications    Scheduled Meds:   hydroxyurea  1,000 mg Oral BID    dexAMETHasone  20 mg Oral Daily    sodium chloride flush  5-40 mL IntraVENous 2 times per day    Saline Mouthwash  15 mL Swish & Spit 4x Daily AC & HS    valACYclovir  500 mg Oral BID    fluconazole  200 mg Oral Daily    levoFLOXacin  500 mg Oral Daily    allopurinol  300 mg Oral Daily    sodium chloride flush  5-40 mL IntraVENous 2 times per day    carvedilol  6.25 mg Oral BID WC    hydroCHLOROthiazide  12.5 mg Oral QAM    losartan  50 mg Oral QAM    gabapentin  300 mg Oral TID    sennosides-docusate sodium  2 tablet Oral BID    polyethylene glycol  17 g Oral Daily     Continuous Infusions:   sodium chloride      sodium chloride      sodium chloride 100 mL/hr at 24 0522    sodium chloride       PRN Meds:.bisacodyl, acetaminophen, sodium chloride, sodium chloride flush, sodium chloride, magnesium sulfate, Saline Mouthwash, sodium chloride flush, sodium chloride, ALTEplase, perflutren lipid microspheres, oxyCODONE, methocarbamol, simethicone    ROS:  As noted above, otherwise remainder of 10-point ROS negative    Physical Exam:     I&O:    Intake/Output Summary (Last 24 hours) at 2024 0907  Last data filed at 2024 0523  Gross per 24 hour   Intake 3074 ml   Output 3375 ml   Net -301 ml         Vital Signs:  BP (!) 142/90   Pulse 67   Temp 98.3 °F (36.8 °C) (Oral)   Resp 20   Ht

## 2024-07-17 NOTE — PLAN OF CARE
Problem: Safety - Adult  Goal: Free from fall injury  7/17/2024 1239 by Carlene Hughes, RN  Outcome: Progressing  Orthostatic vital signs obtained at start of shift - see flowsheet for details.  Pt does not meet criteria for orthostasis.  Pt is a Med fall risk. See [place] Fall Score and ABCDS Injury Risk assessments.   - Screening for Orthostasis AND not a [place] Risk per [place]/ABCDS: Pt bed is in low position, side rails up, call light and belongings are in reach.  Fall risk light is on outside pts room.  Pt encouraged to call for assistance as needed. Will continue with hourly rounds for PO intake, pain needs, toileting and repositioning as needed.      Problem: Pain  Goal: Verbalizes/displays adequate comfort level or baseline comfort level  7/17/2024 1239 by Carlene Hughes, RN  Outcome: Progressing- patient endorsing leg pain during shift. PRN medications administered, relief of pain reported upon reassessment. Care continues.      Problem: Hematologic - Adult  Goal: Maintains hematologic stability  7/17/2024 1239 by Carlene Hughes, RN  Outcome: Progressing  Patient's hemoglobin this AM:   Recent Labs     07/17/24  0350   HGB 12.8     Patient's platelet count this AM:   Recent Labs     07/17/24  0350   PLT 89*    Thrombocytopenia Precautions in place.  Patient showing no signs or symptoms of active bleeding.  Transfusion not indicated at this time.  Patient verbalizes understanding of all instructions. Will continue to assess and implement POC. Call light within reach and hourly rounding in place.

## 2024-07-17 NOTE — PROGRESS NOTES
07/17/24 1410   Encounter Summary   Encounter Overview/Reason Follow-up   Service Provided For Patient   Referral/Consult From Nurse   Support System Family members;Sikhism/miles community;Friends/neighbors;Spouse;Children   Last Encounter  07/17/24  ([facility])   Complexity of Encounter Low   Begin Time 1345   End Time  1412   Total Time Calculated 27 min   Spiritual/Emotional needs   Type Spiritual Support   Assessment/Intervention/Outcome   Assessment Hopeful;Coping;Peaceful   Intervention Discussed belief system/Confucianist practices/miles;Discussed illness injury and it’s impact;Discussed meaning/purpose;Discussed relationship with God;Explored/Affirmed feelings, thoughts, concerns;Explored Coping Skills/Resources;Nurtured Hope;Prayer (assurance of)/[place]   Outcome Comfort;Connection/Belonging;Engaged in conversation;Expressed feelings, needs, and concerns;Expressed feelings of [place], Peace and/or Love;Expressed Gratitude;Receptive   Plan and Referrals   Plan/Referrals Continue to visit, (comment)     Student  Mayra Hopson

## 2024-07-18 VITALS
WEIGHT: 247.6 LBS | HEIGHT: 68 IN | DIASTOLIC BLOOD PRESSURE: 101 MMHG | BODY MASS INDEX: 37.53 KG/M2 | RESPIRATION RATE: 18 BRPM | TEMPERATURE: 98 F | HEART RATE: 86 BPM | OXYGEN SATURATION: 99 % | SYSTOLIC BLOOD PRESSURE: 147 MMHG

## 2024-07-18 LAB
ALBUMIN SERPL-MCNC: 3.4 G/DL (ref 3.4–5)
ALP SERPL-CCNC: 51 U/L (ref 40–129)
ALT SERPL-CCNC: 19 U/L (ref 10–40)
ANION GAP SERPL CALCULATED.3IONS-SCNC: 9 MMOL/L (ref 3–16)
APTT BLD: 23.9 SEC (ref 22.1–36.4)
AST SERPL-CCNC: 10 U/L (ref 15–37)
BASOPHILS # BLD: 0 K/UL (ref 0–0.2)
BASOPHILS NFR BLD: 0.6 %
BILIRUB DIRECT SERPL-MCNC: <0.2 MG/DL (ref 0–0.3)
BILIRUB INDIRECT SERPL-MCNC: ABNORMAL MG/DL (ref 0–1)
BILIRUB SERPL-MCNC: 0.3 MG/DL (ref 0–1)
BUN SERPL-MCNC: 16 MG/DL (ref 7–20)
CALCIUM SERPL-MCNC: 8.4 MG/DL (ref 8.3–10.6)
CHLORIDE SERPL-SCNC: 101 MMOL/L (ref 99–110)
CO2 SERPL-SCNC: 25 MMOL/L (ref 21–32)
CREAT SERPL-MCNC: <0.5 MG/DL (ref 0.6–1.1)
DEPRECATED RDW RBC AUTO: 14.9 % (ref 12.4–15.4)
EOSINOPHIL # BLD: 0 K/UL (ref 0–0.6)
EOSINOPHIL NFR BLD: 0.2 %
FIBRINOGEN PPP-MCNC: 265 MG/DL (ref 227–534)
GFR SERPLBLD CREATININE-BSD FMLA CKD-EPI: >90 ML/MIN/{1.73_M2}
GLUCOSE SERPL-MCNC: 117 MG/DL (ref 70–99)
HCT VFR BLD AUTO: 37.7 % (ref 36–48)
HGB BLD-MCNC: 12.6 G/DL (ref 12–16)
INR PPP: 0.99 (ref 0.85–1.15)
LDH SERPL L TO P-CCNC: 549 U/L (ref 100–190)
LYMPHOCYTES # BLD: 1.6 K/UL (ref 1–5.1)
LYMPHOCYTES NFR BLD: 38.9 %
MAGNESIUM SERPL-MCNC: 2.4 MG/DL (ref 1.8–2.4)
MCH RBC QN AUTO: 28.9 PG (ref 26–34)
MCHC RBC AUTO-ENTMCNC: 33.3 G/DL (ref 31–36)
MCV RBC AUTO: 86.6 FL (ref 80–100)
MISCELLANEOUS LAB TEST ORDER: NORMAL
MONOCYTES # BLD: 0.1 K/UL (ref 0–1.3)
MONOCYTES NFR BLD: 1.7 %
NEUTROPHILS # BLD: 2.4 K/UL (ref 1.7–7.7)
NEUTROPHILS NFR BLD: 58.6 %
PHOSPHATE SERPL-MCNC: 3.9 MG/DL (ref 2.5–4.9)
PLATELET # BLD AUTO: 75 K/UL (ref 135–450)
PMV BLD AUTO: 7.7 FL (ref 5–10.5)
POTASSIUM SERPL-SCNC: 4.5 MMOL/L (ref 3.5–5.1)
PROT SERPL-MCNC: 5.9 G/DL (ref 6.4–8.2)
PROTHROMBIN TIME: 13.3 SEC (ref 11.9–14.9)
RBC # BLD AUTO: 4.36 M/UL (ref 4–5.2)
SODIUM SERPL-SCNC: 135 MMOL/L (ref 136–145)
URATE SERPL-MCNC: 2.8 MG/DL (ref 2.6–6)
WBC # BLD AUTO: 4.1 K/UL (ref 4–11)

## 2024-07-18 PROCEDURE — 84100 ASSAY OF PHOSPHORUS: CPT

## 2024-07-18 PROCEDURE — 6370000000 HC RX 637 (ALT 250 FOR IP): Performed by: STUDENT IN AN ORGANIZED HEALTH CARE EDUCATION/TRAINING PROGRAM

## 2024-07-18 PROCEDURE — 36592 COLLECT BLOOD FROM PICC: CPT

## 2024-07-18 PROCEDURE — 6370000000 HC RX 637 (ALT 250 FOR IP)

## 2024-07-18 PROCEDURE — 80076 HEPATIC FUNCTION PANEL: CPT

## 2024-07-18 PROCEDURE — 85025 COMPLETE CBC W/AUTO DIFF WBC: CPT

## 2024-07-18 PROCEDURE — 85384 FIBRINOGEN ACTIVITY: CPT

## 2024-07-18 PROCEDURE — 80048 BASIC METABOLIC PNL TOTAL CA: CPT

## 2024-07-18 PROCEDURE — 84550 ASSAY OF BLOOD/URIC ACID: CPT

## 2024-07-18 PROCEDURE — 6370000000 HC RX 637 (ALT 250 FOR IP): Performed by: NURSE PRACTITIONER

## 2024-07-18 PROCEDURE — 85730 THROMBOPLASTIN TIME PARTIAL: CPT

## 2024-07-18 PROCEDURE — 2580000003 HC RX 258

## 2024-07-18 PROCEDURE — 83735 ASSAY OF MAGNESIUM: CPT

## 2024-07-18 PROCEDURE — 83615 LACTATE (LD) (LDH) ENZYME: CPT

## 2024-07-18 PROCEDURE — 85610 PROTHROMBIN TIME: CPT

## 2024-07-18 PROCEDURE — 6360000002 HC RX W HCPCS: Performed by: NURSE PRACTITIONER

## 2024-07-18 RX ORDER — FAMOTIDINE 40 MG/1
40 TABLET, FILM COATED ORAL DAILY
Qty: 60 TABLET | Refills: 3 | Status: SHIPPED | OUTPATIENT
Start: 2024-07-18 | End: 2024-07-18

## 2024-07-18 RX ORDER — DEXAMETHASONE 2 MG/1
10 TABLET ORAL DAILY
Qty: 50 TABLET | Refills: 0 | Status: SHIPPED | OUTPATIENT
Start: 2024-07-19 | End: 2024-07-18

## 2024-07-18 RX ORDER — FLUCONAZOLE 200 MG/1
200 TABLET ORAL DAILY
Qty: 30 TABLET | Refills: 4 | Status: SHIPPED | OUTPATIENT
Start: 2024-07-18 | End: 2024-07-18

## 2024-07-18 RX ORDER — ALLOPURINOL 300 MG/1
300 TABLET ORAL DAILY
Qty: 30 TABLET | Refills: 0 | Status: SHIPPED | OUTPATIENT
Start: 2024-07-19 | End: 2024-07-18

## 2024-07-18 RX ORDER — FAMOTIDINE 40 MG/1
40 TABLET, FILM COATED ORAL DAILY
Qty: 60 TABLET | Refills: 3 | Status: SHIPPED | OUTPATIENT
Start: 2024-07-18

## 2024-07-18 RX ORDER — SULFAMETHOXAZOLE AND TRIMETHOPRIM 800; 160 MG/1; MG/1
1 TABLET ORAL
Qty: 12 TABLET | Refills: 5 | Status: SHIPPED | OUTPATIENT
Start: 2024-07-19 | End: 2025-01-03

## 2024-07-18 RX ORDER — GABAPENTIN 300 MG/1
300 CAPSULE ORAL 2 TIMES DAILY
Qty: 60 CAPSULE | Refills: 1 | Status: SHIPPED | OUTPATIENT
Start: 2024-07-18 | End: 2024-09-16

## 2024-07-18 RX ORDER — VALACYCLOVIR HYDROCHLORIDE 500 MG/1
500 TABLET, FILM COATED ORAL 2 TIMES DAILY
Qty: 60 TABLET | Refills: 4 | Status: SHIPPED | OUTPATIENT
Start: 2024-07-18 | End: 2024-12-15

## 2024-07-18 RX ORDER — SULFAMETHOXAZOLE AND TRIMETHOPRIM 800; 160 MG/1; MG/1
1 TABLET ORAL
Qty: 12 TABLET | Refills: 5 | Status: SHIPPED | OUTPATIENT
Start: 2024-07-19 | End: 2024-07-18

## 2024-07-18 RX ORDER — DEXAMETHASONE 4 MG/1
10 TABLET ORAL DAILY
Status: DISCONTINUED | OUTPATIENT
Start: 2024-07-19 | End: 2024-07-18 | Stop reason: HOSPADM

## 2024-07-18 RX ORDER — VALACYCLOVIR HYDROCHLORIDE 500 MG/1
500 TABLET, FILM COATED ORAL 2 TIMES DAILY
Qty: 60 TABLET | Refills: 4 | Status: SHIPPED | OUTPATIENT
Start: 2024-07-18 | End: 2024-07-18

## 2024-07-18 RX ORDER — LEVOFLOXACIN 500 MG/1
500 TABLET, FILM COATED ORAL DAILY
Qty: 30 TABLET | Refills: 0 | Status: SHIPPED | OUTPATIENT
Start: 2024-07-19 | End: 2024-08-18

## 2024-07-18 RX ORDER — FLUCONAZOLE 200 MG/1
200 TABLET ORAL DAILY
Qty: 30 TABLET | Refills: 4 | Status: SHIPPED | OUTPATIENT
Start: 2024-07-18 | End: 2024-12-15

## 2024-07-18 RX ORDER — DEXAMETHASONE 2 MG/1
10 TABLET ORAL DAILY
Qty: 50 TABLET | Refills: 0 | Status: SHIPPED | OUTPATIENT
Start: 2024-07-19 | End: 2024-07-29

## 2024-07-18 RX ORDER — ALLOPURINOL 300 MG/1
300 TABLET ORAL DAILY
Qty: 30 TABLET | Refills: 0 | Status: SHIPPED | OUTPATIENT
Start: 2024-07-19

## 2024-07-18 RX ORDER — LEVOFLOXACIN 500 MG/1
500 TABLET, FILM COATED ORAL DAILY
Qty: 30 TABLET | Refills: 0 | Status: SHIPPED | OUTPATIENT
Start: 2024-07-19 | End: 2024-07-18

## 2024-07-18 RX ORDER — FAMOTIDINE 20 MG/1
40 TABLET, FILM COATED ORAL DAILY
Status: DISCONTINUED | OUTPATIENT
Start: 2024-07-18 | End: 2024-07-18 | Stop reason: HOSPADM

## 2024-07-18 RX ADMIN — HYDROXYUREA 1000 MG: 500 CAPSULE ORAL at 08:12

## 2024-07-18 RX ADMIN — LOSARTAN POTASSIUM 50 MG: 50 TABLET, FILM COATED ORAL at 08:12

## 2024-07-18 RX ADMIN — ACETAMINOPHEN 650 MG: 325 TABLET ORAL at 09:20

## 2024-07-18 RX ADMIN — HYDROCHLOROTHIAZIDE 12.5 MG: 12.5 CAPSULE ORAL at 08:09

## 2024-07-18 RX ADMIN — VALACYCLOVIR HYDROCHLORIDE 500 MG: 500 TABLET, FILM COATED ORAL at 08:13

## 2024-07-18 RX ADMIN — ALLOPURINOL 300 MG: 300 TABLET ORAL at 08:13

## 2024-07-18 RX ADMIN — GABAPENTIN 300 MG: 300 CAPSULE ORAL at 15:13

## 2024-07-18 RX ADMIN — GABAPENTIN 300 MG: 300 CAPSULE ORAL at 08:11

## 2024-07-18 RX ADMIN — SODIUM CHLORIDE: 9 INJECTION, SOLUTION INTRAVENOUS at 10:54

## 2024-07-18 RX ADMIN — DEXAMETHASONE 20 MG: 4 TABLET ORAL at 08:11

## 2024-07-18 RX ADMIN — LEVOFLOXACIN 500 MG: 500 TABLET, FILM COATED ORAL at 08:11

## 2024-07-18 RX ADMIN — SODIUM CHLORIDE: 9 INJECTION, SOLUTION INTRAVENOUS at 00:18

## 2024-07-18 RX ADMIN — SODIUM CHLORIDE, PRESERVATIVE FREE 10 ML: 5 INJECTION INTRAVENOUS at 08:15

## 2024-07-18 RX ADMIN — FAMOTIDINE 40 MG: 20 TABLET, FILM COATED ORAL at 09:20

## 2024-07-18 RX ADMIN — FLUCONAZOLE 200 MG: 200 TABLET ORAL at 08:10

## 2024-07-18 RX ADMIN — CARVEDILOL 6.25 MG: 3.12 TABLET, FILM COATED ORAL at 08:09

## 2024-07-18 RX ADMIN — POLYETHYLENE GLYCOL 3350 17 G: 17 POWDER, FOR SOLUTION ORAL at 08:16

## 2024-07-18 ASSESSMENT — PAIN SCALES - GENERAL
PAINLEVEL_OUTOF10: 0
PAINLEVEL_OUTOF10: 4
PAINLEVEL_OUTOF10: 0
PAINLEVEL_OUTOF10: 4

## 2024-07-18 ASSESSMENT — PAIN DESCRIPTION - DESCRIPTORS
DESCRIPTORS: ACHING
DESCRIPTORS: ACHING

## 2024-07-18 ASSESSMENT — PAIN DESCRIPTION - FREQUENCY
FREQUENCY: INTERMITTENT
FREQUENCY: INTERMITTENT

## 2024-07-18 ASSESSMENT — PAIN DESCRIPTION - PAIN TYPE
TYPE: ACUTE PAIN
TYPE: ACUTE PAIN

## 2024-07-18 ASSESSMENT — PAIN - FUNCTIONAL ASSESSMENT
PAIN_FUNCTIONAL_ASSESSMENT: ACTIVITIES ARE NOT PREVENTED
PAIN_FUNCTIONAL_ASSESSMENT: ACTIVITIES ARE NOT PREVENTED

## 2024-07-18 ASSESSMENT — PAIN DESCRIPTION - ONSET
ONSET: GRADUAL
ONSET: GRADUAL

## 2024-07-18 ASSESSMENT — PAIN DESCRIPTION - ORIENTATION: ORIENTATION: LEFT

## 2024-07-18 ASSESSMENT — PAIN DESCRIPTION - LOCATION
LOCATION: KNEE
LOCATION: KNEE

## 2024-07-18 NOTE — CARE COORDINATION
Patient and bedside RN made aware that medications will be filled in outpatient pharmacy and co pay is approximately $50.

## 2024-07-18 NOTE — PROGRESS NOTES
07/18/24 1350   Encounter Summary   Encounter Overview/Reason Follow-up   Service Provided For Patient   Referral/Consult From Nurse   Support System Family members;Mu-ism/miles community;Friends/neighbors;Spouse;Children   Last Encounter  07/18/24  ([facility])   Complexity of Encounter Low   Begin Time 1346   End Time  1350   Total Time Calculated 4 min   Assessment/Intervention/Outcome   Assessment Hopeful   Intervention Discussed illness injury and it’s impact;Discussed meaning/purpose;Discussed relationship with God;Nurtured Hope   Outcome Connection/Belonging;Expressed Gratitude;Expressed feelings of [place], Peace and/or Love;Receptive     Student  Mayra Hopson

## 2024-07-18 NOTE — CARE COORDINATION
Case Management Assessment            Discharge Note                    Date / Time of Note: 7/18/2024 10:14 AM                  Discharge Note Completed by: ALMA Ivey   for [facility] ([facility])  [employer] Mobile: [phone]    Patient Name: Pam Guido   YOB: 1975  Diagnosis: ALL (acute lymphoid leukemia) in relapse (HCC) [C91.02]  Acute lymphocytic leukemia not having achieved remission (HCC) [C91.00]   Date / Time: 7/12/2024 12:33 PM    Current PCP: Johanna Jensen, ALLY - CNP  Clinic patient: No    Hospitalization in the last 30 days: Yes  Readmission Assessment  Number of Days since last admission?:  (Transfered from [facility] to [facility])  Previous Disposition:  (Transfered from [facility] to [facility])  Who is being Interviewed:  (Transfered from [facility] to [facility])  What was the patient's/caregiver's perception as to why they think they needed to return back to the hospital?:  (Transfered from [facility] to [facility])  Did you visit your Primary Care Physician after you left the hospital, before you returned this time?:  (Transfered from [facility] to [facility])  Did you see a specialist, such as Cardiac, Pulmonary, Orthopedic Physician, etc. after you left the hospital?:  (Transfered from [facility] to [facility])  Who advised the patient to return to the hospital?:  (Transfered from [facility] to [facility])  Does the patient report anything that got in the way of taking their medications?:  (Transfered from [facility] to [facility])  In our efforts to provide the best possible care to you and others like you, can you think of anything that we could have done to help you after you left the hospital the first time, so that you might not have needed to return so soon?:  (Transfered from [facility] to [facility])    Advance Directives:  Code Status: Full Code  [place] DNR  form completed and on chart: Not Indicated    Financial:  [facility] / Plan: [facility] - [place] PPO / Product Type: *No Product type* /      Pharmacy:    [facility] [id number] - [place], [place] - [address] - P [phone] - F [phone]  [address]  [facility] [place] [address]  Phone: [phone] Fax: [phone]    [facility] [id number] - [place], [place] - [address] - P [phone] - F [phone]  [address] [facility] [place] [address]  Phone: [phone] Fax: [phone]    [facility] [id number] [place], [place] - [address] - P [phone] - F [phone]  [address]  [facility] [address]  Phone: [phone] Fax: [phone]    [facility] [address] - P [phone] - F [phone]  [address]  [facility] [address]  Phone: [phone] Fax: [phone]      Assistance purchasing medications?: Potential Assistance Purchasing Medications: No  Assistance provided by Case Management: None at this time    Does patient want to participate in local refill/ meds to beds program?:      Meds To Beds General Rules:  1. Can ONLY be done Monday- Friday between 8:30am-5pm  2. Prescription(s) must be in pharmacy by 3pm to be filled same day  3.Copy of patient's insurance/ prescription drug card and patient face sheet must be sent along with the prescription(s)  4. Cost of Rx cannot be added to hospital bill. If financial assistance is needed, please contact unit  or ;  or  CANNOT provide pharmacy voucher for patients co-pays  5. Patients can then  the prescription on their way out of the hospital at discharge, or pharmacy can deliver to the bedside if staff is available. (payment due at time of pick-up or delivery - cash, check, or card accepted)     Able to afford home medications/ co-pay costs: Yes    ADLS:  Current PT AM-PAC Score: 24 /24  Current OT AM-PAC Score:

## 2024-07-18 NOTE — PLAN OF CARE
Problem: Safety - Adult  Goal: Free from fall injury  7/18/2024 0940 by Ivette Hernandez RN  Outcome: Progressing     Problem: Pain  Goal: Verbalizes/displays adequate comfort level or baseline comfort level  7/18/2024 0940 by Ivette Hernandez RN  Outcome: Progressing     Problem: ABCDS Injury Assessment  Goal: Absence of physical injury  7/18/2024 0940 by Ivette Hernandez RN  Outcome: Progressing

## 2024-07-18 NOTE — PROGRESS NOTES
Discharge instructions provided, verbalized understanding. Prescriptions provided at this time. Pt is aware of all follow up appointments and denies any questions at this time. Belongings packed and with pt. Denies further discharge needs. Leaving unit in stable condition.

## 2024-07-18 NOTE — DISCHARGE SUMMARY
COZAAR  Take 1 tablet by mouth every morning  Notes to patient: Losartan  (Cozaar)  USE--  Lower blood pressure, prevent heart failure  SIDE EFFECTS--  Dizziness, headache, low blood pressure, cough     sulfamethoxazole-trimethoprim 800-160 MG per tablet  Commonly known as: BACTRIM DS;SEPTRA DS  Take 1 tablet by mouth three times a week  Start taking on: July 19, 2024            STOP taking these medications      acyclovir 200 MG capsule  Commonly known as: ZOVIRAX     lidocaine 4 % external patch     methocarbamol 750 MG tablet  Commonly known as: ROBAXIN     oxyCODONE 10 MG extended release tablet  Commonly known as: OXYCONTIN     oxyCODONE 5 MG immediate release tablet  Commonly known as: ROXICODONE               Where to Get Your Medications        These medications were sent to [id number] - [place], [place] - [address] - P [phone] F [phone] [address] [facility] [address]      Phone: [phone]   allopurinol 300 MG tablet  dexAMETHasone 2 MG tablet  famotidine 40 MG tablet  fluconazole 200 MG tablet  gabapentin 300 MG capsule  levoFLOXacin 500 MG tablet  sulfamethoxazole-trimethoprim 800-160 MG per tablet  valACYclovir 500 MG tablet       These medications were sent to [facility] [address] - P [phone] - F [phone]  [facility], [facility] [address]      Phone: [phone]   PONATinib HCl 30 MG Tabs         Reason for Admission: Newly diagnosed ALL    Hospital Course:   Pam Guido is a 49 year old female with a past medical history significant for essential hypertension, obesity, chronic pain, and GERD. She presented to the emergency department in [facility] on 7/7/2024 with complaints of substernal chest pain, generalized body aches, headache, and nausea. . Her labs also revealed a leukocytosis with a WBC of 24,000 and an elevated D dimer.  She was transferred to [facility] for further  infectious workup and infectious disease consult due to possible sepsis with unknown etiology.     Upon transfer to [facility], infectious disease was consulted and all of her infectious workup, including blood cultures, CT CAP, LP, and CT soft tissue neck, was all negative. With her ongoing leukocytosis hematology/oncology was consulted on 7/10/24 for further evaluation. A bone marrow biopsy and aspiration was ordered and preliminary results were consistent with ALL. With these findings, Pam was transferred to [facility] for further evaluation and treatment on newly diagnosed acute lymphocytic leukemia.     Patient was initiated on Hydrea and Dexamethasone.  She also received Cytarabine 1 gm IV on 7/14/24 due to rapidly increasing WBC.  Her WBC is currently stable.  She will be discharged with Dex 10 mg PO daily.  Patient will initiate Ponatinib once it is available from her pharmacy.  She also will receive Blincyto in the near future as part of her treatment plan.  She is doing well with no s/s of disease other than generalized fatigue.  We will discharge her today with labs and f/u at [facility] on Friday, 7/19/24.            Physical Exam:     Vital Signs:  BP (!) 147/101   Pulse 86   Temp 98 °F (36.7 °C) (Oral)   Resp 18   Ht 1.727 m (5' 8\")   Wt 112.3 kg (247 lb 9.6 oz)   LMP 03/01/2019   SpO2 99%   BMI 37.65 kg/m²     Weight:    Wt Readings from Last 3 Encounters:   07/18/24 112.3 kg (247 lb 9.6 oz)   07/12/24 113.4 kg (250 lb)   07/07/24 113.4 kg (250 lb)       KPS: 80% Normal activity with effort; some signs or symptoms of disease    PE performed by Dr. York  General: Awake, alert and oriented    HEENT: normocephalic, alopecia, PERRL, no scleral erythema or icterus, Oral mucosa moist and intact, throat clear.   NECK: supple without palpable adenopathy  BACK: Straight, negative CVAT  SKIN: warm dry and intact without lesions rashes or masses  CHEST: CTA bilaterally without use of accessory muscles  CV:

## 2024-07-18 NOTE — PLAN OF CARE
Problem: Safety - Adult  Goal: Free from fall injury  Outcome: Progressing  Free From Fall Injury:   Instruct family/caregiver on patient safety   Based on caregiver fall risk screen, instruct family/caregiver to ask for assistance with transferring infant if caregiver noted to have fall risk factors    Problem: Pain  Goal: Verbalizes/displays adequate comfort level or baseline comfort level  Outcome: Progressing  Verbalizes/displays adequate comfort level or baseline comfort level:   Encourage patient to monitor pain and request assistance   Administer analgesics based on type and severity of pain and evaluate response   Consider cultural and social influences on pain and pain management   Assess pain using appropriate pain scale   Implement non-pharmacological measures as appropriate and evaluate response   Notify Licensed Independent Practitioner if interventions unsuccessful or patient reports new pain  Note: No pain noted during this shift.       Problem: ABCDS Injury Assessment  Goal: Absence of physical injury  Outcome: Progressing  Note: - Screening for Orthostasis AND not a [place] Risk per [place]/ABCDS: Pt bed is in low position, side rails up, call light and belongings are in reach.  Fall risk light is on outside pts room.  Pt encouraged to call for assistance as needed. Will continue with hourly rounds for PO intake, pain needs, toileting and repositioning as needed.        Problem: Hematologic - Adult  Goal: Maintains hematologic stability  Outcome: Progressing  Maintains hematologic stability:   Assess for signs and symptoms of bleeding or hemorrhage   Administer blood products/factors as ordered   Monitor labs for bleeding or clotting disorders  Note: Patient's hemoglobin this AM:   Recent Labs     07/18/24  0343   HGB 12.6     Patient's platelet count this AM:   Recent Labs     07/18/24  0343   PLT 75*    Thrombocytopenia not present at this time.  Patient showing no signs or symptoms of active

## 2024-07-18 NOTE — CARE COORDINATION
Reviewed discharge instructions with patient. Reviewed discharge medications including dosing, schedule, indication, and adverse reactions.  Reviewed which medications were already taken today and next dosage due for each medication. Patient had many questions about her ponatinib. Educated her that this medication is being processed and filled at a specialty pharmacy and that they will be in contact with her to set up delivery.    Reviewed signs and symptoms that prompt a call to the physician and appropriate phone numbers.Reviewed follow up appointments that have been made in [facility].    Patient is being discharged with IV access d/t need for ongoing therapy:      Type:  PICC                        Date of placement:  7/12/2024  Plan:continue   Next dressing change due on: 7/22/2024  Cap changes due on: 7/22/2024    Patient verbalized understanding of all instructions and questions were answered to her. satisfaction.  Signed discharge instructions were given to the patient and a copy placed in the paper-lite chart.  Patient discharged to home per self with family members.      Bety Ruiz RN

## 2024-07-19 ENCOUNTER — TELEPHONE (OUTPATIENT)
Dept: FAMILY MEDICINE CLINIC | Age: 49
End: 2024-07-19

## 2024-07-19 ENCOUNTER — CARE COORDINATION (OUTPATIENT)
Dept: CASE MANAGEMENT | Age: 49
End: 2024-07-19

## 2024-07-19 NOTE — CARE COORDINATION
Attempted to reach patient for transitions of care follow up. Unable to reach patient.    Outreach Attempts:   CTN only able to speak with patient briefly as she was at [facility] for HFU appointment and to get labs.  CTN will attempt to complete initial 24 hour discharge follow up CTN call on Monday.    Patient: Pam Guido    Patient : 1975   MRN: [id number]     Reason for Admission: Acute Lymphocytic Leukemia  Discharge Date: 24    RURS: Readmission Risk Score: 16    Last Discharge Facility       Date Complaint Diagnosis Description Type Department Provider    24  Acute lymphocytic leukemia not having achieved remission (HCC) ... Admission (Discharged) [facility] Shaylee Mccormick, DO            Was this an external facility discharge? No    Follow Up Appointment:   Patient does have a follow up appointment scheduled at time of call.  Scheduled for 2024  Future Appointments         Provider Specialty Dept Phone    2024 11:20 AM Johanna Jensen APRN - CNP Family Medicine [phone]    2024 3:00 PM Johanna Jensen APRCLEMENTE - CNP Family Medicine [phone]            Plan for follow-up on next business day.      Thank You,    Stephanie Hernandez RN  Care Transition Coordinator  Contact Number:[phone]

## 2024-07-19 NOTE — TELEPHONE ENCOUNTER
[facility] Transitions Initial Follow Up Call    Outreach made within 2 business days of discharge: Yes    Patient: Pam Guido Patient : 1975   MRN: [id number]  Reason for Admission: There are no discharge diagnoses documented for the most recent discharge.  Discharge Date: 24       Spoke with: Shakira    Discharge department/facility: [facility]    Non-face-to-face services provided:  Scheduled appointment with PCP-24  Obtained and reviewed discharge summary and/or continuity of care documents    TCM Interactive Patient Contact:  Was patient able to fill all prescriptions: Yes  Was patient instructed to bring all medications to the follow-up visit: Yes  Is patient taking all medications as directed in the discharge summary? Yes  Does patient understand their discharge instructions: Yes  Does patient have questions or concerns that need addressed prior to 7-14 day follow up office visit: no    Scheduled appointment with PCP within 7-14 days    Follow Up  Future Appointments   Date Time Provider Department Center   2024 11:20 AM Johanna Jensen APRN - CNP [facility] Yumiko BLANTON   2024  3:00 PM Johanna Jensen APRN - CNP [facility] Yumiko LANDAVERDE RN

## 2024-07-22 ENCOUNTER — CARE COORDINATION (OUTPATIENT)
Dept: CASE MANAGEMENT | Age: 49
End: 2024-07-22

## 2024-07-22 NOTE — CARE COORDINATION
Attempted to reach patient for transitions of care follow up. Unable to reach patient.    Outreach Attempts:   2ND CTC attempt to reach Pt regarding recent hospital discharge.  CTC unable to leave voice recording with call back number requesting a call back, no mailbox option set up.  CTN will close out CTN episode at this time.    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 Admission (Discharged) [facility], DO            Was this an external facility discharge? No    Follow Up Appointment:   Patient has hospital follow up appointment scheduled within 7 days of discharge.    Future Appointments         Provider Specialty Dept Phone    2024 11:20 AM Johanna Jensen APRN - CNP Family Medicine [phone]    2024 3:00 PM Johanna Jensen APRCLEMENTE - CNP Family Medicine [phone]            No further follow-up call indicated     Thank You,    Stephanie Hernandez RN  Care Transition Coordinator  Contact Number:[phone]

## 2024-07-24 ENCOUNTER — HOSPITAL ENCOUNTER (OUTPATIENT)
Dept: GENERAL RADIOLOGY | Age: 49
Discharge: HOME | End: 2024-07-24
Payer: COMMERCIAL

## 2024-07-24 ENCOUNTER — HOSPITAL ENCOUNTER (OUTPATIENT)
Dept: ONCOLOGY | Age: 49
Setting detail: INFUSION SERIES
Discharge: HOME | End: 2024-07-24
Payer: COMMERCIAL

## 2024-07-24 VITALS
TEMPERATURE: 98.2 F | RESPIRATION RATE: 16 BRPM | SYSTOLIC BLOOD PRESSURE: 127 MMHG | DIASTOLIC BLOOD PRESSURE: 77 MMHG | HEART RATE: 88 BPM | OXYGEN SATURATION: 98 %

## 2024-07-24 DIAGNOSIS — C91.00 ACUTE LYMPHOCYTIC LEUKEMIA NOT HAVING ACHIEVED REMISSION (HCC): Primary | ICD-10-CM

## 2024-07-24 DIAGNOSIS — C91.00 ACUTE LYMPHOBLASTIC LEUKEMIA (ALL) NOT HAVING ACHIEVED REMISSION (HCC): ICD-10-CM

## 2024-07-24 LAB
ABO + RH BLD: NORMAL
APPEARANCE CSF: CLEAR
BLD GP AB SCN SERPL QL: NORMAL
BLOOD BANK DISPENSE STATUS: NORMAL
BLOOD BANK PRODUCT CODE: NORMAL
BPU ID: NORMAL
CLOT EVALUATION CSF: NORMAL
COLOR CSF: COLORLESS
DESCRIPTION BLOOD BANK: NORMAL
GLUCOSE CSF-MCNC: 57 MG/DL (ref 40–80)
MANUAL DIF COMMENT CSF-IMP: NORMAL
MISCELLANEOUS LAB TEST RESULT: NORMAL
NUC CELL # FLD MANUAL: 1 /CUMM (ref 0–5)
PROT CSF-MCNC: 39 MG/DL (ref 15–45)
RBC # FLD MANUAL: 0 /CUMM
TUBE # CSF: NORMAL

## 2024-07-24 PROCEDURE — 77002 NEEDLE LOCALIZATION BY XRAY: CPT

## 2024-07-24 PROCEDURE — 6360000002 HC RX W HCPCS: Performed by: STUDENT IN AN ORGANIZED HEALTH CARE EDUCATION/TRAINING PROGRAM

## 2024-07-24 PROCEDURE — 86901 BLOOD TYPING SEROLOGIC RH(D): CPT

## 2024-07-24 PROCEDURE — 2580000003 HC RX 258: Performed by: STUDENT IN AN ORGANIZED HEALTH CARE EDUCATION/TRAINING PROGRAM

## 2024-07-24 PROCEDURE — 86900 BLOOD TYPING SEROLOGIC ABO: CPT

## 2024-07-24 PROCEDURE — 36430 TRANSFUSION BLD/BLD COMPNT: CPT

## 2024-07-24 PROCEDURE — 88184 FLOWCYTOMETRY/ TC 1 MARKER: CPT

## 2024-07-24 PROCEDURE — 88112 CYTOPATH CELL ENHANCE TECH: CPT

## 2024-07-24 PROCEDURE — 89050 BODY FLUID CELL COUNT: CPT

## 2024-07-24 PROCEDURE — P9036 PLATELET PHERESIS IRRADIATED: HCPCS

## 2024-07-24 PROCEDURE — 88185 FLOWCYTOMETRY/TC ADD-ON: CPT

## 2024-07-24 PROCEDURE — 84157 ASSAY OF PROTEIN OTHER: CPT

## 2024-07-24 PROCEDURE — 82945 GLUCOSE OTHER FLUID: CPT

## 2024-07-24 PROCEDURE — 86850 RBC ANTIBODY SCREEN: CPT

## 2024-07-24 PROCEDURE — 96450 CHEMOTHERAPY INTO CNS: CPT

## 2024-07-24 PROCEDURE — 36592 COLLECT BLOOD FROM PICC: CPT

## 2024-07-24 RX ORDER — ONDANSETRON 2 MG/ML
8 INJECTION INTRAMUSCULAR; INTRAVENOUS
OUTPATIENT
Start: 2024-07-24

## 2024-07-24 RX ORDER — SODIUM CHLORIDE 9 MG/ML
INJECTION, SOLUTION INTRAVENOUS CONTINUOUS
OUTPATIENT
Start: 2024-07-24

## 2024-07-24 RX ORDER — DIPHENHYDRAMINE HYDROCHLORIDE 50 MG/ML
50 INJECTION INTRAMUSCULAR; INTRAVENOUS
OUTPATIENT
Start: 2024-07-24

## 2024-07-24 RX ORDER — ALBUTEROL SULFATE 90 UG/1
4 AEROSOL, METERED RESPIRATORY (INHALATION) PRN
OUTPATIENT
Start: 2024-07-24

## 2024-07-24 RX ORDER — SODIUM CHLORIDE 9 MG/ML
20 INJECTION, SOLUTION INTRAVENOUS CONTINUOUS
OUTPATIENT
Start: 2024-07-24

## 2024-07-24 RX ORDER — SODIUM CHLORIDE 9 MG/ML
25 INJECTION, SOLUTION INTRAVENOUS PRN
OUTPATIENT
Start: 2024-07-24

## 2024-07-24 RX ORDER — ACETAMINOPHEN 325 MG/1
650 TABLET ORAL
OUTPATIENT
Start: 2024-07-24

## 2024-07-24 RX ORDER — SODIUM CHLORIDE 0.9 % (FLUSH) 0.9 %
5-40 SYRINGE (ML) INJECTION PRN
OUTPATIENT
Start: 2024-07-24

## 2024-07-24 RX ORDER — EPINEPHRINE 1 MG/ML
0.3 INJECTION, SOLUTION INTRAMUSCULAR; SUBCUTANEOUS PRN
OUTPATIENT
Start: 2024-07-24

## 2024-07-24 RX ADMIN — SODIUM CHLORIDE 15 MG: 9 INJECTION INTRAMUSCULAR; INTRAVENOUS; SUBCUTANEOUS at 15:26

## 2024-07-24 NOTE — PROGRESS NOTES
Patient seen in IR for lumbar puncture with intrathecal administration of chemotherapy under fluoroscopy.  Original chemotherapy orders reviewed and acknowledged. Appropriateness of chemotherapy treatment and dosing of IT Methotrexate for diagnosis of ALL was verified.  Patient educated on LP and IT chemo instillation by [employer].  Consent for chemotherapy verified.    Chemotherapy drug Methotrexate 15 mg (preservative free) independently verified with Dr. Mert Orellana and Sada Kemp NP, radiologist prior to administration.  Original order, appropriateness of drug & dosing, drug supplied, expiration dates/times, drug appearance, and two patient identifiers were verified by both RN & MD.  Allergies were reviewed.      CSF obtained without issues per Dr. Mert Orellana and Sada Kemp NP and sent for cell count with diff, glucose, protein, and cytology.  Chemotherapy administration time 1526.  Pt tolerated LP with IT chemotherapy well and without incident.      Tatiana Bishop RN

## 2024-07-24 NOTE — PROGRESS NOTES
Patient's hemoglobin this AM: 13.2  Patient's platelet count this AM: 25  Pt seen at [facility] today for  Platelet transfusion  for above lab values per order. Informed consent verified. No Pre-medications ordered with no previous transfusion reaction history. Transfused per policy. Pt tolerated transfusion well and without incident.  Pt verbalizes understanding of discharge instructions.  Discharged to home with family.    Maggie Soriano RN

## 2024-07-25 ENCOUNTER — OFFICE VISIT (OUTPATIENT)
Dept: FAMILY MEDICINE CLINIC | Age: 49
End: 2024-07-25

## 2024-07-25 VITALS
WEIGHT: 238 LBS | BODY MASS INDEX: 36.07 KG/M2 | OXYGEN SATURATION: 97 % | SYSTOLIC BLOOD PRESSURE: 136 MMHG | HEART RATE: 103 BPM | DIASTOLIC BLOOD PRESSURE: 84 MMHG | HEIGHT: 68 IN

## 2024-07-25 DIAGNOSIS — Z09 HOSPITAL DISCHARGE FOLLOW-UP: ICD-10-CM

## 2024-07-25 DIAGNOSIS — C91.00 ACUTE LYMPHOCYTIC LEUKEMIA NOT HAVING ACHIEVED REMISSION (HCC): Primary | ICD-10-CM

## 2024-07-25 RX ORDER — DOCUSATE SODIUM 100 MG/1
100 CAPSULE, LIQUID FILLED ORAL DAILY PRN
Qty: 30 CAPSULE | Refills: 2 | Status: SHIPPED | OUTPATIENT
Start: 2024-07-25

## 2024-07-31 NOTE — PROGRESS NOTES
Post-Discharge Transitional Care  Follow Up      Pam Guido   YOB: 1975    Date of Office Visit:  7/25/2024  Date of Hospital Admission: 7/12/24  Date of Hospital Discharge: 7/18/24  Risk of hospital readmission (high >=14%. Medium >=10%) :Readmission Risk Score: 16      Care management risk score Rising risk (score 2-5) and Complex Care (Scores >=6): No Risk Score On File     Non face to face  following discharge, date last encounter closed (first attempt may have been earlier): 07/19/2024    Call initiated 2 business days of discharge: Yes    ASSESSMENT/PLAN:   Acute lymphocytic leukemia not having achieved remission (HCC)  Continue taking ponatinib (her chemo pill) for 30 days.    She states that it had to come from a specialty pharmacy and it is on the way to her home but she is not currently taking that medication  Patient states that when she completes 30 days of her chemotherapy oral tablet then she will start IV therapy.  Continue with oncology at [facility]  Continue allopurinol 300 mg daily  Complete dexamethasone 2 mg take 5 tablets daily for total of 10 days to be completed around July 29  Continue with amantadine 40 mg daily  Continue Valtrex 500 mg twice a day  Discontinue gabapentin related to neuropathy since patient feels she no longer needs this medication  Bactrim DS was discontinued by hematology/oncology  Continue dapsone 100 mg  PICC line dressing is changed weekly by [facility]  Again stressed the importance to the patient to avoid any ill contacts if possible  Extensively reviewed and discussed recent in patient hospital records, labs, imaging and consults with patient, discussed HPI and Plan, coordinated care and patient counseling provided at today's visit          Medical Decision Making: high complexity  No follow-ups on file.           Subjective:   HPI:  Follow up of Hospital problems/diagnosis(es):   1. Acute lymphocytic leukemia not having achieved remission (HCC)

## 2024-08-05 DIAGNOSIS — I10 PRIMARY HYPERTENSION: ICD-10-CM

## 2024-08-05 DIAGNOSIS — R00.0 TACHYCARDIA: ICD-10-CM

## 2024-08-05 RX ORDER — CARVEDILOL 12.5 MG/1
12.5 TABLET ORAL 2 TIMES DAILY
Qty: 60 TABLET | Refills: 0 | Status: SHIPPED | OUTPATIENT
Start: 2024-08-05

## 2024-08-05 NOTE — TELEPHONE ENCOUNTER
Since pt has been on Chemo pill her bp has been higher and she can feel I it in her chest almost like  palpitations. To was 138/100 hr 83 and then toma 133/90 hr 100. Pt is currently taking 6.25 mg BID. Pt was told her heart rate could go up with the chemo medication and now it is happening. Pt was on higher dose previously but was doing well and was decreased. Pt was told to call pcp about hr and bp if they were to increase. Pt sees oncology tomorrow.

## 2024-08-10 LAB
Lab: NORMAL
REPORT: NORMAL
REPORT: NORMAL
THIS TEST SENT TO: NORMAL

## 2024-08-13 ENCOUNTER — TRANSCRIBE ORDERS (OUTPATIENT)
Facility: HOSPITAL | Age: 49
End: 2024-08-13

## 2024-08-13 ENCOUNTER — TRANSCRIBE ORDERS (OUTPATIENT)
Dept: ADMINISTRATIVE | Age: 49
End: 2024-08-13

## 2024-08-13 DIAGNOSIS — C91.00 ACUTE LYMPHOBLASTIC LEUKEMIA (ALL) NOT HAVING ACHIEVED REMISSION (HCC): Primary | ICD-10-CM

## 2024-08-16 ENCOUNTER — HOSPITAL ENCOUNTER (OUTPATIENT)
Age: 49
Setting detail: SPECIMEN
Discharge: HOME | End: 2024-08-16

## 2024-08-16 ENCOUNTER — HOSPITAL ENCOUNTER (OUTPATIENT)
Dept: ONCOLOGY | Age: 49
Setting detail: INFUSION SERIES
Discharge: HOME | End: 2024-08-16
Payer: COMMERCIAL

## 2024-08-16 VITALS
DIASTOLIC BLOOD PRESSURE: 81 MMHG | HEART RATE: 94 BPM | OXYGEN SATURATION: 94 % | RESPIRATION RATE: 18 BRPM | SYSTOLIC BLOOD PRESSURE: 117 MMHG | TEMPERATURE: 97.9 F

## 2024-08-16 LAB
BASOPHILS # BLD: 0.1 K/UL (ref 0–0.2)
BASOPHILS NFR BLD: 1.7 %
DEPRECATED RDW RBC AUTO: 19.6 % (ref 12.4–15.4)
EOSINOPHIL # BLD: 0 K/UL (ref 0–0.6)
EOSINOPHIL NFR BLD: 0.2 %
HCT VFR BLD AUTO: 33.8 % (ref 36–48)
HGB BLD-MCNC: 11.3 G/DL (ref 12–16)
LYMPHOCYTES # BLD: 1.3 K/UL (ref 1–5.1)
LYMPHOCYTES NFR BLD: 26.7 %
MCH RBC QN AUTO: 30.7 PG (ref 26–34)
MCHC RBC AUTO-ENTMCNC: 33.3 G/DL (ref 31–36)
MCV RBC AUTO: 92.1 FL (ref 80–100)
MONOCYTES # BLD: 0.6 K/UL (ref 0–1.3)
MONOCYTES NFR BLD: 11.1 %
NEUTROPHILS # BLD: 3 K/UL (ref 1.7–7.7)
NEUTROPHILS NFR BLD: 60.3 %
PLATELET # BLD AUTO: 138 K/UL (ref 135–450)
PMV BLD AUTO: 8.2 FL (ref 5–10.5)
RBC # BLD AUTO: 3.67 M/UL (ref 4–5.2)
WBC # BLD AUTO: 5.1 K/UL (ref 4–11)

## 2024-08-16 PROCEDURE — 96374 THER/PROPH/DIAG INJ IV PUSH: CPT

## 2024-08-16 PROCEDURE — 96375 TX/PRO/DX INJ NEW DRUG ADDON: CPT

## 2024-08-16 PROCEDURE — 99152 MOD SED SAME PHYS/QHP 5/>YRS: CPT

## 2024-08-16 PROCEDURE — 96376 TX/PRO/DX INJ SAME DRUG ADON: CPT

## 2024-08-16 PROCEDURE — 6360000002 HC RX W HCPCS: Performed by: STUDENT IN AN ORGANIZED HEALTH CARE EDUCATION/TRAINING PROGRAM

## 2024-08-16 PROCEDURE — 38221 DX BONE MARROW BIOPSIES: CPT

## 2024-08-16 PROCEDURE — 36592 COLLECT BLOOD FROM PICC: CPT

## 2024-08-16 RX ORDER — MIDAZOLAM HYDROCHLORIDE 1 MG/ML
1 INJECTION INTRAMUSCULAR; INTRAVENOUS PRN
Status: DISCONTINUED | OUTPATIENT
Start: 2024-08-16 | End: 2024-08-17 | Stop reason: HOSPADM

## 2024-08-16 RX ORDER — FENTANYL CITRATE 50 UG/ML
25 INJECTION, SOLUTION INTRAMUSCULAR; INTRAVENOUS PRN
Status: DISCONTINUED | OUTPATIENT
Start: 2024-08-16 | End: 2024-08-17 | Stop reason: HOSPADM

## 2024-08-16 RX ORDER — FLUMAZENIL 0.1 MG/ML
0.5 INJECTION INTRAVENOUS PRN
Status: DISCONTINUED | OUTPATIENT
Start: 2024-08-16 | End: 2024-08-17 | Stop reason: HOSPADM

## 2024-08-16 RX ORDER — NALOXONE HYDROCHLORIDE 0.4 MG/ML
0.4 INJECTION, SOLUTION INTRAMUSCULAR; INTRAVENOUS; SUBCUTANEOUS PRN
Status: DISCONTINUED | OUTPATIENT
Start: 2024-08-16 | End: 2024-08-17 | Stop reason: HOSPADM

## 2024-08-16 RX ADMIN — MIDAZOLAM HYDROCHLORIDE 1 MG: 1 INJECTION, SOLUTION INTRAMUSCULAR; INTRAVENOUS at 09:22

## 2024-08-16 RX ADMIN — FENTANYL CITRATE 25 MCG: 50 INJECTION, SOLUTION INTRAMUSCULAR; INTRAVENOUS at 09:22

## 2024-08-16 RX ADMIN — FENTANYL CITRATE 50 MCG: 50 INJECTION, SOLUTION INTRAMUSCULAR; INTRAVENOUS at 09:18

## 2024-08-16 RX ADMIN — MIDAZOLAM HYDROCHLORIDE 2 MG: 1 INJECTION, SOLUTION INTRAMUSCULAR; INTRAVENOUS at 09:17

## 2024-08-16 NOTE — PROGRESS NOTES
Pre-Procedural Assessment:  Signed, Dated, and timed Informed Consent on the chart  VS's obtained and documented  Pt has a patent IV site (see flowsheets)  Pt has been NPO since 1800 8/15/2024  Current Medications, Allergies, and recent H&P reviewed and on chart  Emergency medications and equipment available (crash cart, narcan, flumazenil, O2, suction, etc)  Time out performed prior to procedure (see sedation documentation)  Pre-Procedure Ada Score: 10    Procedure:  Nursing present throughout procedure to assess, assist, and monitor.  Vital Signs monitored throughout - see sedation documentation. Patient to be discharged from OPO once Ada Score of 8 or better is achieved or once pre-procedural Ada Score is obtained.    Post-Procedure:  Pt vital signs stable.  Discharge teaching provided (see AVS).  Post procedure Ada Score 10.  Pt and family verbalized understanding of all instructions.  Pt discharged from Outpt Oncology with mother in law who will drive them home.     Administered  Versed 3mg IVP  Fentanyl 75mcg IVP    Maggie Soriano RN

## 2024-08-16 NOTE — PROGRESS NOTES
ETCO2  Monitoring done for conscious sedation.  Patient is on 2 liters/min via nasal cannula for procedure.    Baseline information:  HR: 86 BP: 123/88  RR: 15 LOC: alert  ETCO2: 37 SpO2: 99    5 minutes after sedation administered:  HR: 89 BP: 156/110  RR: 22 LOC: lethargic  ETCO2: 35 SpO2: 100    10 min after sedation administered:  HR: 91 BP: 156/94  RR: 17 LOC: lethargic  ETCO2: 38 SpO2: 98    15 minutes after sedation administered:  HR: 92 BP: 144/106  RR: 15 LOC:lethargic  ETCO2: 35 SpO2: 98      Post-Procedure:  HR: 89 BP: 135/98  RR: 14 LOC: alert  ETCO2: 35 SpO2: 98  well    Patient/caregiver was educated on the proper method of use:  Yes      Level of patient/caregiver understanding able to:   89 Verbalize understanding   [] Demonstrate understanding       [] Teach back        [] Needs reinforcement       []  No available caregiver               []  Other:     Response to education:  Very Good    608

## 2024-08-19 ENCOUNTER — HOSPITAL ENCOUNTER (OUTPATIENT)
Dept: INTERVENTIONAL RADIOLOGY/VASCULAR | Age: 49
Discharge: HOME | End: 2024-08-21
Attending: STUDENT IN AN ORGANIZED HEALTH CARE EDUCATION/TRAINING PROGRAM
Payer: COMMERCIAL

## 2024-08-19 ENCOUNTER — HOSPITAL ENCOUNTER (OUTPATIENT)
Dept: GENERAL RADIOLOGY | Age: 49
Discharge: HOME | End: 2024-08-19
Attending: STUDENT IN AN ORGANIZED HEALTH CARE EDUCATION/TRAINING PROGRAM
Payer: COMMERCIAL

## 2024-08-19 VITALS
OXYGEN SATURATION: 97 % | HEIGHT: 68 IN | RESPIRATION RATE: 11 BRPM | DIASTOLIC BLOOD PRESSURE: 86 MMHG | TEMPERATURE: 97.8 F | BODY MASS INDEX: 36.68 KG/M2 | SYSTOLIC BLOOD PRESSURE: 136 MMHG | WEIGHT: 242 LBS | HEART RATE: 105 BPM

## 2024-08-19 DIAGNOSIS — C95.90 LEUKEMIA NOT HAVING ACHIEVED REMISSION, UNSPECIFIED LEUKEMIA TYPE (HCC): ICD-10-CM

## 2024-08-19 DIAGNOSIS — C91.00 ACUTE LYMPHOBLASTIC LEUKEMIA (ALL) NOT HAVING ACHIEVED REMISSION (HCC): ICD-10-CM

## 2024-08-19 DIAGNOSIS — C91.00 ACUTE LYMPHOBLASTIC LEUKEMIA (ALL) IN ADULT (HCC): ICD-10-CM

## 2024-08-19 LAB
APPEARANCE CSF: CLEAR
CLOT EVALUATION CSF: NORMAL
COLOR CSF: COLORLESS
ECHO BSA: 2.29 M2
GLUCOSE CSF-MCNC: 51 MG/DL (ref 40–80)
MANUAL DIF COMMENT CSF-IMP: NORMAL
NUC CELL # FLD MANUAL: 0 /CUMM (ref 0–5)
PROT CSF-MCNC: 45 MG/DL (ref 15–45)
RBC # FLD MANUAL: 0 /CUMM
TUBE # CSF: NORMAL

## 2024-08-19 PROCEDURE — 76937 US GUIDE VASCULAR ACCESS: CPT

## 2024-08-19 PROCEDURE — 2500000003 HC RX 250 WO HCPCS: Performed by: RADIOLOGY

## 2024-08-19 PROCEDURE — 6360000002 HC RX W HCPCS: Performed by: RADIOLOGY

## 2024-08-19 PROCEDURE — 6360000002 HC RX W HCPCS: Performed by: STUDENT IN AN ORGANIZED HEALTH CARE EDUCATION/TRAINING PROGRAM

## 2024-08-19 PROCEDURE — C1894 INTRO/SHEATH, NON-LASER: HCPCS

## 2024-08-19 PROCEDURE — 82945 GLUCOSE OTHER FLUID: CPT

## 2024-08-19 PROCEDURE — C1788 PORT, INDWELLING, IMP: HCPCS

## 2024-08-19 PROCEDURE — 84157 ASSAY OF PROTEIN OTHER: CPT

## 2024-08-19 PROCEDURE — 88184 FLOWCYTOMETRY/ TC 1 MARKER: CPT

## 2024-08-19 PROCEDURE — 88112 CYTOPATH CELL ENHANCE TECH: CPT

## 2024-08-19 PROCEDURE — 77002 NEEDLE LOCALIZATION BY XRAY: CPT

## 2024-08-19 PROCEDURE — 7100000010 HC PHASE II RECOVERY - FIRST 15 MIN

## 2024-08-19 PROCEDURE — 99153 MOD SED SAME PHYS/QHP EA: CPT

## 2024-08-19 PROCEDURE — 89050 BODY FLUID CELL COUNT: CPT

## 2024-08-19 PROCEDURE — 2580000003 HC RX 258: Performed by: STUDENT IN AN ORGANIZED HEALTH CARE EDUCATION/TRAINING PROGRAM

## 2024-08-19 PROCEDURE — C1769 GUIDE WIRE: HCPCS

## 2024-08-19 PROCEDURE — 2580000003 HC RX 258: Performed by: RADIOLOGY

## 2024-08-19 PROCEDURE — 88185 FLOWCYTOMETRY/TC ADD-ON: CPT

## 2024-08-19 PROCEDURE — 99152 MOD SED SAME PHYS/QHP 5/>YRS: CPT

## 2024-08-19 PROCEDURE — 85610 PROTHROMBIN TIME: CPT

## 2024-08-19 PROCEDURE — 96450 CHEMOTHERAPY INTO CNS: CPT

## 2024-08-19 PROCEDURE — 2500000003 HC RX 250 WO HCPCS: Performed by: STUDENT IN AN ORGANIZED HEALTH CARE EDUCATION/TRAINING PROGRAM

## 2024-08-19 RX ORDER — DIPHENHYDRAMINE HYDROCHLORIDE 50 MG/ML
INJECTION INTRAMUSCULAR; INTRAVENOUS PRN
Status: COMPLETED | OUTPATIENT
Start: 2024-08-19 | End: 2024-08-19

## 2024-08-19 RX ORDER — LIDOCAINE HYDROCHLORIDE 10 MG/ML
INJECTION, SOLUTION EPIDURAL; INFILTRATION; INTRACAUDAL; PERINEURAL PRN
Status: COMPLETED | OUTPATIENT
Start: 2024-08-19 | End: 2024-08-19

## 2024-08-19 RX ORDER — FENTANYL CITRATE 50 UG/ML
INJECTION, SOLUTION INTRAMUSCULAR; INTRAVENOUS PRN
Status: COMPLETED | OUTPATIENT
Start: 2024-08-19 | End: 2024-08-19

## 2024-08-19 RX ORDER — LIDOCAINE HYDROCHLORIDE 10 MG/ML
5 INJECTION, SOLUTION EPIDURAL; INFILTRATION; INTRACAUDAL; PERINEURAL ONCE
Status: COMPLETED | OUTPATIENT
Start: 2024-08-19 | End: 2024-08-19

## 2024-08-19 RX ORDER — FAMOTIDINE 40 MG/1
40 TABLET, FILM COATED ORAL NIGHTLY
Qty: 90 TABLET | Refills: 1 | Status: SHIPPED | OUTPATIENT
Start: 2024-08-19

## 2024-08-19 RX ORDER — LIDOCAINE HYDROCHLORIDE AND EPINEPHRINE BITARTRATE 20; .01 MG/ML; MG/ML
INJECTION, SOLUTION SUBCUTANEOUS PRN
Status: COMPLETED | OUTPATIENT
Start: 2024-08-19 | End: 2024-08-19

## 2024-08-19 RX ORDER — MIDAZOLAM HYDROCHLORIDE 1 MG/ML
INJECTION INTRAMUSCULAR; INTRAVENOUS PRN
Status: COMPLETED | OUTPATIENT
Start: 2024-08-19 | End: 2024-08-19

## 2024-08-19 RX ORDER — MIDAZOLAM HYDROCHLORIDE 5 MG/ML
INJECTION INTRAMUSCULAR; INTRAVENOUS PRN
Status: COMPLETED | OUTPATIENT
Start: 2024-08-19 | End: 2024-08-19

## 2024-08-19 RX ADMIN — FENTANYL CITRATE 25 MCG: 50 INJECTION, SOLUTION INTRAMUSCULAR; INTRAVENOUS at 15:48

## 2024-08-19 RX ADMIN — VANCOMYCIN HYDROCHLORIDE 1000 MG: 1 INJECTION, POWDER, LYOPHILIZED, FOR SOLUTION INTRAVENOUS at 14:20

## 2024-08-19 RX ADMIN — LIDOCAINE HYDROCHLORIDE,EPINEPHRINE BITARTRATE 15 ML: 20; .01 INJECTION, SOLUTION INFILTRATION; PERINEURAL at 15:56

## 2024-08-19 RX ADMIN — FENTANYL CITRATE 25 MCG: 50 INJECTION, SOLUTION INTRAMUSCULAR; INTRAVENOUS at 15:57

## 2024-08-19 RX ADMIN — LIDOCAINE HYDROCHLORIDE ANHYDROUS 5 ML: 10 INJECTION, SOLUTION INFILTRATION at 12:54

## 2024-08-19 RX ADMIN — SODIUM CHLORIDE 15 MG: 9 INJECTION INTRAMUSCULAR; INTRAVENOUS; SUBCUTANEOUS at 12:44

## 2024-08-19 RX ADMIN — MIDAZOLAM HYDROCHLORIDE 1 MG: 5 INJECTION, SOLUTION INTRAMUSCULAR; INTRAVENOUS at 15:48

## 2024-08-19 RX ADMIN — LIDOCAINE HYDROCHLORIDE 15 ML: 10 INJECTION, SOLUTION EPIDURAL; INFILTRATION; INTRACAUDAL; PERINEURAL at 15:56

## 2024-08-19 RX ADMIN — MIDAZOLAM HYDROCHLORIDE 0.5 MG: 2 INJECTION, SOLUTION INTRAMUSCULAR; INTRAVENOUS at 15:57

## 2024-08-19 RX ADMIN — DIPHENHYDRAMINE HYDROCHLORIDE 25 MG: 50 INJECTION, SOLUTION INTRAMUSCULAR; INTRAVENOUS at 15:48

## 2024-08-19 NOTE — TELEPHONE ENCOUNTER
Refill Request     CONFIRM preferrred pharmacy with the patient.    If Mail Order Rx - Pend for 90 day refill.      Last Seen: Last Seen Department: 7/25/2024  Last Seen by PCP: 7/25/2024    Last Written: 5/20/2024    If no future appointment scheduled, route STAFF MESSAGE with patient name to the  Pool for scheduling.      Next Appointment:   Future Appointments   Date Time Provider Department Center   8/19/2024  1:00 PM [facility] FLUORO RM 1 [facility] RAD [facility]   8/19/2024  2:00 PM [facility] SPECIAL PROCEDURES 1 [facility] SP PROC [facility]   10/30/2024 11:00 AM Johanna Jensen APRN - CNP [facility] DEP   12/4/2024  3:00 PM Johanna Jensen APRN - CNP [facility] DE[facility]       Message sent to  to schedule appt with patient?  NO      Requested Prescriptions     Pending Prescriptions Disp Refills    famotidine (PEPCID) 40 MG tablet [Pharmacy Med Name: FAMOTIDINE 40 MG TABLET] 90 tablet      Sig: TAKE ONE TABLET BY MOUTH ONCE NIGHTLY

## 2024-08-19 NOTE — PROGRESS NOTES
Patient seen in IR for lumbar puncture with intrathecal administration of chemotherapy under fluoroscopy.  Original chemotherapy orders reviewed and acknowledged. Appropriateness of chemotherapy treatment and dosing of IT methotrexate for diagnosis of leukemia was verified.  Patient educated on LP and IT chemo instillation by [employer].  Consent for chemotherapy verified.    Chemotherapy drug methotrexate(preservative free) independently verified with Dr. Orellana, radiologist prior to administration.  Original order, appropriateness of drug & dosing, drug supplied, expiration dates/times, drug appearance, and two patient identifiers were verified by both RN & MD.  Most recent laboratory values from 8/19/24 in [facility] chart reviewed with Dr. Orellana.  Allergies were reviewed.      CSF obtained without issues per Dr. Orellana and sent for cell count with diff, glucose, protein, and cytology.  Chemotherapy administration time 1254.  Pt tolerated LP with IT chemotherapy well and without incident.  .    Cele Redmond RN

## 2024-08-19 NOTE — PRE SEDATION
Patient:  Pam Guido   :   1975      Relevant clinical history, particularly as it involves the pending procedure, was reviewed and discussed.    The procedure including risks, benefits, and alternatives was discussed at length with the patient (or designated family member) and all questions were answered.  Informed consent to proceed with the procedure was given.    Vital signs were monitored and documented by the Radiology nurse.    Targeted physical examination  Heart : regular rate and rhythm  Lungs : clear, breathing easily  Condition : stable    [facility] nurses notes reviewed and agreed.    Past Medical History:        Diagnosis Date    Anemia     Back pain     Chest pain 2014    stress myoview normal    Chronic pain syndrome     Dr. Garcia pain management    Fibroid uterus     fibroid uterus ad abnormal uterine bleeding with uterine fibroids    GERD (gastroesophageal reflux disease)     Hypertension     Menopausal symptoms     Overactive bladder     Pelvic pain     Right lateral epicondylitis     Rotator cuff syndrome of right shoulder     Stress incontinence     Tachycardia     on coreg       Past Surgical History:           Procedure Laterality Date    CT BONE MARROW BIOPSY  2024    CT BONE MARROW BIOPSY 2024 [facility] CT SCAN    DILATION AND CURETTAGE      DILATION AND CURETTAGE OF UTERUS N/A 2019    DIAGNOSTIC LAPAROSCOPY, DILATATION AND CURETTAGE, HYSTEROSCOPY, WITH MYOSURE AND NOVASURE ENDOMETRIAL ABLATION performed by Denise Joya DO at [facility] OR    HYSTERECTOMY (CERVIX STATUS UNKNOWN)      HYSTERECTOMY, VAGINAL N/A 2020    LAPAROSCOPIC ASSISTED VAGINAL HYSTERECTOMY, BILATERAL SALPINGECTOMY, POSSIBLE CYSTOSCOPY performed by Denise Joya DO at [facility] [place] OR    HYSTEROSCOPY      KNEE CARTILAGE SURGERY Right     LAPAROSCOPY      TONSILLECTOMY      TONSILLECTOMY AND ADENOIDECTOMY      as a child    TUBAL LIGATION      US BREAST BIOPSY W LOC DEVICE 1ST LESION

## 2024-08-20 LAB — INR BLD: 1.2 (ref 0.84–1.16)

## 2024-08-23 LAB
Lab: NORMAL
REPORT: NORMAL
THIS TEST SENT TO: NORMAL

## 2024-08-26 ENCOUNTER — HOSPITAL ENCOUNTER (INPATIENT)
Age: 49
LOS: 3 days | Discharge: HOME | End: 2024-08-29
Attending: STUDENT IN AN ORGANIZED HEALTH CARE EDUCATION/TRAINING PROGRAM | Admitting: STUDENT IN AN ORGANIZED HEALTH CARE EDUCATION/TRAINING PROGRAM
Payer: COMMERCIAL

## 2024-08-26 ENCOUNTER — APPOINTMENT (OUTPATIENT)
Dept: GENERAL RADIOLOGY | Age: 49
End: 2024-08-26
Attending: STUDENT IN AN ORGANIZED HEALTH CARE EDUCATION/TRAINING PROGRAM
Payer: COMMERCIAL

## 2024-08-26 DIAGNOSIS — I10 PRIMARY HYPERTENSION: ICD-10-CM

## 2024-08-26 PROBLEM — C91.02 ACUTE LYMPHOBLASTIC LEUKEMIA (ALL) IN RELAPSE (HCC): Status: ACTIVE | Noted: 2024-08-26

## 2024-08-26 PROBLEM — C91.00 B-CELL ACUTE LYMPHOBLASTIC LEUKEMIA (ALL) (HCC): Status: ACTIVE | Noted: 2024-08-26

## 2024-08-26 LAB
ABO + RH BLD: NORMAL
ALBUMIN SERPL-MCNC: 4.1 G/DL (ref 3.4–5)
ALP SERPL-CCNC: 73 U/L (ref 40–129)
ALT SERPL-CCNC: 38 U/L (ref 10–40)
ANION GAP SERPL CALCULATED.3IONS-SCNC: 10 MMOL/L (ref 3–16)
ANISOCYTOSIS BLD QL SMEAR: ABNORMAL
APTT BLD: 27.4 SEC (ref 22.1–36.4)
AST SERPL-CCNC: 21 U/L (ref 15–37)
BACTERIA URNS QL MICRO: ABNORMAL /HPF
BASOPHILS # BLD: 0.1 K/UL (ref 0–0.2)
BASOPHILS NFR BLD: 1 %
BILIRUB DIRECT SERPL-MCNC: 0.2 MG/DL (ref 0–0.3)
BILIRUB INDIRECT SERPL-MCNC: 0.3 MG/DL (ref 0–1)
BILIRUB SERPL-MCNC: 0.5 MG/DL (ref 0–1)
BILIRUB UR QL STRIP.AUTO: NEGATIVE
BLASTS NFR BLD MANUAL: 1 %
BLD GP AB SCN SERPL QL: NORMAL
BUN SERPL-MCNC: 11 MG/DL (ref 7–20)
CALCIUM SERPL-MCNC: 8.6 MG/DL (ref 8.3–10.6)
CHLORIDE SERPL-SCNC: 103 MMOL/L (ref 99–110)
CLARITY UR: CLEAR
CO2 SERPL-SCNC: 25 MMOL/L (ref 21–32)
COLOR UR: YELLOW
CREAT SERPL-MCNC: <0.5 MG/DL (ref 0.6–1.1)
DACRYOCYTES BLD QL SMEAR: ABNORMAL
DEPRECATED RDW RBC AUTO: 19.8 % (ref 12.4–15.4)
EKG ATRIAL RATE: 100 BPM
EKG DIAGNOSIS: NORMAL
EKG P AXIS: 80 DEGREES
EKG P-R INTERVAL: 158 MS
EKG Q-T INTERVAL: 332 MS
EKG QRS DURATION: 80 MS
EKG QTC CALCULATION (BAZETT): 428 MS
EKG R AXIS: -5 DEGREES
EKG T AXIS: 74 DEGREES
EKG VENTRICULAR RATE: 100 BPM
EOSINOPHIL # BLD: 0.1 K/UL (ref 0–0.6)
EOSINOPHIL NFR BLD: 1 %
GFR SERPLBLD CREATININE-BSD FMLA CKD-EPI: >90 ML/MIN/{1.73_M2}
GLUCOSE SERPL-MCNC: 110 MG/DL (ref 70–99)
GLUCOSE UR STRIP.AUTO-MCNC: NEGATIVE MG/DL
HCT VFR BLD AUTO: 28.1 % (ref 36–48)
HGB BLD-MCNC: 9.6 G/DL (ref 12–16)
HGB UR QL STRIP.AUTO: NEGATIVE
INR PPP: 1 (ref 0.85–1.15)
KETONES UR STRIP.AUTO-MCNC: NEGATIVE MG/DL
LDH SERPL L TO P-CCNC: 433 U/L (ref 100–190)
LEUKOCYTE ESTERASE UR QL STRIP.AUTO: ABNORMAL
LYMPHOCYTES # BLD: 1.5 K/UL (ref 1–5.1)
LYMPHOCYTES NFR BLD: 19 %
MACROCYTES BLD QL SMEAR: ABNORMAL
MAGNESIUM SERPL-MCNC: 2.2 MG/DL (ref 1.8–2.4)
MCH RBC QN AUTO: 32.8 PG (ref 26–34)
MCHC RBC AUTO-ENTMCNC: 34.1 G/DL (ref 31–36)
MCV RBC AUTO: 96.3 FL (ref 80–100)
MICROCYTES BLD QL SMEAR: ABNORMAL
MONOCYTES # BLD: 0.8 K/UL (ref 0–1.3)
MONOCYTES NFR BLD: 10 %
NEUTROPHILS # BLD: 5.5 K/UL (ref 1.7–7.7)
NEUTROPHILS NFR BLD: 67 %
NEUTS BAND NFR BLD MANUAL: 1 % (ref 0–7)
NITRITE UR QL STRIP.AUTO: NEGATIVE
NRBC BLD-RTO: 6 /100 WBC
PATH INTERP BLD-IMP: YES
PH UR STRIP.AUTO: 6.5 [PH] (ref 5–8)
PHOSPHATE SERPL-MCNC: 2.7 MG/DL (ref 2.5–4.9)
PLATELET # BLD AUTO: 265 K/UL (ref 135–450)
PMV BLD AUTO: 7.7 FL (ref 5–10.5)
POLYCHROMASIA BLD QL SMEAR: ABNORMAL
POTASSIUM SERPL-SCNC: 3.5 MMOL/L (ref 3.5–5.1)
PROT SERPL-MCNC: 6.4 G/DL (ref 6.4–8.2)
PROT UR STRIP.AUTO-MCNC: NEGATIVE MG/DL
PROTHROMBIN TIME: 13.4 SEC (ref 11.9–14.9)
RBC # BLD AUTO: 2.92 M/UL (ref 4–5.2)
RBC #/AREA URNS HPF: ABNORMAL /HPF (ref 0–4)
SODIUM SERPL-SCNC: 138 MMOL/L (ref 136–145)
SP GR UR STRIP.AUTO: 1.02 (ref 1–1.03)
UA DIPSTICK W REFLEX MICRO PNL UR: YES
URATE SERPL-MCNC: 5.5 MG/DL (ref 2.6–6)
URN SPEC COLLECT METH UR: ABNORMAL
UROBILINOGEN UR STRIP-ACNC: 0.2 E.U./DL
WBC # BLD AUTO: 8.1 K/UL (ref 4–11)
WBC #/AREA URNS HPF: ABNORMAL /HPF (ref 0–5)

## 2024-08-26 PROCEDURE — 85610 PROTHROMBIN TIME: CPT

## 2024-08-26 PROCEDURE — 2060000000 HC ICU INTERMEDIATE R&B

## 2024-08-26 PROCEDURE — 86850 RBC ANTIBODY SCREEN: CPT

## 2024-08-26 PROCEDURE — 86901 BLOOD TYPING SEROLOGIC RH(D): CPT

## 2024-08-26 PROCEDURE — 3E03305 INTRODUCTION OF OTHER ANTINEOPLASTIC INTO PERIPHERAL VEIN, PERCUTANEOUS APPROACH: ICD-10-PCS | Performed by: INTERNAL MEDICINE

## 2024-08-26 PROCEDURE — 94150 VITAL CAPACITY TEST: CPT

## 2024-08-26 PROCEDURE — 84550 ASSAY OF BLOOD/URIC ACID: CPT

## 2024-08-26 PROCEDURE — 85730 THROMBOPLASTIN TIME PARTIAL: CPT

## 2024-08-26 PROCEDURE — 6360000002 HC RX W HCPCS: Performed by: STUDENT IN AN ORGANIZED HEALTH CARE EDUCATION/TRAINING PROGRAM

## 2024-08-26 PROCEDURE — 84100 ASSAY OF PHOSPHORUS: CPT

## 2024-08-26 PROCEDURE — 93010 ELECTROCARDIOGRAM REPORT: CPT | Performed by: INTERNAL MEDICINE

## 2024-08-26 PROCEDURE — A4217 STERILE WATER/SALINE, 500 ML: HCPCS | Performed by: NURSE PRACTITIONER

## 2024-08-26 PROCEDURE — 80048 BASIC METABOLIC PNL TOTAL CA: CPT

## 2024-08-26 PROCEDURE — 93005 ELECTROCARDIOGRAM TRACING: CPT | Performed by: NURSE PRACTITIONER

## 2024-08-26 PROCEDURE — 81001 URINALYSIS AUTO W/SCOPE: CPT

## 2024-08-26 PROCEDURE — 86900 BLOOD TYPING SEROLOGIC ABO: CPT

## 2024-08-26 PROCEDURE — 6370000000 HC RX 637 (ALT 250 FOR IP): Performed by: NURSE PRACTITIONER

## 2024-08-26 PROCEDURE — 6360000002 HC RX W HCPCS: Performed by: NURSE PRACTITIONER

## 2024-08-26 PROCEDURE — 80076 HEPATIC FUNCTION PANEL: CPT

## 2024-08-26 PROCEDURE — 85025 COMPLETE CBC W/AUTO DIFF WBC: CPT

## 2024-08-26 PROCEDURE — 83735 ASSAY OF MAGNESIUM: CPT

## 2024-08-26 PROCEDURE — 71046 X-RAY EXAM CHEST 2 VIEWS: CPT

## 2024-08-26 PROCEDURE — 2580000003 HC RX 258: Performed by: STUDENT IN AN ORGANIZED HEALTH CARE EDUCATION/TRAINING PROGRAM

## 2024-08-26 PROCEDURE — 2580000003 HC RX 258: Performed by: NURSE PRACTITIONER

## 2024-08-26 PROCEDURE — 83615 LACTATE (LD) (LDH) ENZYME: CPT

## 2024-08-26 RX ORDER — HYDROCHLOROTHIAZIDE 12.5 MG/1
12.5 CAPSULE ORAL EVERY MORNING
Status: DISCONTINUED | OUTPATIENT
Start: 2024-08-27 | End: 2024-08-29 | Stop reason: HOSPADM

## 2024-08-26 RX ORDER — FAMOTIDINE 20 MG/1
40 TABLET, FILM COATED ORAL NIGHTLY
Status: DISCONTINUED | OUTPATIENT
Start: 2024-08-26 | End: 2024-08-29 | Stop reason: HOSPADM

## 2024-08-26 RX ORDER — CARVEDILOL 12.5 MG/1
12.5 TABLET ORAL 2 TIMES DAILY
Status: DISCONTINUED | OUTPATIENT
Start: 2024-08-26 | End: 2024-08-29 | Stop reason: HOSPADM

## 2024-08-26 RX ORDER — 0.9 % SODIUM CHLORIDE 0.9 %
500 INTRAVENOUS SOLUTION INTRAVENOUS ONCE
Status: COMPLETED | OUTPATIENT
Start: 2024-08-26 | End: 2024-08-26

## 2024-08-26 RX ORDER — SODIUM CHLORIDE 0.9 % (FLUSH) 0.9 %
5-40 SYRINGE (ML) INJECTION EVERY 12 HOURS SCHEDULED
Status: DISCONTINUED | OUTPATIENT
Start: 2024-08-26 | End: 2024-08-29 | Stop reason: HOSPADM

## 2024-08-26 RX ORDER — ENOXAPARIN SODIUM 100 MG/ML
30 INJECTION SUBCUTANEOUS 2 TIMES DAILY
Status: DISCONTINUED | OUTPATIENT
Start: 2024-08-26 | End: 2024-08-29 | Stop reason: HOSPADM

## 2024-08-26 RX ORDER — SODIUM CHLORIDE 9 MG/ML
INJECTION, SOLUTION INTRAVENOUS CONTINUOUS PRN
Status: DISCONTINUED | OUTPATIENT
Start: 2024-08-26 | End: 2024-08-29 | Stop reason: HOSPADM

## 2024-08-26 RX ORDER — HYDROCHLOROTHIAZIDE 12.5 MG/1
12.5 CAPSULE ORAL EVERY MORNING
Qty: 90 CAPSULE | Refills: 1 | Status: SHIPPED | OUTPATIENT
Start: 2024-08-26

## 2024-08-26 RX ORDER — SODIUM CHLORIDE 9 MG/ML
INJECTION, SOLUTION INTRAVENOUS PRN
Status: DISCONTINUED | OUTPATIENT
Start: 2024-08-26 | End: 2024-08-29 | Stop reason: HOSPADM

## 2024-08-26 RX ORDER — MAGNESIUM SULFATE IN WATER 40 MG/ML
4000 INJECTION, SOLUTION INTRAVENOUS PRN
Status: DISCONTINUED | OUTPATIENT
Start: 2024-08-26 | End: 2024-08-29 | Stop reason: HOSPADM

## 2024-08-26 RX ORDER — VALACYCLOVIR HYDROCHLORIDE 500 MG/1
500 TABLET, FILM COATED ORAL 2 TIMES DAILY
Status: DISCONTINUED | OUTPATIENT
Start: 2024-08-26 | End: 2024-08-29 | Stop reason: HOSPADM

## 2024-08-26 RX ORDER — ACETAMINOPHEN 325 MG/1
650 TABLET ORAL EVERY 6 HOURS PRN
Status: DISCONTINUED | OUTPATIENT
Start: 2024-08-26 | End: 2024-08-29 | Stop reason: HOSPADM

## 2024-08-26 RX ORDER — LOSARTAN POTASSIUM 50 MG/1
50 TABLET ORAL EVERY MORNING
Status: DISCONTINUED | OUTPATIENT
Start: 2024-08-27 | End: 2024-08-29 | Stop reason: HOSPADM

## 2024-08-26 RX ORDER — SODIUM CHLORIDE 0.9 % (FLUSH) 0.9 %
5-40 SYRINGE (ML) INJECTION PRN
Status: DISCONTINUED | OUTPATIENT
Start: 2024-08-26 | End: 2024-08-29 | Stop reason: HOSPADM

## 2024-08-26 RX ADMIN — VALACYCLOVIR HYDROCHLORIDE 500 MG: 500 TABLET, FILM COATED ORAL at 20:13

## 2024-08-26 RX ADMIN — SODIUM CHLORIDE 15 ML: 900 IRRIGANT IRRIGATION at 11:03

## 2024-08-26 RX ADMIN — ENOXAPARIN SODIUM 30 MG: 100 INJECTION SUBCUTANEOUS at 11:05

## 2024-08-26 RX ADMIN — SODIUM CHLORIDE 15 ML: 900 IRRIGANT IRRIGATION at 15:34

## 2024-08-26 RX ADMIN — CARVEDILOL 12.5 MG: 12.5 TABLET, FILM COATED ORAL at 20:12

## 2024-08-26 RX ADMIN — FAMOTIDINE 40 MG: 20 TABLET, FILM COATED ORAL at 20:13

## 2024-08-26 RX ADMIN — SODIUM CHLORIDE 500 ML: 9 INJECTION, SOLUTION INTRAVENOUS at 09:08

## 2024-08-26 RX ADMIN — SODIUM CHLORIDE, PRESERVATIVE FREE 10 ML: 5 INJECTION INTRAVENOUS at 20:14

## 2024-08-26 RX ADMIN — ENOXAPARIN SODIUM 30 MG: 100 INJECTION SUBCUTANEOUS at 20:13

## 2024-08-26 RX ADMIN — BLINATUMOMAB 32.5 MCG: KIT INTRAVENOUS at 11:54

## 2024-08-26 RX ADMIN — POTASSIUM CHLORIDE: 2 INJECTION, SOLUTION, CONCENTRATE INTRAVENOUS at 21:22

## 2024-08-26 RX ADMIN — DEXAMETHASONE SODIUM PHOSPHATE 16 MG: 4 INJECTION, SOLUTION INTRAMUSCULAR; INTRAVENOUS at 11:00

## 2024-08-26 RX ADMIN — SODIUM CHLORIDE 15 ML: 900 IRRIGANT IRRIGATION at 20:15

## 2024-08-26 RX ADMIN — POTASSIUM CHLORIDE: 2 INJECTION, SOLUTION, CONCENTRATE INTRAVENOUS at 10:58

## 2024-08-26 RX ADMIN — SODIUM CHLORIDE, PRESERVATIVE FREE 10 ML: 5 INJECTION INTRAVENOUS at 10:53

## 2024-08-26 RX ADMIN — ACETAMINOPHEN 650 MG: 325 TABLET ORAL at 11:14

## 2024-08-26 ASSESSMENT — PAIN SCALES - GENERAL
PAINLEVEL_OUTOF10: 3
PAINLEVEL_OUTOF10: 2
PAINLEVEL_OUTOF10: 5
PAINLEVEL_OUTOF10: 0

## 2024-08-26 ASSESSMENT — PAIN DESCRIPTION - LOCATION: LOCATION: HEAD

## 2024-08-26 ASSESSMENT — PAIN DESCRIPTION - ORIENTATION: ORIENTATION: INNER

## 2024-08-26 ASSESSMENT — PAIN DESCRIPTION - ONSET: ONSET: GRADUAL

## 2024-08-26 ASSESSMENT — PAIN - FUNCTIONAL ASSESSMENT: PAIN_FUNCTIONAL_ASSESSMENT: ACTIVITIES ARE NOT PREVENTED

## 2024-08-26 ASSESSMENT — PAIN DESCRIPTION - DESCRIPTORS: DESCRIPTORS: ACHING

## 2024-08-26 ASSESSMENT — PAIN DESCRIPTION - FREQUENCY: FREQUENCY: INTERMITTENT

## 2024-08-26 ASSESSMENT — PAIN DESCRIPTION - PAIN TYPE: TYPE: ACUTE PAIN

## 2024-08-26 NOTE — PROGRESS NOTES
Patient reported a headache prior to blincyto start time, rating it a 5/10. PRN tylenol administered by Shelia Lugo RN at 1114. Afternoon VS obtained at 1148. Patient reported a pain level of 3/10, stating her headache was \"better,\" and that she was satisfied. Call light within reach. Denies further needs at this time.

## 2024-08-26 NOTE — PSYCHOTHERAPY
Behavioral Health Intervention Note    Met with patient briefly. Patient denied behavioral health needs today.     Malini Rhodes M.A.   Psychology Trainee

## 2024-08-26 NOTE — PROGRESS NOTES
Pharmacist Review and Automatic Dose Adjustment of Prophylactic Enoxaparin    Reviewed reason(s) for admission/hospital problem list    The reviewing pharmacist has made an adjustment to the ordered enoxaparin dose or converted to UFH per the approved [facility] protocol and table as identified below.        Pam Guido is a 49 y.o. female.     Recent Labs     08/26/24  0823   CREATININE <0.5*       Estimated Creatinine Clearance: 179 mL/min (based on SCr of 0.5 mg/dL).    Recent Labs     08/26/24  0823   HGB 9.6*   HCT 28.1*        Recent Labs     08/26/24  0823   INR 1.00       Height:   Ht Readings from Last 1 Encounters:   08/26/24 1.727 m (5' 8\")     Weight:  Wt Readings from Last 1 Encounters:   08/26/24 107.7 kg (237 lb 7 oz)               Plan: Based upon the patient's weight and renal function    Ordered: Enoxaparin 40mg SUBQ Daily    Changed/converted to    New Order: Enoxaparin 30mg SUBQ BID      Thank you,  Rupali Haas [facility]  8/26/2024, 9:39 AM

## 2024-08-26 NOTE — ONCOLOGY
Original chemotherapy orders reviewed and acknowledged. 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  Patient educated on chemotherapy regimen.  Consent for chemotherapy obtained.      Estimated body surface area is 2.27 meters squared as calculated from the following:    Height as of this encounter: 1.727 m (5' 8\").    Weight as of this encounter: 107.7 kg (237 lb 7 oz). verified.  Appropriate dosing calculations of chemotherapy based on above height, weight, and BSA verified.      Shelia Lugo RN8/26/2024  9:59 AM

## 2024-08-26 NOTE — PLAN OF CARE
Problem: Skin/Tissue Integrity - Adult  Goal: Skin integrity remains intact  Outcome: Progressing  Flowsheets (Taken 8/26/2024 1129)  Skin Integrity Remains Intact:   Monitor for areas of redness and/or skin breakdown   Assess vascular access sites hourly  Goal: Incisions, wounds, or drain sites healing without S/S of infection  Outcome: Progressing     Problem: Cardiovascular - Adult  Goal: Absence of cardiac dysrhythmias or at baseline  Outcome: Progressing  Flowsheets (Taken 8/26/2024 1129)  Absence of cardiac dysrhythmias or at baseline: Monitor cardiac rate and rhythm     Problem: Pain  Goal: Verbalizes/displays adequate comfort level or baseline comfort level  Outcome: Progressing  Flowsheets (Taken 8/26/2024 1129)  Verbalizes/displays adequate comfort level or baseline comfort level:   Encourage patient to monitor pain and request assistance   Assess pain using appropriate pain scale   Administer analgesics based on type and severity of pain and evaluate response   Implement non-pharmacological measures as appropriate and evaluate response   Consider cultural and social influences on pain and pain management   Notify Licensed Independent Practitioner if interventions unsuccessful or patient reports new pain

## 2024-08-26 NOTE — TELEPHONE ENCOUNTER
Refill Request     CONFIRM preferrred pharmacy with the patient.    If Mail Order Rx - Pend for 90 day refill.      Last Seen: Last Seen Department: 7/25/2024  Last Seen by PCP: 7/25/2024    Last Written: 6/1/2024    If no future appointment scheduled, route STAFF MESSAGE with patient name to the  Pool for scheduling.      Next Appointment:   Future Appointments   Date Time Provider Department Center   10/30/2024 11:00 AM Johanna Jensen APRN - CNP [facility] DEP   12/4/2024  3:00 PM Johanna Jensen APRN - CNP [facility] DEP       Message sent to  to schedule appt with patient?  NO      Requested Prescriptions     Pending Prescriptions Disp Refills    hydroCHLOROthiazide 12.5 MG capsule [Pharmacy Med Name: hydroCHLOROthiazide 12.5 MG CAPSULE] 90 capsule 0     Sig: TAKE 1 CAPSULE BY MOUTH EVERY MORNING

## 2024-08-26 NOTE — PROGRESS NOTES
4 Eyes Skin Assessment     NAME:  Pam Guido  YOB: 1975  MEDICAL RECORD NUMBER:  [id number]    The patient is being assessed for  Admission    I agree that at least one RN has performed a thorough Head to Toe Skin Assessment on the patient. ALL assessment sites listed below have been assessed.      Areas assessed by both nurses:    Head, Face, Ears, Shoulders, Back, Chest, Arms, Elbows, Hands, Sacrum. Buttock, Coccyx, Ischium, Legs. Feet and Heels, and Under Medical Devices         Does the Patient have a Wound? No noted wound(s)       Jim Prevention initiated by RN: No  Wound Care Orders initiated by RN: No    Pressure Injury (Stage 3,4, Unstageable, DTI, NWPT, and Complex wounds) if present, place Wound referral order by RN under : No    New Ostomies, if present place, Ostomy referral order under : No     Nurse 1 eSignature: Electronically signed by Echo Soriano RN on 8/26/24 at 11:33 AM EDT    **SHARE this note so that the co-signing nurse can place an eSignature**    Nurse 2 eSignature: Electronically signed by Shelia Lugo RN on 8/26/24 at 5:38 PM EDT

## 2024-08-26 NOTE — PROGRESS NOTES
Patient has arrived to unit room 3526. VS obtained. Orthostatic blood pressures completed at 0807. Second standing BP obtained at 0812. Patient is awake, alert and oriented. Patient denies pain and does not appear to be in distress. Patient oriented to room and call light. Plan of care discussed with patient, patient agreeable. Call light within reach. Family member at bedside. Denies further needs at this time.

## 2024-08-26 NOTE — ONCOLOGY
BLINCYTO ADMINISTRATION ASSESSMENT:  Instructions: Complete assessment prior to first dose of blinatumumab and every 4 hours and PRN during infusion. BLINCYTO should be held for all Grade 3 and 4 reactions. Report all abnormal findings to provider immediately.  Grade 3 CRS Event: Prolonged reaction that is NOT rapidly responsive to dose interruption and/or treatment. OR Recurrence of symptoms following initial improvement.  Withhold Blincyto until symptoms resolve, then restart at 9mcg/day.  Escalate dose to 28mcg/day after 7 days if toxicity does not recur.  Grade 4 CRS Event: Life-threatening; requiring vasopressor or ventilatory support.  Discontinue Blincyto permanently.  Grade 3 Neurological Event: Prolonged reaction that is NOT rapidly responsive to dose interruption and/or treatment. OR Recurrence of symptoms following initial improvement.  Withhold Blincyto until no more than Grade 1 (mild) and for at least 3 days, then restart Blintcyto at 9mcg/day.  Escalate dose to 28mcg/day after 7 days if toxicity does not recur. If the toxicity occurred at 9mcg/day, or if the toxicity takes more than 7 days to resolve, discontinue Blinctyo permanently.  Grade 4 Neurological Event: Life-threatening. Discontinue Blincyto permanently  Seizures: If patient experiences more than 1 seizure, discontinue Blincyto permanently.    Pt continues to receive Blincyto infusion per orders.  Pt monitored closely for adverse reactions to drug administration.    General:  Do not flush stickers applied to IV tubing and access ports? Yes  Daily lab review performed - Yes All new abnormal results reported to provider? N/A  Signature obtained on signature log daily and compared to baseline: Yes.  Gait evaluated this shift: Yes  Neuro assessment completed in flowsheets: Yes  Abnormal findings reported to physician: patient getting headahes prior to infusion    Cytokine Release Syndrome (CRS) Assessment:  No symptoms of CRS present at this

## 2024-08-26 NOTE — H&P
[facility] History and Physical       Attending Physician: Shaylee Mccormick DO    Primary Care: Johanna Jensen, APRN - CNP       Referring MD: Shaylee Mccormick DO  [address]  [address]  [place] [address]    Name: Pam Guido :  1975  MRN:  [id number]    Admission: 2024      Date: 2024    Reason for Admission: Cycle 1 Blincyto    History of Present Illness:     Pam Guido is a 49 year old female with a past medical history significant for essential hypertension, obesity, chronic pain, and GERD. She presented to the emergency department in [facility] on 2024 with complaints of substernal chest pain, generalized body aches, headache, and nausea. Her labs revealed a leukocytosis with a WBC of 24,000 and an elevated D dimer.  She was transferred to [facility] for further infectious workup and an infectious disease consult due to possible sepsis with unknown etiology.     Upon transfer to [facility], infectious disease was consulted and all of her infectious workup, including blood cultures, CT CAP, LP, and CT soft tissue neck, were negative. With her ongoing leukocytosis hematology/oncology was consulted on 7/10/24 for further evaluation. A bone marrow biopsy and aspiration was ordered and preliminary results were consistent with ALL. With these findings, Pam was transferred to [facility] for further evaluation and treatment of newly diagnosed acute lymphocytic leukemia, ph+.      Patient was initiated on Hydrea and Dexamethasone.  She also received Cytarabine 1 gm IV on 24 due to rapidly increasing WBC.  She was stable and was discharged with f/u at [facility].      She was initiated on Ponatinib on 24.  BM bx on 24 showed no B-lymphoblast population, FISH probes not detected, Coloseq MRD +.       She is being admitted today for Cycle 1 of Blinctyo.  Patient feels well with no complaints of fever, cough, SOB, chest pain, dizziness, nausea, vomiting, diarrhea, or  Start Levaquin and Diflucan when ANC <1/5    Current Prophylaxis:  - Continue valtrex and dapsone      3. Heme:   Anemia r/t chemotherapy  - Transfuse for Hgb < 7 and Platelets < 10 K.  - No transfusion today       4. Metabolic: Lytes and renal fxn stable   - NS +10 mEq KCL @ 100 mL/h  - Replace K+ and Mg      5. GI / Nutrition:   Nutrition:   - low microbial diet  - Dietician to follow closely  GERD:   -Continue famotidine 40 mg PO QHS      6. Cardiac:  Essential HTN  - Cont Coreg 6.25 mg BID, increased to 12.5 mg BID (8/6/24)  - Cont Hydrochlorothiazide 12.5 mg daily  - Cont Losartan 50 mg daily  - ECHO:  LVEF 55-60%     7. Pulmonary: No issues     8. MSK:  Chronic Pain  - Manages with Tylenol and Ibuprofen at home  - Does not use narcotics at home      - DVT Prophylaxis: Platelets >50,000 cells/dL, - daily lovenox prophylaxis ordered  Contraindications to pharmacologic prophylaxis: None  Contraindications to mechanical prophylaxis: None      - Disposition: Following chemo     The patient was seen and examined by Dr. York. This admission history and physical has been discussed and agreed upon by Dr. York.

## 2024-08-26 NOTE — CARE COORDINATION
Case Management Assessment  Initial Evaluation    Date/Time of Evaluation: 8/26/2024 11:29 AM  Assessment Completed by: ALMA Ivey   for [facility] [place] ([facility])  WhoAPI Mobile: [phone]    If patient is discharged prior to next notation, then this note serves as note for discharge by case management.    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 Yes    Chart Reviewed: Yes      History Provided by: Patient  Patient Orientation: Alert and Oriented    Patient Cognition: Alert    Hospitalization in the last 30 days (Readmission):  No    If yes, Readmission Assessment in CM Navigator will be completed.    Advance Directives:      Code Status: Full Code   Patient's Primary Decision Maker is: Legal Next of Kin    Primary Decision Maker: Sunday Guido - Spouse - [phone]    Discharge Planning:    Patient lives with: Spouse/Significant Other Type of Home: House  Primary Care Giver: Self  Patient Support Systems include: Family Members, Spouse/Significant Other   Current Financial resources: Other (Comment) ([place])  Current community resources: None  Current services prior to admission: Other (Comment) ([place])            Current DME:              Type of Home Care services:  None    ADLS  Prior functional level: Independent in ADLs/IADLs  Current functional level: Independent in ADLs/IADLs    PT AM-PAC:   /24  OT AM-PAC:   /24    Family can provide assistance at DC: Yes  Would you like Case Management to discuss the discharge plan with any other family

## 2024-08-26 NOTE — ONCOLOGY
Original chemotherapy orders reviewed and acknowledged. 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  Patient educated on chemotherapy regimen.  Acknowledgement of informed consent for chemotherapy obtained.      Estimated body surface area is 2.27 meters squared as calculated from the following:    Height as of this encounter: 1.727 m (5' 8\").    Weight as of this encounter: 107.7 kg (237 lb 7 oz). verified.  Appropriate dosing calculations of chemotherapy based on above height, weight, and BSA verified.        Administration: Chemotherapy drug Blincyto independently verified with Tracy Chirinos RN prior to administration.  Acknowledgement of informed consent for chemotherapy administration verified.  Original order, appropriateness of regimen, drug supplied, height, weight, BSA, dose calculations, expiration dates/times, drug appearance, and two patient identifiers were verified by both RNs.  Drug checked for vesicant/irritant status and for risk of hypersensitivity.  Most recent laboratory values and allergies, were reviewed.  Positive, brisk blood return via CVC was confirmed prior to administration. Chest x-ray for correct line placement reviewed. Shelia Lugo RN and Tracy Chirinos RN verified correct rate of chemotherapy and maintenance IV fluids.  Patient was educated on chemotherapy regimen prior to administration including indication for treatment related to disease & side effects of chemotherapy drug.  Patient verbalizes understanding of all instructions.     Monitoring during infusion done per policy, see Flowsheets.  Blood return verified before  infusion per policy; no signs of extravasation.  Pt {tolerating chemotherapy well and without incident.  Blincyto infusion to be an 24 hour infusion.  Sign on door and pump for only RN taking care of patient to touch pump. Stickers in line in two different places.   Will continue to monitor. Patient verbalize

## 2024-08-26 NOTE — ONCOLOGY
BLINCYTO ADMINISTRATION ASSESSMENT:  Instructions: Complete assessment prior to first dose of blinatumumab and every 4 hours and PRN during infusion. BLINCYTO should be held for all Grade 3 and 4 reactions. Report all abnormal findings to provider immediately.  Grade 3 CRS Event: Prolonged reaction that is NOT rapidly responsive to dose interruption and/or treatment. OR Recurrence of symptoms following initial improvement.  Withhold Blincyto until symptoms resolve, then restart at 9mcg/day.  Escalate dose to 28mcg/day after 7 days if toxicity does not recur.  Grade 4 CRS Event: Life-threatening; requiring vasopressor or ventilatory support.  Discontinue Blincyto permanently.  Grade 3 Neurological Event: Prolonged reaction that is NOT rapidly responsive to dose interruption and/or treatment. OR Recurrence of symptoms following initial improvement.  Withhold Blincyto until no more than Grade 1 (mild) and for at least 3 days, then restart Blintcyto at 9mcg/day.  Escalate dose to 28mcg/day after 7 days if toxicity does not recur. If the toxicity occurred at 9mcg/day, or if the toxicity takes more than 7 days to resolve, discontinue Blinctyo permanently.  Grade 4 Neurological Event: Life-threatening. Discontinue Blincyto permanently  Seizures: If patient experiences more than 1 seizure, discontinue Blincyto permanently.    Pt continues to receive Blincyto infusion per orders.  Pt monitored closely for adverse reactions to drug administration.    General:  Do not flush stickers applied to IV tubing and access ports? Yes  Daily lab review performed - Yes All new abnormal results reported to provider? N/A  Signature obtained on signature log daily and compared to baseline: Yes.  Gait evaluated this shift: Yes  Neuro assessment completed in flowsheets: Yes  Abnormal findings reported to physician: patient was getting headahes prior to infusion    Cytokine Release Syndrome (CRS) Assessment:  No symptoms of CRS present at this

## 2024-08-26 NOTE — ONCOLOGY
BLINCYTO PRE-ADMINISTRATION ASSESSMENT:  Instructions: Complete assessment prior to first dose of blinatumumab on Day 1 of each cycle.  Report any positive findings (yes answers) to physician before proceeding with first dose of blinatumumab.    Patient Questions:  Do you have any allergies?  If so, to what and what is the reaction?   Yes penicillin/hives  lisinopril/cough  biaxin/rash  Do you have a history of neurological problems including siezures, confusion, trouble speaking, or loss of balance?   No  Have you ever had an infusion reaction after receiving BLINCYTO or any other medications?   No  Have you ever had radiation treatment to the brain?   No  Have you recently received one of the following vaccines: MMR, Shingles/Zostavax?  Live vaccines should be avoided within 2 weeks before starting treatment, during treatment, and until full immune recovery after your last dose of BLINCYTO.   No  Are you able to become pregnant?  If so, are you pregnant or plan to become pregnant? - Female patients of childbearing age or those who have not yet undergone menopause should undergo pregnancy testing prior to treatment. Females who are able to become pregnant should be encouraged to utilize an effective form of birth control for at least 48 hours after the last treatment.   No  Are you currently breastfeeding or plan to breastfeed? Females who are breastfeeding or planning to breastfeed should not breastfeed for at least 48 hours after the last treatment.   No  Nursing Assessments Completed  Medication Reconciliation completed - Yes  Appropriate & Functioning IV Access assessed - Yes  Verify outpatient home care infusion set up via  - working on it  Acknowledgment of consent for chemotherapy/biotherapy obtained- Yes  Do not flush stickers applied to IV tubing and access ports? - Yes  Lab review performed.  All abnormal results reported to provider? - Yes  Baseline signature obtained on signature log - Yes  Baseline

## 2024-08-27 LAB
ALBUMIN SERPL-MCNC: 3.7 G/DL (ref 3.4–5)
ALP SERPL-CCNC: 68 U/L (ref 40–129)
ALT SERPL-CCNC: 53 U/L (ref 10–40)
ANION GAP SERPL CALCULATED.3IONS-SCNC: 10 MMOL/L (ref 3–16)
AST SERPL-CCNC: 28 U/L (ref 15–37)
BASOPHILS # BLD: 0.1 K/UL (ref 0–0.2)
BASOPHILS NFR BLD: 0.7 %
BILIRUB DIRECT SERPL-MCNC: 0.2 MG/DL (ref 0–0.3)
BILIRUB INDIRECT SERPL-MCNC: 0.3 MG/DL (ref 0–1)
BILIRUB SERPL-MCNC: 0.5 MG/DL (ref 0–1)
BUN SERPL-MCNC: 8 MG/DL (ref 7–20)
CALCIUM SERPL-MCNC: 8.1 MG/DL (ref 8.3–10.6)
CHLORIDE SERPL-SCNC: 108 MMOL/L (ref 99–110)
CO2 SERPL-SCNC: 21 MMOL/L (ref 21–32)
CREAT SERPL-MCNC: <0.5 MG/DL (ref 0.6–1.1)
DEPRECATED RDW RBC AUTO: 19.7 % (ref 12.4–15.4)
EOSINOPHIL # BLD: 0 K/UL (ref 0–0.6)
EOSINOPHIL NFR BLD: 0.1 %
GFR SERPLBLD CREATININE-BSD FMLA CKD-EPI: >90 ML/MIN/{1.73_M2}
GLUCOSE SERPL-MCNC: 122 MG/DL (ref 70–99)
HCT VFR BLD AUTO: 27.1 % (ref 36–48)
HGB BLD-MCNC: 9.1 G/DL (ref 12–16)
LDH SERPL L TO P-CCNC: 391 U/L (ref 100–190)
LYMPHOCYTES # BLD: 0.3 K/UL (ref 1–5.1)
LYMPHOCYTES NFR BLD: 3.4 %
MAGNESIUM SERPL-MCNC: 2.1 MG/DL (ref 1.8–2.4)
MCH RBC QN AUTO: 32.7 PG (ref 26–34)
MCHC RBC AUTO-ENTMCNC: 33.5 G/DL (ref 31–36)
MCV RBC AUTO: 97.5 FL (ref 80–100)
MONOCYTES # BLD: 0.8 K/UL (ref 0–1.3)
MONOCYTES NFR BLD: 8 %
NEUTROPHILS # BLD: 8.8 K/UL (ref 1.7–7.7)
NEUTROPHILS NFR BLD: 87.8 %
PHOSPHATE SERPL-MCNC: 2.6 MG/DL (ref 2.5–4.9)
PLATELET # BLD AUTO: 286 K/UL (ref 135–450)
PMV BLD AUTO: 8.1 FL (ref 5–10.5)
POTASSIUM SERPL-SCNC: 3.7 MMOL/L (ref 3.5–5.1)
PROT SERPL-MCNC: 5.8 G/DL (ref 6.4–8.2)
RBC # BLD AUTO: 2.78 M/UL (ref 4–5.2)
SODIUM SERPL-SCNC: 139 MMOL/L (ref 136–145)
URATE SERPL-MCNC: 4.6 MG/DL (ref 2.6–6)
WBC # BLD AUTO: 10 K/UL (ref 4–11)

## 2024-08-27 PROCEDURE — 6360000002 HC RX W HCPCS: Performed by: NURSE PRACTITIONER

## 2024-08-27 PROCEDURE — 2060000000 HC ICU INTERMEDIATE R&B

## 2024-08-27 PROCEDURE — 80048 BASIC METABOLIC PNL TOTAL CA: CPT

## 2024-08-27 PROCEDURE — 84100 ASSAY OF PHOSPHORUS: CPT

## 2024-08-27 PROCEDURE — 80076 HEPATIC FUNCTION PANEL: CPT

## 2024-08-27 PROCEDURE — 83735 ASSAY OF MAGNESIUM: CPT

## 2024-08-27 PROCEDURE — 36415 COLL VENOUS BLD VENIPUNCTURE: CPT

## 2024-08-27 PROCEDURE — 83615 LACTATE (LD) (LDH) ENZYME: CPT

## 2024-08-27 PROCEDURE — 6370000000 HC RX 637 (ALT 250 FOR IP): Performed by: NURSE PRACTITIONER

## 2024-08-27 PROCEDURE — 2580000003 HC RX 258: Performed by: STUDENT IN AN ORGANIZED HEALTH CARE EDUCATION/TRAINING PROGRAM

## 2024-08-27 PROCEDURE — 2580000003 HC RX 258: Performed by: NURSE PRACTITIONER

## 2024-08-27 PROCEDURE — 84550 ASSAY OF BLOOD/URIC ACID: CPT

## 2024-08-27 PROCEDURE — 6360000002 HC RX W HCPCS: Performed by: STUDENT IN AN ORGANIZED HEALTH CARE EDUCATION/TRAINING PROGRAM

## 2024-08-27 PROCEDURE — 85025 COMPLETE CBC W/AUTO DIFF WBC: CPT

## 2024-08-27 RX ORDER — ASPIRIN 81 MG/1
81 TABLET, CHEWABLE ORAL DAILY
Status: DISCONTINUED | OUTPATIENT
Start: 2024-08-27 | End: 2024-08-29 | Stop reason: HOSPADM

## 2024-08-27 RX ADMIN — VALACYCLOVIR HYDROCHLORIDE 500 MG: 500 TABLET, FILM COATED ORAL at 20:32

## 2024-08-27 RX ADMIN — SODIUM CHLORIDE 15 ML: 900 IRRIGANT IRRIGATION at 16:15

## 2024-08-27 RX ADMIN — BLINATUMOMAB 32.5 MCG: KIT INTRAVENOUS at 11:54

## 2024-08-27 RX ADMIN — HYDROCHLOROTHIAZIDE 12.5 MG: 12.5 CAPSULE ORAL at 07:39

## 2024-08-27 RX ADMIN — ASPIRIN 81 MG: 81 TABLET, CHEWABLE ORAL at 09:09

## 2024-08-27 RX ADMIN — LOSARTAN POTASSIUM 50 MG: 50 TABLET, FILM COATED ORAL at 07:39

## 2024-08-27 RX ADMIN — POTASSIUM CHLORIDE: 2 INJECTION, SOLUTION, CONCENTRATE INTRAVENOUS at 18:15

## 2024-08-27 RX ADMIN — FAMOTIDINE 40 MG: 20 TABLET, FILM COATED ORAL at 20:32

## 2024-08-27 RX ADMIN — SODIUM CHLORIDE, PRESERVATIVE FREE 10 ML: 5 INJECTION INTRAVENOUS at 20:32

## 2024-08-27 RX ADMIN — CARVEDILOL 12.5 MG: 12.5 TABLET, FILM COATED ORAL at 07:39

## 2024-08-27 RX ADMIN — SODIUM CHLORIDE, PRESERVATIVE FREE 10 ML: 5 INJECTION INTRAVENOUS at 07:40

## 2024-08-27 RX ADMIN — ACETAMINOPHEN 650 MG: 325 TABLET ORAL at 20:41

## 2024-08-27 RX ADMIN — POTASSIUM CHLORIDE: 2 INJECTION, SOLUTION, CONCENTRATE INTRAVENOUS at 07:23

## 2024-08-27 RX ADMIN — SODIUM CHLORIDE 15 ML: 900 IRRIGANT IRRIGATION at 07:25

## 2024-08-27 RX ADMIN — ENOXAPARIN SODIUM 30 MG: 100 INJECTION SUBCUTANEOUS at 07:39

## 2024-08-27 RX ADMIN — SODIUM CHLORIDE 15 ML: 900 IRRIGANT IRRIGATION at 11:44

## 2024-08-27 RX ADMIN — CARVEDILOL 12.5 MG: 12.5 TABLET, FILM COATED ORAL at 20:32

## 2024-08-27 RX ADMIN — SODIUM CHLORIDE 15 ML: 900 IRRIGANT IRRIGATION at 20:44

## 2024-08-27 RX ADMIN — ENOXAPARIN SODIUM 30 MG: 100 INJECTION SUBCUTANEOUS at 20:32

## 2024-08-27 RX ADMIN — VALACYCLOVIR HYDROCHLORIDE 500 MG: 500 TABLET, FILM COATED ORAL at 07:39

## 2024-08-27 ASSESSMENT — PAIN SCALES - GENERAL
PAINLEVEL_OUTOF10: 0

## 2024-08-27 NOTE — ONCOLOGY
BLINCYTO ADMINISTRATION ASSESSMENT:  Instructions: Complete assessment prior to first dose of blinatumumab and every 4 hours and PRN during infusion. BLINCYTO should be held for all Grade 3 and 4 reactions. Report all abnormal findings to provider immediately.  Grade 3 CRS Event: Prolonged reaction that is NOT rapidly responsive to dose interruption and/or treatment. OR Recurrence of symptoms following initial improvement.  Withhold Blincyto until symptoms resolve, then restart at 9mcg/day.  Escalate dose to 28mcg/day after 7 days if toxicity does not recur.  Grade 4 CRS Event: Life-threatening; requiring vasopressor or ventilatory support.  Discontinue Blincyto permanently.  Grade 3 Neurological Event: Prolonged reaction that is NOT rapidly responsive to dose interruption and/or treatment. OR Recurrence of symptoms following initial improvement.  Withhold Blincyto until no more than Grade 1 (mild) and for at least 3 days, then restart Blintcyto at 9mcg/day.  Escalate dose to 28mcg/day after 7 days if toxicity does not recur. If the toxicity occurred at 9mcg/day, or if the toxicity takes more than 7 days to resolve, discontinue Blinctyo permanently.  Grade 4 Neurological Event: Life-threatening. Discontinue Blincyto permanently  Seizures: If patient experiences more than 1 seizure, discontinue Blincyto permanently.    Pt continues to receive Blincyto infusion per orders.  Pt monitored closely for adverse reactions to drug administration.    General:  Do not flush stickers applied to IV tubing and access ports? Yes  Daily lab review performed - Yes All new abnormal results reported to provider? N/A  Signature obtained on signature log daily and compared to baseline: Yes.  Gait evaluated this shift: Yes  Neuro assessment completed in flowsheets: Yes  Abnormal findings reported to physician: N/A    Cytokine Release Syndrome (CRS) Assessment:  No symptoms of CRS present at this time    Neurological Assessment:  No  stretcher

## 2024-08-27 NOTE — PROGRESS NOTES
AM head to toe assessment completed. Routine vitals obtained. Scheduled medications given. Patient is awake, alert and oriented. Patient denies pain and does not appear to be in distress, resting comfortably at this time. Provided patient with a cup of ice and starry per patient request. PCA at bedside changing linens. Call light within reach. Denies further needs at this time.

## 2024-08-27 NOTE — PLAN OF CARE
Problem: Cardiovascular - Adult  Goal: Absence of cardiac dysrhythmias or at baseline  8/27/2024 0803 by Echo Soriano RN  Outcome: Progressing  Flowsheets (Taken 8/26/2024 1129)  Absence of cardiac dysrhythmias or at baseline: Monitor cardiac rate and rhythm  8/27/2024 0558 by Fady Lopez RN  Outcome: Progressing  Note: Vital signs stable.  Electrolytes within normal limits this AM.       Problem: Skin/Tissue Integrity - Adult  Goal: Skin integrity remains intact  8/27/2024 0803 by Echo Soriano RN  Outcome: Progressing  Flowsheets (Taken 8/26/2024 1129)  Skin Integrity Remains Intact:   Monitor for areas of redness and/or skin breakdown   Assess vascular access sites hourly  8/27/2024 0558 by Fady Lopez RN  Outcome: Progressing  Note: Skin is clean dry and intact.    Goal: Incisions, wounds, or drain sites healing without S/S of infection  Outcome: Progressing     Problem: Pain  Goal: Verbalizes/displays adequate comfort level or baseline comfort level  8/27/2024 0803 by Echo Soriano RN  Outcome: Progressing  Flowsheets (Taken 8/26/2024 1129)  Verbalizes/displays adequate comfort level or baseline comfort level:   Encourage patient to monitor pain and request assistance   Assess pain using appropriate pain scale   Administer analgesics based on type and severity of pain and evaluate response   Implement non-pharmacological measures as appropriate and evaluate response   Consider cultural and social influences on pain and pain management   Notify Licensed Independent Practitioner if interventions unsuccessful or patient reports new pain  8/27/2024 0558 by Fady Lopez RN  Outcome: Progressing  Note: Patient had no complaint of pain this shift.      Problem: Safety - Adult  Goal: Free from fall injury  Outcome: Progressing  Flowsheets (Taken 8/27/2024 0801)  Free From Fall Injury: Instruct family/caregiver on patient safety  Note: Orthostatic vital signs obtained at start of shift - see

## 2024-08-27 NOTE — ONCOLOGY
BLINCYTO ADMINISTRATION ASSESSMENT:  Instructions: Complete assessment prior to first dose of blinatumumab and every 4 hours and PRN during infusion. BLINCYTO should be held for all Grade 3 and 4 reactions. Report all abnormal findings to provider immediately.  Grade 3 CRS Event: Prolonged reaction that is NOT rapidly responsive to dose interruption and/or treatment. OR Recurrence of symptoms following initial improvement.  Withhold Blincyto until symptoms resolve, then restart at 9mcg/day.  Escalate dose to 28mcg/day after 7 days if toxicity does not recur.  Grade 4 CRS Event: Life-threatening; requiring vasopressor or ventilatory support.  Discontinue Blincyto permanently.  Grade 3 Neurological Event: Prolonged reaction that is NOT rapidly responsive to dose interruption and/or treatment. OR Recurrence of symptoms following initial improvement.  Withhold Blincyto until no more than Grade 1 (mild) and for at least 3 days, then restart Blintcyto at 9mcg/day.  Escalate dose to 28mcg/day after 7 days if toxicity does not recur. If the toxicity occurred at 9mcg/day, or if the toxicity takes more than 7 days to resolve, discontinue Blinctyo permanently.  Grade 4 Neurological Event: Life-threatening. Discontinue Blincyto permanently  Seizures: If patient experiences more than 1 seizure, discontinue Blincyto permanently.    Pt continues to receive Blincyto infusion per orders.  Pt monitored closely for adverse reactions to drug administration.    General:  Do not flush stickers applied to IV tubing and access ports? Yes  Daily lab review performed - Yes All new abnormal results reported to provider? N/A  Signature obtained on signature log daily and compared to baseline: Yes.  Gait evaluated this shift: Yes  Neuro assessment completed in flowsheets: Yes  Abnormal findings reported to physician: N/A    Cytokine Release Syndrome (CRS) Assessment:  No symptoms of CRS present at this time    Neurological Assessment:  No  symptoms of neurologic toxicity present at this time    Provider notified of abnormal findings: N/A  Action Taken: No action indicated at this time

## 2024-08-27 NOTE — PROGRESS NOTES
identified.  -Clonoseq with 3 dominant sequences identified; MRD +.   -CG: No abnormalities detected     - LP with IT MTX (7/24/24, 8/19/24): Neg    PLAN:   -Ponatinib (started 7/29/24) 30 mg PO QOD  -Plan to change ponatinib to 15 mg PO QD once patient receives supply   -ASA 81 mg PO QD while on ponatinib      Blinctyo  Cycle 1 Day 1 (8/26/24)     2. ID:  Afebrile  -Continue valtrex 500 mg PO BID   - Start Levaquin and Diflucan when ANC <1/5      3. Heme:   Anemia r/t chemotherapy  - Transfuse for Hgb < 7 and Platelets < 10 K.  - No transfusion today       4. Metabolic: Lytes and renal fxn stable   - NS +10 mEq KCL @ 100 mL/h  - Replace K+ and Mg      5. GI / Nutrition:   Nutrition:   - low microbial diet  - Dietician to follow closely  GERD:   -Continue famotidine 40 mg PO QHS      6. Cardiac:  Essential HTN  - Cont Coreg 6.25 mg BID, increased to 12.5 mg BID (8/6/24)  - Cont Hydrochlorothiazide 12.5 mg daily  - Cont Losartan 50 mg daily  - ECHO:  LVEF 55-60%     7. Pulmonary: No issues     8. MSK:  Chronic Pain  - Manages with Tylenol and Ibuprofen at home  - Does not use narcotics at home       - DVT Prophylaxis: Platelets >50,000 cells/dL, - daily lovenox prophylaxis ordered  Contraindications to pharmacologic prophylaxis: None  Contraindications to mechanical prophylaxis: None        - Disposition: Following chemo      The patient was seen and examined by Dr. Chela Beard, APRN - CNP     Raj York MD  [facility]  [phone]

## 2024-08-27 NOTE — ONCOLOGY
Administration: Chemotherapy drug Blincyto independently verified with Rajani Alexander RN prior to administration.  Acknowledgement of informed consent for chemotherapy administration verified.  Original order, appropriateness of regimen, drug supplied, height, weight, BSA, dose calculations, expiration dates/times, drug appearance, and two patient identifiers were verified by both RNs.  Drug checked for vesicant/irritant status and for risk of hypersensitivity.  Most recent laboratory values and allergies, were reviewed.  Positive, brisk blood return via CVC was confirmed prior to administration. Chest x-ray for correct line placement reviewed. Shelia Lugo RN and Rajani Alexander RN verified correct rate of chemotherapy and maintenance IV fluids.  Patient was educated on chemotherapy regimen prior to administration including indication for treatment related to disease & side effects of chemotherapy drug.  Patient verbalizes understanding of all instructions. Signature sheet sighed. vss     Monitoring during infusion done per policy, see Flowsheets.  Blood return verified before  infusion per policy; no signs of extravasation.  Pt {tolerating chemotherapy well and without incident.  Blincyto infusion to be an 24 hour infusion.  Sign on door and pump for only RN taking care of patient to touch pump. Stickers in line in two different places.   Will continue to monitor. Patient verbalize understanding of precautions with pump. Pt verbalize understanding of side effects to watch out for.

## 2024-08-27 NOTE — ONCOLOGY
BLINCYTO ADMINISTRATION ASSESSMENT:  Instructions: Complete assessment prior to first dose of blinatumumab and every 4 hours and PRN during infusion. BLINCYTO should be held for all Grade 3 and 4 reactions. Report all abnormal findings to provider immediately.  Grade 3 CRS Event: Prolonged reaction that is NOT rapidly responsive to dose interruption and/or treatment. OR Recurrence of symptoms following initial improvement.  Withhold Blincyto until symptoms resolve, then restart at 9mcg/day.  Escalate dose to 28mcg/day after 7 days if toxicity does not recur.  Grade 4 CRS Event: Life-threatening; requiring vasopressor or ventilatory support.  Discontinue Blincyto permanently.  Grade 3 Neurological Event: Prolonged reaction that is NOT rapidly responsive to dose interruption and/or treatment. OR Recurrence of symptoms following initial improvement.  Withhold Blincyto until no more than Grade 1 (mild) and for at least 3 days, then restart Blintcyto at 9mcg/day.  Escalate dose to 28mcg/day after 7 days if toxicity does not recur. If the toxicity occurred at 9mcg/day, or if the toxicity takes more than 7 days to resolve, discontinue Blinctyo permanently.  Grade 4 Neurological Event: Life-threatening. Discontinue Blincyto permanently  Seizures: If patient experiences more than 1 seizure, discontinue Blincyto permanently.    Pt continues to receive Blincyto infusion per orders.  Pt monitored closely for adverse reactions to drug administration.    General:  Do not flush stickers applied to IV tubing and access ports? Yes  Daily lab review performed - Yes All new abnormal results reported to provider? Aw/A  Signature obtained on signature log daily and compared to baseline: Yes.  Gait evaluated this shift: Yes  Neuro assessment completed in flowsheets: Yes  Abnormal findings reported to physician: N/A    Cytokine Release Syndrome (CRS) Assessment:  No symptoms of CRS present at this time    Neurological Assessment:  No  symptoms of neurologic toxicity present at this time    Provider notified of abnormal findings: N/A  Action Taken: No action indicated at this time

## 2024-08-27 NOTE — PLAN OF CARE
Problem: Cardiovascular - Adult  Goal: Absence of cardiac dysrhythmias or at baseline  Outcome: Progressing  Note: Vital signs stable.  Electrolytes within normal limits this AM.       Problem: Skin/Tissue Integrity - Adult  Goal: Skin integrity remains intact  Outcome: Progressing  Note: Skin is clean dry and intact.       Problem: Pain  Goal: Verbalizes/displays adequate comfort level or baseline comfort level  Outcome: Progressing  Note: Patient had no complaint of pain this shift.

## 2024-08-27 NOTE — PROGRESS NOTES
Comprehensive Nutrition Assessment    RECOMMENDATIONS:  PO Diet: Regular diet  Nutrition Supplement: Available PRN  Nutrition Education: Education completed     NUTRITION ASSESSMENT:   Nutritional summary & status: Consult for low microbial diet education. Admitted for ctx. Insignificant weight loss of 7% in 3 months. Patient explains /250. Patient appears well nouished, reports adequate appetite. PO intake adequate this admit. No nutrition interventions at this time.     Admission // PMH: B-cell acute lymphoblastic leukemia // GERD, HTN    MALNUTRITION ASSESSMENT  Context of Malnutrition: Chronic Illness   Malnutrition Status: No malnutrition  Findings of the 6 clinical characteristics of malnutrition (Minimum of 2 out of 6 clinical characteristics is required to make the diagnosis of moderate or severe Protein Calorie Malnutrition based on AND/ASPEN Guidelines):  Energy Intake:  No significant decrease in energy intake  Weight Loss:  No significant weight loss     Body Fat Loss:  No significant body fat loss     Muscle Mass Loss:  No significant muscle mass loss        NUTRITION DIAGNOSIS   Increased nutrient needs related to catabolic illness as evidenced by B-cell acute lymphoblastic leukemia undergoing ctx    Nutrition Monitoring and Evaluation:   Food/Nutrient Intake Outcomes:  Food and Nutrient Intake  Physical Signs/Symptoms Outcomes:  Biochemical Data, Nutrition Focused Physical Findings, Skin, Weight     OBJECTIVE DATA: Significant to nutrition assessment  Nutrition Related Findings: labs reviewed. +1.4 L  Wounds: None  Nutrition Goals: Meet at least 75% of estimated needs, by next RD assessment     CURRENT NUTRITION THERAPIES  ADULT DIET; Regular; Low Microbial  PO Intake: %, 51-75%   PO Supplement Intake:None Ordered  Additional Sources of Calories/IVF:KCl @ 100 ml/hr     COMPARATIVE STANDARDS  Energy (kcal):  1709-7301 (16-17 kcal/kg CBW)     Protein (g):  83-96 (1.3-1.5 g/kg IBW)

## 2024-08-27 NOTE — PROGRESS NOTES
Physical Therapy/Occupational Therapy  Discharge    PT/OT referrals received, chart reviewed.  Spoke with nursing who states pt is independent and actually leaving the hospital on Thursday.  No skilled therapy indicated at this time.  D/c.  Kaleigh Funes PT [id number]  Pamela Tadeo, OT

## 2024-08-27 NOTE — ONCOLOGY
BLINCYTO ADMINISTRATION ASSESSMENT:  Instructions: Complete assessment prior to first dose of blinatumumab and every 4 hours and PRN during infusion. BLINCYTO should be held for all Grade 3 and 4 reactions. Report all abnormal findings to provider immediately.  Grade 3 CRS Event: Prolonged reaction that is NOT rapidly responsive to dose interruption and/or treatment. OR Recurrence of symptoms following initial improvement.  Withhold Blincyto until symptoms resolve, then restart at 9mcg/day.  Escalate dose to 28mcg/day after 7 days if toxicity does not recur.  Grade 4 CRS Event: Life-threatening; requiring vasopressor or ventilatory support.  Discontinue Blincyto permanently.  Grade 3 Neurological Event: Prolonged reaction that is NOT rapidly responsive to dose interruption and/or treatment. OR Recurrence of symptoms following initial improvement.  Withhold Blincyto until no more than Grade 1 (mild) and for at least 3 days, then restart Blintcyto at 9mcg/day.  Escalate dose to 28mcg/day after 7 days if toxicity does not recur. If the toxicity occurred at 9mcg/day, or if the toxicity takes more than 7 days to resolve, discontinue Blinctyo permanently.  Grade 4 Neurological Event: Life-threatening. Discontinue Blincyto permanently  Seizures: If patient experiences more than 1 seizure, discontinue Blincyto permanently.    Pt continues to receive Blincyto infusion per orders.  Pt monitored closely for adverse reactions to drug administration.    General:  Do not flush stickers applied to IV tubing and access ports? Yes  Daily lab review performed - Yes All new abnormal results reported to provider? N/A  Signature obtained on signature log daily and compared to baseline: Yes.  Gait evaluated this shift: Yes  Neuro assessment completed in flowsheets: Yes  Abnormal findings reported to physician: patient was getting headahes prior to infusion    Cytokine Release Syndrome (CRS) Assessment:  No symptoms of CRS present at this  time    Neurological Assessment:  No symptoms of neurologic toxicity present at this time    Provider notified of abnormal findings: N/A  Action Taken: no action taken at this time

## 2024-08-28 LAB
ALBUMIN SERPL-MCNC: 3.5 G/DL (ref 3.4–5)
ALP SERPL-CCNC: 59 U/L (ref 40–129)
ALT SERPL-CCNC: 45 U/L (ref 10–40)
ANION GAP SERPL CALCULATED.3IONS-SCNC: 8 MMOL/L (ref 3–16)
ANISOCYTOSIS BLD QL SMEAR: ABNORMAL
AST SERPL-CCNC: 22 U/L (ref 15–37)
BASOPHILS # BLD: 0 K/UL (ref 0–0.2)
BASOPHILS NFR BLD: 0 %
BILIRUB DIRECT SERPL-MCNC: 0.2 MG/DL (ref 0–0.3)
BILIRUB INDIRECT SERPL-MCNC: 0.1 MG/DL (ref 0–1)
BILIRUB SERPL-MCNC: 0.3 MG/DL (ref 0–1)
BUN SERPL-MCNC: 12 MG/DL (ref 7–20)
CALCIUM SERPL-MCNC: 8 MG/DL (ref 8.3–10.6)
CHLORIDE SERPL-SCNC: 109 MMOL/L (ref 99–110)
CO2 SERPL-SCNC: 23 MMOL/L (ref 21–32)
CREAT SERPL-MCNC: <0.5 MG/DL (ref 0.6–1.1)
DACRYOCYTES BLD QL SMEAR: ABNORMAL
DEPRECATED RDW RBC AUTO: 19.7 % (ref 12.4–15.4)
EOSINOPHIL # BLD: 0.1 K/UL (ref 0–0.6)
EOSINOPHIL NFR BLD: 1 %
GFR SERPLBLD CREATININE-BSD FMLA CKD-EPI: >90 ML/MIN/{1.73_M2}
GLUCOSE SERPL-MCNC: 92 MG/DL (ref 70–99)
HCT VFR BLD AUTO: 25.6 % (ref 36–48)
HGB BLD-MCNC: 8.3 G/DL (ref 12–16)
LDH SERPL L TO P-CCNC: 355 U/L (ref 100–190)
LYMPHOCYTES # BLD: 0.4 K/UL (ref 1–5.1)
LYMPHOCYTES NFR BLD: 7 %
MACROCYTES BLD QL SMEAR: ABNORMAL
MAGNESIUM SERPL-MCNC: 2.1 MG/DL (ref 1.8–2.4)
MCH RBC QN AUTO: 32.7 PG (ref 26–34)
MCHC RBC AUTO-ENTMCNC: 32.7 G/DL (ref 31–36)
MCV RBC AUTO: 100.2 FL (ref 80–100)
METAMYELOCYTES NFR BLD MANUAL: 2 %
MICROCYTES BLD QL SMEAR: ABNORMAL
MONOCYTES # BLD: 0.5 K/UL (ref 0–1.3)
MONOCYTES NFR BLD: 9 %
MYELOCYTES NFR BLD MANUAL: 1 %
NEUTROPHILS # BLD: 5 K/UL (ref 1.7–7.7)
NEUTROPHILS NFR BLD: 80 %
NRBC BLD-RTO: 5 /100 WBC
PHOSPHATE SERPL-MCNC: 3 MG/DL (ref 2.5–4.9)
PLATELET # BLD AUTO: 238 K/UL (ref 135–450)
PMV BLD AUTO: 7.6 FL (ref 5–10.5)
POIKILOCYTOSIS BLD QL SMEAR: ABNORMAL
POLYCHROMASIA BLD QL SMEAR: ABNORMAL
POTASSIUM SERPL-SCNC: 3.8 MMOL/L (ref 3.5–5.1)
PROT SERPL-MCNC: 5.4 G/DL (ref 6.4–8.2)
RBC # BLD AUTO: 2.55 M/UL (ref 4–5.2)
SCHISTOCYTES BLD QL SMEAR: ABNORMAL
SODIUM SERPL-SCNC: 140 MMOL/L (ref 136–145)
URATE SERPL-MCNC: 5.4 MG/DL (ref 2.6–6)
WBC # BLD AUTO: 6 K/UL (ref 4–11)

## 2024-08-28 PROCEDURE — 2580000003 HC RX 258: Performed by: NURSE PRACTITIONER

## 2024-08-28 PROCEDURE — 6370000000 HC RX 637 (ALT 250 FOR IP): Performed by: NURSE PRACTITIONER

## 2024-08-28 PROCEDURE — 36415 COLL VENOUS BLD VENIPUNCTURE: CPT

## 2024-08-28 PROCEDURE — 2060000000 HC ICU INTERMEDIATE R&B

## 2024-08-28 PROCEDURE — 83735 ASSAY OF MAGNESIUM: CPT

## 2024-08-28 PROCEDURE — 6360000002 HC RX W HCPCS: Performed by: STUDENT IN AN ORGANIZED HEALTH CARE EDUCATION/TRAINING PROGRAM

## 2024-08-28 PROCEDURE — 83615 LACTATE (LD) (LDH) ENZYME: CPT

## 2024-08-28 PROCEDURE — 85025 COMPLETE CBC W/AUTO DIFF WBC: CPT

## 2024-08-28 PROCEDURE — 84550 ASSAY OF BLOOD/URIC ACID: CPT

## 2024-08-28 PROCEDURE — 6360000002 HC RX W HCPCS: Performed by: NURSE PRACTITIONER

## 2024-08-28 PROCEDURE — 84100 ASSAY OF PHOSPHORUS: CPT

## 2024-08-28 PROCEDURE — 80076 HEPATIC FUNCTION PANEL: CPT

## 2024-08-28 PROCEDURE — 2580000003 HC RX 258: Performed by: STUDENT IN AN ORGANIZED HEALTH CARE EDUCATION/TRAINING PROGRAM

## 2024-08-28 PROCEDURE — 80048 BASIC METABOLIC PNL TOTAL CA: CPT

## 2024-08-28 RX ADMIN — ASPIRIN 81 MG: 81 TABLET, CHEWABLE ORAL at 08:48

## 2024-08-28 RX ADMIN — VALACYCLOVIR HYDROCHLORIDE 500 MG: 500 TABLET, FILM COATED ORAL at 20:41

## 2024-08-28 RX ADMIN — FAMOTIDINE 40 MG: 20 TABLET, FILM COATED ORAL at 20:41

## 2024-08-28 RX ADMIN — VALACYCLOVIR HYDROCHLORIDE 500 MG: 500 TABLET, FILM COATED ORAL at 08:48

## 2024-08-28 RX ADMIN — CARVEDILOL 12.5 MG: 12.5 TABLET, FILM COATED ORAL at 08:48

## 2024-08-28 RX ADMIN — ENOXAPARIN SODIUM 30 MG: 100 INJECTION SUBCUTANEOUS at 20:42

## 2024-08-28 RX ADMIN — CARVEDILOL 12.5 MG: 12.5 TABLET, FILM COATED ORAL at 20:42

## 2024-08-28 RX ADMIN — POTASSIUM CHLORIDE: 2 INJECTION, SOLUTION, CONCENTRATE INTRAVENOUS at 04:22

## 2024-08-28 RX ADMIN — HYDROCHLOROTHIAZIDE 12.5 MG: 12.5 CAPSULE ORAL at 08:48

## 2024-08-28 RX ADMIN — ACETAMINOPHEN 650 MG: 325 TABLET ORAL at 20:41

## 2024-08-28 RX ADMIN — ACETAMINOPHEN 650 MG: 325 TABLET ORAL at 02:54

## 2024-08-28 RX ADMIN — LOSARTAN POTASSIUM 50 MG: 50 TABLET, FILM COATED ORAL at 08:48

## 2024-08-28 RX ADMIN — ENOXAPARIN SODIUM 30 MG: 100 INJECTION SUBCUTANEOUS at 08:48

## 2024-08-28 RX ADMIN — SODIUM CHLORIDE, PRESERVATIVE FREE 10 ML: 5 INJECTION INTRAVENOUS at 08:49

## 2024-08-28 RX ADMIN — BLINATUMOMAB 32.5 MCG: KIT INTRAVENOUS at 12:00

## 2024-08-28 ASSESSMENT — PAIN DESCRIPTION - FREQUENCY
FREQUENCY: INTERMITTENT

## 2024-08-28 ASSESSMENT — PAIN DESCRIPTION - LOCATION
LOCATION: HEAD

## 2024-08-28 ASSESSMENT — PAIN SCALES - GENERAL
PAINLEVEL_OUTOF10: 2
PAINLEVEL_OUTOF10: 0
PAINLEVEL_OUTOF10: 4
PAINLEVEL_OUTOF10: 5

## 2024-08-28 ASSESSMENT — PAIN DESCRIPTION - DESCRIPTORS
DESCRIPTORS: ACHING

## 2024-08-28 ASSESSMENT — PAIN DESCRIPTION - ORIENTATION
ORIENTATION: INNER
ORIENTATION: RIGHT;LEFT
ORIENTATION: MID

## 2024-08-28 ASSESSMENT — PAIN - FUNCTIONAL ASSESSMENT
PAIN_FUNCTIONAL_ASSESSMENT: ACTIVITIES ARE NOT PREVENTED

## 2024-08-28 ASSESSMENT — PAIN DESCRIPTION - ONSET
ONSET: GRADUAL

## 2024-08-28 ASSESSMENT — PAIN DESCRIPTION - PAIN TYPE
TYPE: ACUTE PAIN

## 2024-08-28 NOTE — ONCOLOGY
BLINCYTO ADMINISTRATION ASSESSMENT:  Instructions: Complete assessment prior to first dose of blinatumumab and every 4 hours and PRN during infusion. BLINCYTO should be held for all Grade 3 and 4 reactions. Report all abnormal findings to provider immediately.  Grade 3 CRS Event: Prolonged reaction that is NOT rapidly responsive to dose interruption and/or treatment. OR Recurrence of symptoms following initial improvement.  Withhold Blincyto until symptoms resolve, then restart at 9mcg/day.  Escalate dose to 28mcg/day after 7 days if toxicity does not recur.  Grade 4 CRS Event: Life-threatening; requiring vasopressor or ventilatory support.  Discontinue Blincyto permanently.  Grade 3 Neurological Event: Prolonged reaction that is NOT rapidly responsive to dose interruption and/or treatment. OR Recurrence of symptoms following initial improvement.  Withhold Blincyto until no more than Grade 1 (mild) and for at least 3 days, then restart Blintcyto at 9mcg/day.  Escalate dose to 28mcg/day after 7 days if toxicity does not recur. If the toxicity occurred at 9mcg/day, or if the toxicity takes more than 7 days to resolve, discontinue Blinctyo permanently.  Grade 4 Neurological Event: Life-threatening. Discontinue Blincyto permanently  Seizures: If patient experiences more than 1 seizure, discontinue Blincyto permanently.    Pt continues to receive Blincyto infusion per orders.  Pt monitored closely for adverse reactions to drug administration.    General:  Do not flush stickers applied to IV tubing and access ports? Yes  Daily lab review performed - Yes All new abnormal results reported to provider? N/A  Signature obtained on signature log daily and compared to baseline: Yes.  Gait evaluated this shift: Yes  Neuro assessment completed in flowsheets: Yes  Abnormal findings reported to physician: leland    Cytokine Release Syndrome (CRS) Assessment:  No symptoms of CRS present at this time    Neurological Assessment:  No

## 2024-08-28 NOTE — DISCHARGE SUMMARY
[facility] Discharge Summary             Attending Physician: Shaylee Mccormick DO    Referring MD: Shaylee Mccormick DO  [address]  [address]  [place] [address]    Name: Pam Guido :  1975  MRN:  [id number]    Admission: 2024     Discharge:   24    Date: 2024    Diagnosis on admit: pH+ B-Cell ALL, admitted for cycle 1 Blincyto.     Procedures: Routine chest x-ray, laboratories, EKG, IV fluid hydration, Panculture for fevers, IV antimicrobial therapy, Respiratory therapy, Oxygen therapy, Blood Product Infusions    Consultations:     Medications:      Medication List        ASK your doctor about these medications      allopurinol 300 MG tablet  Commonly known as: ZYLOPRIM  Take 1 tablet by mouth daily     carvedilol 12.5 MG tablet  Commonly known as: COREG  Take 1 tablet by mouth 2 times daily     docusate sodium 100 MG capsule  Commonly known as: COLACE  Take 1 capsule by mouth daily as needed for Constipation     famotidine 40 MG tablet  Commonly known as: PEPCID  TAKE ONE TABLET BY MOUTH ONCE NIGHTLY     fluconazole 200 MG tablet  Commonly known as: DIFLUCAN  Take 1 tablet by mouth daily     hydroCHLOROthiazide 12.5 MG capsule  TAKE 1 CAPSULE BY MOUTH EVERY MORNING  Ask about: Which instructions should I use?     losartan 50 MG tablet  Commonly known as: COZAAR  Take 1 tablet by mouth every morning     PONATinib HCl 30 MG Tabs  Take 30 mg by mouth daily     valACYclovir 500 MG tablet  Commonly known as: VALTREX  Take 1 tablet by mouth 2 times daily               Where to Get Your Medications        These medications were sent to [facility] [id number] - [place], [place] - [address] [facility] - P [phone] - F [phone]  [address] [facility] [address]      Phone: [phone]   hydroCHLOROthiazide 12.5 MG capsule         Reason for Admission: Cycle 1 Blincyto.     Hospital Course: Pam Guido is a 49 year old female with a past medical history significant for

## 2024-08-28 NOTE — PROGRESS NOTES
[facility] Progress Note    2024     Pam Guido    MRN: [id number]    : 1975    Referring MD: Shaylee Mccormick,   [address]  [facility] [address]  [place] [address]      SUBJECTIVE:  Patient is doing welI.  Vito Blincyto without problem.  Continuing Ponatinib 30 mg qod, will switch to 15 mg po daily on Thurs.  No signs of fluid overload.     ECOG PS:  (1) Restricted in physically strenuous activity, ambulatory and able to do work of light nature    KPS: 80% Normal activity with effort; some signs or symptoms of disease    Isolation: None    Medications    Scheduled Meds:   aspirin  81 mg Oral Daily    sodium chloride flush  5-40 mL IntraVENous 2 times per day    Saline Mouthwash  15 mL Swish & Spit 4x Daily AC & HS    enoxaparin  30 mg SubCUTAneous BID    blinatumomab (BLINCYTO) 32.5 mcg in sodium chloride 0.9 % 270 mL chemo infusion  32.5 mcg IntraVENous Q24H    hydroCHLOROthiazide  12.5 mg Oral QAM    losartan  50 mg Oral QAM    valACYclovir  500 mg Oral BID    carvedilol  12.5 mg Oral BID    famotidine  40 mg Oral Nightly    PONATinib HCl  30 mg Oral Every Other Day     Continuous Infusions:   sodium chloride      sodium chloride      potassium chloride 10 mEq in sodium chloride 0.9 % 1,000 mL infusion 100 mL/hr at 24 0422     PRN Meds:.sodium chloride, sodium chloride flush, sodium chloride, magnesium sulfate, Saline Mouthwash, ALTEplase (CATHFLO) 2 mg in sterile water 2 mL injection, acetaminophen    ROS:  As noted above, otherwise remainder of 10-point ROS negative    Physical Exam:     I&O:    Intake/Output Summary (Last 24 hours) at 2024 0719  Last data filed at 2024 0645  Gross per 24 hour   Intake 3969.64 ml   Output 1150 ml   Net 2819.64 ml       Vital Signs:  /85   Pulse 82   Temp 97.8 °F (36.6 °C) (Oral)   Resp 16   Ht 1.727 m (5' 8\")   Wt 115.2 kg (254 lb)   LMP 2019   SpO2 97%   BMI 38.62 kg/m²     Weight:    Wt Readings from Last 3 Encounters:

## 2024-08-28 NOTE — PROGRESS NOTES
Administration: Chemotherapy drug blincyto independently verified with Tracy Chirinos RN prior to administration.  Acknowledgement of informed consent for chemotherapy administration verified.  Original order, appropriateness of regimen, drug supplied, height, weight, BSA, dose calculations, expiration dates/times, drug appearance, and two patient identifiers were verified by both RNs.  Drug checked for vesicant/irritant status and for risk of hypersensitivity.  Most recent laboratory values and allergies, were reviewed.  Positive, brisk blood return via CVC was confirmed prior to administration. Chest x-ray for correct line placement reviewed. Mignon Garibay RN and Tracy Chirinos RN verified correct rate of chemotherapy and maintenance IV fluids.  Patient was educated on chemotherapy regimen prior to administration including indication for treatment related to disease & side effects of chemotherapy drug.  Patient verbalizes understanding of all instructions.      Chemotherapy Name/Regimen____blincyto________________    Bag # ___3______ of ______3______ bags (total # of bags in regimen)    Completion of Chemotherapy: Monitoring during infusion done per policy, see Flowsheets.  Blood return verified before, during, and after infusion per policy; no signs of extravasation.  Pt tolerating chemotherapy well and without incident.  Chemotherapy infusion end time on the MAR.

## 2024-08-28 NOTE — PROGRESS NOTES
BLINCYTO ADMINISTRATION ASSESSMENT:  Instructions: Complete assessment prior to first dose of blinatumumab and every 4 hours and PRN during infusion. BLINCYTO should be held for all Grade 3 and 4 reactions. Report all abnormal findings to provider immediately.  Grade 3 CRS Event: Prolonged reaction that is NOT rapidly responsive to dose interruption and/or treatment. OR Recurrence of symptoms following initial improvement.  Withhold Blincyto until symptoms resolve, then restart at 9mcg/day.  Escalate dose to 28mcg/day after 7 days if toxicity does not recur.  Grade 4 CRS Event: Life-threatening; requiring vasopressor or ventilatory support.  Discontinue Blincyto permanently.  Grade 3 Neurological Event: Prolonged reaction that is NOT rapidly responsive to dose interruption and/or treatment. OR Recurrence of symptoms following initial improvement.  Withhold Blincyto until no more than Grade 1 (mild) and for at least 3 days, then restart Blintcyto at 9mcg/day.  Escalate dose to 28mcg/day after 7 days if toxicity does not recur. If the toxicity occurred at 9mcg/day, or if the toxicity takes more than 7 days to resolve, discontinue Blinctyo permanently.  Grade 4 Neurological Event: Life-threatening. Discontinue Blincyto permanently  Seizures: If patient experiences more than 1 seizure, discontinue Blincyto permanently.    Pt continues to receive Blincyto infusion per orders.  Pt monitored closely for adverse reactions to drug administration.    General:  Do not flush stickers applied to IV tubing and access ports? Yes  Daily lab review performed - Yes All new abnormal results reported to provider? N/A  Signature obtained on signature log daily and compared to baseline: Yes.  Gait evaluated this shift: Yes  Neuro assessment completed in flowsheets: Yes  Abnormal findings reported to physician: N/A    Cytokine Release Syndrome (CRS) Assessment:  No symptoms of CRS present at this time    Neurological Assessment:  No

## 2024-08-28 NOTE — PLAN OF CARE
Problem: Cardiovascular - Adult  Goal: Absence of cardiac dysrhythmias or at baseline  Outcome: Progressing  Note: Vital signs stable. Electrolytes within normal limits this AM.      Problem: Skin/Tissue Integrity - Adult  Goal: Skin integrity remains intact  Outcome: Progressing  Note: No new skin break down noted this shift.     Problem: Pain  Goal: Verbalizes/displays adequate comfort level or baseline comfort level  Outcome: Progressing  Note: Pt complained of head ache, PRN tylenol given.     Problem: Safety - Adult  Goal: Free from fall injury  Outcome: Progressing  Note:   - Screening for Orthostasis AND not a [place] Risk per ESPA[place]A/ABCDS: Pt bed is in low position, side rails up, call light and belongings are in reach.  Fall risk light is on outside pts room.  Pt encouraged to call for assistance as needed. Will continue with hourly rounds for PO intake, pain needs, toileting and repositioning as needed.

## 2024-08-29 VITALS
HEART RATE: 97 BPM | SYSTOLIC BLOOD PRESSURE: 137 MMHG | DIASTOLIC BLOOD PRESSURE: 83 MMHG | TEMPERATURE: 98.3 F | BODY MASS INDEX: 38.31 KG/M2 | HEIGHT: 68 IN | OXYGEN SATURATION: 99 % | RESPIRATION RATE: 18 BRPM | WEIGHT: 252.8 LBS

## 2024-08-29 LAB
ALBUMIN SERPL-MCNC: 3.6 G/DL (ref 3.4–5)
ALP SERPL-CCNC: 57 U/L (ref 40–129)
ALT SERPL-CCNC: 48 U/L (ref 10–40)
ANION GAP SERPL CALCULATED.3IONS-SCNC: 8 MMOL/L (ref 3–16)
ANISOCYTOSIS BLD QL SMEAR: ABNORMAL
APTT BLD: 28 SEC (ref 22.1–36.4)
AST SERPL-CCNC: 24 U/L (ref 15–37)
BASOPHILS # BLD: 0 K/UL (ref 0–0.2)
BASOPHILS NFR BLD: 0 %
BILIRUB DIRECT SERPL-MCNC: 0.2 MG/DL (ref 0–0.3)
BILIRUB INDIRECT SERPL-MCNC: 0.1 MG/DL (ref 0–1)
BILIRUB SERPL-MCNC: 0.3 MG/DL (ref 0–1)
BUN SERPL-MCNC: 9 MG/DL (ref 7–20)
CALCIUM SERPL-MCNC: 8.2 MG/DL (ref 8.3–10.6)
CHLORIDE SERPL-SCNC: 108 MMOL/L (ref 99–110)
CO2 SERPL-SCNC: 25 MMOL/L (ref 21–32)
CREAT SERPL-MCNC: <0.5 MG/DL (ref 0.6–1.1)
DACRYOCYTES BLD QL SMEAR: ABNORMAL
DEPRECATED RDW RBC AUTO: 18.7 % (ref 12.4–15.4)
EOSINOPHIL # BLD: 0.1 K/UL (ref 0–0.6)
EOSINOPHIL NFR BLD: 1 %
GFR SERPLBLD CREATININE-BSD FMLA CKD-EPI: >90 ML/MIN/{1.73_M2}
GLUCOSE SERPL-MCNC: 87 MG/DL (ref 70–99)
HCT VFR BLD AUTO: 25.9 % (ref 36–48)
HGB BLD-MCNC: 8.7 G/DL (ref 12–16)
INR PPP: 1.02 (ref 0.85–1.15)
LDH SERPL L TO P-CCNC: 378 U/L (ref 100–190)
LYMPHOCYTES # BLD: 0.8 K/UL (ref 1–5.1)
LYMPHOCYTES NFR BLD: 13 %
MACROCYTES BLD QL SMEAR: ABNORMAL
MAGNESIUM SERPL-MCNC: 2.1 MG/DL (ref 1.8–2.4)
MCH RBC QN AUTO: 32.7 PG (ref 26–34)
MCHC RBC AUTO-ENTMCNC: 33.5 G/DL (ref 31–36)
MCV RBC AUTO: 97.7 FL (ref 80–100)
MICROCYTES BLD QL SMEAR: ABNORMAL
MONOCYTES # BLD: 1.3 K/UL (ref 0–1.3)
MONOCYTES NFR BLD: 21 %
NEUTROPHILS # BLD: 4 K/UL (ref 1.7–7.7)
NEUTROPHILS NFR BLD: 65 %
PHOSPHATE SERPL-MCNC: 3.5 MG/DL (ref 2.5–4.9)
PLATELET # BLD AUTO: 248 K/UL (ref 135–450)
PMV BLD AUTO: 7.4 FL (ref 5–10.5)
POIKILOCYTOSIS BLD QL SMEAR: ABNORMAL
POLYCHROMASIA BLD QL SMEAR: ABNORMAL
POTASSIUM SERPL-SCNC: 3.6 MMOL/L (ref 3.5–5.1)
PROT SERPL-MCNC: 5.4 G/DL (ref 6.4–8.2)
PROTHROMBIN TIME: 13.6 SEC (ref 11.9–14.9)
RBC # BLD AUTO: 2.65 M/UL (ref 4–5.2)
SCHISTOCYTES BLD QL SMEAR: ABNORMAL
SODIUM SERPL-SCNC: 141 MMOL/L (ref 136–145)
URATE SERPL-MCNC: 6.3 MG/DL (ref 2.6–6)
WBC # BLD AUTO: 6.1 K/UL (ref 4–11)

## 2024-08-29 PROCEDURE — 2580000003 HC RX 258: Performed by: NURSE PRACTITIONER

## 2024-08-29 PROCEDURE — 85025 COMPLETE CBC W/AUTO DIFF WBC: CPT

## 2024-08-29 PROCEDURE — 80076 HEPATIC FUNCTION PANEL: CPT

## 2024-08-29 PROCEDURE — 85610 PROTHROMBIN TIME: CPT

## 2024-08-29 PROCEDURE — 85730 THROMBOPLASTIN TIME PARTIAL: CPT

## 2024-08-29 PROCEDURE — 83615 LACTATE (LD) (LDH) ENZYME: CPT

## 2024-08-29 PROCEDURE — 84100 ASSAY OF PHOSPHORUS: CPT

## 2024-08-29 PROCEDURE — 84550 ASSAY OF BLOOD/URIC ACID: CPT

## 2024-08-29 PROCEDURE — 80048 BASIC METABOLIC PNL TOTAL CA: CPT

## 2024-08-29 PROCEDURE — 83735 ASSAY OF MAGNESIUM: CPT

## 2024-08-29 PROCEDURE — 6370000000 HC RX 637 (ALT 250 FOR IP): Performed by: NURSE PRACTITIONER

## 2024-08-29 RX ORDER — BLINATUMOMAB 35 MCG
32.5 KIT INTRAVENOUS EVERY 24 HOURS
Qty: 72.8 ML | Refills: 0
Start: 2024-08-29 | End: 2024-09-26

## 2024-08-29 RX ORDER — ASPIRIN 81 MG/1
81 TABLET, CHEWABLE ORAL DAILY
COMMUNITY
Start: 2024-08-29

## 2024-08-29 RX ADMIN — HYDROCHLOROTHIAZIDE 12.5 MG: 12.5 CAPSULE ORAL at 09:01

## 2024-08-29 RX ADMIN — LOSARTAN POTASSIUM 50 MG: 50 TABLET, FILM COATED ORAL at 09:01

## 2024-08-29 RX ADMIN — CARVEDILOL 12.5 MG: 12.5 TABLET, FILM COATED ORAL at 09:01

## 2024-08-29 RX ADMIN — VALACYCLOVIR HYDROCHLORIDE 500 MG: 500 TABLET, FILM COATED ORAL at 09:01

## 2024-08-29 RX ADMIN — ASPIRIN 81 MG: 81 TABLET, CHEWABLE ORAL at 09:01

## 2024-08-29 RX ADMIN — SODIUM CHLORIDE, PRESERVATIVE FREE 10 ML: 5 INJECTION INTRAVENOUS at 02:44

## 2024-08-29 RX ADMIN — SODIUM CHLORIDE 15 ML: 900 IRRIGANT IRRIGATION at 02:44

## 2024-08-29 NOTE — PLAN OF CARE
Problem: Cardiovascular - Adult  Goal: Absence of cardiac dysrhythmias or at baseline  Outcome: Progressing     Problem: Skin/Tissue Integrity - Adult  Goal: Incisions, wounds, or drain sites healing without S/S of infection  Outcome: Progressing     Problem: Pain  Goal: Verbalizes/displays adequate comfort level or baseline comfort level  Outcome: Progressing  Pt complained of chronic headache pain.  Tylenol administered and pt satisfied.  Plan of care on going.      Problem: Safety - Adult  Goal: Free from fall injury  Outcome: Progressing     Problem: Nutrition Deficit:  Goal: Optimize nutritional status  Outcome: Progressing     Problem: ABCDS Injury Assessment  Goal: Absence of physical injury  Outcome: Progressing

## 2024-08-29 NOTE — CARE COORDINATION
Reviewed discharge instructions with patient. Reviewed discharge medications including dosing, schedule, indication, and adverse reactions.  Reviewed which medications were already taken today and next dosage due for each medication.      Reviewed signs and symptoms that prompt a call to the physician and appropriate phone numbers.Reviewed follow up appointments that have been made in [facility]. Pt educated that she has a 4 hour window to seek help if the Blincyto pump malfunctions or she gets disconnected, appropriate numbers given to patient. Aranza from [facility] will meet with patient at noon to provide additional information.    Patient is being discharged with IV access d/t need for ongoing therapy:      Type:  Port                           Plan:continue   Next dressing change due on: 9/2  Cap changes due on: 9/2  CVC care and maintenance was reviewed with patient.  Pt verbalizes understanding of line care and maintenance.      Patient verbalized understanding of all instructions and questions were answered to her. satisfaction.  Signed discharge instructions were given to the patient and a copy placed in the paper-lite chart.  Patient discharged to home per self with spouse.      Bety Ruiz RN

## 2024-08-29 NOTE — PROGRESS NOTES
Signed         BLINCYTO ADMINISTRATION ASSESSMENT:  Instructions: Complete assessment prior to first dose of blinatumumab and every 4 hours and PRN during infusion. BLINCYTO should be held for all Grade 3 and 4 reactions. Report all abnormal findings to provider immediately.  Grade 3 CRS Event: Prolonged reaction that is NOT rapidly responsive to dose interruption and/or treatment. OR Recurrence of symptoms following initial improvement.  Withhold Blincyto until symptoms resolve, then restart at 9mcg/day.  Escalate dose to 28mcg/day after 7 days if toxicity does not recur.  Grade 4 CRS Event: Life-threatening; requiring vasopressor or ventilatory support.  Discontinue Blincyto permanently.  Grade 3 Neurological Event: Prolonged reaction that is NOT rapidly responsive to dose interruption and/or treatment. OR Recurrence of symptoms following initial improvement.  Withhold Blincyto until no more than Grade 1 (mild) and for at least 3 days, then restart Blintcyto at 9mcg/day.  Escalate dose to 28mcg/day after 7 days if toxicity does not recur. If the toxicity occurred at 9mcg/day, or if the toxicity takes more than 7 days to resolve, discontinue Blinctyo permanently.  Grade 4 Neurological Event: Life-threatening. Discontinue Blincyto permanently  Seizures: If patient experiences more than 1 seizure, discontinue Blincyto permanently.     Pt continues to receive Blincyto infusion per orders.  Pt monitored closely for adverse reactions to drug administration.    General:  Do not flush stickers applied to IV tubing and access ports? Yes  Daily lab review performed - Yes All new abnormal results reported to provider? N/A  Signature obtained on signature log daily and compared to baseline: Yes.  Gait evaluated this shift: Yes  Neuro assessment completed in flowsheets: Yes  Abnormal findings reported to physician: N/A     Cytokine Release Syndrome (CRS) Assessment:  No symptoms of CRS present at this time    Neurological

## 2024-08-29 NOTE — PROGRESS NOTES
Patient seen and assessed for standard line care needs.  CVC site remains free of visible signs and symptoms of infection. No drainage, edema, erythema, pain, itching or warmth noted at and around the insertion site .  Port accessed on 8/26, needle remains intact. Line care performed by Sada Silverio RN.  The need for continued use of the CVC is due to ongoing therapy.  Patient verbalizes understanding of line care education provided by line care RN.  Staff RNs will continue to monitor and assess the CVC site throughout the patient's hospital stay and appointments.  Sterile dressing changes will continue to be changed per policy.    Sada Silverio RN

## 2024-08-29 NOTE — DISCHARGE SUMMARY
[facility] Discharge Summary             Attending Physician: Shaylee Mccormick DO    Referring MD: Shaylee Mccormick DO  [address]  [address]  [place] [id number]    Name: Pam Guido :  1975  MRN:  [id number]    Admission: 2024     Discharge:   24    Date: 2024    Diagnosis on admit: pH+ B-Cell ALL, admitted for cycle 1 Blincyto.         Medications:      Medication List        START taking these medications      aspirin 81 MG chewable tablet  Take 1 tablet by mouth daily     Blincyto Solr chemo injection  Generic drug: blinatumomab  Infuse 32.5 mcg intravenously every 24 hours for 28 days            CHANGE how you take these medications      PONATinib HCl 15 MG Tabs  Take 15 mg by mouth daily  What changed:   medication strength  how much to take  when to take this            CONTINUE taking these medications      carvedilol 12.5 MG tablet  Commonly known as: COREG  Take 1 tablet by mouth 2 times daily  Notes to patient: Carvedilol  (Coreg)  USE-- lower blood pressure, prevent heart attack, treat heart failure  SIDE EFFECTS--  Dizziness, feeling tired, low blood pressure     famotidine 40 MG tablet  Commonly known as: PEPCID  TAKE ONE TABLET BY MOUTH ONCE NIGHTLY  Notes to patient: Famotidine  (Pepcid)  USE--  Treat acid reflux, protect digestive tract  SIDE EFFECTS--  headache     hydroCHLOROthiazide 12.5 MG capsule  TAKE 1 CAPSULE BY MOUTH EVERY MORNING     losartan 50 MG tablet  Commonly known as: COZAAR  Take 1 tablet by mouth every morning  Notes to patient: Losartan  (Cozaar)  USE--  Lower blood pressure, prevent heart failure  SIDE EFFECTS--  Dizziness, headache, low blood pressure, cough     valACYclovir 500 MG tablet  Commonly known as: VALTREX  Take 1 tablet by mouth 2 times daily  Notes to patient: This medication is an anti-viral drug.  Used to help prevent viral infections when your going through treatment or a stem cell transplant    Side Effects;  headache, nausea,

## 2024-08-29 NOTE — DISCHARGE INSTRUCTIONS
If the pump malfunctions or gets disconnected you have 4 hours to seek help by calling [facility] [phone], call [facility] [phone], or go to [facility] emergency room.

## 2024-08-29 NOTE — PROGRESS NOTES
BLINCYTO ADMINISTRATION ASSESSMENT:  Instructions: Complete assessment prior to first dose of blinatumumab and every 4 hours and PRN during infusion. BLINCYTO should be held for all Grade 3 and 4 reactions. Report all abnormal findings to provider immediately.  Grade 3 CRS Event: Prolonged reaction that is NOT rapidly responsive to dose interruption and/or treatment. OR Recurrence of symptoms following initial improvement.  Withhold Blincyto until symptoms resolve, then restart at 9mcg/day.  Escalate dose to 28mcg/day after 7 days if toxicity does not recur.  Grade 4 CRS Event: Life-threatening; requiring vasopressor or ventilatory support.  Discontinue Blincyto permanently.  Grade 3 Neurological Event: Prolonged reaction that is NOT rapidly responsive to dose interruption and/or treatment. OR Recurrence of symptoms following initial improvement.  Withhold Blincyto until no more than Grade 1 (mild) and for at least 3 days, then restart Blintcyto at 9mcg/day.  Escalate dose to 28mcg/day after 7 days if toxicity does not recur. If the toxicity occurred at 9mcg/day, or if the toxicity takes more than 7 days to resolve, discontinue Blinctyo permanently.  Grade 4 Neurological Event: Life-threatening. Discontinue Blincyto permanently  Seizures: If patient experiences more than 1 seizure, discontinue Blincyto permanently.    Pt continues to receive Blincyto infusion per orders.  Pt monitored closely for adverse reactions to drug administration.    General:  Do not flush stickers applied to IV tubing and access ports? Yes  Daily lab review performed - Yes All new abnormal results reported to provider? yes  Signature obtained on signature log daily and compared to baseline: Yes.  Gait evaluated this shift: Yes  Neuro assessment completed in flowsheets: Yes  Abnormal findings reported to physician: N/A    Cytokine Release Syndrome (CRS) Assessment:  No symptoms of CRS present at this time    Neurological Assessment:  No

## 2024-08-29 NOTE — CARE COORDINATION
Case Management Assessment            Discharge Note                    Date / Time of Note: 8/29/2024 10:24 AM                  Discharge Note Completed by: ALMA Ivey   for [facility] ([facility])  [place] Mobile: [phone]    Patient Name: Pam Guido   YOB: 1975  Diagnosis: B-cell acute lymphoblastic leukemia (ALL) (HCC) [C91.00]  Acute lymphoblastic leukemia (ALL) in relapse (HCC) [C91.02]   Date / Time: 8/26/2024  7:57 AM    Current PCP: Johanna Jensen, ALLY - CNP  Clinic patient: No    Hospitalization in the last 30 days: No       Advance Directives:  Code Status: Full Code  [place] DNR form completed and on chart: Not Indicated    Financial:  Payor: [facility] / Plan: [facility] - [place] PPO / Product Type: *No Product type* /      Pharmacy:    [facility] [id number] - [place], [place] - [address] - P [phone] - F [phone]  [address] [place] [address]  Phone: [phone] Fax: [phone]    [facility] [id number] - [place], [place] - [address] - P [phone] - F [phone]  [address] [place] [address]  Phone: [phone] Fax: [phone]    [facility] [id number] - [place], [place] - [address] - P [phone] - F [phone]  [address]  [facility] [address]  Phone: [phone] Fax: [phone]    [facility] - [facility] [address] - P [phone] - F [phone]  [address]  [facility] [address]  Phone: [phone] Fax: [phone]      Assistance purchasing medications?: Potential Assistance Purchasing Medications: No  Assistance provided by Case Management: None at this time    Does patient want to participate in local refill/ meds to beds program?:      Meds To Beds General Rules:  1. Can ONLY be done Monday- Friday between 8:30am-5pm  2. Prescription(s) must be in pharmacy by 3pm to be filled same day  3.Copy of  [facility] ([facility]  [place] Mobile: [phone]

## 2024-08-29 NOTE — PROGRESS NOTES
Assessment:  No symptoms of neurologic toxicity present at this time     Provider notified of abnormal findings: N/A  Action Taken: No action indicated at this time

## 2024-08-30 ENCOUNTER — TELEPHONE (OUTPATIENT)
Dept: FAMILY MEDICINE CLINIC | Age: 49
End: 2024-08-30

## 2024-08-30 NOTE — TELEPHONE ENCOUNTER
[facility] Transitions Initial Follow Up Call  Outreach made within 2 business days of discharge: Yes  Patient: Pam Guido Patient : 1975   MRN: [id number]  Reason for Admission: There are no discharge diagnoses documented for the most recent discharge.  Discharge Date: 24    Discharge department/facility: [facility] Appointments   Date Time Provider Department Center   10/30/2024 11:00 AM Johanna Jensen APRN - CNP [facility] DEP   2024  3:00 PM Johanna Jensen APRN - CNP [facility] DEP       MARLO LANDAVERDE RN

## 2024-09-04 DIAGNOSIS — R00.0 TACHYCARDIA: ICD-10-CM

## 2024-09-04 DIAGNOSIS — I10 PRIMARY HYPERTENSION: ICD-10-CM

## 2024-09-04 RX ORDER — CARVEDILOL 12.5 MG/1
12.5 TABLET ORAL 2 TIMES DAILY
Qty: 60 TABLET | Refills: 0 | Status: SHIPPED | OUTPATIENT
Start: 2024-09-04

## 2024-09-04 NOTE — TELEPHONE ENCOUNTER
Refill Request     CONFIRM preferrred pharmacy with the patient.    If Mail Order Rx - Pend for 90 day refill.      Last Seen: Last Seen Department: 7/25/2024  Last Seen by PCP: 12/30/2021    Last Written: 8/5/24    If no future appointment scheduled, route STAFF MESSAGE with patient name to the  Pool for scheduling.      Next Appointment:   Future Appointments   Date Time Provider Department Center   10/30/2024 11:00 AM Johanna Jensen APRN - CNP [facility] DEP   12/4/2024  3:00 PM Johanna Jensen APRN - CNP [facility] DEP       Message sent to  to schedule appt with patient?  N/A      Requested Prescriptions     Pending Prescriptions Disp Refills    carvedilol (COREG) 12.5 MG tablet [Pharmacy Med Name: CARVEDILOL 12.5 MG TABLET] 60 tablet 0     Sig: TAKE 1 TABLET BY MOUTH 2 TIMES A DAY

## 2024-09-26 ENCOUNTER — APPOINTMENT (OUTPATIENT)
Dept: GENERAL RADIOLOGY | Age: 49
DRG: 872 | End: 2024-09-26
Payer: COMMERCIAL

## 2024-09-26 ENCOUNTER — HOSPITAL ENCOUNTER (INPATIENT)
Age: 49
LOS: 4 days | Discharge: HOME | DRG: 872 | End: 2024-09-30
Attending: EMERGENCY MEDICINE | Admitting: STUDENT IN AN ORGANIZED HEALTH CARE EDUCATION/TRAINING PROGRAM
Payer: COMMERCIAL

## 2024-09-26 DIAGNOSIS — R00.0 TACHYCARDIA: ICD-10-CM

## 2024-09-26 DIAGNOSIS — R68.89 RIGORS: Primary | ICD-10-CM

## 2024-09-26 LAB
ANION GAP SERPL CALCULATED.3IONS-SCNC: 14 MMOL/L (ref 3–16)
BASOPHILS # BLD: 0 K/UL (ref 0–0.2)
BASOPHILS NFR BLD: 0.5 %
BILIRUB UR QL STRIP.AUTO: NEGATIVE
BUN SERPL-MCNC: 14 MG/DL (ref 7–20)
CALCIUM SERPL-MCNC: 9.1 MG/DL (ref 8.3–10.6)
CHLORIDE SERPL-SCNC: 102 MMOL/L (ref 99–110)
CLARITY UR: CLEAR
CO2 SERPL-SCNC: 25 MMOL/L (ref 21–32)
COLOR UR: YELLOW
CREAT SERPL-MCNC: 0.8 MG/DL (ref 0.6–1.1)
DEPRECATED RDW RBC AUTO: 14.7 % (ref 12.4–15.4)
EOSINOPHIL # BLD: 0.3 K/UL (ref 0–0.6)
EOSINOPHIL NFR BLD: 5.3 %
FLUAV RNA RESP QL NAA+PROBE: NOT DETECTED
FLUBV RNA RESP QL NAA+PROBE: NOT DETECTED
GFR SERPLBLD CREATININE-BSD FMLA CKD-EPI: >90 ML/MIN/{1.73_M2}
GLUCOSE SERPL-MCNC: 91 MG/DL (ref 70–99)
GLUCOSE UR STRIP.AUTO-MCNC: NEGATIVE MG/DL
HCT VFR BLD AUTO: 40.9 % (ref 36–48)
HGB BLD-MCNC: 13.7 G/DL (ref 12–16)
HGB UR QL STRIP.AUTO: NEGATIVE
KETONES UR STRIP.AUTO-MCNC: NEGATIVE MG/DL
LACTATE BLDV-SCNC: 1.6 MMOL/L (ref 0.4–1.9)
LACTATE BLDV-SCNC: 1.7 MMOL/L (ref 0.4–1.9)
LEUKOCYTE ESTERASE UR QL STRIP.AUTO: NEGATIVE
LYMPHOCYTES # BLD: 0.3 K/UL (ref 1–5.1)
LYMPHOCYTES NFR BLD: 6.2 %
MCH RBC QN AUTO: 31.9 PG (ref 26–34)
MCHC RBC AUTO-ENTMCNC: 33.5 G/DL (ref 31–36)
MCV RBC AUTO: 95.3 FL (ref 80–100)
MONOCYTES # BLD: 0 K/UL (ref 0–1.3)
MONOCYTES NFR BLD: 0.6 %
NEUTROPHILS # BLD: 4.7 K/UL (ref 1.7–7.7)
NEUTROPHILS NFR BLD: 87.4 %
NITRITE UR QL STRIP.AUTO: NEGATIVE
PH UR STRIP.AUTO: 6.5 [PH] (ref 5–8)
PLATELET # BLD AUTO: 253 K/UL (ref 135–450)
PMV BLD AUTO: 9 FL (ref 5–10.5)
POTASSIUM SERPL-SCNC: 4 MMOL/L (ref 3.5–5.1)
PROT UR STRIP.AUTO-MCNC: NEGATIVE MG/DL
RBC # BLD AUTO: 4.29 M/UL (ref 4–5.2)
S PYO AG THROAT QL: NEGATIVE
SARS-COV-2 RNA RESP QL NAA+PROBE: NOT DETECTED
SODIUM SERPL-SCNC: 141 MMOL/L (ref 136–145)
SP GR UR STRIP.AUTO: 1.02 (ref 1–1.03)
UA COMPLETE W REFLEX CULTURE PNL UR: NORMAL
UA DIPSTICK W REFLEX MICRO PNL UR: NORMAL
URN SPEC COLLECT METH UR: NORMAL
UROBILINOGEN UR STRIP-ACNC: 0.2 E.U./DL
WBC # BLD AUTO: 5.4 K/UL (ref 4–11)

## 2024-09-26 PROCEDURE — 1200000000 HC SEMI PRIVATE

## 2024-09-26 PROCEDURE — 2580000003 HC RX 258: Performed by: NURSE PRACTITIONER

## 2024-09-26 PROCEDURE — 6370000000 HC RX 637 (ALT 250 FOR IP): Performed by: NURSE PRACTITIONER

## 2024-09-26 PROCEDURE — 87186 SC STD MICRODIL/AGAR DIL: CPT

## 2024-09-26 PROCEDURE — 80048 BASIC METABOLIC PNL TOTAL CA: CPT

## 2024-09-26 PROCEDURE — 85025 COMPLETE CBC W/AUTO DIFF WBC: CPT

## 2024-09-26 PROCEDURE — 6360000002 HC RX W HCPCS: Performed by: NURSE PRACTITIONER

## 2024-09-26 PROCEDURE — 6370000000 HC RX 637 (ALT 250 FOR IP): Performed by: EMERGENCY MEDICINE

## 2024-09-26 PROCEDURE — 71046 X-RAY EXAM CHEST 2 VIEWS: CPT

## 2024-09-26 PROCEDURE — 99285 EMERGENCY DEPT VISIT HI MDM: CPT

## 2024-09-26 PROCEDURE — 6360000002 HC RX W HCPCS: Performed by: EMERGENCY MEDICINE

## 2024-09-26 PROCEDURE — 36415 COLL VENOUS BLD VENIPUNCTURE: CPT

## 2024-09-26 PROCEDURE — 87880 STREP A ASSAY W/OPTIC: CPT

## 2024-09-26 PROCEDURE — 96375 TX/PRO/DX INJ NEW DRUG ADDON: CPT

## 2024-09-26 PROCEDURE — 81003 URINALYSIS AUTO W/O SCOPE: CPT

## 2024-09-26 PROCEDURE — 2580000003 HC RX 258: Performed by: EMERGENCY MEDICINE

## 2024-09-26 PROCEDURE — 87636 SARSCOV2 & INF A&B AMP PRB: CPT

## 2024-09-26 PROCEDURE — 96365 THER/PROPH/DIAG IV INF INIT: CPT

## 2024-09-26 PROCEDURE — 83605 ASSAY OF LACTIC ACID: CPT

## 2024-09-26 PROCEDURE — 87150 DNA/RNA AMPLIFIED PROBE: CPT

## 2024-09-26 PROCEDURE — 2060000000 HC ICU INTERMEDIATE R&B

## 2024-09-26 PROCEDURE — 87040 BLOOD CULTURE FOR BACTERIA: CPT

## 2024-09-26 RX ORDER — CARVEDILOL 12.5 MG/1
12.5 TABLET ORAL ONCE
Status: COMPLETED | OUTPATIENT
Start: 2024-09-26 | End: 2024-09-26

## 2024-09-26 RX ORDER — POTASSIUM CHLORIDE 29.8 MG/ML
20 INJECTION INTRAVENOUS PRN
Status: DISCONTINUED | OUTPATIENT
Start: 2024-09-26 | End: 2024-09-27

## 2024-09-26 RX ORDER — VALACYCLOVIR HYDROCHLORIDE 500 MG/1
500 TABLET, FILM COATED ORAL 2 TIMES DAILY
Status: DISCONTINUED | OUTPATIENT
Start: 2024-09-26 | End: 2024-09-30 | Stop reason: HOSPADM

## 2024-09-26 RX ORDER — DIPHENHYDRAMINE HCL 25 MG
25 TABLET ORAL NIGHTLY PRN
Status: COMPLETED | OUTPATIENT
Start: 2024-09-26 | End: 2024-09-29

## 2024-09-26 RX ORDER — MAGNESIUM SULFATE IN WATER 40 MG/ML
4000 INJECTION, SOLUTION INTRAVENOUS PRN
Status: DISCONTINUED | OUTPATIENT
Start: 2024-09-26 | End: 2024-09-27

## 2024-09-26 RX ORDER — 0.9 % SODIUM CHLORIDE 0.9 %
1000 INTRAVENOUS SOLUTION INTRAVENOUS ONCE
Status: COMPLETED | OUTPATIENT
Start: 2024-09-26 | End: 2024-09-27

## 2024-09-26 RX ORDER — SODIUM CHLORIDE 9 MG/ML
INJECTION, SOLUTION INTRAVENOUS CONTINUOUS
Status: DISCONTINUED | OUTPATIENT
Start: 2024-09-26 | End: 2024-09-28

## 2024-09-26 RX ORDER — SODIUM CHLORIDE 0.9 % (FLUSH) 0.9 %
5-40 SYRINGE (ML) INJECTION EVERY 12 HOURS SCHEDULED
Status: DISCONTINUED | OUTPATIENT
Start: 2024-09-26 | End: 2024-09-30 | Stop reason: HOSPADM

## 2024-09-26 RX ORDER — SODIUM CHLORIDE, SODIUM LACTATE, POTASSIUM CHLORIDE, AND CALCIUM CHLORIDE .6; .31; .03; .02 G/100ML; G/100ML; G/100ML; G/100ML
1000 INJECTION, SOLUTION INTRAVENOUS ONCE
Status: COMPLETED | OUTPATIENT
Start: 2024-09-26 | End: 2024-09-26

## 2024-09-26 RX ORDER — SODIUM CHLORIDE 9 MG/ML
INJECTION, SOLUTION INTRAVENOUS PRN
Status: DISCONTINUED | OUTPATIENT
Start: 2024-09-26 | End: 2024-09-30 | Stop reason: HOSPADM

## 2024-09-26 RX ORDER — ACETAMINOPHEN 500 MG
500 TABLET ORAL
Status: COMPLETED | OUTPATIENT
Start: 2024-09-26 | End: 2024-09-26

## 2024-09-26 RX ORDER — KETOROLAC TROMETHAMINE 30 MG/ML
15 INJECTION, SOLUTION INTRAMUSCULAR; INTRAVENOUS ONCE
Status: COMPLETED | OUTPATIENT
Start: 2024-09-26 | End: 2024-09-26

## 2024-09-26 RX ORDER — FLUCONAZOLE 200 MG/1
400 TABLET ORAL DAILY
Status: DISCONTINUED | OUTPATIENT
Start: 2024-09-27 | End: 2024-09-27

## 2024-09-26 RX ORDER — SODIUM CHLORIDE 0.9 % (FLUSH) 0.9 %
5-40 SYRINGE (ML) INJECTION PRN
Status: DISCONTINUED | OUTPATIENT
Start: 2024-09-26 | End: 2024-09-30 | Stop reason: HOSPADM

## 2024-09-26 RX ORDER — ONDANSETRON 2 MG/ML
4 INJECTION INTRAMUSCULAR; INTRAVENOUS ONCE
Status: COMPLETED | OUTPATIENT
Start: 2024-09-26 | End: 2024-09-26

## 2024-09-26 RX ORDER — SODIUM CHLORIDE 9 MG/ML
INJECTION, SOLUTION INTRAVENOUS CONTINUOUS PRN
Status: DISCONTINUED | OUTPATIENT
Start: 2024-09-26 | End: 2024-09-30 | Stop reason: HOSPADM

## 2024-09-26 RX ADMIN — CARVEDILOL 12.5 MG: 12.5 TABLET, FILM COATED ORAL at 18:07

## 2024-09-26 RX ADMIN — ACETAMINOPHEN 500 MG: 500 TABLET ORAL at 17:54

## 2024-09-26 RX ADMIN — SODIUM CHLORIDE, POTASSIUM CHLORIDE, SODIUM LACTATE AND CALCIUM CHLORIDE 1000 ML: 600; 310; 30; 20 INJECTION, SOLUTION INTRAVENOUS at 17:21

## 2024-09-26 RX ADMIN — CEFEPIME 2000 MG: 2 INJECTION, POWDER, FOR SOLUTION INTRAVENOUS at 23:47

## 2024-09-26 RX ADMIN — ONDANSETRON 4 MG: 2 INJECTION INTRAMUSCULAR; INTRAVENOUS at 20:51

## 2024-09-26 RX ADMIN — VALACYCLOVIR HYDROCHLORIDE 500 MG: 500 TABLET, FILM COATED ORAL at 23:42

## 2024-09-26 RX ADMIN — KETOROLAC TROMETHAMINE 15 MG: 30 INJECTION INTRAMUSCULAR; INTRAVENOUS at 17:55

## 2024-09-26 RX ADMIN — SODIUM CHLORIDE, PRESERVATIVE FREE 10 ML: 5 INJECTION INTRAVENOUS at 23:47

## 2024-09-26 RX ADMIN — SODIUM CHLORIDE 1000 ML: 9 INJECTION, SOLUTION INTRAVENOUS at 23:15

## 2024-09-26 RX ADMIN — VANCOMYCIN HYDROCHLORIDE 2500 MG: 1 INJECTION, POWDER, LYOPHILIZED, FOR SOLUTION INTRAVENOUS at 20:44

## 2024-09-26 ASSESSMENT — PAIN SCALES - GENERAL
PAINLEVEL_OUTOF10: 6

## 2024-09-26 NOTE — ED PROVIDER NOTES
THE [facility]  EMERGENCY DEPARTMENT ENCOUNTER          ATTENDING PHYSICIAN NOTE       Date of evaluation: 9/26/2024    Chief Complaint     Chills (Left pt appointment w/ onc and started having chills/shivering)      History of Present Illness     Pam Guido is a 49 y.o. female with a history of AL L diagnosed in July of this year currently on oral chemo who presents with chills that developed after her oncology appointment today.  She was on her way home and began feeling chills, generalized bodyaches, and sore throat.  She called her oncologist who advised her to come back to the hospital.  The patient denies productive cough or measured fever, she has no urinary symptoms.  She denies abdominal pain or diarrhea.    ASSESSMENT / PLAN  (MEDICAL DECISION MAKING)     INITIAL VITALS: BP: (!) 167/104, Temp: 98.2 °F (36.8 °C), Pulse: (!) 111, Respirations: 18, SpO2: 98 %      Pam Guido is a 49 y.o. female with a history of a LL on oral chemo here with chills.  She does not have a fever and white blood cell count in the office today was 12.6, therefore not neutropenic.  Here the patient had labs drawn and throat culture obtained as well as COVID and flu.  Also's are negative and current white blood cell count is in the normal range.  She is not neutropenic.  In speaking with oncology, the decision was made to admit the patient on IV antibiotics because she became rigors right after her port was flushed and there was concern that she could have risk for bloodstream infection.  Oncology will be admitting and starting broad-spectrum antibiotics but asked that we start the vancomycin which was ordered and pending at this time.    Is this patient to be included in the SEP-1 core measure? No Exclusion criteria - the patient is NOT to be included for SEP-1 Core Measure due to: May have criteria for sepsis, but does not meet criteria for severe sepsis or septic shock    Medical Decision Making  Amount and/or

## 2024-09-27 ENCOUNTER — HOSPITAL ENCOUNTER (INPATIENT)
Dept: INTERVENTIONAL RADIOLOGY/VASCULAR | Age: 49
Discharge: HOME | DRG: 872 | End: 2024-09-29
Attending: STUDENT IN AN ORGANIZED HEALTH CARE EDUCATION/TRAINING PROGRAM
Payer: COMMERCIAL

## 2024-09-27 VITALS
BODY MASS INDEX: 39.25 KG/M2 | OXYGEN SATURATION: 96 % | RESPIRATION RATE: 18 BRPM | HEART RATE: 96 BPM | SYSTOLIC BLOOD PRESSURE: 125 MMHG | TEMPERATURE: 97.4 F | HEIGHT: 68 IN | DIASTOLIC BLOOD PRESSURE: 66 MMHG | WEIGHT: 259 LBS

## 2024-09-27 LAB
ALBUMIN SERPL-MCNC: 3.5 G/DL (ref 3.4–5)
ALP SERPL-CCNC: 74 U/L (ref 40–129)
ALT SERPL-CCNC: 111 U/L (ref 10–40)
ANION GAP SERPL CALCULATED.3IONS-SCNC: 12 MMOL/L (ref 3–16)
AST SERPL-CCNC: 100 U/L (ref 15–37)
BACTERIA URNS QL MICRO: ABNORMAL /HPF
BASOPHILS # BLD: 0.1 K/UL (ref 0–0.2)
BASOPHILS NFR BLD: 0.7 %
BILIRUB DIRECT SERPL-MCNC: 0.2 MG/DL (ref 0–0.3)
BILIRUB INDIRECT SERPL-MCNC: 0.1 MG/DL (ref 0–1)
BILIRUB SERPL-MCNC: 0.3 MG/DL (ref 0–1)
BILIRUB UR QL STRIP.AUTO: NEGATIVE
BUN SERPL-MCNC: 15 MG/DL (ref 7–20)
CALCIUM SERPL-MCNC: 8.2 MG/DL (ref 8.3–10.6)
CHLORIDE SERPL-SCNC: 106 MMOL/L (ref 99–110)
CLARITY UR: CLEAR
CO2 SERPL-SCNC: 21 MMOL/L (ref 21–32)
COLOR UR: YELLOW
CREAT SERPL-MCNC: 0.8 MG/DL (ref 0.6–1.1)
DEPRECATED RDW RBC AUTO: 14.4 % (ref 12.4–15.4)
ECHO BSA: 2.37 M2
EOSINOPHIL # BLD: 0 K/UL (ref 0–0.6)
EOSINOPHIL NFR BLD: 0.3 %
EPI CELLS #/AREA URNS HPF: ABNORMAL /HPF (ref 0–5)
GFR SERPLBLD CREATININE-BSD FMLA CKD-EPI: >90 ML/MIN/{1.73_M2}
GLUCOSE SERPL-MCNC: 134 MG/DL (ref 70–99)
GLUCOSE UR STRIP.AUTO-MCNC: NEGATIVE MG/DL
HCT VFR BLD AUTO: 33.8 % (ref 36–48)
HGB BLD-MCNC: 11.4 G/DL (ref 12–16)
HGB UR QL STRIP.AUTO: NEGATIVE
KETONES UR STRIP.AUTO-MCNC: ABNORMAL MG/DL
LACTATE BLDV-SCNC: 1.3 MMOL/L (ref 0.4–2)
LDH SERPL L TO P-CCNC: 313 U/L (ref 100–190)
LEUKOCYTE ESTERASE UR QL STRIP.AUTO: NEGATIVE
LYMPHOCYTES # BLD: 0.2 K/UL (ref 1–5.1)
LYMPHOCYTES NFR BLD: 1.4 %
MAGNESIUM SERPL-MCNC: 1.6 MG/DL (ref 1.8–2.4)
MCH RBC QN AUTO: 32.2 PG (ref 26–34)
MCHC RBC AUTO-ENTMCNC: 33.9 G/DL (ref 31–36)
MCV RBC AUTO: 95.1 FL (ref 80–100)
MONOCYTES # BLD: 0.3 K/UL (ref 0–1.3)
MONOCYTES NFR BLD: 2.1 %
MUCOUS THREADS #/AREA URNS LPF: ABNORMAL /LPF
NEUTROPHILS # BLD: 14.9 K/UL (ref 1.7–7.7)
NEUTROPHILS NFR BLD: 95.5 %
NITRITE UR QL STRIP.AUTO: NEGATIVE
PH UR STRIP.AUTO: 6 [PH] (ref 5–8)
PHOSPHATE SERPL-MCNC: 1.9 MG/DL (ref 2.5–4.9)
PLATELET # BLD AUTO: 193 K/UL (ref 135–450)
PMV BLD AUTO: 9.1 FL (ref 5–10.5)
POTASSIUM SERPL-SCNC: 3.8 MMOL/L (ref 3.5–5.1)
PROCALCITONIN SERPL IA-MCNC: 32.44 NG/ML (ref 0–0.15)
PROT SERPL-MCNC: 5.1 G/DL (ref 6.4–8.2)
PROT UR STRIP.AUTO-MCNC: ABNORMAL MG/DL
RBC # BLD AUTO: 3.55 M/UL (ref 4–5.2)
RBC #/AREA URNS HPF: ABNORMAL /HPF (ref 0–4)
REPORT: NORMAL
SODIUM SERPL-SCNC: 139 MMOL/L (ref 136–145)
SP GR UR STRIP.AUTO: >=1.03 (ref 1–1.03)
UA DIPSTICK W REFLEX MICRO PNL UR: YES
URATE SERPL-MCNC: 6.9 MG/DL (ref 2.6–6)
URN SPEC COLLECT METH UR: ABNORMAL
UROBILINOGEN UR STRIP-ACNC: 0.2 E.U./DL
WBC # BLD AUTO: 15.6 K/UL (ref 4–11)
WBC #/AREA URNS HPF: ABNORMAL /HPF (ref 0–5)

## 2024-09-27 PROCEDURE — 99153 MOD SED SAME PHYS/QHP EA: CPT

## 2024-09-27 PROCEDURE — 97530 THERAPEUTIC ACTIVITIES: CPT

## 2024-09-27 PROCEDURE — 6370000000 HC RX 637 (ALT 250 FOR IP): Performed by: NURSE PRACTITIONER

## 2024-09-27 PROCEDURE — 6360000002 HC RX W HCPCS: Performed by: STUDENT IN AN ORGANIZED HEALTH CARE EDUCATION/TRAINING PROGRAM

## 2024-09-27 PROCEDURE — 87641 MR-STAPH DNA AMP PROBE: CPT

## 2024-09-27 PROCEDURE — 97162 PT EVAL MOD COMPLEX 30 MIN: CPT

## 2024-09-27 PROCEDURE — 85025 COMPLETE CBC W/AUTO DIFF WBC: CPT

## 2024-09-27 PROCEDURE — 83615 LACTATE (LD) (LDH) ENZYME: CPT

## 2024-09-27 PROCEDURE — 99152 MOD SED SAME PHYS/QHP 5/>YRS: CPT

## 2024-09-27 PROCEDURE — 87070 CULTURE OTHR SPECIMN AEROBIC: CPT

## 2024-09-27 PROCEDURE — 0JPT0WZ REMOVAL OF TOTALLY IMPLANTABLE VASCULAR ACCESS DEVICE FROM TRUNK SUBCUTANEOUS TISSUE AND FASCIA, OPEN APPROACH: ICD-10-PCS | Performed by: STUDENT IN AN ORGANIZED HEALTH CARE EDUCATION/TRAINING PROGRAM

## 2024-09-27 PROCEDURE — 6360000002 HC RX W HCPCS

## 2024-09-27 PROCEDURE — 87086 URINE CULTURE/COLONY COUNT: CPT

## 2024-09-27 PROCEDURE — 6370000000 HC RX 637 (ALT 250 FOR IP): Performed by: STUDENT IN AN ORGANIZED HEALTH CARE EDUCATION/TRAINING PROGRAM

## 2024-09-27 PROCEDURE — 97166 OT EVAL MOD COMPLEX 45 MIN: CPT

## 2024-09-27 PROCEDURE — 83735 ASSAY OF MAGNESIUM: CPT

## 2024-09-27 PROCEDURE — 97116 GAIT TRAINING THERAPY: CPT

## 2024-09-27 PROCEDURE — 84550 ASSAY OF BLOOD/URIC ACID: CPT

## 2024-09-27 PROCEDURE — 2580000003 HC RX 258: Performed by: STUDENT IN AN ORGANIZED HEALTH CARE EDUCATION/TRAINING PROGRAM

## 2024-09-27 PROCEDURE — 36591 DRAW BLOOD OFF VENOUS DEVICE: CPT

## 2024-09-27 PROCEDURE — 2580000003 HC RX 258

## 2024-09-27 PROCEDURE — 6370000000 HC RX 637 (ALT 250 FOR IP)

## 2024-09-27 PROCEDURE — 83605 ASSAY OF LACTIC ACID: CPT

## 2024-09-27 PROCEDURE — A4217 STERILE WATER/SALINE, 500 ML: HCPCS | Performed by: NURSE PRACTITIONER

## 2024-09-27 PROCEDURE — 84100 ASSAY OF PHOSPHORUS: CPT

## 2024-09-27 PROCEDURE — 2580000003 HC RX 258: Performed by: NURSE PRACTITIONER

## 2024-09-27 PROCEDURE — 84145 PROCALCITONIN (PCT): CPT

## 2024-09-27 PROCEDURE — 81001 URINALYSIS AUTO W/SCOPE: CPT

## 2024-09-27 PROCEDURE — 80048 BASIC METABOLIC PNL TOTAL CA: CPT

## 2024-09-27 PROCEDURE — 6360000002 HC RX W HCPCS: Performed by: NURSE PRACTITIONER

## 2024-09-27 PROCEDURE — 36590 REMOVAL TUNNELED CV CATH: CPT

## 2024-09-27 PROCEDURE — 2060000000 HC ICU INTERMEDIATE R&B

## 2024-09-27 PROCEDURE — 80076 HEPATIC FUNCTION PANEL: CPT

## 2024-09-27 RX ORDER — MAGNESIUM SULFATE 1 G/100ML
1000 INJECTION INTRAVENOUS PRN
Status: DISCONTINUED | OUTPATIENT
Start: 2024-09-27 | End: 2024-09-30 | Stop reason: HOSPADM

## 2024-09-27 RX ORDER — HYDROCHLOROTHIAZIDE 12.5 MG/1
12.5 CAPSULE ORAL EVERY MORNING
Status: DISCONTINUED | OUTPATIENT
Start: 2024-09-27 | End: 2024-09-30 | Stop reason: HOSPADM

## 2024-09-27 RX ORDER — PROCHLORPERAZINE EDISYLATE 5 MG/ML
5 INJECTION INTRAMUSCULAR; INTRAVENOUS EVERY 4 HOURS PRN
Status: DISCONTINUED | OUTPATIENT
Start: 2024-09-27 | End: 2024-09-30 | Stop reason: HOSPADM

## 2024-09-27 RX ORDER — PROCHLORPERAZINE MALEATE 10 MG
5 TABLET ORAL EVERY 4 HOURS PRN
Status: DISCONTINUED | OUTPATIENT
Start: 2024-09-27 | End: 2024-09-30 | Stop reason: HOSPADM

## 2024-09-27 RX ORDER — ASPIRIN 81 MG/1
81 TABLET, CHEWABLE ORAL DAILY
Status: DISCONTINUED | OUTPATIENT
Start: 2024-09-28 | End: 2024-09-30 | Stop reason: HOSPADM

## 2024-09-27 RX ORDER — ACETAMINOPHEN 325 MG/1
650 TABLET ORAL EVERY 4 HOURS PRN
Status: DISCONTINUED | OUTPATIENT
Start: 2024-09-27 | End: 2024-09-30 | Stop reason: HOSPADM

## 2024-09-27 RX ORDER — ONDANSETRON 8 MG/1
8 TABLET, FILM COATED ORAL EVERY 8 HOURS PRN
Status: DISCONTINUED | OUTPATIENT
Start: 2024-09-27 | End: 2024-09-30 | Stop reason: HOSPADM

## 2024-09-27 RX ORDER — ACETAMINOPHEN 325 MG/1
650 TABLET ORAL ONCE
Status: COMPLETED | OUTPATIENT
Start: 2024-09-27 | End: 2024-09-27

## 2024-09-27 RX ORDER — ONDANSETRON 8 MG/1
8 TABLET, FILM COATED ORAL EVERY 8 HOURS PRN
Status: DISCONTINUED | OUTPATIENT
Start: 2024-09-27 | End: 2024-09-27

## 2024-09-27 RX ORDER — ACETAMINOPHEN 325 MG/1
650 TABLET ORAL EVERY 4 HOURS PRN
Status: DISCONTINUED | OUTPATIENT
Start: 2024-09-27 | End: 2024-09-27

## 2024-09-27 RX ORDER — ONDANSETRON 2 MG/ML
8 INJECTION INTRAMUSCULAR; INTRAVENOUS EVERY 8 HOURS PRN
Status: DISCONTINUED | OUTPATIENT
Start: 2024-09-27 | End: 2024-09-30 | Stop reason: HOSPADM

## 2024-09-27 RX ORDER — POTASSIUM CHLORIDE 29.8 MG/ML
20 INJECTION INTRAVENOUS PRN
Status: DISCONTINUED | OUTPATIENT
Start: 2024-09-27 | End: 2024-09-27 | Stop reason: SDUPTHER

## 2024-09-27 RX ORDER — CARVEDILOL 12.5 MG/1
12.5 TABLET ORAL 2 TIMES DAILY
Status: DISCONTINUED | OUTPATIENT
Start: 2024-09-27 | End: 2024-09-30 | Stop reason: HOSPADM

## 2024-09-27 RX ORDER — LOSARTAN POTASSIUM 50 MG/1
50 TABLET ORAL EVERY MORNING
Status: DISCONTINUED | OUTPATIENT
Start: 2024-09-27 | End: 2024-09-30 | Stop reason: HOSPADM

## 2024-09-27 RX ORDER — FAMOTIDINE 20 MG/1
20 TABLET, FILM COATED ORAL 2 TIMES DAILY
Status: DISCONTINUED | OUTPATIENT
Start: 2024-09-27 | End: 2024-09-30 | Stop reason: HOSPADM

## 2024-09-27 RX ORDER — TRAMADOL HYDROCHLORIDE 50 MG/1
50 TABLET ORAL EVERY 6 HOURS PRN
Status: DISCONTINUED | OUTPATIENT
Start: 2024-09-27 | End: 2024-09-30 | Stop reason: HOSPADM

## 2024-09-27 RX ORDER — POTASSIUM CHLORIDE 7.45 MG/ML
10 INJECTION INTRAVENOUS PRN
Status: DISCONTINUED | OUTPATIENT
Start: 2024-09-27 | End: 2024-09-30 | Stop reason: HOSPADM

## 2024-09-27 RX ORDER — VALACYCLOVIR HYDROCHLORIDE 500 MG/1
500 TABLET, FILM COATED ORAL 2 TIMES DAILY
Status: DISCONTINUED | OUTPATIENT
Start: 2024-09-27 | End: 2024-09-27

## 2024-09-27 RX ORDER — POTASSIUM CHLORIDE 1500 MG/1
40 TABLET, EXTENDED RELEASE ORAL PRN
Status: DISCONTINUED | OUTPATIENT
Start: 2024-09-27 | End: 2024-09-30 | Stop reason: HOSPADM

## 2024-09-27 RX ORDER — MIDAZOLAM HYDROCHLORIDE 1 MG/ML
INJECTION INTRAMUSCULAR; INTRAVENOUS PRN
Status: COMPLETED | OUTPATIENT
Start: 2024-09-27 | End: 2024-09-27

## 2024-09-27 RX ORDER — MAGNESIUM SULFATE IN WATER 40 MG/ML
4000 INJECTION, SOLUTION INTRAVENOUS PRN
Status: DISCONTINUED | OUTPATIENT
Start: 2024-09-27 | End: 2024-09-27

## 2024-09-27 RX ADMIN — ONDANSETRON HYDROCHLORIDE 8 MG: 8 TABLET, FILM COATED ORAL at 11:21

## 2024-09-27 RX ADMIN — ACETAMINOPHEN 650 MG: 325 TABLET ORAL at 03:58

## 2024-09-27 RX ADMIN — SODIUM CHLORIDE 1250 MG: 9 INJECTION, SOLUTION INTRAVENOUS at 11:39

## 2024-09-27 RX ADMIN — MEROPENEM 1000 MG: 1 INJECTION INTRAVENOUS at 20:56

## 2024-09-27 RX ADMIN — SODIUM CHLORIDE, PRESERVATIVE FREE 10 ML: 5 INJECTION INTRAVENOUS at 10:25

## 2024-09-27 RX ADMIN — FAMOTIDINE 20 MG: 20 TABLET, FILM COATED ORAL at 20:56

## 2024-09-27 RX ADMIN — DIBASIC SODIUM PHOSPHATE, MONOBASIC POTASSIUM PHOSPHATE AND MONOBASIC SODIUM PHOSPHATE 2 TABLET: 852; 155; 130 TABLET ORAL at 20:57

## 2024-09-27 RX ADMIN — ACETAMINOPHEN 650 MG: 325 TABLET ORAL at 10:43

## 2024-09-27 RX ADMIN — ONDANSETRON HYDROCHLORIDE 8 MG: 8 TABLET, FILM COATED ORAL at 21:04

## 2024-09-27 RX ADMIN — TRAMADOL HYDROCHLORIDE 50 MG: 50 TABLET ORAL at 22:28

## 2024-09-27 RX ADMIN — CEFEPIME 2000 MG: 2 INJECTION, POWDER, FOR SOLUTION INTRAVENOUS at 06:57

## 2024-09-27 RX ADMIN — MIDAZOLAM HYDROCHLORIDE 2 MG: 2 INJECTION, SOLUTION INTRAMUSCULAR; INTRAVENOUS at 15:03

## 2024-09-27 RX ADMIN — FLUCONAZOLE 400 MG: 200 TABLET ORAL at 10:24

## 2024-09-27 RX ADMIN — VALACYCLOVIR HYDROCHLORIDE 500 MG: 500 TABLET, FILM COATED ORAL at 10:24

## 2024-09-27 RX ADMIN — MEROPENEM 1000 MG: 1 INJECTION INTRAVENOUS at 12:45

## 2024-09-27 RX ADMIN — ACETAMINOPHEN 650 MG: 325 TABLET ORAL at 21:04

## 2024-09-27 RX ADMIN — FAMOTIDINE 20 MG: 20 TABLET, FILM COATED ORAL at 10:24

## 2024-09-27 RX ADMIN — SODIUM CHLORIDE, PRESERVATIVE FREE 10 ML: 5 INJECTION INTRAVENOUS at 20:57

## 2024-09-27 RX ADMIN — FENTANYL CITRATE 25 MCG: 50 INJECTION, SOLUTION INTRAMUSCULAR; INTRAVENOUS at 15:03

## 2024-09-27 RX ADMIN — SODIUM CHLORIDE: 9 INJECTION, SOLUTION INTRAVENOUS at 15:59

## 2024-09-27 RX ADMIN — SODIUM CHLORIDE: 9 INJECTION, SOLUTION INTRAVENOUS at 01:23

## 2024-09-27 RX ADMIN — FENTANYL CITRATE 25 MCG: 50 INJECTION, SOLUTION INTRAMUSCULAR; INTRAVENOUS at 15:10

## 2024-09-27 RX ADMIN — ACETAMINOPHEN 650 MG: 325 TABLET ORAL at 15:56

## 2024-09-27 RX ADMIN — DIBASIC SODIUM PHOSPHATE, MONOBASIC POTASSIUM PHOSPHATE AND MONOBASIC SODIUM PHOSPHATE 2 TABLET: 852; 155; 130 TABLET ORAL at 12:40

## 2024-09-27 RX ADMIN — VALACYCLOVIR HYDROCHLORIDE 500 MG: 500 TABLET, FILM COATED ORAL at 20:56

## 2024-09-27 RX ADMIN — SODIUM CHLORIDE 15 ML: 900 IRRIGANT IRRIGATION at 06:59

## 2024-09-27 ASSESSMENT — PAIN DESCRIPTION - PAIN TYPE
TYPE: ACUTE PAIN

## 2024-09-27 ASSESSMENT — PAIN SCALES - GENERAL
PAINLEVEL_OUTOF10: 5
PAINLEVEL_OUTOF10: 2
PAINLEVEL_OUTOF10: 5
PAINLEVEL_OUTOF10: 6
PAINLEVEL_OUTOF10: 3
PAINLEVEL_OUTOF10: 5

## 2024-09-27 ASSESSMENT — PAIN DESCRIPTION - FREQUENCY
FREQUENCY: CONTINUOUS
FREQUENCY: CONTINUOUS
FREQUENCY: INTERMITTENT
FREQUENCY: CONTINUOUS
FREQUENCY: CONTINUOUS

## 2024-09-27 ASSESSMENT — PAIN DESCRIPTION - ORIENTATION
ORIENTATION: ANTERIOR;POSTERIOR
ORIENTATION: ANTERIOR
ORIENTATION: ANTERIOR
ORIENTATION: ANTERIOR;POSTERIOR

## 2024-09-27 ASSESSMENT — PAIN DESCRIPTION - DESCRIPTORS
DESCRIPTORS: ACHING

## 2024-09-27 ASSESSMENT — PAIN DESCRIPTION - LOCATION
LOCATION: HEAD

## 2024-09-27 ASSESSMENT — PAIN - FUNCTIONAL ASSESSMENT

## 2024-09-27 ASSESSMENT — PAIN DESCRIPTION - ONSET
ONSET: ON-GOING

## 2024-09-27 ASSESSMENT — PAIN SCALES - WONG BAKER: WONGBAKER_NUMERICALRESPONSE: NO HURT

## 2024-09-27 NOTE — H&P
pending peripheral blood    2. ID:   Fever/Sepsis POA  Infectious Workup:  - Blood cultures 9/26/24: Enterobacter Cloacae, sensitivities pending    - Urine culture 9/26/24: Pending  - Covid/Flu Swab 9/26/24: Negative  - Rapid Strep A 9/26/24: Negative  - CXR 9/26/24: No pulmonary disease  - Procalcitonin 9/27/24: Pendin  - Lactic Acid 9/27/24: 1.3  - MRSA Nares 9/27/24: Pending  - Port removed 9/27/24 in IR: Tip culture pending    Current Prophylaxis:  - Continue valtrex 500 mg PO BID  - Start Diflucan when ANC <1.5    Current Treatment:  - Merrem day +1 (9/27/24)- switched with cultures positive for enterobacter cloacae   - Vancomycin day +1 (9/27/24)    Abx Hx:  - Cefepime 9/26/24-9/27/24    3. Heme:     Anemia r/t chemotherapy  - Transfuse for Hgb < 7 and Platelets < 10 K.  - No transfusion today       4. Metabolic:   - NS@ 75 mL/hr   HypoMag  - Replace mag and K per PRN orders  HypoPhos  - Start K Phos 500 mg BID (9/27/24)     5. GI / Nutrition:   Nutrition:   - low microbial diet  GERD:  - Continue famotidine 40 mg PO QHS- decreased to 20 mg daily on admission   Nausea:  - Compazine 5 mg q4h prn  - Ondansetron 8 mg q8h prn    6. Cardiac:  Essential HTN (hold meds below with sepsis and hypotension)  - HOLD Coreg 12.5 mg BID (8/6/24)  - HOLD Hydrochlorothiazide 12.5 mg daily  - HOLD Losartan 50 mg daily  - ECHO (7/13/24):  LVEF 55-60%  BLE edema:  - Rx lasix 20 mg x3 days 9/20/24- hold with admission for sepsis     7. Pulmonary: No issues  - Encourage ambulation and IS  - Currently on room air     8. MSK:  Chronic Pain  - Manages with Tylenol and Ibuprofen at home  - Does not use narcotics at home     9. Neuro:   Headache:   - likely d/t Ponatinib and improved on lower dose  - Cont Tylenol prn    - DVT Prophylaxis: Platelets >50,000 cells/dL, - daily lovenox prophylaxis ordered  Contraindications to pharmacologic prophylaxis: None  Contraindications to mechanical prophylaxis: None    - Disposition: When afebrile

## 2024-09-27 NOTE — PROCEDURES
Interventional Radiology Post Procedure    Date: 9/27/2024    Physician: Gus Torre MD    Pre-op Diagnosis: sepsis    Post-op Diagnosis: same    Variation from Planned Procedure: None       Findings: successful right chest port removal    Patient condition: Stable    Estimated Blood Loss: 5 cc    Specimens:  none      Signed,  Henrry Torre MD  3:28 PM  9/27/2024

## 2024-09-27 NOTE — CONSULTS
IR consult complete, successful R chest port removal today with Dr Torre. See procedure dictation for additional information.

## 2024-09-27 NOTE — CARE COORDINATION
Case Management Assessment  Initial Evaluation    Date/Time of Evaluation: 9/27/2024 12:22 PM  Assessment Completed by: ALMA Ivey   for [facility] ([facility])  [place] [employer]: [phone]    If patient is discharged prior to next notation, then this note serves as note for discharge by case management.    Patient Name: Pam Guido                   YOB: 1975  Diagnosis: Rigors [R68.89]  Tachycardia [R00.0]                   Date / Time: 9/26/2024  4:42 PM    Patient Admission Status: Inpatient   Readmission Risk (Low < 19, Mod (19-27), High > 27): Readmission Risk Score: 19.5    Current PCP: Johanna Jensen APRN - CNP  PCP verified by CM? Yes (has been going to [facility])    Chart Reviewed: Yes      History Provided by: Patient  Patient Orientation: Alert and Oriented    Patient Cognition: Alert    Hospitalization in the last 30 days (Readmission):  Yes    If yes, Readmission Assessment in CM Navigator will be completed.    Advance Directives:      Code Status: Full Code   Patient's Primary Decision Maker is: Legal Next of Kin    Primary Decision Maker: Sunday Guido - Spouse - [phone]    Discharge Planning:    Patient lives with: Spouse/Significant Other Type of Home: House  Primary Care Giver: Self  Patient Support Systems include: Spouse/Significant Other   Current Financial resources: Other (Comment) (Helen MCKNIGHT)  Current community resources: None  Current services prior to admission: Other (Comment) ([facility])            Current DME:              Type of Home Care services:  None    ADLS  Prior functional level: Independent in ADLs/IADLs  Current functional level: Independent in ADLs/IADLs    PT AM-PAC: 19 /24  OT AM-PAC: 17 /24    Family can provide assistance at DC: Yes  Would you like Case Management to discuss the discharge plan with any other family members/significant others, and if so, who? Yes  Plans to Return to Present

## 2024-09-27 NOTE — CONSENT
Informed Consent for Blood Component Transfusion Note    I have discussed with the patient the rationale for blood component transfusion; its benefits in treating or preventing fatigue, organ damage, or death; and its risk which includes mild transfusion reactions, rare risk of blood borne infection, or more serious but rare reactions. I have discussed the alternatives to transfusion, including the risk and consequences of not receiving transfusion. The patient had an opportunity to ask questions and had agreed to proceed with transfusion of blood components.    Electronically signed by ALLY Concepcion CNP on 9/27/24 at 12:47 PM EDT

## 2024-09-28 LAB
ANION GAP SERPL CALCULATED.3IONS-SCNC: 14 MMOL/L (ref 3–16)
BACTERIA UR CULT: NORMAL
BASOPHILS # BLD: 0.1 K/UL (ref 0–0.2)
BASOPHILS NFR BLD: 1.5 %
BUN SERPL-MCNC: 14 MG/DL (ref 7–20)
CALCIUM SERPL-MCNC: 7.6 MG/DL (ref 8.3–10.6)
CHLORIDE SERPL-SCNC: 107 MMOL/L (ref 99–110)
CO2 SERPL-SCNC: 19 MMOL/L (ref 21–32)
CREAT SERPL-MCNC: <0.5 MG/DL (ref 0.6–1.1)
DEPRECATED RDW RBC AUTO: 14.8 % (ref 12.4–15.4)
EOSINOPHIL # BLD: 0.1 K/UL (ref 0–0.6)
EOSINOPHIL NFR BLD: 1.3 %
GFR SERPLBLD CREATININE-BSD FMLA CKD-EPI: >90 ML/MIN/{1.73_M2}
GLUCOSE SERPL-MCNC: 91 MG/DL (ref 70–99)
HCT VFR BLD AUTO: 34.2 % (ref 36–48)
HGB BLD-MCNC: 11.5 G/DL (ref 12–16)
LYMPHOCYTES # BLD: 0.5 K/UL (ref 1–5.1)
LYMPHOCYTES NFR BLD: 8 %
MCH RBC QN AUTO: 31.7 PG (ref 26–34)
MCHC RBC AUTO-ENTMCNC: 33.5 G/DL (ref 31–36)
MCV RBC AUTO: 94.5 FL (ref 80–100)
MONOCYTES # BLD: 0.6 K/UL (ref 0–1.3)
MONOCYTES NFR BLD: 9.1 %
MRSA DNA SPEC QL NAA+PROBE: NORMAL
NEUTROPHILS # BLD: 5.5 K/UL (ref 1.7–7.7)
NEUTROPHILS NFR BLD: 80.1 %
PHOSPHATE SERPL-MCNC: 2.9 MG/DL (ref 2.5–4.9)
PLATELET # BLD AUTO: 133 K/UL (ref 135–450)
PMV BLD AUTO: 9.2 FL (ref 5–10.5)
POTASSIUM SERPL-SCNC: 3.7 MMOL/L (ref 3.5–5.1)
RBC # BLD AUTO: 3.62 M/UL (ref 4–5.2)
SODIUM SERPL-SCNC: 140 MMOL/L (ref 136–145)
URATE SERPL-MCNC: 5.7 MG/DL (ref 2.6–6)
WBC # BLD AUTO: 6.8 K/UL (ref 4–11)

## 2024-09-28 PROCEDURE — 6370000000 HC RX 637 (ALT 250 FOR IP): Performed by: NURSE PRACTITIONER

## 2024-09-28 PROCEDURE — 6360000002 HC RX W HCPCS: Performed by: STUDENT IN AN ORGANIZED HEALTH CARE EDUCATION/TRAINING PROGRAM

## 2024-09-28 PROCEDURE — 6370000000 HC RX 637 (ALT 250 FOR IP)

## 2024-09-28 PROCEDURE — 84550 ASSAY OF BLOOD/URIC ACID: CPT

## 2024-09-28 PROCEDURE — 6370000000 HC RX 637 (ALT 250 FOR IP): Performed by: STUDENT IN AN ORGANIZED HEALTH CARE EDUCATION/TRAINING PROGRAM

## 2024-09-28 PROCEDURE — 80048 BASIC METABOLIC PNL TOTAL CA: CPT

## 2024-09-28 PROCEDURE — 2580000003 HC RX 258: Performed by: STUDENT IN AN ORGANIZED HEALTH CARE EDUCATION/TRAINING PROGRAM

## 2024-09-28 PROCEDURE — 6360000002 HC RX W HCPCS: Performed by: NURSE PRACTITIONER

## 2024-09-28 PROCEDURE — 2580000003 HC RX 258

## 2024-09-28 PROCEDURE — 6360000002 HC RX W HCPCS

## 2024-09-28 PROCEDURE — 84100 ASSAY OF PHOSPHORUS: CPT

## 2024-09-28 PROCEDURE — 2580000003 HC RX 258: Performed by: NURSE PRACTITIONER

## 2024-09-28 PROCEDURE — 2060000000 HC ICU INTERMEDIATE R&B

## 2024-09-28 PROCEDURE — 85025 COMPLETE CBC W/AUTO DIFF WBC: CPT

## 2024-09-28 PROCEDURE — 36415 COLL VENOUS BLD VENIPUNCTURE: CPT

## 2024-09-28 RX ORDER — FUROSEMIDE 10 MG/ML
40 INJECTION INTRAMUSCULAR; INTRAVENOUS ONCE
Status: COMPLETED | OUTPATIENT
Start: 2024-09-28 | End: 2024-09-28

## 2024-09-28 RX ADMIN — SODIUM CHLORIDE, PRESERVATIVE FREE 10 ML: 5 INJECTION INTRAVENOUS at 07:57

## 2024-09-28 RX ADMIN — TRAMADOL HYDROCHLORIDE 50 MG: 50 TABLET ORAL at 15:33

## 2024-09-28 RX ADMIN — CARVEDILOL 12.5 MG: 12.5 TABLET, FILM COATED ORAL at 11:15

## 2024-09-28 RX ADMIN — VALACYCLOVIR HYDROCHLORIDE 500 MG: 500 TABLET, FILM COATED ORAL at 07:57

## 2024-09-28 RX ADMIN — SODIUM CHLORIDE: 9 INJECTION, SOLUTION INTRAVENOUS at 04:54

## 2024-09-28 RX ADMIN — FAMOTIDINE 20 MG: 20 TABLET, FILM COATED ORAL at 07:57

## 2024-09-28 RX ADMIN — MEROPENEM 1000 MG: 1 INJECTION INTRAVENOUS at 04:52

## 2024-09-28 RX ADMIN — VALACYCLOVIR HYDROCHLORIDE 500 MG: 500 TABLET, FILM COATED ORAL at 21:09

## 2024-09-28 RX ADMIN — SODIUM CHLORIDE 1250 MG: 9 INJECTION, SOLUTION INTRAVENOUS at 00:06

## 2024-09-28 RX ADMIN — HYDROCHLOROTHIAZIDE 12.5 MG: 12.5 CAPSULE ORAL at 11:16

## 2024-09-28 RX ADMIN — CARVEDILOL 12.5 MG: 12.5 TABLET, FILM COATED ORAL at 21:09

## 2024-09-28 RX ADMIN — ONDANSETRON HYDROCHLORIDE 8 MG: 8 TABLET, FILM COATED ORAL at 21:23

## 2024-09-28 RX ADMIN — MEROPENEM 1000 MG: 1 INJECTION INTRAVENOUS at 21:14

## 2024-09-28 RX ADMIN — ACETAMINOPHEN 650 MG: 325 TABLET ORAL at 04:56

## 2024-09-28 RX ADMIN — TRAMADOL HYDROCHLORIDE 50 MG: 50 TABLET ORAL at 07:56

## 2024-09-28 RX ADMIN — TRAMADOL HYDROCHLORIDE 50 MG: 50 TABLET ORAL at 22:26

## 2024-09-28 RX ADMIN — DIBASIC SODIUM PHOSPHATE, MONOBASIC POTASSIUM PHOSPHATE AND MONOBASIC SODIUM PHOSPHATE 2 TABLET: 852; 155; 130 TABLET ORAL at 07:57

## 2024-09-28 RX ADMIN — ACETAMINOPHEN 650 MG: 325 TABLET ORAL at 19:16

## 2024-09-28 RX ADMIN — MEROPENEM 1000 MG: 1 INJECTION INTRAVENOUS at 12:17

## 2024-09-28 RX ADMIN — ASPIRIN 81 MG: 81 TABLET, CHEWABLE ORAL at 07:57

## 2024-09-28 RX ADMIN — DIBASIC SODIUM PHOSPHATE, MONOBASIC POTASSIUM PHOSPHATE AND MONOBASIC SODIUM PHOSPHATE 2 TABLET: 852; 155; 130 TABLET ORAL at 21:09

## 2024-09-28 RX ADMIN — FUROSEMIDE 40 MG: 10 INJECTION, SOLUTION INTRAMUSCULAR; INTRAVENOUS at 11:15

## 2024-09-28 RX ADMIN — FAMOTIDINE 20 MG: 20 TABLET, FILM COATED ORAL at 21:09

## 2024-09-28 ASSESSMENT — PAIN SCALES - GENERAL
PAINLEVEL_OUTOF10: 2
PAINLEVEL_OUTOF10: 2
PAINLEVEL_OUTOF10: 5
PAINLEVEL_OUTOF10: 5
PAINLEVEL_OUTOF10: 4
PAINLEVEL_OUTOF10: 4
PAINLEVEL_OUTOF10: 5
PAINLEVEL_OUTOF10: 6
PAINLEVEL_OUTOF10: 3
PAINLEVEL_OUTOF10: 5
PAINLEVEL_OUTOF10: 2
PAINLEVEL_OUTOF10: 2
PAINLEVEL_OUTOF10: 3
PAINLEVEL_OUTOF10: 4

## 2024-09-28 ASSESSMENT — PAIN DESCRIPTION - PAIN TYPE
TYPE: ACUTE PAIN

## 2024-09-28 ASSESSMENT — PAIN DESCRIPTION - ORIENTATION
ORIENTATION: INNER
ORIENTATION: INNER
ORIENTATION: ANTERIOR;POSTERIOR
ORIENTATION: INNER
ORIENTATION: ANTERIOR;POSTERIOR
ORIENTATION: INNER
ORIENTATION: ANTERIOR;POSTERIOR

## 2024-09-28 ASSESSMENT — PAIN DESCRIPTION - LOCATION
LOCATION: HEAD

## 2024-09-28 ASSESSMENT — PAIN DESCRIPTION - ONSET
ONSET: ON-GOING
ONSET: GRADUAL
ONSET: ON-GOING

## 2024-09-28 ASSESSMENT — PAIN DESCRIPTION - DESCRIPTORS
DESCRIPTORS: ACHING

## 2024-09-28 ASSESSMENT — PAIN - FUNCTIONAL ASSESSMENT
PAIN_FUNCTIONAL_ASSESSMENT: PREVENTS OR INTERFERES SOME ACTIVE ACTIVITIES AND ADLS
PAIN_FUNCTIONAL_ASSESSMENT: ACTIVITIES ARE NOT PREVENTED
PAIN_FUNCTIONAL_ASSESSMENT: ACTIVITIES ARE NOT PREVENTED
PAIN_FUNCTIONAL_ASSESSMENT: PREVENTS OR INTERFERES SOME ACTIVE ACTIVITIES AND ADLS
PAIN_FUNCTIONAL_ASSESSMENT: ACTIVITIES ARE NOT PREVENTED
PAIN_FUNCTIONAL_ASSESSMENT: PREVENTS OR INTERFERES SOME ACTIVE ACTIVITIES AND ADLS

## 2024-09-28 ASSESSMENT — PAIN DESCRIPTION - FREQUENCY
FREQUENCY: CONTINUOUS

## 2024-09-28 ASSESSMENT — PAIN SCALES - WONG BAKER
WONGBAKER_NUMERICALRESPONSE: HURTS A LITTLE BIT
WONGBAKER_NUMERICALRESPONSE: NO HURT

## 2024-09-28 NOTE — CONSULTS
Clinical Pharmacy Consult Note    Vancomycin has been discontinued. Pharmacy will sign off on dosing at this time.   If resumed, please re-consult pharmacy.    Please call with questions.    Dayna Turk, PharmD, [facility]  Clinical Pharmacist  Wireless: [phone]  9/28/2024 10:25 AM

## 2024-09-29 LAB
ANION GAP SERPL CALCULATED.3IONS-SCNC: 10 MMOL/L (ref 3–16)
BACTERIA BLD CULT: ABNORMAL
BACTERIA BLD CULT: ABNORMAL
BASOPHILS # BLD: 0.1 K/UL (ref 0–0.2)
BASOPHILS NFR BLD: 2.6 %
BUN SERPL-MCNC: 9 MG/DL (ref 7–20)
CALCIUM SERPL-MCNC: 8.4 MG/DL (ref 8.3–10.6)
CHLORIDE SERPL-SCNC: 104 MMOL/L (ref 99–110)
CO2 SERPL-SCNC: 28 MMOL/L (ref 21–32)
CREAT SERPL-MCNC: <0.5 MG/DL (ref 0.6–1.1)
DEPRECATED RDW RBC AUTO: 14.5 % (ref 12.4–15.4)
EOSINOPHIL # BLD: 0.5 K/UL (ref 0–0.6)
EOSINOPHIL NFR BLD: 8.8 %
GFR SERPLBLD CREATININE-BSD FMLA CKD-EPI: >90 ML/MIN/{1.73_M2}
GLUCOSE SERPL-MCNC: 101 MG/DL (ref 70–99)
HCT VFR BLD AUTO: 34.5 % (ref 36–48)
HGB BLD-MCNC: 11.4 G/DL (ref 12–16)
LYMPHOCYTES # BLD: 0.8 K/UL (ref 1–5.1)
LYMPHOCYTES NFR BLD: 15.2 %
MCH RBC QN AUTO: 31.5 PG (ref 26–34)
MCHC RBC AUTO-ENTMCNC: 33.1 G/DL (ref 31–36)
MCV RBC AUTO: 95.2 FL (ref 80–100)
MONOCYTES # BLD: 0.7 K/UL (ref 0–1.3)
MONOCYTES NFR BLD: 12.6 %
NEUTROPHILS # BLD: 3.4 K/UL (ref 1.7–7.7)
NEUTROPHILS NFR BLD: 60.8 %
ORGANISM: ABNORMAL
ORGANISM: ABNORMAL
PHOSPHATE SERPL-MCNC: 3.1 MG/DL (ref 2.5–4.9)
PLATELET # BLD AUTO: 138 K/UL (ref 135–450)
PMV BLD AUTO: 9.5 FL (ref 5–10.5)
POTASSIUM SERPL-SCNC: 3.3 MMOL/L (ref 3.5–5.1)
RBC # BLD AUTO: 3.63 M/UL (ref 4–5.2)
SODIUM SERPL-SCNC: 142 MMOL/L (ref 136–145)
URATE SERPL-MCNC: 5.7 MG/DL (ref 2.6–6)
WBC # BLD AUTO: 5.5 K/UL (ref 4–11)

## 2024-09-29 PROCEDURE — 6360000002 HC RX W HCPCS

## 2024-09-29 PROCEDURE — 2580000003 HC RX 258

## 2024-09-29 PROCEDURE — 2060000000 HC ICU INTERMEDIATE R&B

## 2024-09-29 PROCEDURE — 6370000000 HC RX 637 (ALT 250 FOR IP): Performed by: NURSE PRACTITIONER

## 2024-09-29 PROCEDURE — 84100 ASSAY OF PHOSPHORUS: CPT

## 2024-09-29 PROCEDURE — 6370000000 HC RX 637 (ALT 250 FOR IP)

## 2024-09-29 PROCEDURE — 6370000000 HC RX 637 (ALT 250 FOR IP): Performed by: STUDENT IN AN ORGANIZED HEALTH CARE EDUCATION/TRAINING PROGRAM

## 2024-09-29 PROCEDURE — 2580000003 HC RX 258: Performed by: NURSE PRACTITIONER

## 2024-09-29 PROCEDURE — 84550 ASSAY OF BLOOD/URIC ACID: CPT

## 2024-09-29 PROCEDURE — 36415 COLL VENOUS BLD VENIPUNCTURE: CPT

## 2024-09-29 PROCEDURE — 85025 COMPLETE CBC W/AUTO DIFF WBC: CPT

## 2024-09-29 PROCEDURE — 80048 BASIC METABOLIC PNL TOTAL CA: CPT

## 2024-09-29 RX ORDER — CIPROFLOXACIN 500 MG/1
500 TABLET, FILM COATED ORAL EVERY 12 HOURS SCHEDULED
Status: DISCONTINUED | OUTPATIENT
Start: 2024-09-29 | End: 2024-09-30 | Stop reason: HOSPADM

## 2024-09-29 RX ORDER — IBUPROFEN 200 MG
400 TABLET ORAL ONCE
Status: COMPLETED | OUTPATIENT
Start: 2024-09-29 | End: 2024-09-29

## 2024-09-29 RX ADMIN — CARVEDILOL 12.5 MG: 12.5 TABLET, FILM COATED ORAL at 08:50

## 2024-09-29 RX ADMIN — CARVEDILOL 12.5 MG: 12.5 TABLET, FILM COATED ORAL at 20:48

## 2024-09-29 RX ADMIN — MEROPENEM 1000 MG: 1 INJECTION INTRAVENOUS at 04:18

## 2024-09-29 RX ADMIN — ASPIRIN 81 MG: 81 TABLET, CHEWABLE ORAL at 08:50

## 2024-09-29 RX ADMIN — TRAMADOL HYDROCHLORIDE 50 MG: 50 TABLET ORAL at 18:40

## 2024-09-29 RX ADMIN — CIPROFLOXACIN HYDROCHLORIDE 500 MG: 500 TABLET, FILM COATED ORAL at 11:49

## 2024-09-29 RX ADMIN — VALACYCLOVIR HYDROCHLORIDE 500 MG: 500 TABLET, FILM COATED ORAL at 08:50

## 2024-09-29 RX ADMIN — ONDANSETRON HYDROCHLORIDE 8 MG: 8 TABLET, FILM COATED ORAL at 21:40

## 2024-09-29 RX ADMIN — CIPROFLOXACIN HYDROCHLORIDE 500 MG: 500 TABLET, FILM COATED ORAL at 20:48

## 2024-09-29 RX ADMIN — DIBASIC SODIUM PHOSPHATE, MONOBASIC POTASSIUM PHOSPHATE AND MONOBASIC SODIUM PHOSPHATE 2 TABLET: 852; 155; 130 TABLET ORAL at 20:48

## 2024-09-29 RX ADMIN — DIPHENHYDRAMINE HYDROCHLORIDE 25 MG: 25 TABLET ORAL at 21:40

## 2024-09-29 RX ADMIN — SODIUM CHLORIDE, PRESERVATIVE FREE 10 ML: 5 INJECTION INTRAVENOUS at 08:51

## 2024-09-29 RX ADMIN — TRAMADOL HYDROCHLORIDE 50 MG: 50 TABLET ORAL at 05:45

## 2024-09-29 RX ADMIN — POTASSIUM BICARBONATE 40 MEQ: 782 TABLET, EFFERVESCENT ORAL at 08:50

## 2024-09-29 RX ADMIN — ACETAMINOPHEN 650 MG: 325 TABLET ORAL at 04:16

## 2024-09-29 RX ADMIN — HYDROCHLOROTHIAZIDE 12.5 MG: 12.5 CAPSULE ORAL at 08:50

## 2024-09-29 RX ADMIN — FAMOTIDINE 20 MG: 20 TABLET, FILM COATED ORAL at 20:48

## 2024-09-29 RX ADMIN — DIBASIC SODIUM PHOSPHATE, MONOBASIC POTASSIUM PHOSPHATE AND MONOBASIC SODIUM PHOSPHATE 2 TABLET: 852; 155; 130 TABLET ORAL at 08:50

## 2024-09-29 RX ADMIN — FAMOTIDINE 20 MG: 20 TABLET, FILM COATED ORAL at 08:50

## 2024-09-29 RX ADMIN — SODIUM CHLORIDE 15 ML: 900 IRRIGANT IRRIGATION at 20:52

## 2024-09-29 RX ADMIN — ACETAMINOPHEN 650 MG: 325 TABLET ORAL at 21:40

## 2024-09-29 RX ADMIN — IBUPROFEN 400 MG: 200 TABLET, FILM COATED ORAL at 07:12

## 2024-09-29 RX ADMIN — VALACYCLOVIR HYDROCHLORIDE 500 MG: 500 TABLET, FILM COATED ORAL at 20:48

## 2024-09-29 ASSESSMENT — PAIN SCALES - GENERAL
PAINLEVEL_OUTOF10: 0
PAINLEVEL_OUTOF10: 4
PAINLEVEL_OUTOF10: 4
PAINLEVEL_OUTOF10: 6
PAINLEVEL_OUTOF10: 5
PAINLEVEL_OUTOF10: 5
PAINLEVEL_OUTOF10: 4
PAINLEVEL_OUTOF10: 5

## 2024-09-29 ASSESSMENT — PAIN DESCRIPTION - ORIENTATION
ORIENTATION: ANTERIOR
ORIENTATION: MID
ORIENTATION: LOWER;DISTAL
ORIENTATION: INNER
ORIENTATION: ANTERIOR

## 2024-09-29 ASSESSMENT — PAIN DESCRIPTION - DESCRIPTORS
DESCRIPTORS: ACHING
DESCRIPTORS: ACHING
DESCRIPTORS: ACHING;DISCOMFORT
DESCRIPTORS: ACHING
DESCRIPTORS: ACHING

## 2024-09-29 ASSESSMENT — PAIN SCALES - WONG BAKER
WONGBAKER_NUMERICALRESPONSE: NO HURT
WONGBAKER_NUMERICALRESPONSE: HURTS LITTLE MORE

## 2024-09-29 ASSESSMENT — PAIN DESCRIPTION - DIRECTION
RADIATING_TOWARDS: NECK
RADIATING_TOWARDS: NECK

## 2024-09-29 ASSESSMENT — PAIN - FUNCTIONAL ASSESSMENT
PAIN_FUNCTIONAL_ASSESSMENT: ACTIVITIES ARE NOT PREVENTED
PAIN_FUNCTIONAL_ASSESSMENT: PREVENTS OR INTERFERES SOME ACTIVE ACTIVITIES AND ADLS

## 2024-09-29 ASSESSMENT — PAIN DESCRIPTION - FREQUENCY
FREQUENCY: CONTINUOUS
FREQUENCY: INTERMITTENT
FREQUENCY: CONTINUOUS
FREQUENCY: CONTINUOUS
FREQUENCY: INTERMITTENT

## 2024-09-29 ASSESSMENT — PAIN DESCRIPTION - ONSET
ONSET: ON-GOING
ONSET: GRADUAL

## 2024-09-29 ASSESSMENT — PAIN DESCRIPTION - PAIN TYPE
TYPE: ACUTE PAIN

## 2024-09-29 ASSESSMENT — PAIN DESCRIPTION - LOCATION
LOCATION: HEAD
LOCATION: NECK;HEAD
LOCATION: HEAD

## 2024-09-30 ENCOUNTER — APPOINTMENT (OUTPATIENT)
Dept: GENERAL RADIOLOGY | Age: 49
DRG: 872 | End: 2024-09-30
Payer: COMMERCIAL

## 2024-09-30 VITALS
RESPIRATION RATE: 16 BRPM | DIASTOLIC BLOOD PRESSURE: 86 MMHG | WEIGHT: 261.47 LBS | TEMPERATURE: 98.1 F | HEART RATE: 80 BPM | OXYGEN SATURATION: 96 % | SYSTOLIC BLOOD PRESSURE: 151 MMHG | HEIGHT: 68 IN | BODY MASS INDEX: 39.63 KG/M2

## 2024-09-30 LAB
ALBUMIN SERPL-MCNC: 3.6 G/DL (ref 3.4–5)
ALP SERPL-CCNC: 98 U/L (ref 40–129)
ALT SERPL-CCNC: 136 U/L (ref 10–40)
ANION GAP SERPL CALCULATED.3IONS-SCNC: 11 MMOL/L (ref 3–16)
APTT BLD: 33.9 SEC (ref 22.1–36.4)
AST SERPL-CCNC: 66 U/L (ref 15–37)
BASOPHILS # BLD: 0.2 K/UL (ref 0–0.2)
BASOPHILS NFR BLD: 2.2 %
BILIRUB DIRECT SERPL-MCNC: 0.2 MG/DL (ref 0–0.3)
BILIRUB INDIRECT SERPL-MCNC: ABNORMAL MG/DL (ref 0–1)
BILIRUB SERPL-MCNC: <0.2 MG/DL (ref 0–1)
BUN SERPL-MCNC: 7 MG/DL (ref 7–20)
CALCIUM SERPL-MCNC: 8.3 MG/DL (ref 8.3–10.6)
CHLORIDE SERPL-SCNC: 105 MMOL/L (ref 99–110)
CO2 SERPL-SCNC: 26 MMOL/L (ref 21–32)
CREAT SERPL-MCNC: <0.5 MG/DL (ref 0.6–1.1)
DEPRECATED RDW RBC AUTO: 14.5 % (ref 12.4–15.4)
EOSINOPHIL # BLD: 0.8 K/UL (ref 0–0.6)
EOSINOPHIL NFR BLD: 11.3 %
GFR SERPLBLD CREATININE-BSD FMLA CKD-EPI: >90 ML/MIN/{1.73_M2}
GLUCOSE SERPL-MCNC: 97 MG/DL (ref 70–99)
HCT VFR BLD AUTO: 35 % (ref 36–48)
HGB BLD-MCNC: 11.7 G/DL (ref 12–16)
INR PPP: 1.05 (ref 0.85–1.15)
LDH SERPL L TO P-CCNC: 329 U/L (ref 100–190)
LYMPHOCYTES # BLD: 1.3 K/UL (ref 1–5.1)
LYMPHOCYTES NFR BLD: 18.1 %
MAGNESIUM SERPL-MCNC: 2 MG/DL (ref 1.8–2.4)
MCH RBC QN AUTO: 31.5 PG (ref 26–34)
MCHC RBC AUTO-ENTMCNC: 33.4 G/DL (ref 31–36)
MCV RBC AUTO: 94.2 FL (ref 80–100)
MONOCYTES # BLD: 0.9 K/UL (ref 0–1.3)
MONOCYTES NFR BLD: 11.9 %
NEUTROPHILS # BLD: 4.2 K/UL (ref 1.7–7.7)
NEUTROPHILS NFR BLD: 56.5 %
PHOSPHATE SERPL-MCNC: 4.2 MG/DL (ref 2.5–4.9)
PLATELET # BLD AUTO: 177 K/UL (ref 135–450)
PMV BLD AUTO: 9.9 FL (ref 5–10.5)
POTASSIUM SERPL-SCNC: 3.7 MMOL/L (ref 3.5–5.1)
PROT SERPL-MCNC: 5.4 G/DL (ref 6.4–8.2)
PROTHROMBIN TIME: 13.9 SEC (ref 11.9–14.9)
RBC # BLD AUTO: 3.71 M/UL (ref 4–5.2)
SODIUM SERPL-SCNC: 142 MMOL/L (ref 136–145)
URATE SERPL-MCNC: 5 MG/DL (ref 2.6–6)
WBC # BLD AUTO: 7.4 K/UL (ref 4–11)

## 2024-09-30 PROCEDURE — 6370000000 HC RX 637 (ALT 250 FOR IP): Performed by: STUDENT IN AN ORGANIZED HEALTH CARE EDUCATION/TRAINING PROGRAM

## 2024-09-30 PROCEDURE — 6370000000 HC RX 637 (ALT 250 FOR IP)

## 2024-09-30 PROCEDURE — 83615 LACTATE (LD) (LDH) ENZYME: CPT

## 2024-09-30 PROCEDURE — 84100 ASSAY OF PHOSPHORUS: CPT

## 2024-09-30 PROCEDURE — 85730 THROMBOPLASTIN TIME PARTIAL: CPT

## 2024-09-30 PROCEDURE — 85025 COMPLETE CBC W/AUTO DIFF WBC: CPT

## 2024-09-30 PROCEDURE — 6370000000 HC RX 637 (ALT 250 FOR IP): Performed by: NURSE PRACTITIONER

## 2024-09-30 PROCEDURE — 36415 COLL VENOUS BLD VENIPUNCTURE: CPT

## 2024-09-30 PROCEDURE — 2580000003 HC RX 258: Performed by: NURSE PRACTITIONER

## 2024-09-30 PROCEDURE — 85610 PROTHROMBIN TIME: CPT

## 2024-09-30 PROCEDURE — 80048 BASIC METABOLIC PNL TOTAL CA: CPT

## 2024-09-30 PROCEDURE — 84550 ASSAY OF BLOOD/URIC ACID: CPT

## 2024-09-30 PROCEDURE — 83735 ASSAY OF MAGNESIUM: CPT

## 2024-09-30 PROCEDURE — 80076 HEPATIC FUNCTION PANEL: CPT

## 2024-09-30 RX ORDER — CIPROFLOXACIN 500 MG/1
500 TABLET, FILM COATED ORAL EVERY 12 HOURS SCHEDULED
Qty: 8 TABLET | Refills: 0 | Status: SHIPPED | OUTPATIENT
Start: 2024-09-30 | End: 2024-09-30

## 2024-09-30 RX ORDER — CIPROFLOXACIN 500 MG/1
500 TABLET, FILM COATED ORAL EVERY 12 HOURS SCHEDULED
Qty: 22 TABLET | Refills: 0 | Status: SHIPPED | OUTPATIENT
Start: 2024-09-30

## 2024-09-30 RX ADMIN — CIPROFLOXACIN HYDROCHLORIDE 500 MG: 500 TABLET, FILM COATED ORAL at 08:24

## 2024-09-30 RX ADMIN — VALACYCLOVIR HYDROCHLORIDE 500 MG: 500 TABLET, FILM COATED ORAL at 08:24

## 2024-09-30 RX ADMIN — CARVEDILOL 12.5 MG: 12.5 TABLET, FILM COATED ORAL at 08:24

## 2024-09-30 RX ADMIN — ASPIRIN 81 MG: 81 TABLET, CHEWABLE ORAL at 08:24

## 2024-09-30 RX ADMIN — TRAMADOL HYDROCHLORIDE 50 MG: 50 TABLET ORAL at 04:11

## 2024-09-30 RX ADMIN — ACETAMINOPHEN 650 MG: 325 TABLET ORAL at 07:01

## 2024-09-30 RX ADMIN — HYDROCHLOROTHIAZIDE 12.5 MG: 12.5 CAPSULE ORAL at 08:24

## 2024-09-30 RX ADMIN — SODIUM CHLORIDE, PRESERVATIVE FREE 10 ML: 5 INJECTION INTRAVENOUS at 08:24

## 2024-09-30 RX ADMIN — DIBASIC SODIUM PHOSPHATE, MONOBASIC POTASSIUM PHOSPHATE AND MONOBASIC SODIUM PHOSPHATE 2 TABLET: 852; 155; 130 TABLET ORAL at 08:23

## 2024-09-30 RX ADMIN — FAMOTIDINE 20 MG: 20 TABLET, FILM COATED ORAL at 08:24

## 2024-09-30 ASSESSMENT — PAIN SCALES - GENERAL
PAINLEVEL_OUTOF10: 5
PAINLEVEL_OUTOF10: 4
PAINLEVEL_OUTOF10: 6
PAINLEVEL_OUTOF10: 2

## 2024-09-30 ASSESSMENT — PAIN DESCRIPTION - PAIN TYPE: TYPE: ACUTE PAIN

## 2024-09-30 ASSESSMENT — PAIN DESCRIPTION - FREQUENCY: FREQUENCY: INTERMITTENT

## 2024-09-30 ASSESSMENT — PAIN DESCRIPTION - ONSET: ONSET: ON-GOING

## 2024-09-30 ASSESSMENT — PAIN DESCRIPTION - DESCRIPTORS
DESCRIPTORS: ACHING
DESCRIPTORS: ACHING

## 2024-09-30 ASSESSMENT — PAIN DESCRIPTION - LOCATION
LOCATION: HEAD
LOCATION: HEAD

## 2024-09-30 ASSESSMENT — PAIN DESCRIPTION - ORIENTATION
ORIENTATION: ANTERIOR
ORIENTATION: ANTERIOR

## 2024-09-30 NOTE — DISCHARGE SUMMARY
Ph+  Ponatinib (7/29/24)  + Dex + Blincyto (8/26/24)  Blincyto (C1 D1 8/26/24)        ASSESSMENT AND PLAN:           1. B Cell Acute Lymphocytic Leukemia, Ph+:  - BMBx 7/11/24: B-lymphoblastic leukemia; 72% blasts; Clonoseq with 3 dominant sequences identified   - CG- BCR/ABL ph+  - BCR PCR 7/11/24: p210, 0.1324%  - Repeat BCR/ABL PCR 7/29/24:  Not detected  - BMBX 8/16/24: No B-lymphoblast population identified. MRD + (556 residual clonal cells), CG, FISH: No abnormalities detected  - LP with IT MTX (7/24/24, 8/19/24): Neg    PLAN: Blinctyo  (C1D1 8/26/24) with Ponatinib  - Ponatinib (started 7/29/24) 30 mg PO QOD, lowered to 15 mg PO QD 8/29/24  - ASA 81 mg PO QD while on ponatinib (on hold with PAC removal, will resume 9/28/24     s/p cycle 1 of Blincyto 8/26/24     plan to admit for cycle two on 10/7/24 , will do LP while inpatient. Plan for PICC line with blincyto on 10/7/24 admission  - continue ponaitnib 15 mg daily   - clonoseq 9/26/24 pending peripheral blood    2. ID:   Fever/Sepsis POA  Infectious Workup:  - Blood cultures 9/26/24: Enterobacter Cloacae, sensitivities pending    - Urine culture 9/26/24: Pending  - Covid/Flu Swab 9/26/24: Negative  - Rapid Strep A 9/26/24: Negative  - CXR 9/26/24: No pulmonary disease  - Procalcitonin 9/27/24: 32.44  - Lactic Acid 9/27/24: 1.3  - MRSA Nares 9/27/24: negative  - Port removed 9/27/24 in IR: Tip culture NGTD    Current Prophylaxis:  - Continue valtrex 500 mg PO BID  - Start Diflucan when ANC <1.5     Current Treatment:  - Discontinue Merrem  (9/27-29/24)- positive for enterobacter cloacae   - Switch to Ciprofloxacin 500 mg BID plan for total 14 days 10/10/24 stop date     Abx Hx:  - Cefepime 9/26/24-9/27/24  - Vancomycin 9/26/24- 9/28/24    3. Heme:     Anemia r/t chemotherapy  - Transfuse for Hgb < 7 and Platelets < 10 K.  - No transfusion today       4. Metabolic:   Renal Function and Electrolytes Stable  - Check labs at next visit  - Encourage PO intake

## 2024-09-30 NOTE — PLAN OF CARE
Problem: Pain  Goal: Verbalizes/displays adequate comfort level or baseline comfort level  9/27/2024 1420 by Fiona Madera, RN  Outcome: Progressing     Problem: Safety - Adult  Goal: Free from fall injury  9/27/2024 1420 by Fiona Madera, RN  Outcome: Progressing     Problem: ABCDS Injury Assessment  Goal: Absence of physical injury  9/27/2024 1420 by Fiona Madera, RN  Outcome: Progressing          
  Problem: Pain  Goal: Verbalizes/displays adequate comfort level or baseline comfort level  9/29/2024 0320 by Brook Mcneal, RN  Outcome: Progressing  Flowsheets (Taken 9/28/2024 0643 by Simeon Alfred, RN)  Verbalizes/displays adequate comfort level or baseline comfort level:   Encourage patient to monitor pain and request assistance   Administer analgesics based on type and severity of pain and evaluate response   Assess pain using appropriate pain scale   Implement non-pharmacological measures as appropriate and evaluate response  Note:   Pt complaining of headache this PM. PRN meds given - see eMAR. Pt instructed to call for new/increasing pain levels. Pt verbalized understanding. Will continue to monitor.        Problem: Safety - Adult  Goal: Free from fall injury  9/29/2024 0320 by Brook Mcneal RN  Outcome: Progressing  Note: Orthostatic vital signs obtained at start of shift - see flowsheet for details.  Pt does not meet criteria for orthostasis.  Pt is a Med fall risk. See [place] Fall Score and ABCDS Injury Risk assessments. - Screening for Orthostasis AND not a [place] Risk per [place]/ABCDS: Pt bed is in low position, side rails up, call light and belongings are in reach.  Fall risk light is on outside pts room.  Pt encouraged to call for assistance as needed. Will continue with hourly rounds for PO intake, pain needs, toileting and repositioning as needed.        Problem: ABCDS Injury Assessment  Goal: Absence of physical injury  Outcome: Progressing     Problem: Hematologic - Adult  Goal: Maintains hematologic stability  9/29/2024 0320 by Brook Mcneal, RN  Outcome: Progressing  9/28/2024 1411 by Carlene Hughes, RN  Outcome: Progressing     
  Problem: Pain  Goal: Verbalizes/displays adequate comfort level or baseline comfort level  9/30/2024 0952 by Tracy Chirinos, RN  Outcome: Progressing  Flowsheets (Taken 9/30/2024 0952)  Verbalizes/displays adequate comfort level or baseline comfort level:   Encourage patient to monitor pain and request assistance   Implement non-pharmacological measures as appropriate and evaluate response   Assess pain using appropriate pain scale  Note: Patient with no pain so far this shift. Patient able to take tylenol at home for headaches per dr Wilkinson.     Problem: Safety - Adult  Goal: Free from fall injury  9/30/2024 0952 by Tracy Chirinos, RN  Outcome: Progressing  Flowsheets (Taken 9/30/2024 0952)  Free From Fall Injury: Instruct family/caregiver on patient safety  Note: Orthostatic vital signs obtained at start of shift - see flowsheet for details.  Pt does not meet criteria for orthostasis.  Pt is a Med fall risk. See [place] Fall Score and ABCDS Injury Risk assessments.   - Screening for Orthostasis AND not a [place] Risk per BHAT/ABCDS: Pt bed is in low position, side rails up, call light and belongings are in reach.  Fall risk light is on outside pts room.  Pt encouraged to call for assistance as needed. Will continue with hourly rounds for PO intake, pain needs, toileting and repositioning as needed.        Problem: Hematologic - Adult  Goal: Maintains hematologic stability  9/30/2024 0952 by Tracy Chirinos, RN  Outcome: Progressing  Flowsheets (Taken 9/30/2024 0952)  Maintains hematologic stability:   Assess for signs and symptoms of bleeding or hemorrhage   Administer blood products/factors as ordered   Monitor labs for bleeding or clotting disorders  Note: Patient's hemoglobin this AM:   Recent Labs     09/30/24  0442   HGB 11.7*     Patient's platelet count this AM:   Recent Labs     09/30/24 0442       Thrombocytopenia not present at this time.  Patient showing no signs or symptoms of active 
  Problem: Pain  Goal: Verbalizes/displays adequate comfort level or baseline comfort level  Outcome: Progressing  Flowsheets (Taken 9/27/2024 0811)  Verbalizes/displays adequate comfort level or baseline comfort level: Encourage patient to monitor pain and request assistance     Problem: Safety - Adult  Goal: Free from fall injury  Outcome: Progressing     Problem: ABCDS Injury Assessment  Goal: Absence of physical injury  Outcome: Progressing  Flowsheets (Taken 9/27/2024 0811)  Absence of Physical Injury: Implement safety measures based on patient assessment     Problem: Hematologic - Adult  Goal: Maintains hematologic stability  Outcome: Progressing  Note: Patient's hemoglobin this AM:   Recent Labs     09/27/24  0346   HGB 11.4*     Patient's platelet count this AM:   Recent Labs     09/27/24  0346       Thrombocytopenia not present at this time.  Patient showing no signs or symptoms of active bleeding.  Transfusion not indicated at this time.  Patient verbalizes understanding of all instructions. Will continue to assess and implement POC. Call light within reach and hourly rounding in place.        
  Problem: Pain  Goal: Verbalizes/displays adequate comfort level or baseline comfort level  Outcome: Progressing  Flowsheets (Taken 9/28/2024 0643)  Verbalizes/displays adequate comfort level or baseline comfort level:   Encourage patient to monitor pain and request assistance   Administer analgesics based on type and severity of pain and evaluate response   Assess pain using appropriate pain scale   Implement non-pharmacological measures as appropriate and evaluate response     Problem: Hematologic - Adult  Goal: Maintains hematologic stability  Outcome: Progressing  Note: Patient's hemoglobin this AM:   Recent Labs     09/28/24  0538   HGB 11.5*     Patient's platelet count this AM:   Recent Labs     09/28/24  0538   *    Thrombocytopenia Precautions in place.  Patient showing no signs or symptoms of active bleeding.  Transfusion not indicated at this time.  Patient verbalizes understanding of all instructions. Will continue to assess and implement POC. Call light within reach and hourly rounding in place.       Problem: Safety - Adult  Goal: Free from fall injury  Outcome: Progressing     Problem: ABCDS Injury Assessment  Goal: Absence of physical injury  Outcome: Progressing  Flowsheets (Taken 9/28/2024 0643)  Absence of Physical Injury: Implement safety measures based on patient assessment     
Problem: Pain  Goal: Verbalizes/displays adequate comfort level or baseline comfort level  9/28/2024 1411 by Carlene Hughes RN  Outcome: Progressing- patient endorsing headache. PRN medication given, relief of pain reported upon reassessment. Care continues.      Problem: Safety - Adult  Goal: Free from fall injury  9/28/2024 1411 by Carlene Hughes RN  Outcome: Progressing  Orthostatic vital signs obtained at start of shift - see flowsheet for details.  Pt does not meet criteria for orthostasis.  Pt is a Med fall risk. See [place] Fall Score and ABCDS Injury Risk assessments.    - Screening for Orthostasis AND not a [place] Risk per ESPA[place]A/ABCDS: Pt bed is in low position, side rails up, call light and belongings are in reach.  Fall risk light is on outside pts room.  Pt encouraged to call for assistance as needed. Will continue with hourly rounds for PO intake, pain needs, toileting and repositioning as needed.      Problem: Hematologic - Adult  Goal: Maintains hematologic stability  9/28/2024 1411 by Carlene Hughes RN  Outcome: Progressing  Patient's hemoglobin this AM:   Recent Labs     09/28/24  0538   HGB 11.5*     Patient's platelet count this AM:   Recent Labs     09/28/24  0538   *    Thrombocytopenia Precautions in place.  Patient showing no signs or symptoms of active bleeding.  Transfusion not indicated at this time.  Patient verbalizes understanding of all instructions. Will continue to assess and implement POC. Call light within reach and hourly rounding in place.      
Problem: Pain  Goal: Verbalizes/displays adequate comfort level or baseline comfort level  9/29/2024 1540 by Carlene Hughes RN  Outcome: Progressing- patient endorsing headache at beginning of shift. Motrin administered, relief of pain reported upon reassessment. Patient has denied any pain otherwise during shift.      Problem: Safety - Adult  Goal: Free from fall injury  9/29/2024 1540 by Carlene Hughes RN  Outcome: Progressing  Orthostatic vital signs obtained at start of shift - see flowsheet for details.  Pt does not meet criteria for orthostasis.  Pt is a Med fall risk. See [place] Fall Score and ABCDS Injury Risk assessments.   - Screening for Orthostasis AND not a [place] Risk per [place]/ABCDS: Pt bed is in low position, side rails up, call light and belongings are in reach.  Fall risk light is on outside pts room.  Pt encouraged to call for assistance as needed. Will continue with hourly rounds for PO intake, pain needs, toileting and repositioning as needed.      Problem: Hematologic - Adult  Goal: Maintains hematologic stability  9/29/2024 1540 by Carlene Hughes, RN  Outcome: Progressing  Patient's hemoglobin this AM:   Recent Labs     09/29/24  0413   HGB 11.4*     Patient's platelet count this AM:   Recent Labs     09/29/24  0413       Thrombocytopenia Precautions in place.  Patient showing no signs or symptoms of active bleeding.  Transfusion not indicated at this time.  Patient verbalizes understanding of all instructions. Will continue to assess and implement POC. Call light within reach and hourly rounding in place.      
to assess and implement POC. Call light within reach and hourly rounding in place.

## 2024-09-30 NOTE — DISCHARGE INSTRUCTIONS
[facility] Discharge Instructions    Call for Questions/Concerns:  [phone] [facility] office  The phone number listed above is available 24 hrs/7 days per week  [facility] is open M-F 8am-4:30pm; Sat-Sun/Holidays 8am-1pm    Symptoms to Report Immediately:    Fever of 100.5 or greater  Vomiting without relief after use of anti-nausea medication  Severe abdominal cramping  Diarrhea: More than 3 loose, watery bowel movements in a 24 hour period  Unusual or excessive bleeding from your mouth, nose, rectum, bladder or vagina  Sudden onset of shortness of breath or chest pain  Signs/symptoms of infection: redness, warmth, swelling-particularly to central line site    Report to Physician's office within 24 hours:    Pain not relieved by pain medication  Change in urination-odor, cloudiness, frequency, or pain with urination  Flu-like symptoms  Skin changes-rash, hives, redness or peeling of skin    Additional Instructions:    Avoid people with colds, flu-like symptoms, or any sign of infection  Drink plenty of fluids-attempt to consume 2-3 liters ( ounces) of fluids/24 hour period  Continue low microbial diet until instructed by physician to resume normal diet  Bring all of your medications with you to your doctor's appointments  Bring your current medicine list to each hospital and office visit      9/30/2024 10:02 AM  Tracy Chirinos RN            My Discharge Checklist    Here at the [facility], we want to make sure you have the help you will need once you leave the hospital.  We are going to go over your discharge instructions with you. We give these to you in writing so you will have a reference if you have questions about symptoms or problems to look for after you leave the hospital.     We know you want to feel better and get home soon. Please answer these questions so we can be sure you have what you need, your questions are answered, and you feel prepared for

## 2024-09-30 NOTE — PROGRESS NOTES
[facility] Progress Note    2024     Pam Guido    MRN: [id number]    : 1975    Referring MD: No referring provider defined for this encounter.      SUBJECTIVE:  she is doing ok, had headaches last night. Cultures sensitive to ciprofloxacin. Will stop meropenem and switch to ciprofloxacin, if stable DC tomorrow       ECOG PS:  (1) Restricted in physically strenuous activity, ambulatory and able to do work of light nature    KPS: 90% Able to carry on normal activity; minor signs or symptoms of disease    Isolation: None    Medications    Scheduled Meds:   PONATinib HCl  15 mg Oral Daily    famotidine  20 mg Oral BID    carvedilol  12.5 mg Oral BID    hydroCHLOROthiazide  12.5 mg Oral QAM    [Held by provider] losartan  50 mg Oral QAM    phosphorus  500 mg Oral BID    meropenem  1,000 mg IntraVENous Q8H    aspirin  81 mg Oral Daily    sodium chloride flush  5-40 mL IntraVENous 2 times per day    Saline Mouthwash  15 mL Swish & Spit 4x Daily AC & HS    valACYclovir  500 mg Oral BID     Continuous Infusions:   sodium chloride      sodium chloride       PRN Meds:.acetaminophen, potassium chloride **OR** potassium alternative oral replacement **OR** potassium chloride, magnesium sulfate, ondansetron **OR** ondansetron, prochlorperazine **OR** prochlorperazine, traMADol, diphenhydrAMINE, sodium chloride, sodium chloride flush, sodium chloride, magnesium hydroxide, Saline Mouthwash, ALTEplase (CATHFLO) 2 mg in sterile water 2 mL injection    ROS:  As noted above, otherwise remainder of 10-point ROS negative    Physical Exam:     I&O:    Intake/Output Summary (Last 24 hours) at 2024 0757  Last data filed at 2024 0615  Gross per 24 hour   Intake 1902 ml   Output 2000 ml   Net -98 ml       Vital Signs:  /74   Pulse 66   Temp 97.6 °F (36.4 °C) (Oral)   Resp 16   Ht 1.727 m (5' 8\")   Wt 121.3 kg (267 lb 6.4 oz)   LMP 2019   SpO2 94%   BMI 40.66 kg/m²     Weight:    Wt Readings from 
4 Eyes Admission Assessment     I agree as the admission nurse that 2 RN's have performed a thorough Head to Toe Skin Assessment on the patient. ALL assessment sites listed below have been assessed on admission.       Areas assessed by both nurses:   [x]   Head, Face, and Ears   [x]   Shoulders, Back, and Chest  [x]   Arms, Elbows, and Hands   [x]   Coccyx, Sacrum, and Ischium  [x]   Legs, Feet, and Heels        Does the Patient have Skin Breakdown?  No         Jim Prevention initiated:  No   Wound Care Orders initiated:  No      [facility] nurse consulted for Pressure Injury (Stage 3,4, Unstageable, DTI, NWPT, and Complex wounds) or Jim score 18 or lower:  No      Nurse 1 eSignature: Electronically signed by Simeon Alfred RN on 9/27/24 at 7:36 AM EDT    **SHARE this note so that the co-signing nurse is able to place an eSignature**    Nurse 2 eSignature: Electronically signed by Mary Arana RN on 9/27/24 at 7:48 AM EDT    
Occupational Therapy  Facility/Department: [address] CANCER [place] [employer]  Occupational Therapy Initial Assessment    Name: Pam Guido  : 1975  MRN: [id number]  Date of Service: 2024    Discharge Recommendations:  Home with assist PRN, Continue to assess pending progress  OT Equipment Recommendations  Equipment Needed: No       Patient Diagnosis(es): The primary encounter diagnosis was Rigors. A diagnosis of Tachycardia was also pertinent to this visit.  Past Medical History:  has a past medical history of Anemia, Back pain, Chest pain, Chronic pain syndrome, Fibroid uterus, GERD (gastroesophageal reflux disease), Hypertension, Menopausal symptoms, Overactive bladder, Pelvic pain, Right lateral epicondylitis, Rotator cuff syndrome of right shoulder, Stress incontinence, and Tachycardia.  Past Surgical History:  has a past surgical history that includes Tubal ligation; Knee cartilage surgery (Right, ); Tonsillectomy and Adenoidectomy; Dilation and curettage of uterus (N/A, 2019); hysteroscopy; Dilation & curettage; laparoscopy; Tonsillectomy; Hysterectomy, vaginal (N/A, 2020); Hysterectomy; US BREAST BIOPSY W LOC DEVICE 1ST LESION LEFT (Left, 2021); CT BIOPSY BONE MARROW (2024); and IR PORT PLACEMENT > 5 YEARS (2024).    Treatment Diagnosis: imp ADLs/transfers      Assessment  Performance deficits / Impairments: Decreased functional mobility ;Decreased ADL status;Decreased balance;Decreased endurance  Assessment: Pt is a 49 y.o female with AL Leukemia. Pt presents from home, lives in one-level condo with spouse. Pt only able to tolerate minimal activity during evaluation. Pt completes sit>stand and stand pivot transfers with CGA without AD but limited by headache and reported anxious about having port removed later. Pt would have assist prn from in-laws at discharge, spouse unable to provide assist. Will continue to follow in acute care.  Treatment Diagnosis: imp 
Patient complained of 5/10 headache. Patient febrile and tachycardic with temperature 101.1F and HR 124bpm. RN notified Angelica Tuttle NP. New order given. See MAR.  
Patient headache is worsening, radiates to her neck. Neuro assessment is WNL. NP Michelle made aware via [employer], new order given - One time dose of Ibuprofen 400mg.  
Pharmacy Consult Note - ED Vancomycin    Pharmacy consulted by Dr. Aragon in ED to order first dose of vancomycin.    Indication :   Bloodstream Infection    Patient Data:      Height:  172.7 cm (5' 8\")  Weight: 117.5 kg (259 lb)  Estimated Creatinine Clearance: 117 mL/min (based on SCr of 0.8 mg/dL).    Plan:    Will order vancomycin 2500 mg   ( ~21 mg/ kg) x 1 dose, to be administered in ED.      Please note this consult covers ED vancomycin dose only.  If admitting provider would like further vancomycin dose management by pharmacy, please place additional consult order.      Thank you for the consult  Luz Marina Brink  PharmD Candidate [id number]      
Pt arrived on unit at 2150. VS and assessment completed. Oriented pt to room and unit protocols. Educated pt on importance of calling for help when needed. Bed alarm activated. Call light within reach.  
RN notified Angelica Tuttle NP regarding patient's low blood pressures. New orders given. See MAR.  
[facility] stated patient blood culture was positive for enterobacter cloacae complex. Notified NP Bernie Lund.  
Reviewed discharge instructions with patient.  Reviewed discharge medications including dosing, schedule, indication, and adverse reactions.  Reviewed which medications were already taken today and next dosage due for each medication.      Reviewed signs and symptoms that prompt a call to the physician and appropriate phone numbers. Reviewed follow up appointments that have been made in [facility] and Outpatient Oncology.  Low microbial diet, activity restrictions, and increased risk of infection were reviewed.     Patient verbalized understanding of all instructions and questions were answered to her. satisfaction.  Signed discharge instructions were given to the patient and a copy placed in the paper-lite chart.  Patient discharged to home per wheelchair with family members.    Patient's home oral chemotherapy sent home with patient. Patient aware of appointment on Wednesday and of new medication called to her pharmacy.    Tracy Chirinos RN    
[facility] - Clinical Pharmacy Note - Extended Infusion Beta-Lactam Adjustment    Cefepime ordered for treatment of Sepsis. Per [facility] Extended Infusion Beta-Lactam Policy, Cefepime will be changed to 2000 mg Q8H EI (4 hour infusion).     Estimated Creatinine Clearance: Estimated Creatinine Clearance: 117 mL/min (based on SCr of 0.8 mg/dL).  Dialysis Status, RAOUL, CKD: NA  BMI: Body mass index is 39.38 kg/m².    Rationale for Adjustment: Agent demonstrates time-dependent effect on bacterial eradication. Extended-infusion dosing strategy aims to enhance microbiologic and clinical efficacy.    Pharmacy will continue to monitor renal function, cultures and sensitivities (where available) and adjust dose as necessary.      Please call with any questions.      Malini Quigley, PharmD  [id number] (Main Pharmacy)     
[facility] -  Clinical Pharmacy Note    Vancomycin - Management by Pharmacy    Consult Date(s): 9/27/24  Consulting Provider(s): Dr ROSALINDA Mccormick    Assessment / Plan  Bloodstream infection- Vancomycin  Concurrent Antimicrobials: Cefepime and proph Valtrex  Day of Vanc Therapy / Ordered Duration: 2/ tbd  Current Dosing Method: Bayesian-Guided AUC Dosing  Therapeutic Goal: -600 mg/L*hr  Current Dose / Plan:   Per review of outpatient labs- baseline scr around 0.5-0.6; today 9/27 scr 0.8 with est CrCl 117 mL/min  Vancomycin 2500 mg IV x1 dose given on 9/26 at 2045 in the ER  Will schedule Vancomycin 1250 mg IV q12h to start this morning for estimated AUCss 478 mg/L*hr and troughss 11.1 mg/L  Will obtain Vancomycin level when appropriate  Will continue to monitor clinical condition and make adjustments to regimen as appropriate.    Thank you for consulting pharmacy,    Elisha Tenorio,PharmD  [facility] Clinical Pharmacist  [phone]      Interval update:  therapy initiation ; febrile this morning; PAC scheduled to be removed today    Subjective/Objective:   Pam Guido is a 49 y.o. female with a PMHx significant for ALL s/p Blincyto and Ponatinib cycle 1 on 8/26/24 (next cycle scheduled on 10/7) PMH also significant for HTN and GERD.  Patient is admitted sore throat/chills and was seen at [facility] yesterday where port was flushed with symptoms occurring shortly after.    Pharmacy is consulted to dose Vancomycin..    Ht Readings from Last 1 Encounters:   09/26/24 1.727 m (5' 8\")     Wt Readings from Last 1 Encounters:   09/26/24 117.5 kg (259 lb)         Vancomycin Level(s) / Doses:    Date Time Dose Type of Level / Level Interpretation   9/26 2044 2500 mg IV x1 (in ER)     9/27  1250 mg IV q12h            Note: Serum levels collected for AUC-based dosing may be high if collected in close proximity to the dose administered. This is not necessarily indicative of toxicity.    Cultures & Sensitivities:    Date Site Micro 
kg/m²     Weight:    Wt Readings from Last 3 Encounters:   09/28/24 121.3 kg (267 lb 6.4 oz)   09/27/24 117.5 kg (259 lb)   08/29/24 114.7 kg (252 lb 12.8 oz)         General: Awake, alert and oriented.  HEENT: normocephalic, PERRL, no scleral erythema or icterus, Oral mucosa moist and intact, throat clear  NECK: supple  BACK: Straight   SKIN: warm dry and intact without lesions rashes or masses  CHEST: CTA bilaterally without use of accessory muscles  CV: Normal S1 S2, RRR, no MRG  ABD: NT ND normoactive BS  EXTREMITIES: without edema, denies calf tenderness  NEURO: CN II - XII grossly intact  CATHETER: PAC     Data    CBC:   Recent Labs     09/26/24  1710 09/27/24  0346 09/28/24  0538   WBC 5.4 15.6* 6.8   HGB 13.7 11.4* 11.5*   HCT 40.9 33.8* 34.2*   MCV 95.3 95.1 94.5    193 133*     BMP/Mag:  Recent Labs     09/26/24  1710 09/27/24  0346 09/28/24  0431    139 140   K 4.0 3.8 3.7    106 107   CO2 25 21 19*   PHOS  --  1.9* 2.9   BUN 14 15 14   CREATININE 0.8 0.8 <0.5*   MG  --  1.60*  --      LIVP:   Recent Labs     09/27/24  0346   *   *   BILIDIR 0.2   BILITOT 0.3   ALKPHOS 74     Coags: No results for input(s): \"PROTIME\", \"INR\", \"APTT\" in the last 72 hours.  Uric Acid No results for input(s): \"LABURIC\" in the last 72 hours.      PROBLEM LIST:            B Cell Acute lymphocytic leukemia, Ph+  HTN  GERD  Chronic Pain      TREATMENT:            B Cell Acute lymphocytic leukemia, Ph+  Ponatinib (7/29/24)  + Dex + Blincyto (8/26/24)  Blincyto (C1 D1 8/26/24)         ASSESSMENT AND PLAN:           1. B Cell Acute Lymphocytic Leukemia, Ph+:  - BMBx 7/11/24: B-lymphoblastic leukemia; 72% blasts; Clonoseq with 3 dominant sequences identified   - CG- BCR/ABL ph+  - BCR PCR 7/11/24: p210, 0.1324%  - Repeat BCR/ABL PCR 7/29/24:  Not detected  - BMBX 8/16/24: No B-lymphoblast population identified. MRD + (556 residual clonal cells), CG, FISH: No abnormalities detected  - LP with IT MTX 
walker due to fatigue after amb back from bathroom.  Will follow  Treatment Diagnosis: impaired gait and transfers  Therapy Prognosis: Good  Decision Making: Medium Complexity  Requires PT Follow-Up: Yes    Plan  Physical Therapy Plan  General Plan:  (2-5)  Current Treatment Recommendations: Strengthening, Functional mobility training, Gait training, Transfer training, Patient/Caregiver education & training, Therapeutic activities, Equipment evaluation, education, & procurement  Safety Devices  Type of Devices: Gait belt, Nurse notified, Call light within reach, Chair alarm in place, Left in chair    Restrictions  Position Activity Restriction  Other position/activity restrictions: up as tolerated     Subjective  General  Chart Reviewed: Yes  Additional Pertinent Hx: Admit 9/26 with chills; PMHx: anemia, chronic pain, HTN, ALL  Referring Practitioner: KIMBERLI White  Diagnosis: rigors  Subjective  Subjective: Pt found semisupine in bed.  Alert and agrees to PT.  Reports 5-6/10 HA - RN aware.  \"I just don't feel well.\"         Social/Functional History  Social/Functional History  Lives With: Spouse  Type of Home: [facility]  Home Layout: One level  Home Access: Level entry  Bathroom Shower/Tub: Walk-in shower, Tub/Shower unit  Bathroom Toilet: Handicap height (sink nearby for leverage)  Bathroom Equipment: Grab bars in shower  Home Equipment: Wheelchair - Manual, Walker - 4-Wheeled (DME belongs to  but pt able to use)  ADL Assistance: Independent  Homemaking Assistance: Independent  Ambulation Assistance: Independent  Transfer Assistance: Independent  Active : Yes  Type of Occupation: works at [employer] - on her feet all day; last worked July 2024  Additional Comments: in-laws able to assist prn at d/c;  has his own medial issues - unable to assist with homemaking  Vision/Hearing  Vision  Vision: Impaired  Vision Exceptions: Wears glasses for reading  Hearing  Hearing: Within functional limits    Cognition

## 2024-10-01 ENCOUNTER — TELEPHONE (OUTPATIENT)
Dept: FAMILY MEDICINE CLINIC | Age: 49
End: 2024-10-01

## 2024-10-01 ENCOUNTER — CARE COORDINATION (OUTPATIENT)
Dept: CASE MANAGEMENT | Age: 49
End: 2024-10-01

## 2024-10-01 DIAGNOSIS — R68.89 RIGORS: Primary | ICD-10-CM

## 2024-10-01 LAB — BACTERIA CATH TIP CULT: NORMAL

## 2024-10-01 NOTE — TELEPHONE ENCOUNTER
[facility] Transitions Initial Follow Up Call    Outreach made within 2 business days of discharge: Yes    Patient: Pam Guido Patient : 1975   MRN: [id number]  Reason for Admission: There are no discharge diagnoses documented for the most recent discharge.  Discharge Date: 24       Spoke with: Sunday    Discharge department/facility: [facility]    Non-face-to-face services provided:  Scheduled appointment with PCP-   Obtained and reviewed discharge summary and/or continuity of care documents    TCM Interactive Patient Contact:  Was patient able to fill all prescriptions: Yes  Was patient instructed to bring all medications to the follow-up visit: Yes  Is patient taking all medications as directed in the discharge summary? Yes  Does patient understand their discharge instructions: Yes  Does patient have questions or concerns that need addressed prior to 7-14 day follow up office visit: no    Scheduled appointment with PCP within 7-14 days    Follow Up  Future Appointments   Date Time Provider Department Center   10/7/2024  1:00 PM [facility] SPECIAL PROCEDURES 1 [facility] SP PROC [facility]   10/9/2024 11:20 AM Johanna Jensen APRN - CNP [facility] DEP   10/30/2024 11:00 AM Johanna Jensen APRN - CNP [facility] DEP   10/30/2024 11:20 AM Johanna Jensen APRN - CNP [facility] DEP   2024  3:00 PM Johanna Jensen APRN - CNP [facility] DEP       MARLO LADNAVERDE RN

## 2024-10-01 NOTE — CARE COORDINATION
Johanna Ruvalcaba, APRN - CNP Family Medicine [phone]            Patient graduated from the Care Transitions program on 10/01/2024.    Handoff:   Patient was not referred to the ACM team due to no additional needs identified.       Patient has agreed to contact primary care provider and/or specialist for any further questions, concerns, or needs.    Thank You,    Stephanie Hernandez RN  Care Transition Coordinator  Contact Number:[phone]

## 2024-10-02 LAB
BACTERIA BLD CULT ORG #2: ABNORMAL
ORGANISM: ABNORMAL

## 2024-10-02 NOTE — PRE-PROCEDURE INSTRUCTIONS
Called patient about procedure. Told to be here at 1300 for procedure at 1430. Must be NPO 8 hours prior to procedure. Patient is not on a blood thinner. Told to have a responsible adult with them to take them home and stay with them afterwards, if they do not get admitted to hospital. Also, to bring a current list of medications. No other questions or concerns.

## 2024-10-03 ENCOUNTER — HOSPITAL ENCOUNTER (OUTPATIENT)
Dept: INTERVENTIONAL RADIOLOGY/VASCULAR | Age: 49
Discharge: HOME | End: 2024-10-05
Attending: STUDENT IN AN ORGANIZED HEALTH CARE EDUCATION/TRAINING PROGRAM
Payer: COMMERCIAL

## 2024-10-03 VITALS
OXYGEN SATURATION: 97 % | TEMPERATURE: 97.7 F | DIASTOLIC BLOOD PRESSURE: 90 MMHG | SYSTOLIC BLOOD PRESSURE: 139 MMHG | HEART RATE: 71 BPM

## 2024-10-03 DIAGNOSIS — C91.00 B LYMPHOBLASTIC LEUKEMIA (HCC): ICD-10-CM

## 2024-10-03 PROCEDURE — 2500000003 HC RX 250 WO HCPCS: Performed by: STUDENT IN AN ORGANIZED HEALTH CARE EDUCATION/TRAINING PROGRAM

## 2024-10-03 PROCEDURE — C1769 GUIDE WIRE: HCPCS

## 2024-10-03 PROCEDURE — C1751 CATH, INF, PER/CENT/MIDLINE: HCPCS

## 2024-10-03 PROCEDURE — 7100000011 HC PHASE II RECOVERY - ADDTL 15 MIN

## 2024-10-03 PROCEDURE — 7100000010 HC PHASE II RECOVERY - FIRST 15 MIN

## 2024-10-03 PROCEDURE — 76937 US GUIDE VASCULAR ACCESS: CPT

## 2024-10-03 RX ORDER — LIDOCAINE HYDROCHLORIDE 10 MG/ML
INJECTION, SOLUTION EPIDURAL; INFILTRATION; INTRACAUDAL; PERINEURAL PRN
Status: COMPLETED | OUTPATIENT
Start: 2024-10-03 | End: 2024-10-03

## 2024-10-03 RX ADMIN — LIDOCAINE HYDROCHLORIDE 5 ML: 10 INJECTION, SOLUTION EPIDURAL; INFILTRATION; INTRACAUDAL; PERINEURAL at 14:39

## 2024-10-03 NOTE — DISCHARGE INSTRUCTIONS
has other damage     References:  [email or web address]

## 2024-10-03 NOTE — PROGRESS NOTES
Cath Lab Pre Procedure Flowsheet    Plan of Care:     Hemodynamics and cardiac rhythm will remain stable.   Comfort level will be maintained.   Respiratory function will remain adequate.   Pt/family will verbalize understanding of the procedure.   Procedure will be tolerated without complications.   Patient will recover from procedure without complications.   ID armband on patient and identification verified.   Informed consent obtained.   Non invasive blood pressure cuff applied, monitoring initiated.   EKG pads and pulse oximeter applied, monitoring initiated.   Instructions given. Patient and / or family verbalize understanding.   H&P will be documented by physician in Epic.     Pre-procedure:    NPO Status: npo since 0800    Contrast / IV Dye Allergy: no    Pregnancy Test: N/A.    Prep Sites: Wrist(s)  Chest    Ruy's Test:    Pulses:     Anticoagulants: None.     Antiplatelets: None.     Chief Complaint:      Diabetic: No    Pre EKG Rhythm:     Pre SBP:137/75    IV access:    Pre-procedure blood work collected by:     NIH Scale:

## 2024-10-04 ENCOUNTER — TELEPHONE (OUTPATIENT)
Dept: FAMILY MEDICINE CLINIC | Age: 49
End: 2024-10-04

## 2024-10-04 NOTE — TELEPHONE ENCOUNTER
Patient cancelled appt for 10/9/65415 .  She will be in [facility] for IV chemo.  [facility] Transitions Initial Follow Up Call    Outreach made within 2 business days of discharge: Yes    Patient: Pam Guido Patient : 1975   MRN: [id number]  Reason for Admission: There are no discharge diagnoses documented for the most recent discharge.  Discharge Date: 24       Spoke with: Pam    Discharge department/facility: [facility]    Non-face-to-face services provided:  Scheduled appointment with PCP-   Obtained and reviewed discharge summary and/or continuity of care documents    TCM Interactive Patient Contact:  Was patient able to fill all prescriptions: Yes  Was patient instructed to bring all medications to the follow-up visit: Yes  Is patient taking all medications as directed in the discharge summary? Yes  Does patient understand their discharge instructions: Yes  Does patient have questions or concerns that need addressed prior to 7-14 day follow up office visit: no    Patient unable  to be Scheduled appointment with PCP within 7-14 days she will be at [facility] for IV CHEMO.    Follow Up  Future Appointments   Date Time Provider Department Center   10/30/2024 11:00 AM Johanna Jensen APRN - CNP [facility] DEP   10/30/2024 11:20 AM Johanna Jensen APRN - CNP [facility] DEP   2024  3:00 PM Johanna Jensen APRN - CNP [facility] DEP     MARLO LANDAVERDE RN

## 2024-10-07 ENCOUNTER — APPOINTMENT (OUTPATIENT)
Dept: GENERAL RADIOLOGY | Age: 49
DRG: 838 | End: 2024-10-07
Attending: STUDENT IN AN ORGANIZED HEALTH CARE EDUCATION/TRAINING PROGRAM
Payer: COMMERCIAL

## 2024-10-07 ENCOUNTER — HOSPITAL ENCOUNTER (INPATIENT)
Age: 49
LOS: 1 days | Discharge: HOME | DRG: 838 | End: 2024-10-07
Attending: STUDENT IN AN ORGANIZED HEALTH CARE EDUCATION/TRAINING PROGRAM | Admitting: STUDENT IN AN ORGANIZED HEALTH CARE EDUCATION/TRAINING PROGRAM
Payer: COMMERCIAL

## 2024-10-07 PROBLEM — C91.00 LYMPHOBLASTIC LEUKEMIA (HCC): Status: ACTIVE | Noted: 2024-10-07

## 2024-10-07 PROCEDURE — 87040 BLOOD CULTURE FOR BACTERIA: CPT

## 2024-10-07 PROCEDURE — 71045 X-RAY EXAM CHEST 1 VIEW: CPT

## 2024-10-07 PROCEDURE — 3E03305 INTRODUCTION OF OTHER ANTINEOPLASTIC INTO PERIPHERAL VEIN, PERCUTANEOUS APPROACH: ICD-10-PCS | Performed by: STUDENT IN AN ORGANIZED HEALTH CARE EDUCATION/TRAINING PROGRAM

## 2024-10-07 PROCEDURE — 2060000000 HC ICU INTERMEDIATE R&B

## 2024-10-07 RX ORDER — SODIUM CHLORIDE 9 MG/ML
INJECTION, SOLUTION INTRAVENOUS PRN
Status: DISCONTINUED | OUTPATIENT
Start: 2024-10-07 | End: 2024-10-07 | Stop reason: HOSPADM

## 2024-10-07 RX ORDER — LINEZOLID 600 MG/1
600 TABLET, FILM COATED ORAL EVERY 12 HOURS SCHEDULED
Status: DISCONTINUED | OUTPATIENT
Start: 2024-10-07 | End: 2024-10-07 | Stop reason: HOSPADM

## 2024-10-07 RX ORDER — LINEZOLID 600 MG/1
600 TABLET, FILM COATED ORAL EVERY 12 HOURS SCHEDULED
Qty: 28 TABLET | Refills: 0 | OUTPATIENT
Start: 2024-10-07 | End: 2024-10-21

## 2024-10-07 RX ORDER — MECOBALAMIN 5000 MCG
5 TABLET,DISINTEGRATING ORAL NIGHTLY PRN
Status: DISCONTINUED | OUTPATIENT
Start: 2024-10-07 | End: 2024-10-07 | Stop reason: HOSPADM

## 2024-10-07 RX ORDER — CALCIUM CARBONATE 500 MG/1
500 TABLET, CHEWABLE ORAL 3 TIMES DAILY PRN
Status: DISCONTINUED | OUTPATIENT
Start: 2024-10-07 | End: 2024-10-07 | Stop reason: HOSPADM

## 2024-10-07 RX ORDER — SODIUM CHLORIDE 0.9 % (FLUSH) 0.9 %
5-40 SYRINGE (ML) INJECTION EVERY 12 HOURS SCHEDULED
Status: DISCONTINUED | OUTPATIENT
Start: 2024-10-07 | End: 2024-10-07 | Stop reason: HOSPADM

## 2024-10-07 RX ORDER — ENOXAPARIN SODIUM 100 MG/ML
40 INJECTION SUBCUTANEOUS EVERY EVENING
Status: DISCONTINUED | OUTPATIENT
Start: 2024-10-07 | End: 2024-10-07 | Stop reason: HOSPADM

## 2024-10-07 RX ORDER — 0.9 % SODIUM CHLORIDE 0.9 %
1000 INTRAVENOUS SOLUTION INTRAVENOUS ONCE
Status: DISCONTINUED | OUTPATIENT
Start: 2024-10-07 | End: 2024-10-07 | Stop reason: HOSPADM

## 2024-10-07 RX ORDER — SODIUM CHLORIDE 9 MG/ML
500 INJECTION, SOLUTION INTRAVENOUS CONTINUOUS PRN
Status: DISCONTINUED | OUTPATIENT
Start: 2024-10-07 | End: 2024-10-07 | Stop reason: HOSPADM

## 2024-10-07 RX ORDER — ACETAMINOPHEN 325 MG/1
650 TABLET ORAL EVERY 6 HOURS PRN
Status: DISCONTINUED | OUTPATIENT
Start: 2024-10-07 | End: 2024-10-07 | Stop reason: HOSPADM

## 2024-10-07 RX ORDER — SODIUM CHLORIDE 0.9 % (FLUSH) 0.9 %
5-40 SYRINGE (ML) INJECTION PRN
Status: DISCONTINUED | OUTPATIENT
Start: 2024-10-07 | End: 2024-10-07 | Stop reason: HOSPADM

## 2024-10-07 RX ORDER — SENNA AND DOCUSATE SODIUM 50; 8.6 MG/1; MG/1
2 TABLET, FILM COATED ORAL 2 TIMES DAILY PRN
Status: DISCONTINUED | OUTPATIENT
Start: 2024-10-07 | End: 2024-10-07 | Stop reason: HOSPADM

## 2024-10-07 RX ORDER — LINEZOLID 600 MG/1
600 TABLET, FILM COATED ORAL EVERY 12 HOURS SCHEDULED
Qty: 28 TABLET | Refills: 0 | Status: ON HOLD | OUTPATIENT
Start: 2024-10-07 | End: 2024-10-11 | Stop reason: HOSPADM

## 2024-10-07 RX ORDER — POTASSIUM CHLORIDE 29.8 MG/ML
20 INJECTION INTRAVENOUS PRN
Status: DISCONTINUED | OUTPATIENT
Start: 2024-10-07 | End: 2024-10-07 | Stop reason: HOSPADM

## 2024-10-07 RX ORDER — MAGNESIUM SULFATE IN WATER 40 MG/ML
4000 INJECTION, SOLUTION INTRAVENOUS PRN
Status: DISCONTINUED | OUTPATIENT
Start: 2024-10-07 | End: 2024-10-07 | Stop reason: HOSPADM

## 2024-10-07 NOTE — PROGRESS NOTES
Patient admitted to unit for Blincyto infusion. RN assessing patient's PICC line and patient is stating \"This really hurts\". Patient's R arm is warm to touch at and around insertion site. Redness noted to site. Patient c/o pain to touch. Dr. York made aware. At bedside to assess patient. Order to obtain blood cultures and remove PICC line. Blood cultures drawn from double lumen PICC, line removed, patient tolerated well.   Patient to discharge home, updated on plan of care.

## 2024-10-07 NOTE — CARE COORDINATION
Co pay for Zyvox is $10, will be ready for  in outpatient pharmacy at discharge.  
care with Bety BUSTAMANTE. Bety kaba will update Aranza at [facility] Care on plan for possible admission Wednesday for Jose Jo. Bety aware that Inge RICHARDSON was going to notify [facility] of the need for an appt and line placement in the AM on Wednesday.     The Plan for Transition of Care is related to the following treatment goals of Lymphoblastic leukemia (HCC) [C91.00]  B-cell acute lymphoblastic leukemia (ALL) (HCC) [C91.00]    Ronit HUNTLEY, [place]   for [facility] ([facility])  [employer] Mobile: [phone]

## 2024-10-07 NOTE — DISCHARGE SUMMARY
[facility] Discharge Summary             Attending Physician: No att. providers found    Referring MD: Shaylee Mccormick DO  [address]  [facility] [address]  [place] [address]    Name: Pam Guido :  1975  MRN:  [id number]    Admission: 10/7/2024     Discharge: 10/7/2024       Date: 10/7/2024    Diagnosis on admit: Phili + B-ALL     Procedures: Routine chest x-ray, laboratories, EKG, IV fluid hydration, Panculture for fevers, IV antimicrobial therapy, Respiratory therapy, Oxygen therapy, Blood Product Infusions    Consultations:     Medications:      Medication List        START taking these medications      linezolid 600 MG tablet  Commonly known as: ZYVOX  Take 1 tablet by mouth every 12 hours for 14 days  Notes to patient: Linezolid  (Zyvox)  USE--  Treat bacterial infection  SIDE EFFECTS:  Headache, upset stomach, diarrhea            CONTINUE taking these medications      aspirin 81 MG chewable tablet  Take 1 tablet by mouth daily     carvedilol 12.5 MG tablet  Commonly known as: COREG  TAKE 1 TABLET BY MOUTH 2 TIMES A DAY     famotidine 40 MG tablet  Commonly known as: PEPCID  TAKE ONE TABLET BY MOUTH ONCE NIGHTLY     hydroCHLOROthiazide 12.5 MG capsule  TAKE 1 CAPSULE BY MOUTH EVERY MORNING     losartan 50 MG tablet  Commonly known as: COZAAR  Take 1 tablet by mouth every morning     PONATinib HCl 15 MG Tabs  Take 15 mg by mouth daily     valACYclovir 500 MG tablet  Commonly known as: VALTREX  Take 1 tablet by mouth 2 times daily            STOP taking these medications      ciprofloxacin 500 MG tablet  Commonly known as: CIPRO               Where to Get Your Medications        These medications were sent to [id number] - [place], [place] - [address] - P [phone] - F [phone]  [address] [facility] [address]      Phone: [phone]   linezolid 600 MG tablet         Reason for Admission: Planned Blinatumomab cycle 2     Hospital Course:    Pam Guido

## 2024-10-07 NOTE — PROGRESS NOTES
Reviewed discharge instructions with patient. Reviewed discharge medications including dosing, schedule, indication, and adverse reactions.Reviewed which medications were already taken today and next dosage due for each medication.   Patient verbalized understanding of all instructions and questions were answered to her. satisfaction.  Signed discharge instructions were given to the patient and a copy placed in the paper-lite chart.  Patient discharged to home per wheelchair with family members.      Emily Duncan RN

## 2024-10-07 NOTE — H&P
BCC History and Physical        Attending Physician: No att. providers found    Primary Care: Johanna Jensen, APRN - CNP       Referring MD: Shaylee Mccormick DO  [address] [facility] [address]  [place] [address]    Name: Pam Guido :  1975  MRN:  [id number]    Admission: 10/7/2024      Date: 10/7/2024    Reason for Admission: Planned Blinatumomab cycle 2     History of Present Illness:    Pam Guido is a 49 year old female with a past medical history significant for Phili + B-ALL, essential hypertension, obesity, chronic pain, and GERD.     For her B-ALL she was initiated on Hydrea and Dexamethasone. She also received Cytarabine 1 gm IV on 24 due to rapidly increasing WBC. She was initiated on Ponatinib on 24. BM bx on 24 showed no B-lymphoblast population, FISH probes not detected, Coloseq MRD +. She received Cycle 1 of Blinctyo, which she tolerated well.      She was subsequently admitted from 2024-2024 for sepsis from Enterobacter Cloacae. Her port was removed and she remained on Merrem while awaiting sensitivities. Based on sensitivities, Merrem was discontinued on 24 and she was started on Cipro with plans to continue Cipro through 10/10/24 to complete a 14 day course of antibiotics.     A new PICC was placed on 10/3/24 in IR. She admitted today, 10/7/24 with plans to start Cycle 2 Blincyto. Upon arrival she reports that the arm with her PICC is very painful. The pain tracks from the insertion site up the arm.Given her recent admission for bacteremia, and significant discomfort at the PICC site, the decision was made to remove the PICC. Peripheral blood, blood from the PICC and the catheter tip were sent for culture. Cipro was discontinued. She was started on linezolid 600 mg PO Q12h x 14 days.       She will discharge home today with follow up in outpatient IR on Wednesday, 10/9/24,  at 0800. This plan was discussed with

## 2024-10-08 NOTE — PROGRESS NOTES
Called patient about procedure. Told to be here at 0630 for procedure at 0800. Must be NPO after midnight but can take morning medication with sips of water. Patient stated they do not take a blood thinner. Told to have a responsible adult with them to take them home and stay with them afterwards, if they do not get admitted to hospital. Also, to bring a current list of medications. No other questions or concerns.

## 2024-10-09 ENCOUNTER — APPOINTMENT (OUTPATIENT)
Dept: GENERAL RADIOLOGY | Age: 49
End: 2024-10-09
Attending: STUDENT IN AN ORGANIZED HEALTH CARE EDUCATION/TRAINING PROGRAM
Payer: COMMERCIAL

## 2024-10-09 ENCOUNTER — HOSPITAL ENCOUNTER (INPATIENT)
Age: 49
LOS: 2 days | Discharge: HOME | End: 2024-10-11
Attending: STUDENT IN AN ORGANIZED HEALTH CARE EDUCATION/TRAINING PROGRAM | Admitting: STUDENT IN AN ORGANIZED HEALTH CARE EDUCATION/TRAINING PROGRAM
Payer: COMMERCIAL

## 2024-10-09 ENCOUNTER — HOSPITAL ENCOUNTER (OUTPATIENT)
Dept: INTERVENTIONAL RADIOLOGY/VASCULAR | Age: 49
Discharge: HOME | End: 2024-10-09
Attending: STUDENT IN AN ORGANIZED HEALTH CARE EDUCATION/TRAINING PROGRAM

## 2024-10-09 PROBLEM — C95.90 LEUKEMIA, UNSPECIFIED NOT HAVING ACHIEVED REMISSION (HCC): Status: ACTIVE | Noted: 2024-10-09

## 2024-10-09 LAB
ALBUMIN SERPL-MCNC: 4 G/DL (ref 3.4–5)
ALP SERPL-CCNC: 77 U/L (ref 40–129)
ALT SERPL-CCNC: 31 U/L (ref 10–40)
ANION GAP SERPL CALCULATED.3IONS-SCNC: 11 MMOL/L (ref 3–16)
APTT BLD: 26.5 SEC (ref 22.1–36.4)
AST SERPL-CCNC: 18 U/L (ref 15–37)
BASOPHILS # BLD: 0.1 K/UL (ref 0–0.2)
BASOPHILS NFR BLD: 1 %
BILIRUB DIRECT SERPL-MCNC: 0.2 MG/DL (ref 0–0.3)
BILIRUB INDIRECT SERPL-MCNC: ABNORMAL MG/DL (ref 0–1)
BILIRUB SERPL-MCNC: <0.2 MG/DL (ref 0–1)
BILIRUB UR QL STRIP.AUTO: NEGATIVE
BUN SERPL-MCNC: 10 MG/DL (ref 7–20)
CALCIUM SERPL-MCNC: 9.1 MG/DL (ref 8.3–10.6)
CHLORIDE SERPL-SCNC: 105 MMOL/L (ref 99–110)
CLARITY UR: CLEAR
CO2 SERPL-SCNC: 24 MMOL/L (ref 21–32)
COLOR UR: YELLOW
CREAT SERPL-MCNC: <0.5 MG/DL (ref 0.6–1.1)
DEPRECATED RDW RBC AUTO: 15 % (ref 12.4–15.4)
EKG ATRIAL RATE: 74 BPM
EKG DIAGNOSIS: NORMAL
EKG P AXIS: 70 DEGREES
EKG P-R INTERVAL: 162 MS
EKG Q-T INTERVAL: 378 MS
EKG QRS DURATION: 84 MS
EKG QTC CALCULATION (BAZETT): 419 MS
EKG R AXIS: 1 DEGREES
EKG T AXIS: 54 DEGREES
EKG VENTRICULAR RATE: 74 BPM
EOSINOPHIL # BLD: 1 K/UL (ref 0–0.6)
EOSINOPHIL NFR BLD: 9.9 %
GFR SERPLBLD CREATININE-BSD FMLA CKD-EPI: >90 ML/MIN/{1.73_M2}
GLUCOSE SERPL-MCNC: 92 MG/DL (ref 70–99)
GLUCOSE UR STRIP.AUTO-MCNC: NEGATIVE MG/DL
HCT VFR BLD AUTO: 37 % (ref 36–48)
HGB BLD-MCNC: 12.2 G/DL (ref 12–16)
HGB UR QL STRIP.AUTO: NEGATIVE
INR PPP: 1.03 (ref 0.85–1.15)
KETONES UR STRIP.AUTO-MCNC: NEGATIVE MG/DL
LDH SERPL L TO P-CCNC: 234 U/L (ref 100–190)
LEUKOCYTE ESTERASE UR QL STRIP.AUTO: NEGATIVE
LYMPHOCYTES # BLD: 1.1 K/UL (ref 1–5.1)
LYMPHOCYTES NFR BLD: 10.9 %
MAGNESIUM SERPL-MCNC: 2.1 MG/DL (ref 1.8–2.4)
MCH RBC QN AUTO: 30.5 PG (ref 26–34)
MCHC RBC AUTO-ENTMCNC: 33.1 G/DL (ref 31–36)
MCV RBC AUTO: 92.4 FL (ref 80–100)
MONOCYTES # BLD: 1 K/UL (ref 0–1.3)
MONOCYTES NFR BLD: 10.2 %
NEUTROPHILS # BLD: 7 K/UL (ref 1.7–7.7)
NEUTROPHILS NFR BLD: 68 %
NITRITE UR QL STRIP.AUTO: NEGATIVE
PH UR STRIP.AUTO: 6 [PH] (ref 5–8)
PHOSPHATE SERPL-MCNC: 3.1 MG/DL (ref 2.5–4.9)
PLATELET # BLD AUTO: 316 K/UL (ref 135–450)
PMV BLD AUTO: 9.6 FL (ref 5–10.5)
POTASSIUM SERPL-SCNC: 3.9 MMOL/L (ref 3.5–5.1)
PROT SERPL-MCNC: 6.2 G/DL (ref 6.4–8.2)
PROT UR STRIP.AUTO-MCNC: NEGATIVE MG/DL
PROTHROMBIN TIME: 13.7 SEC (ref 11.9–14.9)
RBC # BLD AUTO: 4.01 M/UL (ref 4–5.2)
SODIUM SERPL-SCNC: 140 MMOL/L (ref 136–145)
SP GR UR STRIP.AUTO: 1.02 (ref 1–1.03)
UA DIPSTICK W REFLEX MICRO PNL UR: NORMAL
URATE SERPL-MCNC: 4.9 MG/DL (ref 2.6–6)
URN SPEC COLLECT METH UR: NORMAL
UROBILINOGEN UR STRIP-ACNC: 0.2 E.U./DL
WBC # BLD AUTO: 10.2 K/UL (ref 4–11)

## 2024-10-09 PROCEDURE — 2580000003 HC RX 258: Performed by: NURSE PRACTITIONER

## 2024-10-09 PROCEDURE — 02HV33Z INSERTION OF INFUSION DEVICE INTO SUPERIOR VENA CAVA, PERCUTANEOUS APPROACH: ICD-10-PCS | Performed by: STUDENT IN AN ORGANIZED HEALTH CARE EDUCATION/TRAINING PROGRAM

## 2024-10-09 PROCEDURE — 80048 BASIC METABOLIC PNL TOTAL CA: CPT

## 2024-10-09 PROCEDURE — 81003 URINALYSIS AUTO W/O SCOPE: CPT

## 2024-10-09 PROCEDURE — 3E0U305 INTRODUCTION OF OTHER ANTINEOPLASTIC INTO JOINTS, PERCUTANEOUS APPROACH: ICD-10-PCS | Performed by: STUDENT IN AN ORGANIZED HEALTH CARE EDUCATION/TRAINING PROGRAM

## 2024-10-09 PROCEDURE — 71046 X-RAY EXAM CHEST 2 VIEWS: CPT

## 2024-10-09 PROCEDURE — 83615 LACTATE (LD) (LDH) ENZYME: CPT

## 2024-10-09 PROCEDURE — 2580000003 HC RX 258: Performed by: STUDENT IN AN ORGANIZED HEALTH CARE EDUCATION/TRAINING PROGRAM

## 2024-10-09 PROCEDURE — 93010 ELECTROCARDIOGRAM REPORT: CPT | Performed by: INTERNAL MEDICINE

## 2024-10-09 PROCEDURE — 36569 INSJ PICC 5 YR+ W/O IMAGING: CPT

## 2024-10-09 PROCEDURE — 6370000000 HC RX 637 (ALT 250 FOR IP): Performed by: NURSE PRACTITIONER

## 2024-10-09 PROCEDURE — 2500000003 HC RX 250 WO HCPCS: Performed by: STUDENT IN AN ORGANIZED HEALTH CARE EDUCATION/TRAINING PROGRAM

## 2024-10-09 PROCEDURE — 84100 ASSAY OF PHOSPHORUS: CPT

## 2024-10-09 PROCEDURE — 85730 THROMBOPLASTIN TIME PARTIAL: CPT

## 2024-10-09 PROCEDURE — 6360000002 HC RX W HCPCS: Performed by: NURSE PRACTITIONER

## 2024-10-09 PROCEDURE — A4217 STERILE WATER/SALINE, 500 ML: HCPCS | Performed by: NURSE PRACTITIONER

## 2024-10-09 PROCEDURE — 84550 ASSAY OF BLOOD/URIC ACID: CPT

## 2024-10-09 PROCEDURE — 2060000000 HC ICU INTERMEDIATE R&B

## 2024-10-09 PROCEDURE — 6360000002 HC RX W HCPCS: Performed by: STUDENT IN AN ORGANIZED HEALTH CARE EDUCATION/TRAINING PROGRAM

## 2024-10-09 PROCEDURE — 85610 PROTHROMBIN TIME: CPT

## 2024-10-09 PROCEDURE — 3E03305 INTRODUCTION OF OTHER ANTINEOPLASTIC INTO PERIPHERAL VEIN, PERCUTANEOUS APPROACH: ICD-10-PCS | Performed by: STUDENT IN AN ORGANIZED HEALTH CARE EDUCATION/TRAINING PROGRAM

## 2024-10-09 PROCEDURE — 83735 ASSAY OF MAGNESIUM: CPT

## 2024-10-09 PROCEDURE — 85025 COMPLETE CBC W/AUTO DIFF WBC: CPT

## 2024-10-09 PROCEDURE — 80076 HEPATIC FUNCTION PANEL: CPT

## 2024-10-09 PROCEDURE — C1751 CATH, INF, PER/CENT/MIDLINE: HCPCS

## 2024-10-09 PROCEDURE — 93005 ELECTROCARDIOGRAM TRACING: CPT | Performed by: NURSE PRACTITIONER

## 2024-10-09 RX ORDER — LINEZOLID 600 MG/1
600 TABLET, FILM COATED ORAL EVERY 12 HOURS SCHEDULED
Status: DISCONTINUED | OUTPATIENT
Start: 2024-10-09 | End: 2024-10-11 | Stop reason: HOSPADM

## 2024-10-09 RX ORDER — SODIUM CHLORIDE 0.9 % (FLUSH) 0.9 %
5-40 SYRINGE (ML) INJECTION EVERY 12 HOURS SCHEDULED
Status: DISCONTINUED | OUTPATIENT
Start: 2024-10-09 | End: 2024-10-11 | Stop reason: HOSPADM

## 2024-10-09 RX ORDER — ASPIRIN 81 MG/1
81 TABLET, CHEWABLE ORAL DAILY
Status: DISCONTINUED | OUTPATIENT
Start: 2024-10-09 | End: 2024-10-11 | Stop reason: HOSPADM

## 2024-10-09 RX ORDER — LIDOCAINE HYDROCHLORIDE 10 MG/ML
50 INJECTION, SOLUTION EPIDURAL; INFILTRATION; INTRACAUDAL; PERINEURAL ONCE
Status: COMPLETED | OUTPATIENT
Start: 2024-10-09 | End: 2024-10-09

## 2024-10-09 RX ORDER — SODIUM CHLORIDE 9 MG/ML
INJECTION, SOLUTION INTRAVENOUS CONTINUOUS
Status: DISCONTINUED | OUTPATIENT
Start: 2024-10-09 | End: 2024-10-11 | Stop reason: HOSPADM

## 2024-10-09 RX ORDER — SODIUM CHLORIDE 0.9 % (FLUSH) 0.9 %
5-40 SYRINGE (ML) INJECTION PRN
Status: DISCONTINUED | OUTPATIENT
Start: 2024-10-09 | End: 2024-10-11 | Stop reason: HOSPADM

## 2024-10-09 RX ORDER — LOSARTAN POTASSIUM 50 MG/1
50 TABLET ORAL EVERY MORNING
Status: DISCONTINUED | OUTPATIENT
Start: 2024-10-09 | End: 2024-10-11 | Stop reason: HOSPADM

## 2024-10-09 RX ORDER — SODIUM CHLORIDE 9 MG/ML
INJECTION, SOLUTION INTRAVENOUS PRN
Status: DISCONTINUED | OUTPATIENT
Start: 2024-10-09 | End: 2024-10-11 | Stop reason: HOSPADM

## 2024-10-09 RX ORDER — ENOXAPARIN SODIUM 100 MG/ML
30 INJECTION SUBCUTANEOUS 2 TIMES DAILY
Status: DISCONTINUED | OUTPATIENT
Start: 2024-10-09 | End: 2024-10-11 | Stop reason: HOSPADM

## 2024-10-09 RX ORDER — HYDROCHLOROTHIAZIDE 12.5 MG/1
12.5 CAPSULE ORAL EVERY MORNING
Status: DISCONTINUED | OUTPATIENT
Start: 2024-10-09 | End: 2024-10-11 | Stop reason: HOSPADM

## 2024-10-09 RX ORDER — POTASSIUM CHLORIDE 29.8 MG/ML
20 INJECTION INTRAVENOUS PRN
Status: DISCONTINUED | OUTPATIENT
Start: 2024-10-09 | End: 2024-10-11 | Stop reason: HOSPADM

## 2024-10-09 RX ORDER — CARVEDILOL 12.5 MG/1
12.5 TABLET ORAL 2 TIMES DAILY
Status: DISCONTINUED | OUTPATIENT
Start: 2024-10-09 | End: 2024-10-11 | Stop reason: HOSPADM

## 2024-10-09 RX ORDER — VALACYCLOVIR HYDROCHLORIDE 500 MG/1
500 TABLET, FILM COATED ORAL 2 TIMES DAILY
Status: DISCONTINUED | OUTPATIENT
Start: 2024-10-09 | End: 2024-10-11 | Stop reason: HOSPADM

## 2024-10-09 RX ORDER — SODIUM CHLORIDE 9 MG/ML
INJECTION, SOLUTION INTRAVENOUS CONTINUOUS PRN
Status: DISCONTINUED | OUTPATIENT
Start: 2024-10-09 | End: 2024-10-11 | Stop reason: HOSPADM

## 2024-10-09 RX ORDER — MAGNESIUM SULFATE IN WATER 40 MG/ML
4000 INJECTION, SOLUTION INTRAVENOUS PRN
Status: DISCONTINUED | OUTPATIENT
Start: 2024-10-09 | End: 2024-10-11 | Stop reason: HOSPADM

## 2024-10-09 RX ORDER — 0.9 % SODIUM CHLORIDE 0.9 %
1000 INTRAVENOUS SOLUTION INTRAVENOUS ONCE
Status: COMPLETED | OUTPATIENT
Start: 2024-10-09 | End: 2024-10-09

## 2024-10-09 RX ORDER — FAMOTIDINE 20 MG/1
40 TABLET, FILM COATED ORAL NIGHTLY
Status: DISCONTINUED | OUTPATIENT
Start: 2024-10-09 | End: 2024-10-10

## 2024-10-09 RX ADMIN — SODIUM CHLORIDE, PRESERVATIVE FREE 10 ML: 5 INJECTION INTRAVENOUS at 20:28

## 2024-10-09 RX ADMIN — SODIUM CHLORIDE 1000 ML: 9 INJECTION, SOLUTION INTRAVENOUS at 09:24

## 2024-10-09 RX ADMIN — SODIUM CHLORIDE: 9 INJECTION, SOLUTION INTRAVENOUS at 11:40

## 2024-10-09 RX ADMIN — LIDOCAINE HYDROCHLORIDE ANHYDROUS 50 MG: 10 INJECTION, SOLUTION INFILTRATION at 08:14

## 2024-10-09 RX ADMIN — ENOXAPARIN SODIUM 30 MG: 100 INJECTION SUBCUTANEOUS at 20:20

## 2024-10-09 RX ADMIN — SODIUM CHLORIDE, PRESERVATIVE FREE 10 ML: 5 INJECTION INTRAVENOUS at 10:20

## 2024-10-09 RX ADMIN — CARVEDILOL 12.5 MG: 12.5 TABLET, FILM COATED ORAL at 20:20

## 2024-10-09 RX ADMIN — VALACYCLOVIR HYDROCHLORIDE 500 MG: 500 TABLET, FILM COATED ORAL at 12:09

## 2024-10-09 RX ADMIN — SODIUM CHLORIDE, PRESERVATIVE FREE 10 ML: 5 INJECTION INTRAVENOUS at 20:27

## 2024-10-09 RX ADMIN — FAMOTIDINE 40 MG: 20 TABLET, FILM COATED ORAL at 20:20

## 2024-10-09 RX ADMIN — LOSARTAN POTASSIUM 50 MG: 50 TABLET, FILM COATED ORAL at 12:08

## 2024-10-09 RX ADMIN — SODIUM CHLORIDE, PRESERVATIVE FREE 10 ML: 5 INJECTION INTRAVENOUS at 10:16

## 2024-10-09 RX ADMIN — VALACYCLOVIR HYDROCHLORIDE 500 MG: 500 TABLET, FILM COATED ORAL at 20:20

## 2024-10-09 RX ADMIN — SODIUM CHLORIDE 15 ML: 900 IRRIGANT IRRIGATION at 20:28

## 2024-10-09 RX ADMIN — CARVEDILOL 12.5 MG: 12.5 TABLET, FILM COATED ORAL at 12:08

## 2024-10-09 RX ADMIN — ASPIRIN 81 MG: 81 TABLET, CHEWABLE ORAL at 12:08

## 2024-10-09 RX ADMIN — LINEZOLID 600 MG: 600 TABLET, FILM COATED ORAL at 20:20

## 2024-10-09 RX ADMIN — ENOXAPARIN SODIUM 30 MG: 100 INJECTION SUBCUTANEOUS at 10:16

## 2024-10-09 RX ADMIN — DEXAMETHASONE SODIUM PHOSPHATE 16 MG: 4 INJECTION, SOLUTION INTRAMUSCULAR; INTRAVENOUS at 11:05

## 2024-10-09 RX ADMIN — HYDROCHLOROTHIAZIDE 12.5 MG: 12.5 CAPSULE ORAL at 12:08

## 2024-10-09 RX ADMIN — BLINATUMOMAB 32.5 MCG: KIT INTRAVENOUS at 11:59

## 2024-10-09 ASSESSMENT — PAIN SCALES - GENERAL
PAINLEVEL_OUTOF10: 0

## 2024-10-09 NOTE — ONCOLOGY
BLINCYTO PRE-ADMINISTRATION ASSESSMENT:  Instructions: Complete assessment prior to first dose of blinatumumab on Day 1 of each cycle.  Report any positive findings (yes answers) to physician before proceeding with first dose of blinatumumab.    Patient Questions:  Do you have any allergies?  If so, to what and what is the reaction?   Yes:           Penicillin: anaphylaxis           Bactrim: rash, palpitations           Dapsone: rash           Lisinopril: rash, cough           Biaxin: rash  Do you have a history of neurological problems including siezures, confusion, trouble speaking, or loss of balance?   No  Have you ever had an infusion reaction after receiving BLINCYTO or any other medications?   No  Have you ever had radiation treatment to the brain?   No  Have you recently received one of the following vaccines: MMR, Shingles/Zostavax?  Live vaccines should be avoided within 2 weeks before starting treatment, during treatment, and until full immune recovery after your last dose of BLINCYTO.   No  Are you able to become pregnant?  If so, are you pregnant or plan to become pregnant? - Female patients of childbearing age or those who have not yet undergone menopause should undergo pregnancy testing prior to treatment. Females who are able to become pregnant should be encouraged to utilize an effective form of birth control for at least 48 hours after the last treatment.   No  Are you currently breastfeeding or plan to breastfeed? Females who are breastfeeding or planning to breastfeed should not breastfeed for at least 48 hours after the last treatment.   No  Nursing Assessments Completed  Medication Reconciliation completed - Yes  Appropriate & Functioning IV Access assessed - Yes  Verify outpatient home care infusion set up via SW - Yes  Acknowledgment of consent for chemotherapy/biotherapy obtained- Yes  Do not flush stickers applied to IV tubing and access ports? - Yes  Lab review performed.  All abnormal

## 2024-10-09 NOTE — ONCOLOGY
Administration: Chemotherapy drug blincyto independently verified with Tracy Chirinos RN prior to administration.  Acknowledgement of informed consent for chemotherapy administration verified.  Original order, appropriateness of regimen, drug supplied, height, weight, BSA, dose calculations, expiration dates/times, drug appearance, and two patient identifiers were verified by both RNs.  Drug checked for vesicant/irritant status and for risk of hypersensitivity.  Most recent laboratory values and allergies, were reviewed.  Positive, brisk blood return via CVC was confirmed prior to administration. Chest x-ray for correct line placement reviewed. RACHEAL AC RN and Tracy Chirinos RN verified correct rate of chemotherapy and maintenance IV fluids.  Patient was educated on chemotherapy regimen prior to administration including indication for treatment related to disease & side effects of chemotherapy drug.  Patient verbalizes understanding of all instructions.      Completion of Chemotherapy: Monitoring during infusion done per policy, see Flowsheets.  Blood return verified before, during, and after infusion per policy; no signs of extravasation.  Pt tolerating chemotherapy well and without incident.  Chemotherapy infusion end time on the MAR.

## 2024-10-09 NOTE — CARE COORDINATION
Case Management Assessment  Initial Evaluation    Date/Time of Evaluation: 10/9/2024 10:12 AM  Assessment Completed by: ALMA Ivey   for [facility] [place] ([facility])  [place] [employer]: [phone]    If patient is discharged prior to next notation, then this note serves as note for discharge by case management.    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 Yes (has been going to [facility])    Chart Reviewed: Yes      History Provided by: Patient  Patient Orientation: Alert and Oriented    Patient Cognition: Alert    Hospitalization in the last 30 days (Readmission):  Yes    If yes, Readmission Assessment in CM Navigator will be completed.    Advance Directives:      Code Status: Full Code   Patient's Primary Decision Maker is: Legal Next of Kin    Primary Decision Maker: Sunday Guido - Spouse - [phone]    Discharge Planning:    Patient lives with: Spouse/Significant Other Type of Home: House  Primary Care Giver: Self  Patient Support Systems include: Spouse/Significant Other, Family Members   Current Financial resources: Other (Comment) ([facility])  Current community resources: None  Current services prior to admission: Other (Comment) ([facility] and [facility] for blincyto)            Current DME:              Type of Home Care services:  None    ADLS  Prior functional level: Independent in ADLs/IADLs  Current functional level: Independent in ADLs/IADLs    PT AM-PAC:   /24  OT AM-PAC:   /24    Family can provide assistance at DC: Yes  Would you like Case Management to  aware of locations to look for a walker. She denied needs for writer.     Pt will discharge home with her  and [facility] for blincyto.     SW will follow     The Plan for Transition of Care is related to the following treatment goals of ALL (acute lymphoid leukemia) in relapse (HCC) [C91.02]  Leukemia, unspecified not having achieved remission (HCC) [C95.90]    Ronit HUNTLEY, ALMA   for [facility] [place] ([facility])  [employer]: [phone]    Addendum at 2:25pm: Spoke with Landy BUSTAMANTE who states she spoke with Ivette at [facility] regarding the delivery. Ivette's number is [phone]

## 2024-10-09 NOTE — H&P
[facility] History and Physical        Attending Physician: Shaylee Mccormick DO    Primary Care: Johanna Jensen, APRN - CNP       Referring MD: Shaylee Mccormick DO  [address]  [address]  [place] [address]    Name: Pam Guido :  1975  MRN:  [id number]    Admission: 10/9/2024      Date: 10/9/2024    Reason for Admission: Blinatumomab cycle 2       History of Present Illness:    Pam Guido is a 49 year old female with a past medical history significant for Phili + B-ALL, essential hypertension, obesity, chronic pain, and GERD.      For  B-ALL she was initiated on Hydrea and Dexamethasone. She also received Cytarabine 1 gm IV on 24 due to rapidly increasing WBC. She was initiated on Ponatinib on 24. BM bx on 24 showed no B-lymphoblast population, FISH probes not detected, Coloseq MRD +. She received Cycle 1 of Blinctyo, which she tolerated well.       She was subsequently admitted from 2024-2024 for sepsis from Enterobacter Cloacae. Her port was removed and she remained on Merrem while awaiting sensitivities. Based on sensitivities, Merrem was discontinued on 24 and she was started on Cipro with plans to continue Cipro through 10/10/24 to complete a 14 day course of antibiotics.      A new PICC was placed on 10/3/24 in IR. On 10/7/24 she reported that the arm with the PICC was very painful. The pain tracked from the insertion site up the arm.Given her recent admission for bacteremia, and significant discomfort at the PICC site, the decision was made to remove the PICC. Peripheral blood, blood from the PICC and the catheter tip were sent for culture and have NGTD. Cipro was discontinued on 10/7/24 and she was started on linezolid 600 mg PO Q12h x 14 days.      She had a new PICC placed today, 10/9/24. She is admitted for Cycle 2 of Blinctyo. She feels well today with no complaints.        Past Surgical History:   Procedure    Substance and Sexual Activity    Alcohol use: No    Drug use: No    Sexual activity: Yes     Partners: Male   Other Topics Concern    Not on file   Social History Narrative    Not on file     Social Determinants of Health     Financial Resource Strain: Low Risk  (8/17/2023)    Overall Financial Resource Strain (CARDIA)     Difficulty of Paying Living Expenses: Not hard at all   Food Insecurity: No Food Insecurity (10/9/2024)    Hunger Vital Sign     Worried About Running Out of Food in the Last Year: Never true     Ran Out of Food in the Last Year: Never true   Transportation Needs: No Transportation Needs (10/9/2024)    PRAPARE - Transportation     Lack of Transportation (Medical): No     Lack of Transportation (Non-Medical): No   Physical Activity: Not on file   Stress: Not on file   Social Connections: Not on file   Intimate Partner Violence: Not on file   Housing Stability: Low Risk  (10/9/2024)    Housing Stability Vital Sign     Unable to Pay for Housing in the Last Year: No     Number of Times Moved in the Last Year: 0     Homeless in the Last Year: No        ROS:  As noted above, otherwise remainder of 10-point ROS negative      Physical Exam:     Vital Signs:  /83   Pulse 77   Temp 97.9 °F (36.6 °C) (Oral)   Resp 18   Ht 1.727 m (5' 8\")   Wt 115.5 kg (254 lb 9.6 oz)   LMP 03/01/2019   SpO2 98%   BMI 38.71 kg/m²     Weight:    Wt Readings from Last 3 Encounters:   10/09/24 115.5 kg (254 lb 9.6 oz)   09/30/24 118.6 kg (261 lb 7.5 oz)   09/27/24 117.5 kg (259 lb)       KPS: 80% Normal activity with effort; some signs or symptoms of disease    ECOG PS:  (1) Restricted in physically strenuous activity, ambulatory and able to do work of light nature    General: Awake, alert and oriented.  HEENT: normocephalic, alopecia, PERRL, no scleral erythema or icterus, Oral mucosa moist and intact, throat clear  NECK: supple   BACK: Straight   SKIN: warm dry and intact without lesions rashes or

## 2024-10-09 NOTE — PROGRESS NOTES
Pharmacist Review and Automatic Dose Adjustment of Prophylactic Enoxaparin         The reviewing pharmacist has made an adjustment to the ordered enoxaparin dose or converted to UFH per the approved [facility] protocol and table as identified below.        Pam Guido is a 49 y.o. female.     No results for input(s): \"CREATININE\" in the last 72 hours.    Estimated Creatinine Clearance: 186 mL/min (based on SCr of 0.5 mg/dL).    No results for input(s): \"HGB\", \"HCT\", \"PLT\" in the last 72 hours.  No results for input(s): \"INR\" in the last 72 hours.    Height:   Ht Readings from Last 1 Encounters:   09/26/24 1.727 m (5' 8\")     Weight:  Wt Readings from Last 1 Encounters:   10/09/24 115.5 kg (254 lb 9.6 oz)               Plan: Based upon the patient's weight and renal function    Ordered: Enoxaparin 40mg SUBQ Daily    Changed/converted to    New Order: Enoxaparin 30mg SUBQ BID      Thank you,  Rupali Haas [facility]  10/9/2024, 7:38 AM

## 2024-10-09 NOTE — ONCOLOGY
Verification:  Original chemotherapy orders reviewed and acknowledged. Appropriateness of chemotherapy treatment regimen Blincyto and Cytarabine for diagnosis of ALL was verified.  Patient educated on chemotherapy regimen.  Acknowledgement of informed consent for chemotherapy obtained.      Estimated body surface area is 2.35 meters squared as calculated from the following:    Height as of this encounter: 1.727 m (5' 8\").    Weight as of this encounter: 115.5 kg (254 lb 9.6 oz). verified.  Appropriate dosing calculations of chemotherapy based on above height, weight, and BSA verified.

## 2024-10-09 NOTE — PROCEDURES
PROCEDURE NOTE  Date: 10/9/2024   Name: Pam Guido  YOB: 1975    Procedures                                                                   DOUBLE PICC PROCEDURE NOTE  Chart reviewed for allergies, diagnosis, labs, known contraindications, reason for line placement and planned length of treatment.  Informed consent noted to be signed and on chart.  Insertion procedure discussed with patient/family member.  Three patient identifiers - Patient name,   and MRN -  completed &  confirmed verbally.         Time out performed.  Hat, mask and eye shield donned.  PICC site cleaned with chlorhexidine wipes then scrubbed with Chloraprep one-step applicator for 30 seconds x 1.   Hand Hygiene  performed with 3% Chlorhexidine surgical scrub x1 min prior to  sterile gloves, sterile gown being donned.  Patient draped using maximal sterile barrier technique ( head to toe ).  PICC site scrubbed a 2nd time with Chloraprep One-Step applicator x 30 sec. Modified Seldinger technique/ultrasound assisted and tip locating system utilized for insertion and 1% Lidocaine 5 ml injected intradermal pre-insertion.  PICC tip location in the SVC confirmed by ECG technology.   Positive brisk blood return obtained from all lumens.  Valves placed to all lumens and  flushed with 10 mls 0.9% Sterile Sodium Chloride.  All lumens flush easily with no resistance.  Skin prep applied to site.   Catheter secured with non-sutured locking device per hospital protocol. Bio-patch/CHG impregnated sterile tegaderm dressing applied.  Alcohol Swab Caps applied to each valve.  Sterile field maintained during procedure.  PICC insertion, rhythm and positioning wire (utilized prn) accounted  for post procedure and disposed of in sharps.  Appearance of site is Clean dry and intact without bleeding or edema. All edges of Tegaderm occlusive.   Site marked with date and initials of RN placing line. Teaching performed to pt/family and noted in

## 2024-10-09 NOTE — CARE COORDINATION
Reviewed discharge plan with [facility] Rep Steele.  Medication will be delivered on Friday 10/11.  Ivette will arrive about 12:00 Noon to facilitate the change over of the Blincyto.

## 2024-10-09 NOTE — PROGRESS NOTES
4 Eyes Admission Assessment     I agree as the admission nurse that 2 RN's have performed a thorough Head to Toe Skin Assessment on the patient. ALL assessment sites listed below have been assessed on admission.       Areas assessed by both nurses:   [x]   Head, Face, and Ears   [x]   Shoulders, Back, and Chest  [x]   Arms, Elbows, and Hands   [x]   Coccyx, Sacrum, and Ischium  [x]   Legs, Feet, and Heels        Does the Patient have Skin Breakdown?  No         Jim Prevention initiated:  No   Wound Care Orders initiated:  No      [facility] nurse consulted for Pressure Injury (Stage 3,4, Unstageable, DTI, NWPT, and Complex wounds) or Jim score 18 or lower:  No      Nurse 1 eSignature: Electronically signed by KESAHV MOODY RN on 10/9/24 at 1:58 PM EDT    **SHARE this note so that the co-signing nurse is able to place an eSignature**    Nurse 2 eSignature: Electronically signed by RACHEAL AC RN on 10/9/24 at 3:44 PM EDT

## 2024-10-10 ENCOUNTER — APPOINTMENT (OUTPATIENT)
Dept: GENERAL RADIOLOGY | Age: 49
End: 2024-10-10
Attending: STUDENT IN AN ORGANIZED HEALTH CARE EDUCATION/TRAINING PROGRAM
Payer: COMMERCIAL

## 2024-10-10 LAB
ANION GAP SERPL CALCULATED.3IONS-SCNC: 11 MMOL/L (ref 3–16)
APPEARANCE CSF: CLEAR
APTT BLD: 27.4 SEC (ref 22.1–36.4)
BASOPHILS # BLD: 0.1 K/UL (ref 0–0.2)
BASOPHILS NFR BLD: 0.4 %
BUN SERPL-MCNC: 9 MG/DL (ref 7–20)
CALCIUM SERPL-MCNC: 8.9 MG/DL (ref 8.3–10.6)
CHLORIDE SERPL-SCNC: 107 MMOL/L (ref 99–110)
CLOT EVALUATION CSF: ABNORMAL
CO2 SERPL-SCNC: 22 MMOL/L (ref 21–32)
COLOR CSF: COLORLESS
CREAT SERPL-MCNC: <0.5 MG/DL (ref 0.6–1.1)
DEPRECATED RDW RBC AUTO: 14.5 % (ref 12.4–15.4)
EOSINOPHIL # BLD: 0 K/UL (ref 0–0.6)
EOSINOPHIL NFR BLD: 0 %
GFR SERPLBLD CREATININE-BSD FMLA CKD-EPI: >90 ML/MIN/{1.73_M2}
GLUCOSE CSF-MCNC: 79 MG/DL (ref 40–80)
GLUCOSE SERPL-MCNC: 130 MG/DL (ref 70–99)
HCT VFR BLD AUTO: 36.9 % (ref 36–48)
HGB BLD-MCNC: 12.4 G/DL (ref 12–16)
INR PPP: 1.09 (ref 0.85–1.15)
LYMPHOCYTES # BLD: 0.7 K/UL (ref 1–5.1)
LYMPHOCYTES NFR BLD: 5.8 %
MAGNESIUM SERPL-MCNC: 2.1 MG/DL (ref 1.8–2.4)
MANUAL DIF COMMENT CSF-IMP: ABNORMAL
MCH RBC QN AUTO: 31.1 PG (ref 26–34)
MCHC RBC AUTO-ENTMCNC: 33.7 G/DL (ref 31–36)
MCV RBC AUTO: 92.1 FL (ref 80–100)
MONOCYTES # BLD: 0.8 K/UL (ref 0–1.3)
MONOCYTES NFR BLD: 6.6 %
NEUTROPHILS # BLD: 11.2 K/UL (ref 1.7–7.7)
NEUTROPHILS NFR BLD: 87.2 %
NUC CELL # FLD MANUAL: 2 /CUMM (ref 0–5)
PHOSPHATE SERPL-MCNC: 3.2 MG/DL (ref 2.5–4.9)
PLATELET # BLD AUTO: 311 K/UL (ref 135–450)
PMV BLD AUTO: 9.7 FL (ref 5–10.5)
POTASSIUM SERPL-SCNC: 4 MMOL/L (ref 3.5–5.1)
PROT CSF-MCNC: 44 MG/DL (ref 15–45)
PROTHROMBIN TIME: 14.3 SEC (ref 11.9–14.9)
RBC # BLD AUTO: 4 M/UL (ref 4–5.2)
RBC # FLD MANUAL: 449 /CUMM
SODIUM SERPL-SCNC: 140 MMOL/L (ref 136–145)
WBC # BLD AUTO: 12.8 K/UL (ref 4–11)

## 2024-10-10 PROCEDURE — 2580000003 HC RX 258: Performed by: NURSE PRACTITIONER

## 2024-10-10 PROCEDURE — 88185 FLOWCYTOMETRY/TC ADD-ON: CPT

## 2024-10-10 PROCEDURE — 89050 BODY FLUID CELL COUNT: CPT

## 2024-10-10 PROCEDURE — 2060000000 HC ICU INTERMEDIATE R&B

## 2024-10-10 PROCEDURE — 88184 FLOWCYTOMETRY/ TC 1 MARKER: CPT

## 2024-10-10 PROCEDURE — 82945 GLUCOSE OTHER FLUID: CPT

## 2024-10-10 PROCEDURE — 2580000003 HC RX 258: Performed by: STUDENT IN AN ORGANIZED HEALTH CARE EDUCATION/TRAINING PROGRAM

## 2024-10-10 PROCEDURE — 85610 PROTHROMBIN TIME: CPT

## 2024-10-10 PROCEDURE — 2500000003 HC RX 250 WO HCPCS: Performed by: STUDENT IN AN ORGANIZED HEALTH CARE EDUCATION/TRAINING PROGRAM

## 2024-10-10 PROCEDURE — 2709999900 FL INJ CHEMO AGENT/CNS INC SPIN PUNC

## 2024-10-10 PROCEDURE — 36592 COLLECT BLOOD FROM PICC: CPT

## 2024-10-10 PROCEDURE — 85730 THROMBOPLASTIN TIME PARTIAL: CPT

## 2024-10-10 PROCEDURE — 80048 BASIC METABOLIC PNL TOTAL CA: CPT

## 2024-10-10 PROCEDURE — 6360000002 HC RX W HCPCS: Performed by: STUDENT IN AN ORGANIZED HEALTH CARE EDUCATION/TRAINING PROGRAM

## 2024-10-10 PROCEDURE — 85025 COMPLETE CBC W/AUTO DIFF WBC: CPT

## 2024-10-10 PROCEDURE — 84100 ASSAY OF PHOSPHORUS: CPT

## 2024-10-10 PROCEDURE — 84157 ASSAY OF PROTEIN OTHER: CPT

## 2024-10-10 PROCEDURE — 6370000000 HC RX 637 (ALT 250 FOR IP): Performed by: NURSE PRACTITIONER

## 2024-10-10 PROCEDURE — 6360000002 HC RX W HCPCS: Performed by: NURSE PRACTITIONER

## 2024-10-10 PROCEDURE — 83735 ASSAY OF MAGNESIUM: CPT

## 2024-10-10 RX ORDER — FAMOTIDINE 20 MG/1
20 TABLET, FILM COATED ORAL 2 TIMES DAILY
Status: DISCONTINUED | OUTPATIENT
Start: 2024-10-10 | End: 2024-10-11 | Stop reason: HOSPADM

## 2024-10-10 RX ORDER — LIDOCAINE HYDROCHLORIDE 10 MG/ML
5 INJECTION, SOLUTION EPIDURAL; INFILTRATION; INTRACAUDAL; PERINEURAL ONCE
Status: DISCONTINUED | OUTPATIENT
Start: 2024-10-10 | End: 2024-10-10 | Stop reason: ALTCHOICE

## 2024-10-10 RX ADMIN — ENOXAPARIN SODIUM 30 MG: 100 INJECTION SUBCUTANEOUS at 20:46

## 2024-10-10 RX ADMIN — SODIUM CHLORIDE: 9 INJECTION, SOLUTION INTRAVENOUS at 17:30

## 2024-10-10 RX ADMIN — ASPIRIN 81 MG: 81 TABLET, CHEWABLE ORAL at 08:17

## 2024-10-10 RX ADMIN — SODIUM CHLORIDE 100 MG: 9 INJECTION INTRAMUSCULAR; INTRAVENOUS; SUBCUTANEOUS at 11:44

## 2024-10-10 RX ADMIN — FAMOTIDINE 20 MG: 20 TABLET, FILM COATED ORAL at 20:46

## 2024-10-10 RX ADMIN — FAMOTIDINE 20 MG: 20 TABLET, FILM COATED ORAL at 11:49

## 2024-10-10 RX ADMIN — SODIUM CHLORIDE 15 ML: 900 IRRIGANT IRRIGATION at 20:47

## 2024-10-10 RX ADMIN — SODIUM CHLORIDE, PRESERVATIVE FREE 10 ML: 5 INJECTION INTRAVENOUS at 20:46

## 2024-10-10 RX ADMIN — VALACYCLOVIR HYDROCHLORIDE 500 MG: 500 TABLET, FILM COATED ORAL at 08:13

## 2024-10-10 RX ADMIN — SODIUM CHLORIDE, PRESERVATIVE FREE 10 ML: 5 INJECTION INTRAVENOUS at 20:47

## 2024-10-10 RX ADMIN — LINEZOLID 600 MG: 600 TABLET, FILM COATED ORAL at 20:46

## 2024-10-10 RX ADMIN — ENOXAPARIN SODIUM 30 MG: 100 INJECTION SUBCUTANEOUS at 08:18

## 2024-10-10 RX ADMIN — HYDROCHLOROTHIAZIDE 12.5 MG: 12.5 CAPSULE ORAL at 08:13

## 2024-10-10 RX ADMIN — CARVEDILOL 12.5 MG: 12.5 TABLET, FILM COATED ORAL at 08:13

## 2024-10-10 RX ADMIN — LIDOCAINE HYDROCHLORIDE 5 ML: 10 INJECTION, SOLUTION EPIDURAL; INFILTRATION; INTRACAUDAL; PERINEURAL at 13:53

## 2024-10-10 RX ADMIN — BLINATUMOMAB 32.5 MCG: KIT INTRAVENOUS at 12:01

## 2024-10-10 RX ADMIN — CARVEDILOL 12.5 MG: 12.5 TABLET, FILM COATED ORAL at 20:46

## 2024-10-10 RX ADMIN — LOSARTAN POTASSIUM 50 MG: 50 TABLET, FILM COATED ORAL at 08:13

## 2024-10-10 RX ADMIN — LINEZOLID 600 MG: 600 TABLET, FILM COATED ORAL at 08:13

## 2024-10-10 RX ADMIN — SODIUM CHLORIDE: 9 INJECTION, SOLUTION INTRAVENOUS at 07:50

## 2024-10-10 RX ADMIN — VALACYCLOVIR HYDROCHLORIDE 500 MG: 500 TABLET, FILM COATED ORAL at 20:46

## 2024-10-10 ASSESSMENT — PAIN SCALES - GENERAL
PAINLEVEL_OUTOF10: 0

## 2024-10-10 NOTE — PLAN OF CARE
Problem: ABCDS Injury Assessment  Goal: Absence of physical injury  10/10/2024 1634 by Nitin Vogel RN  Outcome: Progressing     Problem: Metabolic/Fluid and Electrolytes - Adult  Goal: Electrolytes within specified parameters  Description: Electrolytes within specified parameters  10/10/2024 1634 by Nitin Vogel RN  Outcome: Progressing     Problem: Metabolic/Fluid and Electrolytes - Adult  Goal: Electrolytes maintained within normal limits  10/10/2024 1634 by Nitin Vogel RN  Outcome: Progressing

## 2024-10-10 NOTE — PLAN OF CARE
Problem: Metabolic/Fluid and Electrolytes - Adult  Goal: Electrolytes maintained within normal limits  Outcome: Progressing  Flowsheets (Taken 10/10/2024 0545)  Electrolytes maintained within normal limits: Monitor labs and assess patient for signs and symptoms of electrolyte imbalances     Problem: Hematologic - Adult  Goal: Maintains hematologic stability  Outcome: Progressing  Flowsheets (Taken 10/10/2024 0545)  Maintains hematologic stability:   Assess for signs and symptoms of bleeding or hemorrhage   Monitor labs for bleeding or clotting disorders

## 2024-10-10 NOTE — CARE COORDINATION
Return call placed to Ivette at [facility] to confirm pt's room.     Pt will discharge home tomorrow with [facility] for blincyto.     Discussed with Landy BUSTAMANTE.     SW will follow    ALMA Ivey   for [facility] ([facility])  [employer] Mobile: [phone]

## 2024-10-10 NOTE — PROGRESS NOTES
removal  -Painful PICC removed on 10/7/24. Cultures with NG  Treatment History:   Ciprofloxacin (9/27/24-10/7/24--> for enterobacter (transitioned to Linezolid with painful PICC)   Current Tx/PPx:  - Linezolid 600 mg PO Q 12h x 14 days (started 10/7/24) DAY 3/14   -Valtrex ppx     3. Heme:    - Transfuse for Hgb < 7 and Platelets < 10K  - No transfusion today     4. Metabolic:    -Encourage PO intake      5. GI / Nutrition:   Nutrition:   - low microbial diet  GERD:  - famotidine 20 mg PO BID  Nausea:  - Compazine 5 mg q4h prn  - Ondansetron 8 mg q8h prn    6. Cardiac:  - ECHO (7/13/24):  LVEF 55-60%  Essential HTN   - Coreg 12.5 mg BID (8/6/24)  - Hydrochlorothiazide 12.5 mg daily  - Losartan 50 mg daily    7. Pulmonary: No issues     8. MSK:  Chronic Pain  - Manages with Tylenol and Ibuprofen at home  - Does not use narcotics at home     9. Neuro:  Headache:   - likely d/t Ponatinib and improved on lower dose  - Cont Tylenol and tramadol prn     10. Vascular Access   PICC placed 10/9/2024    - DVT Prophylaxis: Platelets >50,000 cells/dL, - daily lovenox prophylaxis ordered  Contraindications to pharmacologic prophylaxis: None  Contraindications to mechanical prophylaxis: None    - Disposition: Discharge Friday if no CRS with blincyto     The patient was seen and examined by Dr. York.  This admission history and physical has been discussed and agreed upon by Dr. York.    Raj York MD

## 2024-10-11 VITALS
RESPIRATION RATE: 20 BRPM | WEIGHT: 263 LBS | HEIGHT: 68 IN | DIASTOLIC BLOOD PRESSURE: 86 MMHG | HEART RATE: 78 BPM | BODY MASS INDEX: 39.86 KG/M2 | OXYGEN SATURATION: 100 % | SYSTOLIC BLOOD PRESSURE: 141 MMHG | TEMPERATURE: 97.9 F

## 2024-10-11 LAB
ALBUMIN SERPL-MCNC: 3.5 G/DL (ref 3.4–5)
ALP SERPL-CCNC: 57 U/L (ref 40–129)
ALT SERPL-CCNC: 28 U/L (ref 10–40)
ANION GAP SERPL CALCULATED.3IONS-SCNC: 10 MMOL/L (ref 3–16)
AST SERPL-CCNC: 16 U/L (ref 15–37)
BACTERIA BLD CULT ORG #2: NORMAL
BACTERIA BLD CULT: NORMAL
BASOPHILS # BLD: 0.1 K/UL (ref 0–0.2)
BASOPHILS NFR BLD: 1.3 %
BILIRUB DIRECT SERPL-MCNC: 0.2 MG/DL (ref 0–0.3)
BILIRUB INDIRECT SERPL-MCNC: ABNORMAL MG/DL (ref 0–1)
BILIRUB SERPL-MCNC: <0.2 MG/DL (ref 0–1)
BUN SERPL-MCNC: 12 MG/DL (ref 7–20)
CALCIUM SERPL-MCNC: 8.1 MG/DL (ref 8.3–10.6)
CHLORIDE SERPL-SCNC: 110 MMOL/L (ref 99–110)
CO2 SERPL-SCNC: 22 MMOL/L (ref 21–32)
CREAT SERPL-MCNC: 0.5 MG/DL (ref 0.6–1.1)
DEPRECATED RDW RBC AUTO: 14.3 % (ref 12.4–15.4)
EOSINOPHIL # BLD: 0.3 K/UL (ref 0–0.6)
EOSINOPHIL NFR BLD: 2.5 %
GFR SERPLBLD CREATININE-BSD FMLA CKD-EPI: >90 ML/MIN/{1.73_M2}
GLUCOSE SERPL-MCNC: 86 MG/DL (ref 70–99)
HCT VFR BLD AUTO: 34.3 % (ref 36–48)
HGB BLD-MCNC: 11.5 G/DL (ref 12–16)
LDH SERPL L TO P-CCNC: 192 U/L (ref 100–190)
LYMPHOCYTES # BLD: 2 K/UL (ref 1–5.1)
LYMPHOCYTES NFR BLD: 18.4 %
MAGNESIUM SERPL-MCNC: 2 MG/DL (ref 1.8–2.4)
MCH RBC QN AUTO: 31 PG (ref 26–34)
MCHC RBC AUTO-ENTMCNC: 33.6 G/DL (ref 31–36)
MCV RBC AUTO: 92.3 FL (ref 80–100)
MONOCYTES # BLD: 1.4 K/UL (ref 0–1.3)
MONOCYTES NFR BLD: 12.6 %
NEUTROPHILS # BLD: 7 K/UL (ref 1.7–7.7)
NEUTROPHILS NFR BLD: 65.2 %
PHOSPHATE SERPL-MCNC: 3.6 MG/DL (ref 2.5–4.9)
PLATELET # BLD AUTO: 282 K/UL (ref 135–450)
PMV BLD AUTO: 9.9 FL (ref 5–10.5)
POTASSIUM SERPL-SCNC: 3.6 MMOL/L (ref 3.5–5.1)
PROT SERPL-MCNC: 5.4 G/DL (ref 6.4–8.2)
RBC # BLD AUTO: 3.71 M/UL (ref 4–5.2)
SODIUM SERPL-SCNC: 142 MMOL/L (ref 136–145)
URATE SERPL-MCNC: 5 MG/DL (ref 2.6–6)
WBC # BLD AUTO: 10.7 K/UL (ref 4–11)

## 2024-10-11 PROCEDURE — 6360000002 HC RX W HCPCS: Performed by: NURSE PRACTITIONER

## 2024-10-11 PROCEDURE — 80048 BASIC METABOLIC PNL TOTAL CA: CPT

## 2024-10-11 PROCEDURE — 84100 ASSAY OF PHOSPHORUS: CPT

## 2024-10-11 PROCEDURE — 80076 HEPATIC FUNCTION PANEL: CPT

## 2024-10-11 PROCEDURE — 83615 LACTATE (LD) (LDH) ENZYME: CPT

## 2024-10-11 PROCEDURE — 85025 COMPLETE CBC W/AUTO DIFF WBC: CPT

## 2024-10-11 PROCEDURE — 84550 ASSAY OF BLOOD/URIC ACID: CPT

## 2024-10-11 PROCEDURE — 6370000000 HC RX 637 (ALT 250 FOR IP): Performed by: NURSE PRACTITIONER

## 2024-10-11 PROCEDURE — 83735 ASSAY OF MAGNESIUM: CPT

## 2024-10-11 PROCEDURE — 36592 COLLECT BLOOD FROM PICC: CPT

## 2024-10-11 RX ORDER — LINEZOLID 600 MG/1
600 TABLET, FILM COATED ORAL EVERY 12 HOURS SCHEDULED
Qty: 18 TABLET | Refills: 0
Start: 2024-10-11 | End: 2024-10-20

## 2024-10-11 RX ADMIN — VALACYCLOVIR HYDROCHLORIDE 500 MG: 500 TABLET, FILM COATED ORAL at 09:39

## 2024-10-11 RX ADMIN — ENOXAPARIN SODIUM 30 MG: 100 INJECTION SUBCUTANEOUS at 09:39

## 2024-10-11 RX ADMIN — HYDROCHLOROTHIAZIDE 12.5 MG: 12.5 CAPSULE ORAL at 09:39

## 2024-10-11 RX ADMIN — ASPIRIN 81 MG: 81 TABLET, CHEWABLE ORAL at 09:39

## 2024-10-11 RX ADMIN — CARVEDILOL 12.5 MG: 12.5 TABLET, FILM COATED ORAL at 09:39

## 2024-10-11 RX ADMIN — LOSARTAN POTASSIUM 50 MG: 50 TABLET, FILM COATED ORAL at 09:39

## 2024-10-11 RX ADMIN — FAMOTIDINE 20 MG: 20 TABLET, FILM COATED ORAL at 09:39

## 2024-10-11 RX ADMIN — LINEZOLID 600 MG: 600 TABLET, FILM COATED ORAL at 09:39

## 2024-10-11 ASSESSMENT — PAIN SCALES - GENERAL
PAINLEVEL_OUTOF10: 0
PAINLEVEL_OUTOF10: 0

## 2024-10-11 NOTE — ONCOLOGY
BLINCYTO ADMINISTRATION ASSESSMENT:  Instructions: Complete assessment prior to first dose of blinatumumab and every 4 hours and PRN during infusion. BLINCYTO should be held for all Grade 3 and 4 reactions. Report all abnormal findings to provider immediately.  Grade 3 CRS Event: Prolonged reaction that is NOT rapidly responsive to dose interruption and/or treatment. OR Recurrence of symptoms following initial improvement.  Withhold Blincyto until symptoms resolve, then restart at 9mcg/day.  Escalate dose to 28mcg/day after 7 days if toxicity does not recur.  Grade 4 CRS Event: Life-threatening; requiring vasopressor or ventilatory support.  Discontinue Blincyto permanently.  Grade 3 Neurological Event: Prolonged reaction that is NOT rapidly responsive to dose interruption and/or treatment. OR Recurrence of symptoms following initial improvement.  Withhold Blincyto until no more than Grade 1 (mild) and for at least 3 days, then restart Blintcyto at 9mcg/day.  Escalate dose to 28mcg/day after 7 days if toxicity does not recur. If the toxicity occurred at 9mcg/day, or if the toxicity takes more than 7 days to resolve, discontinue Blinctyo permanently.  Grade 4 Neurological Event: Life-threatening. Discontinue Blincyto permanently  Seizures: If patient experiences more than 1 seizure, discontinue Blincyto permanently.    Pt continues to receive Blincyto infusion per orders.  Pt monitored closely for adverse reactions to drug administration.    General:  Do not flush stickers applied to IV tubing and access ports? Yes  Daily lab review performed - No All new abnormal results reported to provider? N/A  Signature obtained on signature log daily and compared to baseline: Yes.  Gait evaluated this shift: Yes  Neuro assessment completed in flowsheets: Yes  Abnormal findings reported to physician: N/A    Cytokine Release Syndrome (CRS) Assessment:  No symptoms of CRS present at this time    Neurological Assessment:  No

## 2024-10-11 NOTE — PLAN OF CARE
Problem: Metabolic/Fluid and Electrolytes - Adult  Goal: Electrolytes maintained within normal limits  10/11/2024 0350 by Mariann Nunez RN  Outcome: Progressing  Flowsheets (Taken 10/11/2024 0350)  Electrolytes maintained within normal limits: Monitor labs and assess patient for signs and symptoms of electrolyte imbalances     Problem: Hematologic - Adult  Goal: Maintains hematologic stability  Outcome: Progressing  Flowsheets (Taken 10/11/2024 0350)  Maintains hematologic stability:   Assess for signs and symptoms of bleeding or hemorrhage   Monitor labs for bleeding or clotting disorders     Problem: Safety - Adult  Goal: Free from fall injury  Outcome: Progressing  Flowsheets (Taken 10/11/2024 0350)  Free From Fall Injury:   Instruct family/caregiver on patient safety   Based on caregiver fall risk screen, instruct family/caregiver to ask for assistance with transferring infant if caregiver noted to have fall risk factors     Problem: Pain  Goal: Verbalizes/displays adequate comfort level or baseline comfort level  Outcome: Progressing  Flowsheets (Taken 10/11/2024 0350)  Verbalizes/displays adequate comfort level or baseline comfort level:   Encourage patient to monitor pain and request assistance   Assess pain using appropriate pain scale   Implement non-pharmacological measures as appropriate and evaluate response

## 2024-10-11 NOTE — PROGRESS NOTES
Reviewed discharge instructions with patient. Reviewed discharge medications including dosing, schedule, indication, and adverse reactions.  Reviewed which medications were already taken today and next dosage due for each medication.        Patient is being discharged with IV access d/t need for ongoing therapy:      Type:  L PICC                          CVC care and maintenance was reviewed with patient. Pt verbalizes understanding of line care and maintenance.      Patient verbalized understanding of all instructions and questions were answered to her satisfaction. Discharge instructions were given to the patient. Patient discharged to home per wheelchair with family members.      RACHEAL AC RN

## 2024-10-11 NOTE — DISCHARGE SUMMARY
[facility] Discharge Summary             Attending Physician: No att. providers found    Referring MD: Shaylee Mccormick DO  [address]   [address]  [place] [id number]    Name: Pam Guido :  1975  MRN:  [id number]    Admission: 10/9/2024    Discharge: 10/11/2024       Date: 10/11/2024    Diagnosis on admit: Phili + B-ALL     Procedures: Routine chest x-ray, laboratories, EKG, IV fluid hydration, Panculture for fevers, IV antimicrobial therapy, Respiratory therapy, Oxygen therapy, Blood Product Infusions    Consultations:     Medications:      Medication List        CONTINUE taking these medications      aspirin 81 MG chewable tablet  Take 1 tablet by mouth daily     carvedilol 12.5 MG tablet  Commonly known as: COREG  TAKE 1 TABLET BY MOUTH 2 TIMES A DAY     famotidine 40 MG tablet  Commonly known as: PEPCID  TAKE ONE TABLET BY MOUTH ONCE NIGHTLY     hydroCHLOROthiazide 12.5 MG capsule  TAKE 1 CAPSULE BY MOUTH EVERY MORNING     linezolid 600 MG tablet  Commonly known as: ZYVOX  Take 1 tablet by mouth every 12 hours for 9 days     losartan 50 MG tablet  Commonly known as: COZAAR  Take 1 tablet by mouth every morning     PONATinib HCl 15 MG Tabs  Take 15 mg by mouth daily     valACYclovir 500 MG tablet  Commonly known as: VALTREX  Take 1 tablet by mouth 2 times daily               Where to Get Your Medications        Information about where to get these medications is not yet available    Ask your nurse or doctor about these medications  linezolid 600 MG tablet         Reason for Admission: Blinatumomab cycle 2     Hospital Course:    Pam Guido is a 49 year old female with a past medical history significant for Phili + B-ALL, essential hypertension, obesity, chronic pain, and GERD.      For  B-ALL she was initiated on Hydrea and Dexamethasone. She also received Cytarabine 1 gm IV on 24 due to rapidly increasing WBC. She was initiated on Ponatinib on 24. BM bx on 24 showed no

## 2024-10-11 NOTE — CARE COORDINATION
Case Management Assessment            Discharge Note                    Date / Time of Note: 10/11/2024 9:53 AM                  Discharge Note Completed by: ALMA Ivey   for [facility] ([facility])  [employer]: [phone]    Patient Name: Pam Guido   YOB: 1975  Diagnosis: ALL (acute lymphoid leukemia) in relapse (HCC) [C91.02]  Leukemia, unspecified not having achieved remission (HCC) [C95.90]   Date / Time: 10/9/2024  7:25 AM    Current PCP: Johanna Jensen, APRN - CNP  Clinic patient: No    Hospitalization in the last 30 days: Yes  Readmission Assessment  Number of Days since last admission?: 1-7 days  Previous Disposition: Home with Family  Who is being Interviewed: Patient, Caregiver  What was the patient's/caregiver's perception as to why they think they needed to return back to the hospital?: Other (Comment) (planned readmission)  Did you visit your Primary Care Physician after you left the hospital, before you returned this time?: No  Why weren't you able to visit your PCP?: Did not have an appointment, Other (Comment) (planned readmission)  Did you see a specialist, such as Cardiac, Pulmonary, Orthopedic Physician, etc. after you left the hospital?: Yes (planned readmission)  Who advised the patient to return to the hospital?: Physician (planned readmission)  Does the patient report anything that got in the way of taking their medications?: No (planned readmission)  In our efforts to provide the best possible care to you and others like you, can you think of anything that we could have done to help you after you left the hospital the first time, so that you might not have needed to return so soon?: Other (Comment) (planned readmission)    Advance Directives:  Code Status: Full Code  [place] DNR form completed and on chart: Not Indicated    Financial:  Payor: BCBS / Plan: BCBS - [place] PPO / Product Type: *No Product  type* /      Pharmacy:    [facility] [id number] - [place], [place] - [address] [facility] - P [phone] - F [phone]  [address] [facility] [place] [address]  Phone: [phone] Fax: [phone]    [facility] [id number] - [place], [place] - [address] - P [phone] - F [phone]  [address] [facility] [place] [address]  Phone: [phone] Fax: [phone]    [facility] [id number] - [place], [place] - [address] - P [phone] - F [phone]  [address]  [facility] [address]  Phone: [phone] Fax: [phone]    [facility] [address] - P [phone] - F [phone]  [address]  [facility] [address]  Phone: [phone] Fax: [phone]      Assistance purchasing medications?: Potential Assistance Purchasing Medications: No  Assistance provided by Case Management: None at this time    Does patient want to participate in local refill/ meds to beds program?:      Meds To Beds General Rules:  1. Can ONLY be done Monday- Friday between 8:30am-5pm  2. Prescription(s) must be in pharmacy by 3pm to be filled same day  3.Copy of patient's insurance/ prescription drug card and patient face sheet must be sent along with the prescription(s)  4. Cost of Rx cannot be added to hospital bill. If financial assistance is needed, please contact unit  or ;  or  CANNOT provide pharmacy voucher for patients co-pays  5. Patients can then  the prescription on their way out of the hospital at discharge, or pharmacy can deliver to the bedside if staff is available. (payment due at time of pick-up or delivery - cash, check, or card accepted)     Able to afford home medications/ co-pay costs: Yes    ADLS:  Current PT AM-PAC Score:   /24  Current OT AM-PAC Score:   /24    DISCHARGE Disposition: Home with Blincyto through [facility]     IMM Completed:   Not Indicated due to: Helen

## 2024-10-14 ENCOUNTER — CARE COORDINATION (OUTPATIENT)
Dept: CASE MANAGEMENT | Age: 49
End: 2024-10-14

## 2024-10-14 DIAGNOSIS — I10 PRIMARY HYPERTENSION: Primary | ICD-10-CM

## 2024-10-14 NOTE — CARE COORDINATION
action plan, and red flags with patient. The patient was given an opportunity to ask questions; all questions answered at this time.. The patient verbalized understanding.   Were discharge instructions available to patient? Yes.   Reviewed appropriate site of care based on symptoms and resources available to patient including: PCP  Specialist. The patient agrees to contact the primary care provider and/or specialist office for questions related to their healthcare.      Advance Care Planning:   Does patient have an Advance Directive:    Primary Decision Maker: Sunday Guido - Spouse - [phone] .    Medication Reconciliation:  Medication reconciliation was performed with patient,1111F entered: yes.     Remote Patient Monitoring:  Offered patient enrollment in the Remote Patient Monitoring (RPM) program for in-home monitoring: Yes, but did not enroll at this time: already monitoring with home equipment.    Assessments:  Care Transitions 24 Hour Call    Do you have all of your prescriptions and are they filled?: Yes  Do you have support at home?: Partner/Spouse/SO  Are you an active caregiver in your home?: No  Care Transitions Interventions        Follow Up Appointment:   Discussed follow up appointments. Patient has hospital follow up appointment scheduled within 7 days of discharge. She goes to [facility] weekly on Mondays for line care and labwork.     Future Appointments         Provider Specialty Dept Phone    10/30/2024 11:20 AM Johanna Jensen APRN - CNP Family Medicine [phone]    12/4/2024 3:00 PM Johanna Jensen APRN - CNP Family Medicine [phone]          Care Transition Nurse provided contact information.  Plan for follow-up call in 6-10 days based on severity of symptoms and risk factors.  Plan for next call: self management-       Emmy Borges RN

## 2024-10-16 DIAGNOSIS — I10 PRIMARY HYPERTENSION: ICD-10-CM

## 2024-10-16 DIAGNOSIS — R00.0 TACHYCARDIA: ICD-10-CM

## 2024-10-16 RX ORDER — CARVEDILOL 12.5 MG/1
12.5 TABLET ORAL 2 TIMES DAILY
Qty: 60 TABLET | Refills: 2 | Status: SHIPPED | OUTPATIENT
Start: 2024-10-16

## 2024-10-22 ENCOUNTER — CARE COORDINATION (OUTPATIENT)
Dept: CASE MANAGEMENT | Age: 49
End: 2024-10-22

## 2024-10-22 NOTE — CARE COORDINATION
Care Transitions Note    Follow Up Call     Patient Current Location:  Home: [address]  [address]  [place] [facility] [address]    [facility] Care Coordinator contacted the patient by telephone. Verified name and  as identifiers.    Additional needs identified to be addressed with provider   No needs identified                 Method of communication with provider: none.    Care Summary Note:   Spoke with patient. She stated she is tired today. Denies fever, chills, cp, nvd, sob, cough, lh, dizziness or palpitations. Good appetite and fluid intake. No bladder or bowel problems. Taking medication as prescribed. PICC line intact. She has swelling in her feet and headaches at times. No needs.    Plan of care updates since last contact:  Review of patient management of conditions/medications:         Advance Care Planning:   Does patient have an Advance Directive: not on file.    Medication Review:  No changes since last call.     Remote Patient Monitoring:  Offered patient enrollment in the Remote Patient Monitoring (RPM) program for in-home monitoring: Yes, but did not enroll at this time: already monitoring with home equipment.    Assessments:  Care Transitions Subsequent and Final Call    Subsequent and Final Calls  Do you have any ongoing symptoms?: No  Have your medications changed?: No  Do you have any questions related to your medications?: No  Do you currently have any active services?: No  Do you have any needs or concerns that I can assist you with?: No  Identified Barriers: None  Care Transitions Interventions  Other Interventions:              Follow Up Appointment:   Sees her oncologist every week  Future Appointments         Provider Specialty Dept Phone    10/30/2024 11:20 AM Johanna Jensen APRN  CNP Family Medicine [phone]    2024 3:00 PM Johanna Jensen APRN - CNP Family Medicine [phone]            [facility] Care Coordinator provided contact information.  Plan for follow-up call in

## 2024-10-29 ENCOUNTER — CARE COORDINATION (OUTPATIENT)
Dept: CASE MANAGEMENT | Age: 49
End: 2024-10-29

## 2024-10-29 NOTE — CARE COORDINATION
Care Transitions Note    Follow Up Call     Patient Current Location:  Home: [address]  [address]  [place] [address]    [facility] Care Coordinator contacted the patient by telephone. Verified name and  as identifiers.    Additional needs identified to be addressed with provider   No needs identified                 Method of communication with provider: none.    Care Summary Note:   Patient reports she is doing okay. Appetite is fair. PICC line is intact. Denies sob, fever, chills, cough, cp, palpitations, swelling, lh, dizziness or fatigue. She had nausea yesterday, none today. Still has tiredness daily. No bladder or bowel elimination problems. No BP values, but states it was been good at her appointment.     Plan of care updates since last contact:  Review of patient management of conditions/medications:         Advance Care Planning:   Does patient have an Advance Directive: not on file.    Primary Decision Maker: Sunday Guido - Spouse - [phone] .     Medication Review:  No changes since last call.     Remote Patient Monitoring:  Offered patient enrollment in the Remote Patient Monitoring (RPM) program for in-home monitoring: Yes, but did not enroll at this time: already monitoring with home equipment.    Assessments:  Care Transitions Subsequent and Final Call    Subsequent and Final Calls  Do you have any ongoing symptoms?: No  Have your medications changed?: No  Do you have any questions related to your medications?: No  Do you currently have any active services?: No  Do you have any needs or concerns that I can assist you with?: No  Identified Barriers: None  Care Transitions Interventions  Other Interventions:              Follow Up Appointment:   Visits oncologist weekly  Future Appointments         Provider Specialty Dept Phone    10/30/2024 11:20 AM Johanna Jensen APRN - CNP Family Medicine [phone]    2024 3:00 PM Johanna Jensen APRN - CNP Family Medicine

## 2024-10-30 ENCOUNTER — OFFICE VISIT (OUTPATIENT)
Dept: FAMILY MEDICINE CLINIC | Age: 49
End: 2024-10-30
Payer: COMMERCIAL

## 2024-10-30 VITALS
HEIGHT: 68 IN | BODY MASS INDEX: 38.8 KG/M2 | SYSTOLIC BLOOD PRESSURE: 126 MMHG | WEIGHT: 256 LBS | DIASTOLIC BLOOD PRESSURE: 80 MMHG | HEART RATE: 76 BPM | OXYGEN SATURATION: 98 %

## 2024-10-30 DIAGNOSIS — E55.9 VITAMIN D DEFICIENCY: ICD-10-CM

## 2024-10-30 DIAGNOSIS — K21.9 GASTROESOPHAGEAL REFLUX DISEASE WITHOUT ESOPHAGITIS: ICD-10-CM

## 2024-10-30 DIAGNOSIS — Z09 HOSPITAL DISCHARGE FOLLOW-UP: ICD-10-CM

## 2024-10-30 DIAGNOSIS — C91.00 ACUTE LYMPHOCYTIC LEUKEMIA NOT HAVING ACHIEVED REMISSION (HCC): Primary | ICD-10-CM

## 2024-10-30 PROCEDURE — 3079F DIAST BP 80-89 MM HG: CPT | Performed by: NURSE PRACTITIONER

## 2024-10-30 PROCEDURE — 3074F SYST BP LT 130 MM HG: CPT | Performed by: NURSE PRACTITIONER

## 2024-10-30 PROCEDURE — 1111F DSCHRG MED/CURRENT MED MERGE: CPT | Performed by: NURSE PRACTITIONER

## 2024-10-30 PROCEDURE — 99214 OFFICE O/P EST MOD 30 MIN: CPT | Performed by: NURSE PRACTITIONER

## 2024-10-30 RX ORDER — FAMOTIDINE 40 MG/1
40 TABLET, FILM COATED ORAL EVERY EVENING
Qty: 30 TABLET | Refills: 3 | Status: ON HOLD | OUTPATIENT
Start: 2024-10-30

## 2024-10-30 NOTE — PROGRESS NOTES
lymphadenopathy  Pulmonary/Chest: clear to auscultation bilaterally- no wheezes, rales or rhonchi, normal air movement, no respiratory distress  Cardiovascular: normal rate, regular rhythm, normal S1 and S2, no murmurs, rubs, clicks, or gallops, distal pulses intact, no carotid bruits  Abdomen: soft, non-tender, non-distended, normal bowel sounds, no masses or organomegaly  Extremities: no cyanosis, clubbing or edema, PICC line in left upper extremity without signs or symptoms of infection  Musculoskeletal: normal range of motion, no joint swelling, deformity or tenderness  Neurologic: reflexes normal and symmetric, no cranial nerve deficit, gait, coordination and speech normal      An electronic signature was used to authenticate this note.  --Johanna Jensen, APRN - CNP

## 2024-10-31 ENCOUNTER — HOSPITAL ENCOUNTER (INPATIENT)
Age: 49
LOS: 7 days | Discharge: HOME | DRG: 835 | End: 2024-11-07
Attending: EMERGENCY MEDICINE | Admitting: INTERNAL MEDICINE
Payer: COMMERCIAL

## 2024-10-31 ENCOUNTER — APPOINTMENT (OUTPATIENT)
Dept: GENERAL RADIOLOGY | Age: 49
DRG: 835 | End: 2024-10-31
Payer: COMMERCIAL

## 2024-10-31 DIAGNOSIS — R65.10 SIRS (SYSTEMIC INFLAMMATORY RESPONSE SYNDROME) (HCC): Primary | ICD-10-CM

## 2024-10-31 DIAGNOSIS — C91.00 ACUTE LYMPHOCYTIC LEUKEMIA NOT HAVING ACHIEVED REMISSION (HCC): ICD-10-CM

## 2024-10-31 DIAGNOSIS — Z45.2 PICC (PERIPHERALLY INSERTED CENTRAL CATHETER) IN PLACE: ICD-10-CM

## 2024-10-31 DIAGNOSIS — Z92.89 HISTORY OF IMMUNOTHERAPY: ICD-10-CM

## 2024-10-31 PROBLEM — D61.810 PANCYTOPENIA DUE TO CHEMOTHERAPY (HCC): Status: ACTIVE | Noted: 2024-10-31

## 2024-10-31 LAB
25(OH)D3 SERPL-MCNC: 14.2 NG/ML
ALBUMIN SERPL-MCNC: 4.3 G/DL (ref 3.4–5)
ALP SERPL-CCNC: 71 U/L (ref 40–129)
ALT SERPL-CCNC: 33 U/L (ref 10–40)
ANION GAP SERPL CALCULATED.3IONS-SCNC: 11 MMOL/L (ref 3–16)
AST SERPL-CCNC: 26 U/L (ref 15–37)
BASOPHILS # BLD: 0.1 K/UL (ref 0–0.2)
BASOPHILS NFR BLD: 0.8 %
BILIRUB DIRECT SERPL-MCNC: 0.2 MG/DL (ref 0–0.3)
BILIRUB INDIRECT SERPL-MCNC: 0.1 MG/DL (ref 0–1)
BILIRUB SERPL-MCNC: 0.3 MG/DL (ref 0–1)
BILIRUB UR QL STRIP.AUTO: NEGATIVE
BUN SERPL-MCNC: 9 MG/DL (ref 7–20)
CALCIUM SERPL-MCNC: 9.2 MG/DL (ref 8.3–10.6)
CHLORIDE SERPL-SCNC: 103 MMOL/L (ref 99–110)
CLARITY UR: CLEAR
CO2 SERPL-SCNC: 23 MMOL/L (ref 21–32)
COLOR UR: YELLOW
CREAT SERPL-MCNC: 0.6 MG/DL (ref 0.6–1.1)
DEPRECATED RDW RBC AUTO: 14.9 % (ref 12.4–15.4)
EOSINOPHIL # BLD: 0.3 K/UL (ref 0–0.6)
EOSINOPHIL NFR BLD: 2.1 %
FLUAV RNA RESP QL NAA+PROBE: NOT DETECTED
FLUBV RNA RESP QL NAA+PROBE: NOT DETECTED
GFR SERPLBLD CREATININE-BSD FMLA CKD-EPI: >90 ML/MIN/{1.73_M2}
GLUCOSE SERPL-MCNC: 96 MG/DL (ref 70–99)
GLUCOSE UR STRIP.AUTO-MCNC: NEGATIVE MG/DL
HCT VFR BLD AUTO: 41 % (ref 36–48)
HGB BLD-MCNC: 13.7 G/DL (ref 12–16)
HGB UR QL STRIP.AUTO: NEGATIVE
KETONES UR STRIP.AUTO-MCNC: NEGATIVE MG/DL
LACTATE BLDV-SCNC: 1.3 MMOL/L (ref 0.4–1.9)
LACTATE BLDV-SCNC: 1.3 MMOL/L (ref 0.4–1.9)
LDH SERPL L TO P-CCNC: 348 U/L (ref 100–190)
LEUKOCYTE ESTERASE UR QL STRIP.AUTO: NEGATIVE
LYMPHOCYTES # BLD: 0.4 K/UL (ref 1–5.1)
LYMPHOCYTES NFR BLD: 3.4 %
MAGNESIUM SERPL-MCNC: 1.8 MG/DL (ref 1.8–2.4)
MCH RBC QN AUTO: 29.9 PG (ref 26–34)
MCHC RBC AUTO-ENTMCNC: 33.4 G/DL (ref 31–36)
MCV RBC AUTO: 89.5 FL (ref 80–100)
MONOCYTES # BLD: 1 K/UL (ref 0–1.3)
MONOCYTES NFR BLD: 8.3 %
NEUTROPHILS # BLD: 10.7 K/UL (ref 1.7–7.7)
NEUTROPHILS NFR BLD: 85.4 %
NITRITE UR QL STRIP.AUTO: NEGATIVE
PH UR STRIP.AUTO: 7 [PH] (ref 5–8)
PLATELET # BLD AUTO: 220 K/UL (ref 135–450)
PMV BLD AUTO: 9.4 FL (ref 5–10.5)
POTASSIUM SERPL-SCNC: 3.9 MMOL/L (ref 3.5–5.1)
PROT SERPL-MCNC: 6.7 G/DL (ref 6.4–8.2)
PROT UR STRIP.AUTO-MCNC: NEGATIVE MG/DL
RBC # BLD AUTO: 4.58 M/UL (ref 4–5.2)
SARS-COV-2 RNA RESP QL NAA+PROBE: NOT DETECTED
SODIUM SERPL-SCNC: 137 MMOL/L (ref 136–145)
SP GR UR STRIP.AUTO: 1.02 (ref 1–1.03)
UA DIPSTICK W REFLEX MICRO PNL UR: NORMAL
URATE SERPL-MCNC: 5.7 MG/DL (ref 2.6–6)
URN SPEC COLLECT METH UR: NORMAL
UROBILINOGEN UR STRIP-ACNC: 0.2 E.U./DL
WBC # BLD AUTO: 12.5 K/UL (ref 4–11)

## 2024-10-31 PROCEDURE — 87102 FUNGUS ISOLATION CULTURE: CPT

## 2024-10-31 PROCEDURE — 2580000003 HC RX 258: Performed by: PHYSICIAN ASSISTANT

## 2024-10-31 PROCEDURE — 2060000000 HC ICU INTERMEDIATE R&B

## 2024-10-31 PROCEDURE — 93005 ELECTROCARDIOGRAM TRACING: CPT | Performed by: INTERNAL MEDICINE

## 2024-10-31 PROCEDURE — 6360000002 HC RX W HCPCS: Performed by: PHYSICIAN ASSISTANT

## 2024-10-31 PROCEDURE — 96367 TX/PROPH/DG ADDL SEQ IV INF: CPT

## 2024-10-31 PROCEDURE — 83605 ASSAY OF LACTIC ACID: CPT

## 2024-10-31 PROCEDURE — 87150 DNA/RNA AMPLIFIED PROBE: CPT

## 2024-10-31 PROCEDURE — 87103 BLOOD FUNGUS CULTURE: CPT

## 2024-10-31 PROCEDURE — 80076 HEPATIC FUNCTION PANEL: CPT

## 2024-10-31 PROCEDURE — 85025 COMPLETE CBC W/AUTO DIFF WBC: CPT

## 2024-10-31 PROCEDURE — A4217 STERILE WATER/SALINE, 500 ML: HCPCS | Performed by: INTERNAL MEDICINE

## 2024-10-31 PROCEDURE — 96365 THER/PROPH/DIAG IV INF INIT: CPT

## 2024-10-31 PROCEDURE — 71045 X-RAY EXAM CHEST 1 VIEW: CPT

## 2024-10-31 PROCEDURE — 6370000000 HC RX 637 (ALT 250 FOR IP): Performed by: PHYSICIAN ASSISTANT

## 2024-10-31 PROCEDURE — 83735 ASSAY OF MAGNESIUM: CPT

## 2024-10-31 PROCEDURE — 83615 LACTATE (LD) (LDH) ENZYME: CPT

## 2024-10-31 PROCEDURE — 87040 BLOOD CULTURE FOR BACTERIA: CPT

## 2024-10-31 PROCEDURE — 80048 BASIC METABOLIC PNL TOTAL CA: CPT

## 2024-10-31 PROCEDURE — 87636 SARSCOV2 & INF A&B AMP PRB: CPT

## 2024-10-31 PROCEDURE — 87077 CULTURE AEROBIC IDENTIFY: CPT

## 2024-10-31 PROCEDURE — 1200000000 HC SEMI PRIVATE

## 2024-10-31 PROCEDURE — 87070 CULTURE OTHR SPECIMN AEROBIC: CPT

## 2024-10-31 PROCEDURE — 84550 ASSAY OF BLOOD/URIC ACID: CPT

## 2024-10-31 PROCEDURE — 2580000003 HC RX 258: Performed by: INTERNAL MEDICINE

## 2024-10-31 PROCEDURE — 87186 SC STD MICRODIL/AGAR DIL: CPT

## 2024-10-31 PROCEDURE — 87106 FUNGI IDENTIFICATION YEAST: CPT

## 2024-10-31 PROCEDURE — 36415 COLL VENOUS BLD VENIPUNCTURE: CPT

## 2024-10-31 PROCEDURE — 87205 SMEAR GRAM STAIN: CPT

## 2024-10-31 PROCEDURE — 6370000000 HC RX 637 (ALT 250 FOR IP): Performed by: INTERNAL MEDICINE

## 2024-10-31 PROCEDURE — 99285 EMERGENCY DEPT VISIT HI MDM: CPT

## 2024-10-31 PROCEDURE — 87086 URINE CULTURE/COLONY COUNT: CPT

## 2024-10-31 PROCEDURE — 81003 URINALYSIS AUTO W/O SCOPE: CPT

## 2024-10-31 RX ORDER — SODIUM CHLORIDE 9 MG/ML
INJECTION, SOLUTION INTRAVENOUS PRN
Status: DISCONTINUED | OUTPATIENT
Start: 2024-10-31 | End: 2024-11-07 | Stop reason: HOSPADM

## 2024-10-31 RX ORDER — SODIUM CHLORIDE 0.9 % (FLUSH) 0.9 %
5-40 SYRINGE (ML) INJECTION EVERY 12 HOURS SCHEDULED
Status: DISCONTINUED | OUTPATIENT
Start: 2024-10-31 | End: 2024-11-07 | Stop reason: HOSPADM

## 2024-10-31 RX ORDER — ASPIRIN 81 MG/1
81 TABLET, CHEWABLE ORAL DAILY
Status: DISCONTINUED | OUTPATIENT
Start: 2024-11-01 | End: 2024-11-07 | Stop reason: HOSPADM

## 2024-10-31 RX ORDER — LOSARTAN POTASSIUM 50 MG/1
50 TABLET ORAL EVERY MORNING
Status: DISCONTINUED | OUTPATIENT
Start: 2024-11-01 | End: 2024-11-07 | Stop reason: HOSPADM

## 2024-10-31 RX ORDER — ACETAMINOPHEN 325 MG/1
650 TABLET ORAL EVERY 6 HOURS PRN
Status: DISCONTINUED | OUTPATIENT
Start: 2024-10-31 | End: 2024-11-07 | Stop reason: HOSPADM

## 2024-10-31 RX ORDER — POTASSIUM CHLORIDE 29.8 MG/ML
20 INJECTION INTRAVENOUS PRN
Status: DISCONTINUED | OUTPATIENT
Start: 2024-10-31 | End: 2024-11-07 | Stop reason: HOSPADM

## 2024-10-31 RX ORDER — ACETAMINOPHEN 650 MG/1
650 SUPPOSITORY RECTAL EVERY 6 HOURS PRN
Status: DISCONTINUED | OUTPATIENT
Start: 2024-10-31 | End: 2024-11-01

## 2024-10-31 RX ORDER — MAGNESIUM SULFATE IN WATER 40 MG/ML
4000 INJECTION, SOLUTION INTRAVENOUS PRN
Status: DISCONTINUED | OUTPATIENT
Start: 2024-10-31 | End: 2024-11-07 | Stop reason: HOSPADM

## 2024-10-31 RX ORDER — SODIUM CHLORIDE 9 MG/ML
500 INJECTION, SOLUTION INTRAVENOUS CONTINUOUS PRN
Status: DISCONTINUED | OUTPATIENT
Start: 2024-10-31 | End: 2024-11-07 | Stop reason: HOSPADM

## 2024-10-31 RX ORDER — ACETAMINOPHEN 500 MG
1000 TABLET ORAL ONCE
Status: COMPLETED | OUTPATIENT
Start: 2024-10-31 | End: 2024-10-31

## 2024-10-31 RX ORDER — SODIUM CHLORIDE 0.9 % (FLUSH) 0.9 %
5-40 SYRINGE (ML) INJECTION PRN
Status: DISCONTINUED | OUTPATIENT
Start: 2024-10-31 | End: 2024-11-07 | Stop reason: HOSPADM

## 2024-10-31 RX ORDER — HYDROCHLOROTHIAZIDE 12.5 MG/1
12.5 CAPSULE ORAL EVERY MORNING
Status: DISCONTINUED | OUTPATIENT
Start: 2024-11-01 | End: 2024-11-07 | Stop reason: HOSPADM

## 2024-10-31 RX ORDER — FAMOTIDINE 20 MG/1
40 TABLET, FILM COATED ORAL DAILY
Status: DISCONTINUED | OUTPATIENT
Start: 2024-11-01 | End: 2024-11-07 | Stop reason: HOSPADM

## 2024-10-31 RX ORDER — 0.9 % SODIUM CHLORIDE 0.9 %
30 INTRAVENOUS SOLUTION INTRAVENOUS ONCE
Status: COMPLETED | OUTPATIENT
Start: 2024-10-31 | End: 2024-10-31

## 2024-10-31 RX ORDER — VALACYCLOVIR HYDROCHLORIDE 500 MG/1
500 TABLET, FILM COATED ORAL 2 TIMES DAILY
Status: DISCONTINUED | OUTPATIENT
Start: 2024-10-31 | End: 2024-11-07 | Stop reason: HOSPADM

## 2024-10-31 RX ORDER — CARVEDILOL 12.5 MG/1
12.5 TABLET ORAL 2 TIMES DAILY
Status: DISCONTINUED | OUTPATIENT
Start: 2024-10-31 | End: 2024-11-07 | Stop reason: HOSPADM

## 2024-10-31 RX ADMIN — SODIUM CHLORIDE 1917 ML: 9 INJECTION, SOLUTION INTRAVENOUS at 18:47

## 2024-10-31 RX ADMIN — SODIUM CHLORIDE, PRESERVATIVE FREE 10 ML: 5 INJECTION INTRAVENOUS at 21:58

## 2024-10-31 RX ADMIN — VANCOMYCIN HYDROCHLORIDE 2500 MG: 10 INJECTION, POWDER, LYOPHILIZED, FOR SOLUTION INTRAVENOUS at 19:40

## 2024-10-31 RX ADMIN — CEFEPIME 2000 MG: 2 INJECTION, POWDER, FOR SOLUTION INTRAVENOUS at 18:52

## 2024-10-31 RX ADMIN — VALACYCLOVIR HYDROCHLORIDE 500 MG: 500 TABLET, FILM COATED ORAL at 23:20

## 2024-10-31 RX ADMIN — CARVEDILOL 12.5 MG: 12.5 TABLET, FILM COATED ORAL at 23:20

## 2024-10-31 RX ADMIN — SODIUM CHLORIDE 15 ML: 900 IRRIGANT IRRIGATION at 21:59

## 2024-10-31 RX ADMIN — ACETAMINOPHEN 1000 MG: 500 TABLET ORAL at 18:46

## 2024-10-31 SDOH — ECONOMIC STABILITY: FOOD INSECURITY: WITHIN THE PAST 12 MONTHS, YOU WORRIED THAT YOUR FOOD WOULD RUN OUT BEFORE YOU GOT MONEY TO BUY MORE.: NEVER TRUE

## 2024-10-31 SDOH — ECONOMIC STABILITY: INCOME INSECURITY: HOW HARD IS IT FOR YOU TO PAY FOR THE VERY BASICS LIKE FOOD, HOUSING, MEDICAL CARE, AND HEATING?: NOT HARD AT ALL

## 2024-10-31 SDOH — ECONOMIC STABILITY: FOOD INSECURITY: WITHIN THE PAST 12 MONTHS, THE FOOD YOU BOUGHT JUST DIDN'T LAST AND YOU DIDN'T HAVE MONEY TO GET MORE.: NEVER TRUE

## 2024-10-31 SDOH — ECONOMIC STABILITY: INCOME INSECURITY: IN THE LAST 12 MONTHS, WAS THERE A TIME WHEN YOU WERE NOT ABLE TO PAY THE MORTGAGE OR RENT ON TIME?: NO

## 2024-10-31 SDOH — ECONOMIC STABILITY: TRANSPORTATION INSECURITY
IN THE PAST 12 MONTHS, HAS THE LACK OF TRANSPORTATION KEPT YOU FROM MEDICAL APPOINTMENTS OR FROM GETTING MEDICATIONS?: NO

## 2024-10-31 SDOH — ECONOMIC STABILITY: TRANSPORTATION INSECURITY
IN THE PAST 12 MONTHS, HAS LACK OF TRANSPORTATION KEPT YOU FROM MEETINGS, WORK, OR FROM GETTING THINGS NEEDED FOR DAILY LIVING?: NO

## 2024-10-31 ASSESSMENT — PAIN SCALES - GENERAL: PAINLEVEL_OUTOF10: 10

## 2024-10-31 NOTE — ED NOTES
Reviewed SBIRT and SDOH screenings; Patient has no needs at this time      Marianne Ochoa  10/31/24 6884

## 2024-10-31 NOTE — ED PROVIDER NOTES
ED Attending Attestation Note     Date of evaluation: 10/31/2024    This patient was seen by the advance practice provider.  I have seen and examined the patient, agree with the workup, evaluation, management and diagnosis. The care plan has been discussed.  My assessment reveals patient who is presenting with a temperature of 102.7 from home with a pulse 133 who is in [facility] patient.  She has had infected PICC lines in the past along with a port which is no longer in her.  Chest x-ray was negative.  She is chilled but otherwise does not appear in acute distress.  Blood cultures obtained along with labs and antibiotics administered along with 30 mL/kg..     Fredrick Reynaga MD  10/31/24 1913

## 2024-10-31 NOTE — ED NOTES
IV placed,   Blood and swabs sent to lab   Fluids started, Meds given.   Sinus Tack on the monitor   Patient on continuous cardiac and SpO2 monitoring, equal rise in fall in chest, no signs of distress noted. No requests from patient at the moment. Bed in lowest position and call light within reach.        Fuad Wolf, RN  10/31/24 5548

## 2024-10-31 NOTE — ED PROVIDER NOTES
(HCC)    4. History of immunotherapy        Disposition       DISPOSITION Admitted 10/31/2024 08:39:32 PM             Diagnostic Results and Other Data     RECENT VITALS:  BP: 115/62, Temp: 99.3 °F (37.4 °C), Pulse: (!) 127, Respirations: 20, SpO2: 96 %     ED Course     Nursing Notes, Past Medical Hx,Past Surgical Hx, Social Hx, Allergies, and Family Hx were reviewed.         The patient was given the following medications:  Orders Placed This Encounter   Medications    0.9 % sodium chloride IV bolus 1,917 mL    acetaminophen (TYLENOL) tablet 1,000 mg    vancomycin (VANCOCIN) 2,500 mg in sodium chloride 0.9 % 500 mL IVPB     Order Specific Question:   Antimicrobial Indications     Answer:   Febrile Neutropenia    ceFEPIme (MAXIPIME) 2,000 mg in sodium chloride 0.9 % 100 mL IVPB (mini-bag)     Tolerates cefepime     Order Specific Question:   Antimicrobial Indications     Answer:   Febrile Neutropenia    0.9 % sodium chloride infusion    sodium chloride flush 0.9 % injection 5-40 mL    sodium chloride flush 0.9 % injection 5-40 mL    0.9 % sodium chloride infusion    potassium chloride 20 mEq/50 mL IVPB (Central Line)    magnesium sulfate 4000 mg in 100 mL IVPB premix    OR Linked Order Group     acetaminophen (TYLENOL) tablet 650 mg     acetaminophen (TYLENOL) suppository 650 mg    Saline Mouthwash 15 mL    Saline Mouthwash 15 mL    ALTEplase (CATHFLO) 2 mg in sterile water 2 mL injection    vancomycin (VANCOCIN) 2,500 mg in sodium chloride 0.9 % 500 mL IVPB     Order Specific Question:   Antimicrobial Indications     Answer:   Febrile Neutropenia    ceFEPIme (MAXIPIME) 2,000 mg in sodium chloride 0.9 % 100 mL IVPB (mini-bag)     Tolerates cefepime     Order Specific Question:   Antimicrobial Indications     Answer:   Febrile Neutropenia       CONSULTS:  IP CONSULT TO ONCOLOGY    Review of Systems     Review of Systems   Constitutional:  Positive for appetite change (decreased), chills and fever.   HENT:  Positive  smoking history. She has never used smokeless tobacco. She reports that she does not drink alcohol and does not use drugs.    Medications     Previous Medications    ASPIRIN 81 MG CHEWABLE TABLET    Take 1 tablet by mouth daily    CARVEDILOL (COREG) 12.5 MG TABLET    Take 1 tablet by mouth 2 times daily    FAMOTIDINE (PEPCID) 40 MG TABLET    Take 1 tablet by mouth every evening    HYDROCHLOROTHIAZIDE 12.5 MG CAPSULE    TAKE 1 CAPSULE BY MOUTH EVERY MORNING    LOSARTAN (COZAAR) 50 MG TABLET    Take 1 tablet by mouth every morning    PONATINIB HCL 15 MG TABS    Take 15 mg by mouth daily    VALACYCLOVIR (VALTREX) 500 MG TABLET    Take 1 tablet by mouth 2 times daily       Allergies     She is allergic to penicillins, dapsone, lisinopril, bactrim [sulfamethoxazole-trimethoprim], and biaxin [clarithromycin].    Physical Exam     INITIAL VITALS: BP: 132/86, Temp: (!) 102.7 °F (39.3 °C), Pulse: (!) 133, Respirations: 16, SpO2: 98 %  Physical Exam  Vitals reviewed.   Constitutional:       General: She is not in acute distress.     Appearance: She is well-developed. She is obese.   HENT:      Head: Normocephalic and atraumatic.      Mouth/Throat:      Mouth: Mucous membranes are moist.      Pharynx: Uvula midline. No pharyngeal swelling, posterior oropharyngeal erythema or uvula swelling.      Comments: Few scattered white spots noted to the oropharynx.  Neck:      Trachea: Phonation normal.   Cardiovascular:      Rate and Rhythm: Regular rhythm. Tachycardia present.      Heart sounds: No murmur heard.     No friction rub. No gallop.   Pulmonary:      Effort: Pulmonary effort is normal. No respiratory distress.      Breath sounds: No wheezing, rhonchi or rales.   Abdominal:      General: There is no distension.      Palpations: Abdomen is soft.      Tenderness: There is no abdominal tenderness.   Musculoskeletal:      Cervical back: Normal range of motion and neck supple.      Right lower leg: No edema.      Left lower leg:

## 2024-11-01 PROBLEM — D61.810 PANCYTOPENIA DUE TO CHEMOTHERAPY (HCC): Status: RESOLVED | Noted: 2024-10-31 | Resolved: 2024-11-01

## 2024-11-01 LAB
ALBUMIN SERPL-MCNC: 3.7 G/DL (ref 3.4–5)
ALP SERPL-CCNC: 58 U/L (ref 40–129)
ALT SERPL-CCNC: 29 U/L (ref 10–40)
ANION GAP SERPL CALCULATED.3IONS-SCNC: 13 MMOL/L (ref 3–16)
AST SERPL-CCNC: 21 U/L (ref 15–37)
BACTERIA UR CULT: ABNORMAL
BASOPHILS # BLD: 0.1 K/UL (ref 0–0.2)
BASOPHILS NFR BLD: 0.9 %
BILIRUB DIRECT SERPL-MCNC: 0.2 MG/DL (ref 0–0.3)
BILIRUB INDIRECT SERPL-MCNC: 0.2 MG/DL (ref 0–1)
BILIRUB SERPL-MCNC: 0.4 MG/DL (ref 0–1)
BUN SERPL-MCNC: 6 MG/DL (ref 7–20)
CALCIUM SERPL-MCNC: 7.9 MG/DL (ref 8.3–10.6)
CHLORIDE SERPL-SCNC: 106 MMOL/L (ref 99–110)
CO2 SERPL-SCNC: 22 MMOL/L (ref 21–32)
CREAT SERPL-MCNC: 0.5 MG/DL (ref 0.6–1.1)
DEPRECATED RDW RBC AUTO: 14.8 % (ref 12.4–15.4)
EKG ATRIAL RATE: 131 BPM
EKG DIAGNOSIS: NORMAL
EKG P AXIS: 56 DEGREES
EKG P-R INTERVAL: 154 MS
EKG Q-T INTERVAL: 298 MS
EKG QRS DURATION: 78 MS
EKG QTC CALCULATION (BAZETT): 440 MS
EKG R AXIS: -34 DEGREES
EKG T AXIS: 29 DEGREES
EKG VENTRICULAR RATE: 131 BPM
EOSINOPHIL # BLD: 0 K/UL (ref 0–0.6)
EOSINOPHIL NFR BLD: 0.3 %
FIBRINOGEN PPP-MCNC: 431 MG/DL (ref 227–534)
GFR SERPLBLD CREATININE-BSD FMLA CKD-EPI: >90 ML/MIN/{1.73_M2}
GLUCOSE SERPL-MCNC: 115 MG/DL (ref 70–99)
HCT VFR BLD AUTO: 37.5 % (ref 36–48)
HGB BLD-MCNC: 12.6 G/DL (ref 12–16)
LYMPHOCYTES # BLD: 0.6 K/UL (ref 1–5.1)
LYMPHOCYTES NFR BLD: 4.5 %
MAGNESIUM SERPL-MCNC: 1.8 MG/DL (ref 1.8–2.4)
MCH RBC QN AUTO: 30 PG (ref 26–34)
MCHC RBC AUTO-ENTMCNC: 33.5 G/DL (ref 31–36)
MCV RBC AUTO: 89.6 FL (ref 80–100)
MONOCYTES # BLD: 1.4 K/UL (ref 0–1.3)
MONOCYTES NFR BLD: 11.3 %
NEUTROPHILS # BLD: 10.3 K/UL (ref 1.7–7.7)
NEUTROPHILS NFR BLD: 83 %
ORGANISM: ABNORMAL
PHOSPHATE SERPL-MCNC: 1.8 MG/DL (ref 2.5–4.9)
PLATELET # BLD AUTO: 202 K/UL (ref 135–450)
PMV BLD AUTO: 9.2 FL (ref 5–10.5)
POTASSIUM SERPL-SCNC: 3.4 MMOL/L (ref 3.5–5.1)
PROT SERPL-MCNC: 5.9 G/DL (ref 6.4–8.2)
RBC # BLD AUTO: 4.18 M/UL (ref 4–5.2)
REPORT: NORMAL
SODIUM SERPL-SCNC: 141 MMOL/L (ref 136–145)
WBC # BLD AUTO: 12.5 K/UL (ref 4–11)

## 2024-11-01 PROCEDURE — 85384 FIBRINOGEN ACTIVITY: CPT

## 2024-11-01 PROCEDURE — 80076 HEPATIC FUNCTION PANEL: CPT

## 2024-11-01 PROCEDURE — 6370000000 HC RX 637 (ALT 250 FOR IP): Performed by: NURSE PRACTITIONER

## 2024-11-01 PROCEDURE — 80048 BASIC METABOLIC PNL TOTAL CA: CPT

## 2024-11-01 PROCEDURE — 2580000003 HC RX 258

## 2024-11-01 PROCEDURE — 94761 N-INVAS EAR/PLS OXIMETRY MLT: CPT

## 2024-11-01 PROCEDURE — 87253 VIRUS INOCULATE TISSUE ADDL: CPT

## 2024-11-01 PROCEDURE — 87252 VIRUS INOCULATION TISSUE: CPT

## 2024-11-01 PROCEDURE — 2060000000 HC ICU INTERMEDIATE R&B

## 2024-11-01 PROCEDURE — 6370000000 HC RX 637 (ALT 250 FOR IP)

## 2024-11-01 PROCEDURE — 36415 COLL VENOUS BLD VENIPUNCTURE: CPT

## 2024-11-01 PROCEDURE — 84100 ASSAY OF PHOSPHORUS: CPT

## 2024-11-01 PROCEDURE — 85025 COMPLETE CBC W/AUTO DIFF WBC: CPT

## 2024-11-01 PROCEDURE — 2580000003 HC RX 258: Performed by: INTERNAL MEDICINE

## 2024-11-01 PROCEDURE — 6360000002 HC RX W HCPCS

## 2024-11-01 PROCEDURE — 6370000000 HC RX 637 (ALT 250 FOR IP): Performed by: INTERNAL MEDICINE

## 2024-11-01 PROCEDURE — 6360000002 HC RX W HCPCS: Performed by: INTERNAL MEDICINE

## 2024-11-01 PROCEDURE — 83735 ASSAY OF MAGNESIUM: CPT

## 2024-11-01 PROCEDURE — 2500000003 HC RX 250 WO HCPCS

## 2024-11-01 RX ORDER — BUTALBITAL, ACETAMINOPHEN AND CAFFEINE 50; 325; 40 MG/1; MG/1; MG/1
1 TABLET ORAL EVERY 6 HOURS PRN
Status: DISCONTINUED | OUTPATIENT
Start: 2024-11-01 | End: 2024-11-07 | Stop reason: HOSPADM

## 2024-11-01 RX ORDER — PROCHLORPERAZINE EDISYLATE 5 MG/ML
10 INJECTION INTRAMUSCULAR; INTRAVENOUS EVERY 6 HOURS PRN
Status: DISCONTINUED | OUTPATIENT
Start: 2024-11-01 | End: 2024-11-07 | Stop reason: HOSPADM

## 2024-11-01 RX ORDER — PROCHLORPERAZINE MALEATE 10 MG
10 TABLET ORAL EVERY 6 HOURS PRN
Status: DISCONTINUED | OUTPATIENT
Start: 2024-11-01 | End: 2024-11-07 | Stop reason: HOSPADM

## 2024-11-01 RX ORDER — DIPHENHYDRAMINE HCL 25 MG
25 TABLET ORAL NIGHTLY PRN
Status: DISCONTINUED | OUTPATIENT
Start: 2024-11-01 | End: 2024-11-07 | Stop reason: HOSPADM

## 2024-11-01 RX ORDER — 0.9 % SODIUM CHLORIDE 0.9 %
1000 INTRAVENOUS SOLUTION INTRAVENOUS ONCE
Status: COMPLETED | OUTPATIENT
Start: 2024-11-01 | End: 2024-11-01

## 2024-11-01 RX ORDER — POTASSIUM CHLORIDE 1500 MG/1
40 TABLET, EXTENDED RELEASE ORAL 2 TIMES DAILY WITH MEALS
Status: COMPLETED | OUTPATIENT
Start: 2024-11-01 | End: 2024-11-01

## 2024-11-01 RX ORDER — TRAMADOL HYDROCHLORIDE 50 MG/1
50 TABLET ORAL EVERY 6 HOURS PRN
Status: DISCONTINUED | OUTPATIENT
Start: 2024-11-01 | End: 2024-11-07 | Stop reason: HOSPADM

## 2024-11-01 RX ORDER — DIPHENHYDRAMINE HCL 25 MG
25 TABLET ORAL NIGHTLY PRN
Status: DISCONTINUED | OUTPATIENT
Start: 2024-11-01 | End: 2024-11-01

## 2024-11-01 RX ORDER — ENOXAPARIN SODIUM 100 MG/ML
30 INJECTION SUBCUTANEOUS 2 TIMES DAILY
Status: DISCONTINUED | OUTPATIENT
Start: 2024-11-01 | End: 2024-11-07 | Stop reason: HOSPADM

## 2024-11-01 RX ADMIN — TRAMADOL HYDROCHLORIDE 50 MG: 50 TABLET ORAL at 16:47

## 2024-11-01 RX ADMIN — SODIUM CHLORIDE, PRESERVATIVE FREE 10 ML: 5 INJECTION INTRAVENOUS at 08:35

## 2024-11-01 RX ADMIN — CARVEDILOL 12.5 MG: 12.5 TABLET, FILM COATED ORAL at 20:54

## 2024-11-01 RX ADMIN — ACETAMINOPHEN 650 MG: 325 TABLET ORAL at 03:43

## 2024-11-01 RX ADMIN — TRAMADOL HYDROCHLORIDE 50 MG: 50 TABLET ORAL at 08:33

## 2024-11-01 RX ADMIN — CEFEPIME 2000 MG: 2 INJECTION, POWDER, FOR SOLUTION INTRAVENOUS at 12:26

## 2024-11-01 RX ADMIN — POTASSIUM CHLORIDE 40 MEQ: 1500 TABLET, EXTENDED RELEASE ORAL at 16:47

## 2024-11-01 RX ADMIN — BUTALBITAL, ACETAMINOPHEN, AND CAFFEINE 1 TABLET: 50; 325; 40 TABLET ORAL at 12:23

## 2024-11-01 RX ADMIN — POTASSIUM CHLORIDE 40 MEQ: 1500 TABLET, EXTENDED RELEASE ORAL at 08:32

## 2024-11-01 RX ADMIN — ENOXAPARIN SODIUM 30 MG: 100 INJECTION SUBCUTANEOUS at 13:19

## 2024-11-01 RX ADMIN — SODIUM CHLORIDE 1000 ML: 9 INJECTION, SOLUTION INTRAVENOUS at 14:16

## 2024-11-01 RX ADMIN — DIPHENHYDRAMINE HYDROCHLORIDE 25 MG: 25 TABLET ORAL at 22:43

## 2024-11-01 RX ADMIN — FAMOTIDINE 40 MG: 20 TABLET ORAL at 08:32

## 2024-11-01 RX ADMIN — VALACYCLOVIR HYDROCHLORIDE 500 MG: 500 TABLET, FILM COATED ORAL at 20:54

## 2024-11-01 RX ADMIN — CARVEDILOL 12.5 MG: 12.5 TABLET, FILM COATED ORAL at 08:33

## 2024-11-01 RX ADMIN — DIBASIC SODIUM PHOSPHATE, MONOBASIC POTASSIUM PHOSPHATE AND MONOBASIC SODIUM PHOSPHATE 2 TABLET: 852; 155; 130 TABLET ORAL at 20:54

## 2024-11-01 RX ADMIN — DIBASIC SODIUM PHOSPHATE, MONOBASIC POTASSIUM PHOSPHATE AND MONOBASIC SODIUM PHOSPHATE 2 TABLET: 852; 155; 130 TABLET ORAL at 10:12

## 2024-11-01 RX ADMIN — HYDROCHLOROTHIAZIDE 12.5 MG: 12.5 CAPSULE ORAL at 08:32

## 2024-11-01 RX ADMIN — SODIUM CHLORIDE, PRESERVATIVE FREE 10 ML: 5 INJECTION INTRAVENOUS at 21:05

## 2024-11-01 RX ADMIN — VALACYCLOVIR HYDROCHLORIDE 500 MG: 500 TABLET, FILM COATED ORAL at 08:32

## 2024-11-01 RX ADMIN — CEFEPIME 2000 MG: 2 INJECTION, POWDER, FOR SOLUTION INTRAVENOUS at 20:37

## 2024-11-01 RX ADMIN — CEFEPIME 2000 MG: 2 INJECTION, POWDER, FOR SOLUTION INTRAVENOUS at 03:55

## 2024-11-01 RX ADMIN — ENOXAPARIN SODIUM 30 MG: 100 INJECTION SUBCUTANEOUS at 20:56

## 2024-11-01 RX ADMIN — PROCHLORPERAZINE MALEATE 10 MG: 10 TABLET ORAL at 16:48

## 2024-11-01 RX ADMIN — ACETAMINOPHEN 650 MG: 325 TABLET ORAL at 20:53

## 2024-11-01 RX ADMIN — LOSARTAN POTASSIUM 50 MG: 50 TABLET, FILM COATED ORAL at 08:33

## 2024-11-01 ASSESSMENT — PAIN - FUNCTIONAL ASSESSMENT
PAIN_FUNCTIONAL_ASSESSMENT: ACTIVITIES ARE NOT PREVENTED

## 2024-11-01 ASSESSMENT — PAIN DESCRIPTION - PAIN TYPE
TYPE: ACUTE PAIN

## 2024-11-01 ASSESSMENT — PAIN DESCRIPTION - DESCRIPTORS
DESCRIPTORS: THROBBING
DESCRIPTORS: THROBBING
DESCRIPTORS: ACHING
DESCRIPTORS: THROBBING

## 2024-11-01 ASSESSMENT — PAIN DESCRIPTION - LOCATION
LOCATION: HEAD

## 2024-11-01 ASSESSMENT — PAIN DESCRIPTION - FREQUENCY
FREQUENCY: INTERMITTENT
FREQUENCY: CONTINUOUS
FREQUENCY: INTERMITTENT

## 2024-11-01 ASSESSMENT — PAIN SCALES - GENERAL
PAINLEVEL_OUTOF10: 5
PAINLEVEL_OUTOF10: 5
PAINLEVEL_OUTOF10: 6
PAINLEVEL_OUTOF10: 2
PAINLEVEL_OUTOF10: 4
PAINLEVEL_OUTOF10: 5
PAINLEVEL_OUTOF10: 4
PAINLEVEL_OUTOF10: 5
PAINLEVEL_OUTOF10: 2
PAINLEVEL_OUTOF10: 4

## 2024-11-01 ASSESSMENT — PAIN DESCRIPTION - ONSET
ONSET: ON-GOING
ONSET: GRADUAL

## 2024-11-01 ASSESSMENT — PAIN DESCRIPTION - ORIENTATION
ORIENTATION: ANTERIOR

## 2024-11-01 NOTE — PROGRESS NOTES
Pt arrived to room 3524. Pt VSS, skin assessed by ROBBY Juarez and ROBBY Son. Pt bed is in the lowest position,wheels are locked, call light and bedside table are in reach. Pt home medication PONATinib tabs were walked to pharmacy, labeled and placed in pt locked box in pt room.

## 2024-11-01 NOTE — PLAN OF CARE
Problem: Pain  Goal: Verbalizes/displays adequate comfort level or baseline comfort level  11/1/2024 1048 by Luz Marina Vargas RN  Outcome: Progressing  Flowsheets (Taken 11/1/2024 1048)  Verbalizes/displays adequate comfort level or baseline comfort level:   Encourage patient to monitor pain and request assistance   Assess pain using appropriate pain scale   Administer analgesics based on type and severity of pain and evaluate response   Implement non-pharmacological measures as appropriate and evaluate response   Notify Licensed Independent Practitioner if interventions unsuccessful or patient reports new pain  Note: Patient complains of pain this shift. Pain medication given per orders. See MAR     Problem: Safety - Adult  Goal: Free from fall injury  11/1/2024 1048 by Luz Marina Vargas RN  Outcome: Progressing  Flowsheets (Taken 11/1/2024 1048)  Free From Fall Injury: Instruct family/caregiver on patient safety  Note: Orthostatic vital signs obtained at start of shift - see flowsheet for details.  Pt does not meet criteria for orthostasis.  Pt is a Med fall risk. See [place] Fall Score and ABCDS Injury Risk assessments.   - Screening for Orthostasis AND not a [place] Risk per [place]/ABCDS: Pt bed is in low position, side rails up, call light and belongings are in reach.  Fall risk light is on outside pts room.  Pt encouraged to call for assistance as needed. Will continue with hourly rounds for PO intake, pain needs, toileting and repositioning as needed.

## 2024-11-01 NOTE — ED NOTES
Attempted to call report at this time,   Rn will call back shortly      Fuad Wolf, RN  10/31/24 2056

## 2024-11-01 NOTE — PLAN OF CARE
Problem: Pain  Goal: Verbalizes/displays adequate comfort level or baseline comfort level  Outcome: Progressing  Flowsheets (Taken 11/1/2024 0639)  Verbalizes/displays adequate comfort level or baseline comfort level:   Encourage patient to monitor pain and request assistance   Assess pain using appropriate pain scale   Administer analgesics based on type and severity of pain and evaluate response   Implement non-pharmacological measures as appropriate and evaluate response   Consider cultural and social influences on pain and pain management   Notify Licensed Independent Practitioner if interventions unsuccessful or patient reports new pain     Problem: Safety - Adult  Goal: Free from fall injury  Outcome: Progressing  Flowsheets (Taken 11/1/2024 0639)  Free From Fall Injury:   Instruct family/caregiver on patient safety   Based on caregiver fall risk screen, instruct family/caregiver to ask for assistance with transferring infant if caregiver noted to have fall risk factors     Problem: ABCDS Injury Assessment  Goal: Absence of physical injury  Outcome: Progressing  Flowsheets (Taken 11/1/2024 0639)  Absence of Physical Injury: Implement safety measures based on patient assessment

## 2024-11-01 NOTE — CARE COORDINATION
Addendum at 2:02pm: Discussed with Bernie RICHARDSON who states writer doesn't need to do anything with pt's home blincyto.    Addendum at 12:23pm: Discharge cancelled due to positive blood cultures. Discussed with Hallie Pharmacist.    11:11am: Case Management Assessment  Initial Evaluation and discharge summary     Date/Time of Evaluation: 11/1/2024 11:11 AM  Assessment Completed by: Ronit HUNTLEY, ALMA   for [facility] ([facility])  [employer]: [phone]    If patient is discharged prior to next notation, then this note serves as note for discharge by case management.    Patient Name: Pam Guido                   YOB: 1975  Diagnosis: PICC (peripherally inserted central catheter) in place [Z45.2]  SIRS (systemic inflammatory response syndrome) (HCC) [R65.10]  Pancytopenia due to chemotherapy (HCC) [D61.810]  History of immunotherapy [Z92.89]  Acute lymphocytic leukemia not having achieved remission (HCC) [C91.00]                   Date / Time: 10/31/2024  5:53 PM    Patient Admission Status: Inpatient   Readmission Risk (Low < 19, Mod (19-27), High > 27): Readmission Risk Score: 23.7    Current PCP: Johanna Jensen APRN - CNP  PCP verified by CM? Yes (has been going to [facility])    Chart Reviewed: Yes      History Provided by: Patient  Patient Orientation: Alert and Oriented    Patient Cognition: Alert    Hospitalization in the last 30 days (Readmission):  Yes    If yes, Readmission Assessment in CM Navigator will be completed.    Advance Directives:      Code Status: Full Code   Patient's Primary Decision Maker is: Legal Next of Kin    Primary Decision Maker: Sunday Guido - Spouse - [phone]    Discharge Planning:    Patient lives with: Spouse/Significant Other Type of Home: House  Primary Care Giver: Self  Patient Support Systems include: Spouse/Significant Other, Family Members   Current Financial resources: Other (Comment) (Helen

## 2024-11-01 NOTE — PROGRESS NOTES
Pharmacist Review and Automatic Dose Adjustment of Prophylactic Enoxaparin         The reviewing pharmacist has made an adjustment to the ordered enoxaparin dose or converted to UFH per the approved [facility] protocol and table as identified below.        Pam Guido is a 49 y.o. female.     Recent Labs     10/31/24  1933 11/01/24  0358   CREATININE 0.6 0.5*       Estimated Creatinine Clearance: 187 mL/min (A) (based on SCr of 0.5 mg/dL (L)).    Recent Labs     10/31/24  1922 11/01/24  0358   HGB 13.7 12.6   HCT 41.0 37.5    202     No results for input(s): \"INR\" in the last 72 hours.    Height:   Ht Readings from Last 1 Encounters:   10/31/24 1.727 m (5' 8\")     Weight:  Wt Readings from Last 1 Encounters:   11/01/24 117.1 kg (258 lb 3.2 oz)               Plan: Based upon the patient's weight and renal function    Ordered: Enoxaparin 40mg SUBQ Daily    Changed/converted to    New Order: Enoxaparin 30mg SUBQ BID      Thank you,  Rupali Haas [facility]  11/1/2024, 12:38 PM

## 2024-11-01 NOTE — PROGRESS NOTES
4 Eyes Skin Assessment     NAME:  Pam Guido  YOB: 1975  MEDICAL RECORD NUMBER:  [id number]    The patient is being assessed for  Admission    I agree that at least one RN has performed a thorough Head to Toe Skin Assessment on the patient. ALL assessment sites listed below have been assessed.      Areas assessed by both nurses:    Head, Face, Ears, Shoulders, Back, Chest, Arms, Elbows, Hands, Sacrum. Buttock, Coccyx, Ischium, Legs. Feet and Heels, and Under Medical Devices         Does the Patient have a Wound? No noted wound(s)       Jim Prevention initiated by RN: No  Wound Care Orders initiated by RN: No    Pressure Injury (Stage 3,4, Unstageable, DTI, NWPT, and Complex wounds) if present, place Wound referral order by RN under : No    New Ostomies, if present place, Ostomy referral order under : Yes     Nurse 1 eSignature: Electronically signed by Ann Chapin RN on 11/1/24 at 2:48 AM EDT    **SHARE this note so that the co-signing nurse can place an eSignature**    Nurse 2 eSignature: Electronically signed by Adelaida Harrington RN on 11/1/24 at 6:53 AM EDT

## 2024-11-01 NOTE — H&P
PERRL, no scleral erythema or icterus, Oral mucosa moist and intact, throat clear  NECK: supple   BACK: Straight   SKIN: warm dry and intact without lesions rashes or masses  CHEST: CTA bilaterally without use of accessory muscles  CV: Normal S1 S2, RRR, no MRG  ABD: NT ND normoactive BS  EXTREMITIES: without edema, denies calf tenderness  NEURO: CN II - XII grossly intact  CATHETER: L DLPICC (10/9/24)    Laboratory Data:   CBC:   Recent Labs     10/31/24  1922 11/01/24  0358   WBC 12.5* 12.5*   HGB 13.7 12.6   HCT 41.0 37.5   MCV 89.5 89.6    202     BMP/Mag:  Recent Labs     10/31/24  1933 11/01/24  0358    141   K 3.9 3.4*    106   CO2 23 22   PHOS  --  1.8*   BUN 9 6*   CREATININE 0.6 0.5*   MG 1.80 1.80     LIVP:   Recent Labs     10/31/24  1933 11/01/24  0358   AST 26 21   ALT 33 29   BILIDIR 0.2 0.2   BILITOT 0.3 0.4   ALKPHOS 71 58     Coags: No results for input(s): \"PROTIME\", \"INR\", \"APTT\" in the last 72 hours.  Uric Acid No results for input(s): \"LABURIC\" in the last 72 hours.    PROBLEM LIST:           B Cell Acute lymphocytic leukemia, Ph+  HTN  GERD  Chronic Pain  Enterobacter Bacteremia      TREATMENT:            Ponatinib (7/29/24)  + Dex + Blincyto (8/26/24)  Blincyto (C1 D1 8/26/24)    Blincyto (C2 D1 10/9/24)        ASSESSMENT AND PLAN:           1. B Cell Acute Lymphocytic Leukemia, Ph+:  - BMBx 7/11/24: B-lymphoblastic leukemia; 72% blasts; Clonoseq with 3 dominant sequences identified   - CG- BCR/ABL ph+  - BCR PCR 7/11/24: p210, 0.1324%  - Repeat BCR/ABL PCR 7/29/24:  Not detected  - BMBX 8/16/24: No B-lymphoblast population identified. MRD + (556 residual clonal cells), CG, FISH: No abnormalities detected  - LP with IT MTX (7/24/24, 8/19/24): Neg    PLAN:  Blinctyo  (C1D1 8/26/24) with Ponatinib  - Ponatinib (started 7/29/24) 30 mg PO QOD, lowered to 15 mg PO QD 8/29/24  - ASA 81 mg PO QD while on ponatinib.    - Clonoseq 10/4/24: no residual sequences detected  - LP

## 2024-11-01 NOTE — ED NOTES
Second set of blood cultures sent to lab at this time.   Antibiotics started   Urine sent to lab, Patient to and from bathroom with steady gait.      Fuad Wolf RN  10/31/24 2010

## 2024-11-01 NOTE — PROGRESS NOTES
Department of Pharmacy    Notification received from laboratory of positive blood culture results.    Organism(s) detected: Klebsiella aerogenes  Applicable Antimicrobial Resistance Genes: none detected    Recommend re-starting cefepime.    PerfectServe message sent to Dr. Mccormick.    Please call with any questions.  Rupali Haas, PharmD, BCPS  Main pharmacy: [id number]  11/1/2024 11:46 AM

## 2024-11-01 NOTE — PROGRESS NOTES
Patient's Blincyto (patient supplied) dose started at home on 10/31 at 1600. Per Hallie Ramirez [facility], dose added in MAR as [facility] home health dose started on 10/31 through [facility] pump. Blincyto scanned into MAR, dual verification by this RN and additional chemo certified RN Shelia Lugo.  End time chemo is 11/2 at 1600. (See MAR)

## 2024-11-02 LAB
ANION GAP SERPL CALCULATED.3IONS-SCNC: 12 MMOL/L (ref 3–16)
BACTERIA THROAT AEROBE CULT: NORMAL
BASOPHILS # BLD: 0.1 K/UL (ref 0–0.2)
BASOPHILS NFR BLD: 0.7 %
BUN SERPL-MCNC: 8 MG/DL (ref 7–20)
CALCIUM SERPL-MCNC: 8.4 MG/DL (ref 8.3–10.6)
CHLORIDE SERPL-SCNC: 102 MMOL/L (ref 99–110)
CO2 SERPL-SCNC: 20 MMOL/L (ref 21–32)
CREAT SERPL-MCNC: 0.5 MG/DL (ref 0.6–1.1)
DEPRECATED RDW RBC AUTO: 14.8 % (ref 12.4–15.4)
EOSINOPHIL # BLD: 0 K/UL (ref 0–0.6)
EOSINOPHIL NFR BLD: 0 %
FIBRINOGEN PPP-MCNC: 579 MG/DL (ref 227–534)
GFR SERPLBLD CREATININE-BSD FMLA CKD-EPI: >90 ML/MIN/{1.73_M2}
GLUCOSE SERPL-MCNC: 120 MG/DL (ref 70–99)
HCT VFR BLD AUTO: 37.7 % (ref 36–48)
HGB BLD-MCNC: 12.8 G/DL (ref 12–16)
LYMPHOCYTES # BLD: 0.5 K/UL (ref 1–5.1)
LYMPHOCYTES NFR BLD: 5.6 %
MCH RBC QN AUTO: 30.1 PG (ref 26–34)
MCHC RBC AUTO-ENTMCNC: 34 G/DL (ref 31–36)
MCV RBC AUTO: 88.5 FL (ref 80–100)
MONOCYTES # BLD: 1 K/UL (ref 0–1.3)
MONOCYTES NFR BLD: 10.3 %
NEUTROPHILS # BLD: 8.3 K/UL (ref 1.7–7.7)
NEUTROPHILS NFR BLD: 83.4 %
PLATELET # BLD AUTO: 175 K/UL (ref 135–450)
PMV BLD AUTO: 9.1 FL (ref 5–10.5)
POTASSIUM SERPL-SCNC: 3.4 MMOL/L (ref 3.5–5.1)
RBC # BLD AUTO: 4.26 M/UL (ref 4–5.2)
SODIUM SERPL-SCNC: 134 MMOL/L (ref 136–145)
WBC # BLD AUTO: 9.9 K/UL (ref 4–11)

## 2024-11-02 PROCEDURE — 87324 CLOSTRIDIUM AG IA: CPT

## 2024-11-02 PROCEDURE — 36415 COLL VENOUS BLD VENIPUNCTURE: CPT

## 2024-11-02 PROCEDURE — 6360000002 HC RX W HCPCS

## 2024-11-02 PROCEDURE — 85384 FIBRINOGEN ACTIVITY: CPT

## 2024-11-02 PROCEDURE — 06PYX3Z REMOVAL OF INFUSION DEVICE FROM LOWER VEIN, EXTERNAL APPROACH: ICD-10-PCS | Performed by: STUDENT IN AN ORGANIZED HEALTH CARE EDUCATION/TRAINING PROGRAM

## 2024-11-02 PROCEDURE — 87070 CULTURE OTHR SPECIMN AEROBIC: CPT

## 2024-11-02 PROCEDURE — 2580000003 HC RX 258

## 2024-11-02 PROCEDURE — 2500000003 HC RX 250 WO HCPCS: Performed by: NURSE PRACTITIONER

## 2024-11-02 PROCEDURE — 2580000003 HC RX 258: Performed by: INTERNAL MEDICINE

## 2024-11-02 PROCEDURE — 6370000000 HC RX 637 (ALT 250 FOR IP): Performed by: NURSE PRACTITIONER

## 2024-11-02 PROCEDURE — 85025 COMPLETE CBC W/AUTO DIFF WBC: CPT

## 2024-11-02 PROCEDURE — 06PYX3Z REMOVAL OF INFUSION DEVICE FROM LOWER VEIN, EXTERNAL APPROACH: ICD-10-PCS

## 2024-11-02 PROCEDURE — 87040 BLOOD CULTURE FOR BACTERIA: CPT

## 2024-11-02 PROCEDURE — 6370000000 HC RX 637 (ALT 250 FOR IP): Performed by: INTERNAL MEDICINE

## 2024-11-02 PROCEDURE — 6370000000 HC RX 637 (ALT 250 FOR IP)

## 2024-11-02 PROCEDURE — 2060000000 HC ICU INTERMEDIATE R&B

## 2024-11-02 PROCEDURE — 87449 NOS EACH ORGANISM AG IA: CPT

## 2024-11-02 PROCEDURE — 80048 BASIC METABOLIC PNL TOTAL CA: CPT

## 2024-11-02 RX ORDER — POTASSIUM CHLORIDE 1500 MG/1
40 TABLET, EXTENDED RELEASE ORAL ONCE
Status: COMPLETED | OUTPATIENT
Start: 2024-11-02 | End: 2024-11-02

## 2024-11-02 RX ADMIN — ENOXAPARIN SODIUM 30 MG: 100 INJECTION SUBCUTANEOUS at 20:43

## 2024-11-02 RX ADMIN — SODIUM CHLORIDE, PRESERVATIVE FREE 10 ML: 5 INJECTION INTRAVENOUS at 20:48

## 2024-11-02 RX ADMIN — ENOXAPARIN SODIUM 30 MG: 100 INJECTION SUBCUTANEOUS at 08:54

## 2024-11-02 RX ADMIN — SODIUM CHLORIDE, PRESERVATIVE FREE 10 ML: 5 INJECTION INTRAVENOUS at 08:55

## 2024-11-02 RX ADMIN — CEFEPIME 2000 MG: 2 INJECTION, POWDER, FOR SOLUTION INTRAVENOUS at 12:23

## 2024-11-02 RX ADMIN — DIBASIC SODIUM PHOSPHATE, MONOBASIC POTASSIUM PHOSPHATE AND MONOBASIC SODIUM PHOSPHATE 2 TABLET: 852; 155; 130 TABLET ORAL at 20:42

## 2024-11-02 RX ADMIN — CARVEDILOL 12.5 MG: 12.5 TABLET, FILM COATED ORAL at 20:42

## 2024-11-02 RX ADMIN — CEFEPIME 2000 MG: 2 INJECTION, POWDER, FOR SOLUTION INTRAVENOUS at 20:32

## 2024-11-02 RX ADMIN — VALACYCLOVIR HYDROCHLORIDE 500 MG: 500 TABLET, FILM COATED ORAL at 20:42

## 2024-11-02 RX ADMIN — PROCHLORPERAZINE MALEATE 10 MG: 10 TABLET ORAL at 07:46

## 2024-11-02 RX ADMIN — HYDROCHLOROTHIAZIDE 12.5 MG: 12.5 CAPSULE ORAL at 08:54

## 2024-11-02 RX ADMIN — CARVEDILOL 12.5 MG: 12.5 TABLET, FILM COATED ORAL at 08:54

## 2024-11-02 RX ADMIN — FAMOTIDINE 40 MG: 20 TABLET ORAL at 08:54

## 2024-11-02 RX ADMIN — POTASSIUM CHLORIDE 40 MEQ: 1500 TABLET, EXTENDED RELEASE ORAL at 06:57

## 2024-11-02 RX ADMIN — ASPIRIN 81 MG: 81 TABLET, CHEWABLE ORAL at 08:54

## 2024-11-02 RX ADMIN — CEFEPIME 2000 MG: 2 INJECTION, POWDER, FOR SOLUTION INTRAVENOUS at 04:22

## 2024-11-02 RX ADMIN — BUTALBITAL, ACETAMINOPHEN, AND CAFFEINE 1 TABLET: 50; 325; 40 TABLET ORAL at 11:35

## 2024-11-02 RX ADMIN — TRAMADOL HYDROCHLORIDE 50 MG: 50 TABLET ORAL at 22:39

## 2024-11-02 RX ADMIN — BUTALBITAL, ACETAMINOPHEN, AND CAFFEINE 1 TABLET: 50; 325; 40 TABLET ORAL at 17:39

## 2024-11-02 RX ADMIN — DIBASIC SODIUM PHOSPHATE, MONOBASIC POTASSIUM PHOSPHATE AND MONOBASIC SODIUM PHOSPHATE 2 TABLET: 852; 155; 130 TABLET ORAL at 09:57

## 2024-11-02 RX ADMIN — VALACYCLOVIR HYDROCHLORIDE 500 MG: 500 TABLET, FILM COATED ORAL at 08:54

## 2024-11-02 RX ADMIN — LOSARTAN POTASSIUM 50 MG: 50 TABLET, FILM COATED ORAL at 08:54

## 2024-11-02 RX ADMIN — TRAMADOL HYDROCHLORIDE 50 MG: 50 TABLET ORAL at 16:19

## 2024-11-02 RX ADMIN — ACETAMINOPHEN 650 MG: 325 TABLET ORAL at 09:00

## 2024-11-02 RX ADMIN — TRAMADOL HYDROCHLORIDE 50 MG: 50 TABLET ORAL at 09:57

## 2024-11-02 RX ADMIN — BUTALBITAL, ACETAMINOPHEN, AND CAFFEINE 1 TABLET: 50; 325; 40 TABLET ORAL at 04:37

## 2024-11-02 ASSESSMENT — PAIN SCALES - GENERAL
PAINLEVEL_OUTOF10: 6
PAINLEVEL_OUTOF10: 4
PAINLEVEL_OUTOF10: 6
PAINLEVEL_OUTOF10: 2
PAINLEVEL_OUTOF10: 4
PAINLEVEL_OUTOF10: 6
PAINLEVEL_OUTOF10: 4
PAINLEVEL_OUTOF10: 4
PAINLEVEL_OUTOF10: 5
PAINLEVEL_OUTOF10: 6
PAINLEVEL_OUTOF10: 6

## 2024-11-02 ASSESSMENT — PAIN - FUNCTIONAL ASSESSMENT

## 2024-11-02 ASSESSMENT — PAIN DESCRIPTION - ONSET
ONSET: ON-GOING

## 2024-11-02 ASSESSMENT — PAIN DESCRIPTION - DESCRIPTORS
DESCRIPTORS: ACHING

## 2024-11-02 ASSESSMENT — PAIN DESCRIPTION - LOCATION
LOCATION: HEAD

## 2024-11-02 ASSESSMENT — PAIN DESCRIPTION - PAIN TYPE
TYPE: ACUTE PAIN

## 2024-11-02 ASSESSMENT — PAIN DESCRIPTION - FREQUENCY
FREQUENCY: CONTINUOUS

## 2024-11-02 ASSESSMENT — PAIN DESCRIPTION - ORIENTATION
ORIENTATION: INNER
ORIENTATION: ANTERIOR
ORIENTATION: ANTERIOR
ORIENTATION: INNER
ORIENTATION: ANTERIOR
ORIENTATION: INNER

## 2024-11-02 NOTE — PLAN OF CARE
Problem: Pain  Goal: Verbalizes/displays adequate comfort level or baseline comfort level  Outcome: Progressing  Flowsheets (Taken 11/2/2024 0721)  Verbalizes/displays adequate comfort level or baseline comfort level:   Encourage patient to monitor pain and request assistance   Assess pain using appropriate pain scale   Implement non-pharmacological measures as appropriate and evaluate response     Problem: Safety - Adult  Goal: Free from fall injury  Outcome: Progressing     Problem: ABCDS Injury Assessment  Goal: Absence of physical injury  Outcome: Progressing  Flowsheets (Taken 11/2/2024 0721)  Absence of Physical Injury: Implement safety measures based on patient assessment

## 2024-11-02 NOTE — CARE COORDINATION
Patient admitted with fever, positive blood cultures.  Patient currently has home infusion of Blincyto.  This infusion complete on 11/2 @ 4:00 PM.  When infusion is complete the plan is for the PICC line to be pulled.  When team rounds in the morning this will be confirmed.      Patient will be discharged when afebrile and antibiotics treatment complete.

## 2024-11-02 NOTE — PROGRESS NOTES
[facility] Progress Note    2024     Pam Guido    MRN: [id number]    : 1975    Referring MD: No referring provider defined for this encounter.      SUBJECTIVE:      Continues with low-grade temps. Low-grade headache.  Response to Tylenol.  Having some diarrhea which is more than she usually has when she is on antibiotics.    ECOG PS:  (1) Restricted in physically strenuous activity, ambulatory and able to do work of light nature    KPS: 80% Normal activity with effort; some signs or symptoms of disease    Isolation: None    Medications    Scheduled Meds:   cefepime  2,000 mg IntraVENous q8h    enoxaparin  30 mg SubCUTAneous BID    aspirin  81 mg Oral Daily    carvedilol  12.5 mg Oral BID    famotidine  40 mg Oral Daily    hydroCHLOROthiazide  12.5 mg Oral QAM    losartan  50 mg Oral QAM    PONATinib HCl  15 mg Oral Daily    valACYclovir  500 mg Oral BID    sodium chloride flush  5-40 mL IntraVENous 2 times per day    Saline Mouthwash  15 mL Swish & Spit 4x Daily AC & HS     Continuous Infusions:   blinatumomab (BLINCYTO) 65 mcg in sodium chloride 0.9 % 280.7 mL chemo infusion (patient own supply) (Patient Supplied) 65 mcg (24 1418)    sodium chloride      sodium chloride       PRN Meds:.traMADol, butalbital-acetaminophen-caffeine, prochlorperazine **OR** prochlorperazine, diphenhydrAMINE, sodium chloride, sodium chloride flush, sodium chloride, potassium chloride, magnesium sulfate, acetaminophen **OR** [DISCONTINUED] acetaminophen, Saline Mouthwash, ALTEplase (CATHFLO) 2 mg in sterile water 2 mL injection    ROS:  As noted above, otherwise remainder of 10-point ROS negative    Physical Exam:     I&O:    Intake/Output Summary (Last 24 hours) at 2024 0933  Last data filed at 2024 2105  Gross per 24 hour   Intake 472 ml   Output --   Net 472 ml       Vital Signs:  BP (!) 156/81   Pulse (!) 118   Temp (!) 101.4 °F (38.6 °C) (Oral)   Resp 20   Ht 1.727 m (5' 8\")   Wt 120.4 kg (265      9. Neuro:  Headache:   - Likely d/t Ponatinib and improved on lower dose  - Cont Tylenol and tramadol prn- not helping   - Fioricet added 11/1/24     10. Vascular Access  - PICC placed 10/9/2024, will remove     - DVT Prophylaxis: Platelets >50,000 cells/dL, - daily lovenox prophylaxis ordered  Contraindications to pharmacologic prophylaxis: None  Contraindications to mechanical prophylaxis: None    - Disposition: Unknown    The patient was seen and examined by Dr. Waterhouse Jody M Moehring, APRN - CNP    Upon completion of the patient's chemo we will go ahead and pull her PICC line and culture the tip.  We will continue her antibiotics through her peripheral line.  I will ask for stool check for C. difficile.  She has been on multiple courses of antibiotics.    David M. Waterhouse, M.D., MPH  Director, Early Phase Clinical Trials  [facility] ([facility])/ [place] [address]  Office [phone]  Cell [phone]  [email or web address]    \"Participating in a clinical trial is the first step to fighting cancer; not the last.\"

## 2024-11-03 LAB
ANION GAP SERPL CALCULATED.3IONS-SCNC: 9 MMOL/L (ref 3–16)
BACTERIA BLD CULT: ABNORMAL
BACTERIA BLD CULT: ABNORMAL
BASOPHILS # BLD: 0 K/UL (ref 0–0.2)
BASOPHILS NFR BLD: 0 %
BLOOD BANK DISPENSE STATUS: NORMAL
BLOOD BANK PRODUCT CODE: NORMAL
BPU ID: NORMAL
BUN SERPL-MCNC: 7 MG/DL (ref 7–20)
C DIFF TOX A+B STL QL IA: ABNORMAL
CALCIUM SERPL-MCNC: 8.3 MG/DL (ref 8.3–10.6)
CHLORIDE SERPL-SCNC: 105 MMOL/L (ref 99–110)
CO2 SERPL-SCNC: 21 MMOL/L (ref 21–32)
CREAT SERPL-MCNC: <0.5 MG/DL (ref 0.6–1.1)
DEPRECATED RDW RBC AUTO: 15.2 % (ref 12.4–15.4)
DESCRIPTION BLOOD BANK: NORMAL
EOSINOPHIL # BLD: 0.2 K/UL (ref 0–0.6)
EOSINOPHIL NFR BLD: 2 %
FIBRINOGEN PPP-MCNC: 475 MG/DL (ref 227–534)
FINAL REPORT: NORMAL
GFR SERPLBLD CREATININE-BSD FMLA CKD-EPI: >90 ML/MIN/{1.73_M2}
GLUCOSE SERPL-MCNC: 93 MG/DL (ref 70–99)
HCT VFR BLD AUTO: 36.2 % (ref 36–48)
HGB BLD-MCNC: 11.9 G/DL (ref 12–16)
LYMPHOCYTES # BLD: 1 K/UL (ref 1–5.1)
LYMPHOCYTES NFR BLD: 12 %
MCH RBC QN AUTO: 29.9 PG (ref 26–34)
MCHC RBC AUTO-ENTMCNC: 33 G/DL (ref 31–36)
MCV RBC AUTO: 90.6 FL (ref 80–100)
MONOCYTES # BLD: 1.2 K/UL (ref 0–1.3)
MONOCYTES NFR BLD: 15 %
NEUTROPHILS # BLD: 5.8 K/UL (ref 1.7–7.7)
NEUTROPHILS NFR BLD: 70 %
NEUTS BAND NFR BLD MANUAL: 1 % (ref 0–7)
ORGANISM: ABNORMAL
ORGANISM: ABNORMAL
PHOSPHATE SERPL-MCNC: 2.1 MG/DL (ref 2.5–4.9)
PLATELET # BLD AUTO: 152 K/UL (ref 135–450)
PLATELET BLD QL SMEAR: ADEQUATE
PMV BLD AUTO: 9.7 FL (ref 5–10.5)
POTASSIUM SERPL-SCNC: 3 MMOL/L (ref 3.5–5.1)
PRELIMINARY: NORMAL
RBC # BLD AUTO: 4 M/UL (ref 4–5.2)
RBC MORPH BLD: NORMAL
SLIDE REVIEW: ABNORMAL
SODIUM SERPL-SCNC: 135 MMOL/L (ref 136–145)
WBC # BLD AUTO: 8.1 K/UL (ref 4–11)

## 2024-11-03 PROCEDURE — 6370000000 HC RX 637 (ALT 250 FOR IP): Performed by: INTERNAL MEDICINE

## 2024-11-03 PROCEDURE — 2580000003 HC RX 258: Performed by: INTERNAL MEDICINE

## 2024-11-03 PROCEDURE — 6370000000 HC RX 637 (ALT 250 FOR IP)

## 2024-11-03 PROCEDURE — 85384 FIBRINOGEN ACTIVITY: CPT

## 2024-11-03 PROCEDURE — 2580000003 HC RX 258

## 2024-11-03 PROCEDURE — 85025 COMPLETE CBC W/AUTO DIFF WBC: CPT

## 2024-11-03 PROCEDURE — 84100 ASSAY OF PHOSPHORUS: CPT

## 2024-11-03 PROCEDURE — 80048 BASIC METABOLIC PNL TOTAL CA: CPT

## 2024-11-03 PROCEDURE — 36415 COLL VENOUS BLD VENIPUNCTURE: CPT

## 2024-11-03 PROCEDURE — 6370000000 HC RX 637 (ALT 250 FOR IP): Performed by: NURSE PRACTITIONER

## 2024-11-03 PROCEDURE — 2060000000 HC ICU INTERMEDIATE R&B

## 2024-11-03 PROCEDURE — 6360000002 HC RX W HCPCS

## 2024-11-03 RX ORDER — VANCOMYCIN HYDROCHLORIDE 125 MG/1
125 CAPSULE ORAL 4 TIMES DAILY
Status: DISCONTINUED | OUTPATIENT
Start: 2024-11-03 | End: 2024-11-07 | Stop reason: HOSPADM

## 2024-11-03 RX ORDER — POTASSIUM CHLORIDE 1500 MG/1
20 TABLET, EXTENDED RELEASE ORAL ONCE
Status: COMPLETED | OUTPATIENT
Start: 2024-11-03 | End: 2024-11-03

## 2024-11-03 RX ADMIN — VALACYCLOVIR HYDROCHLORIDE 500 MG: 500 TABLET, FILM COATED ORAL at 20:12

## 2024-11-03 RX ADMIN — POTASSIUM CHLORIDE 20 MEQ: 1500 TABLET, EXTENDED RELEASE ORAL at 07:10

## 2024-11-03 RX ADMIN — VALACYCLOVIR HYDROCHLORIDE 500 MG: 500 TABLET, FILM COATED ORAL at 08:55

## 2024-11-03 RX ADMIN — ENOXAPARIN SODIUM 30 MG: 100 INJECTION SUBCUTANEOUS at 08:56

## 2024-11-03 RX ADMIN — CEFEPIME 2000 MG: 2 INJECTION, POWDER, FOR SOLUTION INTRAVENOUS at 12:15

## 2024-11-03 RX ADMIN — DIPHENHYDRAMINE HYDROCHLORIDE 25 MG: 25 TABLET ORAL at 01:58

## 2024-11-03 RX ADMIN — HYDROCHLOROTHIAZIDE 12.5 MG: 12.5 CAPSULE ORAL at 08:56

## 2024-11-03 RX ADMIN — ENOXAPARIN SODIUM 30 MG: 100 INJECTION SUBCUTANEOUS at 20:16

## 2024-11-03 RX ADMIN — LOSARTAN POTASSIUM 50 MG: 50 TABLET, FILM COATED ORAL at 08:56

## 2024-11-03 RX ADMIN — VANCOMYCIN HYDROCHLORIDE 125 MG: 125 CAPSULE ORAL at 08:56

## 2024-11-03 RX ADMIN — FAMOTIDINE 40 MG: 20 TABLET ORAL at 08:55

## 2024-11-03 RX ADMIN — VANCOMYCIN HYDROCHLORIDE 125 MG: 125 CAPSULE ORAL at 16:37

## 2024-11-03 RX ADMIN — BUTALBITAL, ACETAMINOPHEN, AND CAFFEINE 1 TABLET: 50; 325; 40 TABLET ORAL at 23:05

## 2024-11-03 RX ADMIN — VANCOMYCIN HYDROCHLORIDE 125 MG: 125 CAPSULE ORAL at 12:16

## 2024-11-03 RX ADMIN — SODIUM CHLORIDE, PRESERVATIVE FREE 10 ML: 5 INJECTION INTRAVENOUS at 08:56

## 2024-11-03 RX ADMIN — BUTALBITAL, ACETAMINOPHEN, AND CAFFEINE 1 TABLET: 50; 325; 40 TABLET ORAL at 05:04

## 2024-11-03 RX ADMIN — CARVEDILOL 12.5 MG: 12.5 TABLET, FILM COATED ORAL at 08:56

## 2024-11-03 RX ADMIN — CARVEDILOL 12.5 MG: 12.5 TABLET, FILM COATED ORAL at 20:12

## 2024-11-03 RX ADMIN — CEFEPIME 2000 MG: 2 INJECTION, POWDER, FOR SOLUTION INTRAVENOUS at 20:15

## 2024-11-03 RX ADMIN — ASPIRIN 81 MG: 81 TABLET, CHEWABLE ORAL at 08:55

## 2024-11-03 RX ADMIN — CEFEPIME 2000 MG: 2 INJECTION, POWDER, FOR SOLUTION INTRAVENOUS at 04:53

## 2024-11-03 RX ADMIN — BUTALBITAL, ACETAMINOPHEN, AND CAFFEINE 1 TABLET: 50; 325; 40 TABLET ORAL at 16:37

## 2024-11-03 RX ADMIN — TRAMADOL HYDROCHLORIDE 50 MG: 50 TABLET ORAL at 20:12

## 2024-11-03 RX ADMIN — VANCOMYCIN HYDROCHLORIDE 125 MG: 125 CAPSULE ORAL at 20:12

## 2024-11-03 ASSESSMENT — PAIN DESCRIPTION - FREQUENCY
FREQUENCY: CONTINUOUS
FREQUENCY: CONTINUOUS
FREQUENCY: INTERMITTENT
FREQUENCY: INTERMITTENT
FREQUENCY: CONTINUOUS

## 2024-11-03 ASSESSMENT — PAIN SCALES - GENERAL
PAINLEVEL_OUTOF10: 3
PAINLEVEL_OUTOF10: 2
PAINLEVEL_OUTOF10: 6
PAINLEVEL_OUTOF10: 5
PAINLEVEL_OUTOF10: 4
PAINLEVEL_OUTOF10: 5
PAINLEVEL_OUTOF10: 0
PAINLEVEL_OUTOF10: 3
PAINLEVEL_OUTOF10: 3
PAINLEVEL_OUTOF10: 5

## 2024-11-03 ASSESSMENT — PAIN DESCRIPTION - LOCATION
LOCATION: HEAD

## 2024-11-03 ASSESSMENT — PAIN DESCRIPTION - PAIN TYPE
TYPE: ACUTE PAIN

## 2024-11-03 ASSESSMENT — PAIN DESCRIPTION - ONSET
ONSET: ON-GOING

## 2024-11-03 ASSESSMENT — PAIN DESCRIPTION - ORIENTATION
ORIENTATION: INNER
ORIENTATION: ANTERIOR
ORIENTATION: POSTERIOR
ORIENTATION: INNER

## 2024-11-03 ASSESSMENT — PAIN DESCRIPTION - DESCRIPTORS
DESCRIPTORS: ACHING
DESCRIPTORS: ACHING;DISCOMFORT
DESCRIPTORS: ACHING

## 2024-11-03 ASSESSMENT — PAIN SCALES - WONG BAKER: WONGBAKER_NUMERICALRESPONSE: NO HURT

## 2024-11-03 NOTE — PLAN OF CARE
Problem: Pain  Goal: Verbalizes/displays adequate comfort level or baseline comfort level  Outcome: Progressing  Flowsheets (Taken 11/3/2024 0850)  Verbalizes/displays adequate comfort level or baseline comfort level:   Encourage patient to monitor pain and request assistance   Administer analgesics based on type and severity of pain and evaluate response   Assess pain using appropriate pain scale   Implement non-pharmacological measures as appropriate and evaluate response     Problem: Safety - Adult  Goal: Free from fall injury  Outcome: Progressing     Problem: ABCDS Injury Assessment  Goal: Absence of physical injury  Outcome: Progressing  Flowsheets (Taken 11/3/2024 0850)  Absence of Physical Injury: Implement safety measures based on patient assessment

## 2024-11-03 NOTE — PROGRESS NOTES
[facility] Progress Note    11/3/2024     Pam Guido    MRN: [id number]    : 1975    Referring MD: No referring provider defined for this encounter.      SUBJECTIVE:      Diarrhea continued last night, a walker x 1.  Several times felt that she was not can to make it to the bathroom.  Able to take p.o.  No significant abdominal pain or cramping.  Stool tested positive for C. difficile    ECOG PS:  (1) Restricted in physically strenuous activity, ambulatory and able to do work of light nature    KPS: 80% Normal activity with effort; some signs or symptoms of disease    Isolation: None    Medications    Scheduled Meds:   vancomycin  125 mg Oral 4x Daily    potassium chloride  20 mEq Oral Once    cefepime  2,000 mg IntraVENous q8h    enoxaparin  30 mg SubCUTAneous BID    aspirin  81 mg Oral Daily    carvedilol  12.5 mg Oral BID    famotidine  40 mg Oral Daily    hydroCHLOROthiazide  12.5 mg Oral QAM    losartan  50 mg Oral QAM    PONATinib HCl  15 mg Oral Daily    valACYclovir  500 mg Oral BID    sodium chloride flush  5-40 mL IntraVENous 2 times per day    Saline Mouthwash  15 mL Swish & Spit 4x Daily AC & HS     Continuous Infusions:   sodium chloride      sodium chloride       PRN Meds:.traMADol, butalbital-acetaminophen-caffeine, prochlorperazine **OR** prochlorperazine, diphenhydrAMINE, sodium chloride, sodium chloride flush, sodium chloride, potassium chloride, magnesium sulfate, acetaminophen **OR** [DISCONTINUED] acetaminophen, Saline Mouthwash, ALTEplase (CATHFLO) 2 mg in sterile water 2 mL injection    ROS:  As noted above, otherwise remainder of 10-point ROS negative    Physical Exam:     I&O:    Intake/Output Summary (Last 24 hours) at 11/3/2024 0631  Last data filed at 11/3/2024 0030  Gross per 24 hour   Intake 310 ml   Output 1900 ml   Net -1590 ml       Vital Signs:  /69   Pulse 87   Temp 98.1 °F (36.7 °C) (Oral)   Resp 18   Ht 1.727 m (5' 8\")   Wt 120.4 kg (265 lb 6.4 oz)   LMP

## 2024-11-03 NOTE — PROGRESS NOTES
Notified Dr David Waterhouse regarding low Serum Potassium level of patient this AM. New order given. See MAR.

## 2024-11-03 NOTE — PLAN OF CARE
Problem: Safety - Adult  Goal: Free from fall injury  11/3/2024 1515 by Ya Grullon, RN  Outcome: Progressing  Flowsheets (Taken 11/3/2024 1515)  Free From Fall Injury:   Instruct family/caregiver on patient safety   Based on caregiver fall risk screen, instruct family/caregiver to ask for assistance with transferring infant if caregiver noted to have fall risk factors  Note: Patient is up ad mervat, ortho negative and has no S/S of hypotension.      Problem: ABCDS Injury Assessment  Goal: Absence of physical injury  11/3/2024 1515 by Ya Grullon, RN  Outcome: Progressing  Flowsheets (Taken 11/3/2024 1515)  Absence of Physical Injury: Implement safety measures based on patient assessment  Note: Bed in lowest position, call light within reach, non skid socks on.

## 2024-11-04 ENCOUNTER — APPOINTMENT (OUTPATIENT)
Dept: GENERAL RADIOLOGY | Age: 49
DRG: 835 | End: 2024-11-04
Payer: COMMERCIAL

## 2024-11-04 LAB
ALBUMIN SERPL-MCNC: 3.2 G/DL (ref 3.4–5)
ALP SERPL-CCNC: 69 U/L (ref 40–129)
ALT SERPL-CCNC: 38 U/L (ref 10–40)
ANION GAP SERPL CALCULATED.3IONS-SCNC: 10 MMOL/L (ref 3–16)
APTT BLD: 31.4 SEC (ref 22.1–36.4)
AST SERPL-CCNC: 29 U/L (ref 15–37)
BACTERIA BLD CULT ORG #2: NORMAL
BASOPHILS # BLD: 0.1 K/UL (ref 0–0.2)
BASOPHILS NFR BLD: 1.6 %
BILIRUB DIRECT SERPL-MCNC: 0.2 MG/DL (ref 0–0.3)
BILIRUB INDIRECT SERPL-MCNC: ABNORMAL MG/DL (ref 0–1)
BILIRUB SERPL-MCNC: <0.2 MG/DL (ref 0–1)
BUN SERPL-MCNC: 8 MG/DL (ref 7–20)
CALCIUM SERPL-MCNC: 8.4 MG/DL (ref 8.3–10.6)
CHLORIDE SERPL-SCNC: 108 MMOL/L (ref 99–110)
CO2 SERPL-SCNC: 22 MMOL/L (ref 21–32)
CREAT SERPL-MCNC: <0.5 MG/DL (ref 0.6–1.1)
DEPRECATED RDW RBC AUTO: 15.1 % (ref 12.4–15.4)
EOSINOPHIL # BLD: 0.4 K/UL (ref 0–0.6)
EOSINOPHIL NFR BLD: 7.7 %
FIBRINOGEN PPP-MCNC: 540 MG/DL (ref 227–534)
FUNGUS SPEC CULT: ABNORMAL
GFR SERPLBLD CREATININE-BSD FMLA CKD-EPI: >90 ML/MIN/{1.73_M2}
GLUCOSE SERPL-MCNC: 98 MG/DL (ref 70–99)
HCT VFR BLD AUTO: 33.9 % (ref 36–48)
HGB BLD-MCNC: 11.4 G/DL (ref 12–16)
INR PPP: 1.08 (ref 0.85–1.15)
LOEFFLER MB STN SPEC: ABNORMAL
LYMPHOCYTES # BLD: 1.3 K/UL (ref 1–5.1)
LYMPHOCYTES NFR BLD: 23.7 %
MAGNESIUM SERPL-MCNC: 2.4 MG/DL (ref 1.8–2.4)
MCH RBC QN AUTO: 30 PG (ref 26–34)
MCHC RBC AUTO-ENTMCNC: 33.8 G/DL (ref 31–36)
MCV RBC AUTO: 88.9 FL (ref 80–100)
MONOCYTES # BLD: 1 K/UL (ref 0–1.3)
MONOCYTES NFR BLD: 17.9 %
NEUTROPHILS # BLD: 2.6 K/UL (ref 1.7–7.7)
NEUTROPHILS NFR BLD: 49.1 %
ORGANISM: ABNORMAL
PHOSPHATE SERPL-MCNC: 3.4 MG/DL (ref 2.5–4.9)
PLATELET # BLD AUTO: 177 K/UL (ref 135–450)
PMV BLD AUTO: 9.9 FL (ref 5–10.5)
POTASSIUM SERPL-SCNC: 3.4 MMOL/L (ref 3.5–5.1)
PROT SERPL-MCNC: 5.6 G/DL (ref 6.4–8.2)
PROTHROMBIN TIME: 14.2 SEC (ref 11.9–14.9)
RBC # BLD AUTO: 3.81 M/UL (ref 4–5.2)
SODIUM SERPL-SCNC: 140 MMOL/L (ref 136–145)
WBC # BLD AUTO: 5.4 K/UL (ref 4–11)

## 2024-11-04 PROCEDURE — 6370000000 HC RX 637 (ALT 250 FOR IP): Performed by: INTERNAL MEDICINE

## 2024-11-04 PROCEDURE — 2580000003 HC RX 258: Performed by: INTERNAL MEDICINE

## 2024-11-04 PROCEDURE — 2060000000 HC ICU INTERMEDIATE R&B

## 2024-11-04 PROCEDURE — 2580000003 HC RX 258

## 2024-11-04 PROCEDURE — 84100 ASSAY OF PHOSPHORUS: CPT

## 2024-11-04 PROCEDURE — 6370000000 HC RX 637 (ALT 250 FOR IP)

## 2024-11-04 PROCEDURE — 83735 ASSAY OF MAGNESIUM: CPT

## 2024-11-04 PROCEDURE — 85730 THROMBOPLASTIN TIME PARTIAL: CPT

## 2024-11-04 PROCEDURE — 85384 FIBRINOGEN ACTIVITY: CPT

## 2024-11-04 PROCEDURE — 6360000002 HC RX W HCPCS

## 2024-11-04 PROCEDURE — 85025 COMPLETE CBC W/AUTO DIFF WBC: CPT

## 2024-11-04 PROCEDURE — 71045 X-RAY EXAM CHEST 1 VIEW: CPT

## 2024-11-04 PROCEDURE — 6370000000 HC RX 637 (ALT 250 FOR IP): Performed by: NURSE PRACTITIONER

## 2024-11-04 PROCEDURE — 80048 BASIC METABOLIC PNL TOTAL CA: CPT

## 2024-11-04 PROCEDURE — 80076 HEPATIC FUNCTION PANEL: CPT

## 2024-11-04 PROCEDURE — 85610 PROTHROMBIN TIME: CPT

## 2024-11-04 PROCEDURE — 36415 COLL VENOUS BLD VENIPUNCTURE: CPT

## 2024-11-04 RX ORDER — POTASSIUM CHLORIDE 1500 MG/1
40 TABLET, EXTENDED RELEASE ORAL 2 TIMES DAILY WITH MEALS
Status: COMPLETED | OUTPATIENT
Start: 2024-11-04 | End: 2024-11-04

## 2024-11-04 RX ADMIN — CEFEPIME 2000 MG: 2 INJECTION, POWDER, FOR SOLUTION INTRAVENOUS at 20:11

## 2024-11-04 RX ADMIN — POTASSIUM CHLORIDE 40 MEQ: 1500 TABLET, EXTENDED RELEASE ORAL at 17:12

## 2024-11-04 RX ADMIN — SODIUM CHLORIDE, PRESERVATIVE FREE 10 ML: 5 INJECTION INTRAVENOUS at 20:18

## 2024-11-04 RX ADMIN — LOSARTAN POTASSIUM 50 MG: 50 TABLET, FILM COATED ORAL at 09:50

## 2024-11-04 RX ADMIN — SODIUM CHLORIDE 15 ML: 900 IRRIGANT IRRIGATION at 20:17

## 2024-11-04 RX ADMIN — VANCOMYCIN HYDROCHLORIDE 125 MG: 125 CAPSULE ORAL at 12:58

## 2024-11-04 RX ADMIN — HYDROCHLOROTHIAZIDE 12.5 MG: 12.5 CAPSULE ORAL at 09:50

## 2024-11-04 RX ADMIN — FAMOTIDINE 40 MG: 20 TABLET ORAL at 09:50

## 2024-11-04 RX ADMIN — VANCOMYCIN HYDROCHLORIDE 125 MG: 125 CAPSULE ORAL at 09:50

## 2024-11-04 RX ADMIN — VALACYCLOVIR HYDROCHLORIDE 500 MG: 500 TABLET, FILM COATED ORAL at 20:14

## 2024-11-04 RX ADMIN — VALACYCLOVIR HYDROCHLORIDE 500 MG: 500 TABLET, FILM COATED ORAL at 09:50

## 2024-11-04 RX ADMIN — VANCOMYCIN HYDROCHLORIDE 125 MG: 125 CAPSULE ORAL at 20:14

## 2024-11-04 RX ADMIN — CARVEDILOL 12.5 MG: 12.5 TABLET, FILM COATED ORAL at 09:50

## 2024-11-04 RX ADMIN — ENOXAPARIN SODIUM 30 MG: 100 INJECTION SUBCUTANEOUS at 09:50

## 2024-11-04 RX ADMIN — ENOXAPARIN SODIUM 30 MG: 100 INJECTION SUBCUTANEOUS at 20:14

## 2024-11-04 RX ADMIN — CEFEPIME 2000 MG: 2 INJECTION, POWDER, FOR SOLUTION INTRAVENOUS at 04:19

## 2024-11-04 RX ADMIN — CARVEDILOL 12.5 MG: 12.5 TABLET, FILM COATED ORAL at 20:14

## 2024-11-04 RX ADMIN — ASPIRIN 81 MG: 81 TABLET, CHEWABLE ORAL at 09:50

## 2024-11-04 RX ADMIN — VANCOMYCIN HYDROCHLORIDE 125 MG: 125 CAPSULE ORAL at 17:12

## 2024-11-04 RX ADMIN — POTASSIUM CHLORIDE 40 MEQ: 1500 TABLET, EXTENDED RELEASE ORAL at 09:50

## 2024-11-04 RX ADMIN — SODIUM CHLORIDE, PRESERVATIVE FREE 10 ML: 5 INJECTION INTRAVENOUS at 09:51

## 2024-11-04 RX ADMIN — CEFEPIME 2000 MG: 2 INJECTION, POWDER, FOR SOLUTION INTRAVENOUS at 11:50

## 2024-11-04 ASSESSMENT — PAIN SCALES - GENERAL: PAINLEVEL_OUTOF10: 0

## 2024-11-04 NOTE — CARE COORDINATION
Spoke with pt. She was seen ambulating in her room and denied needs for SW.     She plans on returning home with her  when able with no anticipated SW needs.     SW will follow    ALMA Ivey   for [facility] ([facility])  [place] [employer]: [phone]

## 2024-11-04 NOTE — PROGRESS NOTES
[facility] Progress Note    2024     Pam Guido    MRN: [id number]    : 1975    Referring MD: No referring provider defined for this encounter.      SUBJECTIVE:      Diarrhea continued last night.  Several times felt that she was not can to make it to the bathroom.  Able to take p.o.  No significant abdominal pain or cramping.  Stool tested positive for C. Difficile.  Has Klebsiella bacteremia sens to Cefepime which she is on.      ECOG PS:  (1) Restricted in physically strenuous activity, ambulatory and able to do work of light nature    KPS: 80% Normal activity with effort; some signs or symptoms of disease    Isolation: None    Medications    Scheduled Meds:   potassium chloride  40 mEq Oral BID WC    vancomycin  125 mg Oral 4x Daily    cefepime  2,000 mg IntraVENous q8h    enoxaparin  30 mg SubCUTAneous BID    aspirin  81 mg Oral Daily    carvedilol  12.5 mg Oral BID    famotidine  40 mg Oral Daily    hydroCHLOROthiazide  12.5 mg Oral QAM    losartan  50 mg Oral QAM    PONATinib HCl  15 mg Oral Daily    valACYclovir  500 mg Oral BID    sodium chloride flush  5-40 mL IntraVENous 2 times per day    Saline Mouthwash  15 mL Swish & Spit 4x Daily AC & HS     Continuous Infusions:   sodium chloride      sodium chloride       PRN Meds:.traMADol, butalbital-acetaminophen-caffeine, prochlorperazine **OR** prochlorperazine, diphenhydrAMINE, sodium chloride, sodium chloride flush, sodium chloride, potassium chloride, magnesium sulfate, acetaminophen **OR** [DISCONTINUED] acetaminophen, Saline Mouthwash, ALTEplase (CATHFLO) 2 mg in sterile water 2 mL injection    ROS:  As noted above, otherwise remainder of 10-point ROS negative    Physical Exam:     I&O:    Intake/Output Summary (Last 24 hours) at 2024 0902  Last data filed at 2024 0651  Gross per 24 hour   Intake 1993 ml   Output 1000 ml   Net 993 ml       Vital Signs:  /86   Pulse 77   Temp 97.8 °F (36.6 °C) (Oral)   Resp 20   Ht 1.727

## 2024-11-04 NOTE — PLAN OF CARE
Problem: Pain  Goal: Verbalizes/displays adequate comfort level or baseline comfort level  Outcome: Progressing  Note: Pt complained of headache, PRN pain meds given.     Problem: Safety - Adult  Goal: Free from fall injury  Outcome: Progressing  Note:   - Screening for Orthostasis AND not a [place] Risk per [place]/ABCDS: Pt bed is in low position, side rails up, call light and belongings are in reach.  Fall risk light is on outside pts room.  Pt encouraged to call for assistance as needed. Will continue with hourly rounds for PO intake, pain needs, toileting and repositioning as needed.       Problem: ABCDS Injury Assessment  Goal: Absence of physical injury  Outcome: Progressing

## 2024-11-05 LAB
ANION GAP SERPL CALCULATED.3IONS-SCNC: 11 MMOL/L (ref 3–16)
BASOPHILS # BLD: 0.1 K/UL (ref 0–0.2)
BASOPHILS NFR BLD: 1.9 %
BILIRUB UR QL STRIP.AUTO: NEGATIVE
BUN SERPL-MCNC: 7 MG/DL (ref 7–20)
CALCIUM SERPL-MCNC: 8.5 MG/DL (ref 8.3–10.6)
CHLORIDE SERPL-SCNC: 104 MMOL/L (ref 99–110)
CLARITY UR: CLEAR
CO2 SERPL-SCNC: 22 MMOL/L (ref 21–32)
COLOR UR: YELLOW
CREAT SERPL-MCNC: <0.5 MG/DL (ref 0.6–1.1)
DEPRECATED RDW RBC AUTO: 15.4 % (ref 12.4–15.4)
EOSINOPHIL # BLD: 0.7 K/UL (ref 0–0.6)
EOSINOPHIL NFR BLD: 11.3 %
FIBRINOGEN PPP-MCNC: 502 MG/DL (ref 227–534)
GFR SERPLBLD CREATININE-BSD FMLA CKD-EPI: >90 ML/MIN/{1.73_M2}
GLUCOSE SERPL-MCNC: 99 MG/DL (ref 70–99)
GLUCOSE UR STRIP.AUTO-MCNC: NEGATIVE MG/DL
HCT VFR BLD AUTO: 34 % (ref 36–48)
HGB BLD-MCNC: 11.4 G/DL (ref 12–16)
HGB UR QL STRIP.AUTO: NEGATIVE
KETONES UR STRIP.AUTO-MCNC: NEGATIVE MG/DL
LEUKOCYTE ESTERASE UR QL STRIP.AUTO: NEGATIVE
LYMPHOCYTES # BLD: 1.4 K/UL (ref 1–5.1)
LYMPHOCYTES NFR BLD: 21.8 %
MCH RBC QN AUTO: 29.5 PG (ref 26–34)
MCHC RBC AUTO-ENTMCNC: 33.4 G/DL (ref 31–36)
MCV RBC AUTO: 88.4 FL (ref 80–100)
MONOCYTES # BLD: 0.8 K/UL (ref 0–1.3)
MONOCYTES NFR BLD: 12.7 %
NEUTROPHILS # BLD: 3.4 K/UL (ref 1.7–7.7)
NEUTROPHILS NFR BLD: 52.3 %
NITRITE UR QL STRIP.AUTO: NEGATIVE
PH UR STRIP.AUTO: 7 [PH] (ref 5–8)
PLATELET # BLD AUTO: 232 K/UL (ref 135–450)
PMV BLD AUTO: 9.8 FL (ref 5–10.5)
POTASSIUM SERPL-SCNC: 3.9 MMOL/L (ref 3.5–5.1)
PROT UR STRIP.AUTO-MCNC: NEGATIVE MG/DL
RBC # BLD AUTO: 3.85 M/UL (ref 4–5.2)
SODIUM SERPL-SCNC: 137 MMOL/L (ref 136–145)
SP GR UR STRIP.AUTO: 1.01 (ref 1–1.03)
UA DIPSTICK W REFLEX MICRO PNL UR: NORMAL
URN SPEC COLLECT METH UR: NORMAL
UROBILINOGEN UR STRIP-ACNC: 0.2 E.U./DL
WBC # BLD AUTO: 6.5 K/UL (ref 4–11)

## 2024-11-05 PROCEDURE — 2580000003 HC RX 258

## 2024-11-05 PROCEDURE — 6370000000 HC RX 637 (ALT 250 FOR IP): Performed by: INTERNAL MEDICINE

## 2024-11-05 PROCEDURE — 6360000002 HC RX W HCPCS

## 2024-11-05 PROCEDURE — 6370000000 HC RX 637 (ALT 250 FOR IP): Performed by: NURSE PRACTITIONER

## 2024-11-05 PROCEDURE — 36415 COLL VENOUS BLD VENIPUNCTURE: CPT

## 2024-11-05 PROCEDURE — 2060000000 HC ICU INTERMEDIATE R&B

## 2024-11-05 PROCEDURE — 2580000003 HC RX 258: Performed by: INTERNAL MEDICINE

## 2024-11-05 PROCEDURE — 6370000000 HC RX 637 (ALT 250 FOR IP)

## 2024-11-05 PROCEDURE — 85384 FIBRINOGEN ACTIVITY: CPT

## 2024-11-05 PROCEDURE — 81003 URINALYSIS AUTO W/O SCOPE: CPT

## 2024-11-05 PROCEDURE — 85025 COMPLETE CBC W/AUTO DIFF WBC: CPT

## 2024-11-05 PROCEDURE — 80048 BASIC METABOLIC PNL TOTAL CA: CPT

## 2024-11-05 RX ORDER — VANCOMYCIN HYDROCHLORIDE 125 MG/1
125 CAPSULE ORAL 4 TIMES DAILY
Qty: 40 CAPSULE | Refills: 0 | Status: SHIPPED | OUTPATIENT
Start: 2024-11-05 | End: 2024-11-15

## 2024-11-05 RX ADMIN — ENOXAPARIN SODIUM 30 MG: 100 INJECTION SUBCUTANEOUS at 09:30

## 2024-11-05 RX ADMIN — HYDROCHLOROTHIAZIDE 12.5 MG: 12.5 CAPSULE ORAL at 09:30

## 2024-11-05 RX ADMIN — LOSARTAN POTASSIUM 50 MG: 50 TABLET, FILM COATED ORAL at 09:30

## 2024-11-05 RX ADMIN — VANCOMYCIN HYDROCHLORIDE 125 MG: 125 CAPSULE ORAL at 20:28

## 2024-11-05 RX ADMIN — VANCOMYCIN HYDROCHLORIDE 125 MG: 125 CAPSULE ORAL at 13:02

## 2024-11-05 RX ADMIN — CEFEPIME 2000 MG: 2 INJECTION, POWDER, FOR SOLUTION INTRAVENOUS at 04:04

## 2024-11-05 RX ADMIN — SODIUM CHLORIDE, PRESERVATIVE FREE 10 ML: 5 INJECTION INTRAVENOUS at 09:30

## 2024-11-05 RX ADMIN — ENOXAPARIN SODIUM 30 MG: 100 INJECTION SUBCUTANEOUS at 20:27

## 2024-11-05 RX ADMIN — CEFEPIME 2000 MG: 2 INJECTION, POWDER, FOR SOLUTION INTRAVENOUS at 12:18

## 2024-11-05 RX ADMIN — SODIUM CHLORIDE, PRESERVATIVE FREE 10 ML: 5 INJECTION INTRAVENOUS at 20:31

## 2024-11-05 RX ADMIN — VANCOMYCIN HYDROCHLORIDE 125 MG: 125 CAPSULE ORAL at 17:21

## 2024-11-05 RX ADMIN — CEFEPIME 2000 MG: 2 INJECTION, POWDER, FOR SOLUTION INTRAVENOUS at 20:21

## 2024-11-05 RX ADMIN — CARVEDILOL 12.5 MG: 12.5 TABLET, FILM COATED ORAL at 20:27

## 2024-11-05 RX ADMIN — VANCOMYCIN HYDROCHLORIDE 125 MG: 125 CAPSULE ORAL at 09:29

## 2024-11-05 RX ADMIN — VALACYCLOVIR HYDROCHLORIDE 500 MG: 500 TABLET, FILM COATED ORAL at 20:27

## 2024-11-05 RX ADMIN — VALACYCLOVIR HYDROCHLORIDE 500 MG: 500 TABLET, FILM COATED ORAL at 09:29

## 2024-11-05 RX ADMIN — CARVEDILOL 12.5 MG: 12.5 TABLET, FILM COATED ORAL at 09:30

## 2024-11-05 RX ADMIN — ASPIRIN 81 MG: 81 TABLET, CHEWABLE ORAL at 09:30

## 2024-11-05 RX ADMIN — FAMOTIDINE 40 MG: 20 TABLET ORAL at 09:29

## 2024-11-05 ASSESSMENT — PAIN SCALES - GENERAL
PAINLEVEL_OUTOF10: 0
PAINLEVEL_OUTOF10: 0

## 2024-11-05 NOTE — PLAN OF CARE
Problem: Pain  Goal: Verbalizes/displays adequate comfort level or baseline comfort level  Outcome: Progressing  Note: Pt did not complain of any pain this shift.     Problem: Safety - Adult  Goal: Free from fall injury  Outcome: Progressing  Note:   - Screening for Orthostasis AND not a [place] Risk per [place]/ABCDS: Pt bed is in low position, side rails up, call light and belongings are in reach.  Fall risk light is on outside pts room.  Pt encouraged to call for assistance as needed. Will continue with hourly rounds for PO intake, pain needs, toileting and repositioning as needed.       Pt did not need any electrolytes this AM.

## 2024-11-05 NOTE — CARE COORDINATION
Spoke with treatment team this AM and per DEBRA Alcazar, no IV ABX needed on discharge.     SW will follow    ALMA Ivey   for [facility] ([facility])  [place] Mobile: [phone]

## 2024-11-05 NOTE — PLAN OF CARE
Problem: Safety - Adult  Goal: Free from fall injury  11/5/2024 1818 by Mayra oRss, RN  Outcome: Progressing  Flowsheets (Taken 11/5/2024 1818)  Free From Fall Injury: Instruct family/caregiver on patient safety  Note: Orthostatic vital signs obtained at start of shift - see flowsheet for details.  Pt does not meet criteria for orthostasis.  Pt is a Med fall risk. See [place] Fall Score and ABCDS Injury Risk assessments.   - Screening for Orthostasis AND not a [place] Risk per [place]/ABCDS: Pt bed is in low position, side rails up, call light and belongings are in reach.  Fall risk light is on outside pts room.  Pt encouraged to call for assistance as needed. Will continue with hourly rounds for PO intake, pain needs, toileting and repositioning as needed.       Problem: ABCDS Injury Assessment  Goal: Absence of physical injury  11/5/2024 1818 by Mayra Ross, RN  Outcome: Progressing  Flowsheets (Taken 11/5/2024 1818)  Absence of Physical Injury: Implement safety measures based on patient assessment

## 2024-11-05 NOTE — CARE COORDINATION
Spoke with Dr. Mccormick.  Patient will restart next cycle of Blincyto in approximately 2 weeks. Patient will be discharged on Thursday with follow up appointment in [facility]. [facility] has been notified that patient will be scheduled in 2 weeks.  Pt reported that [facility] was requesting that she return the pump.    Patient has been updated with the discharge plan at this time.     Scripts have been sent to [facility]. Vancomycin approved copay $17.77.

## 2024-11-05 NOTE — PROGRESS NOTES
placed 10/9/2024, removed 11/2/24    - DVT Prophylaxis: Platelets >50,000 cells/dL, - daily lovenox prophylaxis ordered  Contraindications to pharmacologic prophylaxis: None  Contraindications to mechanical prophylaxis: None    - Disposition: Unknown    The patient was seen and examined by Dr. Chela [place] MD  [facility]  [phone]

## 2024-11-06 LAB
ALBUMIN SERPL-MCNC: 3.5 G/DL (ref 3.4–5)
ALP SERPL-CCNC: 76 U/L (ref 40–129)
ALT SERPL-CCNC: 27 U/L (ref 10–40)
ANION GAP SERPL CALCULATED.3IONS-SCNC: 9 MMOL/L (ref 3–16)
AST SERPL-CCNC: 19 U/L (ref 15–37)
BACTERIA BLD CULT ORG #2: NORMAL
BACTERIA BLD CULT: NORMAL
BACTERIA CATH TIP CULT: NORMAL
BASOPHILS # BLD: 0 K/UL (ref 0–0.2)
BASOPHILS NFR BLD: 0 %
BILIRUB DIRECT SERPL-MCNC: <0.1 MG/DL (ref 0–0.3)
BILIRUB INDIRECT SERPL-MCNC: ABNORMAL MG/DL (ref 0–1)
BILIRUB SERPL-MCNC: <0.2 MG/DL (ref 0–1)
BUN SERPL-MCNC: 7 MG/DL (ref 7–20)
CALCIUM SERPL-MCNC: 8.7 MG/DL (ref 8.3–10.6)
CHLORIDE SERPL-SCNC: 106 MMOL/L (ref 99–110)
CO2 SERPL-SCNC: 24 MMOL/L (ref 21–32)
CREAT SERPL-MCNC: 0.5 MG/DL (ref 0.6–1.1)
DEPRECATED RDW RBC AUTO: 15.7 % (ref 12.4–15.4)
EOSINOPHIL # BLD: 1.1 K/UL (ref 0–0.6)
EOSINOPHIL NFR BLD: 12 %
FIBRINOGEN PPP-MCNC: 423 MG/DL (ref 227–534)
GFR SERPLBLD CREATININE-BSD FMLA CKD-EPI: >90 ML/MIN/{1.73_M2}
GLUCOSE SERPL-MCNC: 93 MG/DL (ref 70–99)
HCT VFR BLD AUTO: 34.2 % (ref 36–48)
HGB BLD-MCNC: 11.5 G/DL (ref 12–16)
LYMPHOCYTES # BLD: 1.7 K/UL (ref 1–5.1)
LYMPHOCYTES NFR BLD: 19 %
MAGNESIUM SERPL-MCNC: 2.2 MG/DL (ref 1.8–2.4)
MCH RBC QN AUTO: 29.6 PG (ref 26–34)
MCHC RBC AUTO-ENTMCNC: 33.7 G/DL (ref 31–36)
MCV RBC AUTO: 87.6 FL (ref 80–100)
METAMYELOCYTES NFR BLD MANUAL: 1 %
MONOCYTES # BLD: 0.8 K/UL (ref 0–1.3)
MONOCYTES NFR BLD: 9 %
MYELOCYTES NFR BLD MANUAL: 2 %
NEUTROPHILS # BLD: 5.4 K/UL (ref 1.7–7.7)
NEUTROPHILS NFR BLD: 57 %
PHOSPHATE SERPL-MCNC: 3.8 MG/DL (ref 2.5–4.9)
PLATELET # BLD AUTO: 253 K/UL (ref 135–450)
PLATELET BLD QL SMEAR: ADEQUATE
PMV BLD AUTO: 9.3 FL (ref 5–10.5)
POTASSIUM SERPL-SCNC: 3.7 MMOL/L (ref 3.5–5.1)
PROT SERPL-MCNC: 5.6 G/DL (ref 6.4–8.2)
RBC # BLD AUTO: 3.9 M/UL (ref 4–5.2)
RBC MORPH BLD: NORMAL
SODIUM SERPL-SCNC: 139 MMOL/L (ref 136–145)
WBC # BLD AUTO: 9 K/UL (ref 4–11)

## 2024-11-06 PROCEDURE — 85384 FIBRINOGEN ACTIVITY: CPT

## 2024-11-06 PROCEDURE — 2580000003 HC RX 258

## 2024-11-06 PROCEDURE — 2060000000 HC ICU INTERMEDIATE R&B

## 2024-11-06 PROCEDURE — 6370000000 HC RX 637 (ALT 250 FOR IP)

## 2024-11-06 PROCEDURE — 6370000000 HC RX 637 (ALT 250 FOR IP): Performed by: NURSE PRACTITIONER

## 2024-11-06 PROCEDURE — 80048 BASIC METABOLIC PNL TOTAL CA: CPT

## 2024-11-06 PROCEDURE — 36415 COLL VENOUS BLD VENIPUNCTURE: CPT

## 2024-11-06 PROCEDURE — 83735 ASSAY OF MAGNESIUM: CPT

## 2024-11-06 PROCEDURE — 85025 COMPLETE CBC W/AUTO DIFF WBC: CPT

## 2024-11-06 PROCEDURE — 6360000002 HC RX W HCPCS

## 2024-11-06 PROCEDURE — 80076 HEPATIC FUNCTION PANEL: CPT

## 2024-11-06 PROCEDURE — 2580000003 HC RX 258: Performed by: INTERNAL MEDICINE

## 2024-11-06 PROCEDURE — 6370000000 HC RX 637 (ALT 250 FOR IP): Performed by: INTERNAL MEDICINE

## 2024-11-06 PROCEDURE — 84100 ASSAY OF PHOSPHORUS: CPT

## 2024-11-06 RX ADMIN — VALACYCLOVIR HYDROCHLORIDE 500 MG: 500 TABLET, FILM COATED ORAL at 21:09

## 2024-11-06 RX ADMIN — SODIUM CHLORIDE 15 ML: 900 IRRIGANT IRRIGATION at 19:59

## 2024-11-06 RX ADMIN — CEFEPIME 2000 MG: 2 INJECTION, POWDER, FOR SOLUTION INTRAVENOUS at 04:05

## 2024-11-06 RX ADMIN — HYDROCHLOROTHIAZIDE 12.5 MG: 12.5 CAPSULE ORAL at 09:05

## 2024-11-06 RX ADMIN — SODIUM CHLORIDE, PRESERVATIVE FREE 10 ML: 5 INJECTION INTRAVENOUS at 19:59

## 2024-11-06 RX ADMIN — SODIUM CHLORIDE, PRESERVATIVE FREE 10 ML: 5 INJECTION INTRAVENOUS at 09:05

## 2024-11-06 RX ADMIN — CEFEPIME 2000 MG: 2 INJECTION, POWDER, FOR SOLUTION INTRAVENOUS at 12:05

## 2024-11-06 RX ADMIN — VANCOMYCIN HYDROCHLORIDE 125 MG: 125 CAPSULE ORAL at 16:30

## 2024-11-06 RX ADMIN — ENOXAPARIN SODIUM 30 MG: 100 INJECTION SUBCUTANEOUS at 21:09

## 2024-11-06 RX ADMIN — CEFEPIME 2000 MG: 2 INJECTION, POWDER, FOR SOLUTION INTRAVENOUS at 19:56

## 2024-11-06 RX ADMIN — CARVEDILOL 12.5 MG: 12.5 TABLET, FILM COATED ORAL at 09:05

## 2024-11-06 RX ADMIN — VANCOMYCIN HYDROCHLORIDE 125 MG: 125 CAPSULE ORAL at 09:05

## 2024-11-06 RX ADMIN — VANCOMYCIN HYDROCHLORIDE 125 MG: 125 CAPSULE ORAL at 13:55

## 2024-11-06 RX ADMIN — VALACYCLOVIR HYDROCHLORIDE 500 MG: 500 TABLET, FILM COATED ORAL at 09:05

## 2024-11-06 RX ADMIN — ASPIRIN 81 MG: 81 TABLET, CHEWABLE ORAL at 09:05

## 2024-11-06 RX ADMIN — LOSARTAN POTASSIUM 50 MG: 50 TABLET, FILM COATED ORAL at 09:05

## 2024-11-06 RX ADMIN — CARVEDILOL 12.5 MG: 12.5 TABLET, FILM COATED ORAL at 21:09

## 2024-11-06 RX ADMIN — FAMOTIDINE 40 MG: 20 TABLET ORAL at 09:05

## 2024-11-06 RX ADMIN — VANCOMYCIN HYDROCHLORIDE 125 MG: 125 CAPSULE ORAL at 21:09

## 2024-11-06 RX ADMIN — ENOXAPARIN SODIUM 30 MG: 100 INJECTION SUBCUTANEOUS at 09:05

## 2024-11-06 ASSESSMENT — PAIN SCALES - GENERAL
PAINLEVEL_OUTOF10: 0

## 2024-11-06 NOTE — PLAN OF CARE
Problem: Pain  Goal: Verbalizes/displays adequate comfort level or baseline comfort level  11/6/2024 0319 by Mary Arana, RN  Outcome: Progressing  Flowsheets (Taken 11/6/2024 0319)  Verbalizes/displays adequate comfort level or baseline comfort level:   Encourage patient to monitor pain and request assistance   Assess pain using appropriate pain scale   Administer analgesics based on type and severity of pain and evaluate response   Implement non-pharmacological measures as appropriate and evaluate response  Note: Patient denied pain this shift.     Problem: Safety - Adult  Goal: Free from fall injury  11/6/2024 0319 by Mary Arana, RN  Outcome: Progressing  Flowsheets (Taken 11/6/2024 0319)  Free From Fall Injury:   Instruct family/caregiver on patient safety   Based on caregiver fall risk screen, instruct family/caregiver to ask for assistance with transferring infant if caregiver noted to have fall risk factors  Note: Orthostatic vital signs obtained at start of shift - see flowsheet for details.  Pt does not meet criteria for orthostasis.  Pt is a Med fall risk. See [place] Fall Score and ABCDS Injury Risk assessments.   - Screening for Orthostasis AND not a [place] Risk per [place]/ABCD[place]: Pt bed is in low position, side rails up, call light and belongings are in reach.  Fall risk light is on outside pts room.  Pt encouraged to call for assistance as needed. Will continue with hourly rounds for PO intake, pain needs, toileting and repositioning as needed.      Problem: ABCDS Injury Assessment  Goal: Absence of physical injury  11/6/2024 0319 by Mary Arana, RN  Outcome: Progressing  Flowsheets (Taken 11/6/2024 0319)  Absence of Physical Injury: Implement safety measures based on patient assessment     Problem: Infection - Adult  Goal: Absence of infection at discharge  Outcome: Progressing  Flowsheets (Taken 11/6/2024 0321)  Absence of infection at discharge:   Assess and monitor for signs and

## 2024-11-06 NOTE — DISCHARGE SUMMARY
[facility] Discharge Summary             Attending Physician: Shaylee Mccormick DO    Referring MD: No referring provider defined for this encounter.    Name: Pam Guido :  1975  MRN:  [id number]    Admission: 10/31/2024     Discharge:   2024    Date: 2024    Diagnosis on admit: Phili + B-ALL     Procedures: Routine chest x-ray, laboratories, EKG, IV fluid hydration, Panculture for fevers, IV antimicrobial therapy, Respiratory therapy, Oxygen therapy, Blood Product Infusions    Consultations:     Medications:      Medication List        START taking these medications      vancomycin 125 MG capsule  Commonly known as: VANCOCIN  Take 1 capsule by mouth 4 times daily for 10 days            CHANGE how you take these medications      famotidine 40 MG tablet  Commonly known as: PEPCID  Take 1 tablet by mouth every evening  What changed: when to take this     PONATinib HCl 15 MG Tabs  Take 15 mg by mouth daily  What changed: when to take this            CONTINUE taking these medications      aspirin 81 MG chewable tablet  Take 1 tablet by mouth daily     carvedilol 12.5 MG tablet  Commonly known as: COREG  Take 1 tablet by mouth 2 times daily     hydroCHLOROthiazide 12.5 MG capsule  TAKE 1 CAPSULE BY MOUTH EVERY MORNING     losartan 50 MG tablet  Commonly known as: COZAAR  Take 1 tablet by mouth every morning     valACYclovir 500 MG tablet  Commonly known as: VALTREX  Take 1 tablet by mouth 2 times daily               Where to Get Your Medications        These medications were sent to [facility] [id number] - [place], [place] - [address] - P [phone] - F [phone]  [address] [address]      Phone: [phone]   vancomycin 125 MG capsule         Reason for Admission: Non Neutropenic Fever     Hospital Course:   Pam Guido is a 49 year old female with a past medical history significant for Phili + B-ALL, essential hypertension, obesity, chronic pain, and GERD.     ***.    Physical Exam:     Vital Signs:  /82   Pulse 83   Temp 97.9 °F (36.6 °C) (Oral)   Resp 16   Ht 1.727 m (5' 8\")   Wt 114 kg (251 lb 4.8 oz)   LMP 03/01/2019   SpO2 96%   BMI 38.21 kg/m²     Weight:    Wt Readings from Last 3 Encounters:   11/05/24 114 kg (251 lb 4.8 oz)   10/30/24 116.1 kg (256 lb)   10/11/24 119.3 kg (263 lb)       KPS: 80% Normal activity with effort; some signs or symptoms of disease    General: Awake, alert and oriented    HEENT: normocephalic, alopecia, PERRL, no scleral erythema or icterus, Oral mucosa moist and intact, throat clear.   NECK: supple without palpable adenopathy  BACK: Straight, negative CVAT  SKIN: warm dry and intact without lesions rashes or masses  CHEST: CTA bilaterally without use of accessory muscles  CV: Normal S1 S2, RRR, no MRG  ABD: NT ND normoactive BS, no palpable masses or hepatosplenomegaly  EXTREMITIES: without edema, denies calf tenderness  NEURO: CN II - XII grossly intact  CATHETER: none     Discharge Laboratory Data:  CBC:   Recent Labs     11/04/24 0413 11/05/24 0351 11/06/24 0343   WBC 5.4 6.5 9.0   HGB 11.4* 11.4* 11.5*   HCT 33.9* 34.0* 34.2*   MCV 88.9 88.4 87.6    232 253     BMP/Mag:  Recent Labs     11/04/24 0413 11/05/24 0351 11/06/24 0343    137 139   K 3.4* 3.9 3.7    104 106   CO2 22 22 24   PHOS 3.4  --  3.8   BUN 8 7 7   CREATININE <0.5* <0.5* 0.5*   MG 2.40  --  2.20     LIVP:   Recent Labs     11/04/24 0413 11/06/24 0343   AST 29 19   ALT 38 27   BILIDIR 0.2 <0.1   BILITOT <0.2 <0.2   ALKPHOS 69 76     Coags:   Recent Labs     11/04/24 0413   PROTIME 14.2   INR 1.08   APTT 31.4     Uric Acid No results for input(s): \"LABURIC\" in the last 72 hours.  Tacro:  No results for input(s): \"TACROLEV\" in the last 72 hours.  CMV Quant DNA by PCR: No results found for: \"CMVDNAQNT\"  IgG: No results for input(s): \"IGG\" in the last 72 hours.      PROBLEM LIST:            B Cell Acute lymphocytic leukemia,

## 2024-11-06 NOTE — PROGRESS NOTES
[facility] Progress Note    2024     Pam Guido    MRN: [id number]    : 1975    Referring MD: No referring provider defined for this encounter.      SUBJECTIVE:      Diarrhea resolved.  Able to take p.o.  No significant abdominal pain or cramping.  Stool tested positive for C. Difficile.  Has Klebsiella bacteremia sens to Cefepime which she is on.      ECOG PS:  (1) Restricted in physically strenuous activity, ambulatory and able to do work of light nature    KPS: 80% Normal activity with effort; some signs or symptoms of disease    Isolation: None    Medications    Scheduled Meds:   vancomycin  125 mg Oral 4x Daily    cefepime  2,000 mg IntraVENous q8h    enoxaparin  30 mg SubCUTAneous BID    aspirin  81 mg Oral Daily    carvedilol  12.5 mg Oral BID    famotidine  40 mg Oral Daily    hydroCHLOROthiazide  12.5 mg Oral QAM    losartan  50 mg Oral QAM    PONATinib HCl  15 mg Oral Daily    valACYclovir  500 mg Oral BID    sodium chloride flush  5-40 mL IntraVENous 2 times per day    Saline Mouthwash  15 mL Swish & Spit 4x Daily AC & HS     Continuous Infusions:   sodium chloride      sodium chloride       PRN Meds:.traMADol, butalbital-acetaminophen-caffeine, prochlorperazine **OR** prochlorperazine, diphenhydrAMINE, sodium chloride, sodium chloride flush, sodium chloride, potassium chloride, magnesium sulfate, acetaminophen **OR** [DISCONTINUED] acetaminophen, Saline Mouthwash, ALTEplase (CATHFLO) 2 mg in sterile water 2 mL injection    ROS:  As noted above, otherwise remainder of 10-point ROS negative    Physical Exam:     I&O:    Intake/Output Summary (Last 24 hours) at 2024 0915  Last data filed at 2024 0659  Gross per 24 hour   Intake 897 ml   Output 2125 ml   Net -1228 ml       Vital Signs:  BP (!) 143/90   Pulse 85   Temp 97.9 °F (36.6 °C) (Oral)   Resp 16   Ht 1.727 m (5' 8\")   Wt 114 kg (251 lb 4.8 oz)   LMP 2019   SpO2 95%   BMI 38.21 kg/m²   Tmax 101.5    Weight:    Wt  Date faxed referral received: 10/22/2024  Diagnosis:  history of cerebrovascular accident  Referring Provider: EMMY Collado  Referring provider's phone# [phone]  Referring provider's fax# [phone]    Patient's phone # if different [phone]  Please review and advise COA's for scheduling.        10. Vascular Access  - PICC placed 10/9/2024, removed 11/2/24    - DVT Prophylaxis: Platelets >50,000 cells/dL, - daily lovenox prophylaxis ordered  Contraindications to pharmacologic prophylaxis: None  Contraindications to mechanical prophylaxis: None    - Disposition: Home tomorrow on oral antibiotics.    The patient was seen and examined by Dr. Chela York MD  [facility]  [phone]

## 2024-11-06 NOTE — PLAN OF CARE
Problem: Safety - Adult  Goal: Free from fall injury  11/6/2024 1504 by Mayra Ross, RN  Outcome: Progressing  Flowsheets (Taken 11/6/2024 1504)  Free From Fall Injury: Instruct family/caregiver on patient safety  Note: Orthostatic vital signs obtained at start of shift - see flowsheet for details.  Pt does not meet criteria for orthostasis.  Pt is a Med fall risk. See [place] Fall Score and ABCDS Injury Risk assessments.   - Screening for Orthostasis AND not a [place] Risk per [place]/ABCDS: Pt bed is in low position, side rails up, call light and belongings are in reach.  Fall risk light is on outside pts room.  Pt encouraged to call for assistance as needed. Will continue with hourly rounds for PO intake, pain needs, toileting and repositioning as needed.       Problem: ABCDS Injury Assessment  Goal: Absence of physical injury  11/6/2024 1504 by Mayra Ross RN  Outcome: Progressing  Flowsheets (Taken 11/6/2024 0319 by Mary Arana RN)  Absence of Physical Injury: Implement safety measures based on patient assessment     Problem: Hematologic - Adult  Goal: Maintains hematologic stability  11/6/2024 1504 by Mayra Ross RN  Outcome: Progressing  Flowsheets (Taken 11/6/2024 1504)  Maintains hematologic stability:   Assess for signs and symptoms of bleeding or hemorrhage   Monitor labs for bleeding or clotting disorders   Administer blood products/factors as ordered  Note: Patient's hemoglobin this AM:   Recent Labs     11/06/24  0343   HGB 11.5*     Patient's platelet count this AM:   Recent Labs     11/06/24  0343       Thrombocytopenia not present at this time.  Patient showing no signs or symptoms of active bleeding.  Transfusion not indicated at this time.  Patient verbalizes understanding of all instructions. Will continue to assess and implement POC. Call light within reach and hourly rounding in place.

## 2024-11-07 VITALS
OXYGEN SATURATION: 97 % | RESPIRATION RATE: 18 BRPM | TEMPERATURE: 97.9 F | HEART RATE: 81 BPM | HEIGHT: 68 IN | SYSTOLIC BLOOD PRESSURE: 132 MMHG | WEIGHT: 251.6 LBS | BODY MASS INDEX: 38.13 KG/M2 | DIASTOLIC BLOOD PRESSURE: 86 MMHG

## 2024-11-07 LAB
ANION GAP SERPL CALCULATED.3IONS-SCNC: 9 MMOL/L (ref 3–16)
APTT BLD: 27.1 SEC (ref 22.1–36.4)
BASOPHILS # BLD: 0 K/UL (ref 0–0.2)
BASOPHILS NFR BLD: 0 %
BUN SERPL-MCNC: 8 MG/DL (ref 7–20)
CALCIUM SERPL-MCNC: 8.8 MG/DL (ref 8.3–10.6)
CHLORIDE SERPL-SCNC: 105 MMOL/L (ref 99–110)
CO2 SERPL-SCNC: 24 MMOL/L (ref 21–32)
CREAT SERPL-MCNC: 0.5 MG/DL (ref 0.6–1.1)
DEPRECATED RDW RBC AUTO: 15.4 % (ref 12.4–15.4)
EOSINOPHIL # BLD: 0.1 K/UL (ref 0–0.6)
EOSINOPHIL NFR BLD: 1 %
FIBRINOGEN PPP-MCNC: 432 MG/DL (ref 227–534)
GFR SERPLBLD CREATININE-BSD FMLA CKD-EPI: >90 ML/MIN/{1.73_M2}
GLUCOSE SERPL-MCNC: 97 MG/DL (ref 70–99)
HCT VFR BLD AUTO: 35.3 % (ref 36–48)
HGB BLD-MCNC: 12 G/DL (ref 12–16)
INR PPP: 1.01 (ref 0.85–1.15)
LYMPHOCYTES # BLD: 0.6 K/UL (ref 1–5.1)
LYMPHOCYTES NFR BLD: 5 %
MCH RBC QN AUTO: 29.9 PG (ref 26–34)
MCHC RBC AUTO-ENTMCNC: 33.9 G/DL (ref 31–36)
MCV RBC AUTO: 88.2 FL (ref 80–100)
MONOCYTES # BLD: 0.5 K/UL (ref 0–1.3)
MONOCYTES NFR BLD: 4 %
NEUTROPHILS # BLD: 10.4 K/UL (ref 1.7–7.7)
NEUTROPHILS NFR BLD: 90 %
PLATELET # BLD AUTO: 297 K/UL (ref 135–450)
PMV BLD AUTO: 9.3 FL (ref 5–10.5)
POTASSIUM SERPL-SCNC: 4 MMOL/L (ref 3.5–5.1)
PROTHROMBIN TIME: 13.5 SEC (ref 11.9–14.9)
RBC # BLD AUTO: 4 M/UL (ref 4–5.2)
SODIUM SERPL-SCNC: 138 MMOL/L (ref 136–145)
WBC # BLD AUTO: 11.6 K/UL (ref 4–11)

## 2024-11-07 PROCEDURE — 6370000000 HC RX 637 (ALT 250 FOR IP): Performed by: NURSE PRACTITIONER

## 2024-11-07 PROCEDURE — 85025 COMPLETE CBC W/AUTO DIFF WBC: CPT

## 2024-11-07 PROCEDURE — 6360000002 HC RX W HCPCS

## 2024-11-07 PROCEDURE — 36592 COLLECT BLOOD FROM PICC: CPT

## 2024-11-07 PROCEDURE — 85730 THROMBOPLASTIN TIME PARTIAL: CPT

## 2024-11-07 PROCEDURE — 36415 COLL VENOUS BLD VENIPUNCTURE: CPT

## 2024-11-07 PROCEDURE — 6370000000 HC RX 637 (ALT 250 FOR IP)

## 2024-11-07 PROCEDURE — 2580000003 HC RX 258: Performed by: INTERNAL MEDICINE

## 2024-11-07 PROCEDURE — 80048 BASIC METABOLIC PNL TOTAL CA: CPT

## 2024-11-07 PROCEDURE — 6370000000 HC RX 637 (ALT 250 FOR IP): Performed by: INTERNAL MEDICINE

## 2024-11-07 PROCEDURE — 2580000003 HC RX 258

## 2024-11-07 PROCEDURE — 85610 PROTHROMBIN TIME: CPT

## 2024-11-07 PROCEDURE — 85384 FIBRINOGEN ACTIVITY: CPT

## 2024-11-07 RX ORDER — CEFDINIR 300 MG/1
300 CAPSULE ORAL 2 TIMES DAILY
Qty: 14 CAPSULE | Refills: 0 | Status: SHIPPED | OUTPATIENT
Start: 2024-11-07 | End: 2024-11-14

## 2024-11-07 RX ADMIN — CEFEPIME 2000 MG: 2 INJECTION, POWDER, FOR SOLUTION INTRAVENOUS at 04:27

## 2024-11-07 RX ADMIN — VANCOMYCIN HYDROCHLORIDE 125 MG: 125 CAPSULE ORAL at 08:02

## 2024-11-07 RX ADMIN — HYDROCHLOROTHIAZIDE 12.5 MG: 12.5 CAPSULE ORAL at 08:01

## 2024-11-07 RX ADMIN — VALACYCLOVIR HYDROCHLORIDE 500 MG: 500 TABLET, FILM COATED ORAL at 08:02

## 2024-11-07 RX ADMIN — ENOXAPARIN SODIUM 30 MG: 100 INJECTION SUBCUTANEOUS at 08:02

## 2024-11-07 RX ADMIN — ASPIRIN 81 MG: 81 TABLET, CHEWABLE ORAL at 08:02

## 2024-11-07 RX ADMIN — SODIUM CHLORIDE, PRESERVATIVE FREE 10 ML: 5 INJECTION INTRAVENOUS at 08:02

## 2024-11-07 RX ADMIN — CARVEDILOL 12.5 MG: 12.5 TABLET, FILM COATED ORAL at 08:01

## 2024-11-07 RX ADMIN — LOSARTAN POTASSIUM 50 MG: 50 TABLET, FILM COATED ORAL at 08:02

## 2024-11-07 RX ADMIN — FAMOTIDINE 40 MG: 20 TABLET ORAL at 08:01

## 2024-11-07 ASSESSMENT — PAIN SCALES - GENERAL: PAINLEVEL_OUTOF10: 0

## 2024-11-07 NOTE — CARE COORDINATION
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]   Date / Time: 10/31/2024  5:53 PM    Current PCP: Johanna Jensen APRN - CNP  Clinic patient: No    Hospitalization in the last 30 days: Yes  Readmission Assessment  Number of Days since last admission?: 8-30 days  Previous Disposition: Home with Family  Who is being Interviewed: Patient  What was the patient's/caregiver's perception as to why they think they needed to return back to the hospital?: Other (Comment) (fever)  Did you visit your Primary Care Physician after you left the hospital, before you returned this time?: No  Why weren't you able to visit your PCP?: Did not have an appointment  Did you see a specialist, such as Cardiac, Pulmonary, Orthopedic Physician, etc. after you left the hospital?: Yes  Who advised the patient to return to the hospital?: Self-referral  Does the patient report anything that got in the way of taking their medications?: No  In our efforts to provide the best possible care to you and others like you, can you think of anything that we could have done to help you after you left the hospital the first time, so that you might not have needed to return so soon?: Other (Comment) (nothing else was needed per pt)    Advance Directives:  Code Status: Full Code  [place] DNR form completed and on chart: Not Indicated    Financial:  Payor: [facility] /

## 2024-11-07 NOTE — PLAN OF CARE
Problem: Safety - Adult  Goal: Free from fall injury  11/6/2024 2359 by Ya Borges RN  Flowsheets (Taken 11/6/2024 2359)  Free From Fall Injury:   Instruct family/caregiver on patient safety   Based on caregiver fall risk screen, instruct family/caregiver to ask for assistance with transferring infant if caregiver noted to have fall risk factors  Note: Orthostatic vital signs obtained at start of shift - see flowsheet for details.  Pt meets criteria for orthostasis.  Pt is a Low fall risk. See [place] Fall Score and ABCDS Injury Risk assessments.     - Screening for Orthostasis AND not a [place] Risk per [place]/ABCDS: Pt bed is in low position, side rails up, call light and belongings are in reach.  Fall risk light is on outside pts room.  Pt encouraged to call for assistance as needed. Will continue with hourly rounds for PO intake, pain needs, toileting and repositioning as needed.      Problem: Infection - Adult  Goal: Absence of infection at discharge  11/6/2024 2359 by Ya Borges RN  Flowsheets (Taken 11/6/2024 2359)  Absence of infection at discharge:   Assess and monitor for signs and symptoms of infection   Monitor lab/diagnostic results   Monitor all insertion sites i.e., indwelling lines, tubes and drains  Note: Pt currently on antibiotics.     Problem: Hematologic - Adult  Goal: Maintains hematologic stability  11/6/2024 2359 by Ya Borges RN  Flowsheets (Taken 11/6/2024 2359)  Maintains hematologic stability:   Assess for signs and symptoms of bleeding or hemorrhage   Administer blood products/factors as ordered  Note:   Lab Results   Component Value Date    WBC 9.0 11/06/2024    HGB 11.5 (L) 11/06/2024    HCT 34.2 (L) 11/06/2024    MCV 87.6 11/06/2024     11/06/2024

## 2024-11-07 NOTE — DISCHARGE SUMMARY
[facility] Discharge Summary             Attending Physician: Shaylee Mccormick DO    Referring MD: No referring provider defined for this encounter.    Name: Pam Guido :  1975  MRN:  [id number]    Admission: 10/31/2024       Discharge:   2024    Date: 2024    Diagnosis on admit: Non neutropenic fever        Medications:      Medication List        START taking these medications      cefdinir 300 MG capsule  Commonly known as: OMNICEF  Take 1 capsule by mouth 2 times daily for 7 days     vancomycin 125 MG capsule  Commonly known as: VANCOCIN  Take 1 capsule by mouth 4 times daily for 10 days            CHANGE how you take these medications      famotidine 40 MG tablet  Commonly known as: PEPCID  Take 1 tablet by mouth every evening  What changed: when to take this     PONATinib HCl 15 MG Tabs  Take 15 mg by mouth daily  What changed: when to take this            CONTINUE taking these medications      aspirin 81 MG chewable tablet  Take 1 tablet by mouth daily     carvedilol 12.5 MG tablet  Commonly known as: COREG  Take 1 tablet by mouth 2 times daily     hydroCHLOROthiazide 12.5 MG capsule  TAKE 1 CAPSULE BY MOUTH EVERY MORNING     losartan 50 MG tablet  Commonly known as: COZAAR  Take 1 tablet by mouth every morning     valACYclovir 500 MG tablet  Commonly known as: VALTREX  Take 1 tablet by mouth 2 times daily               Where to Get Your Medications        These medications were sent to [facility] [id number] - [facility], [place] - [address] - P [phone] - F [phone]  [address] [address]      Phone: [phone]   cefdinir 300 MG capsule  vancomycin 125 MG capsule         Reason for Admission: Non Neutropenic Fever     Hospital Course:   Pam Guido is a 49 year old female with a past medical history significant for Ph + B-ALL, essential hypertension, obesity, chronic pain, and GERD.      For  B-ALL she was initiated on Hydrea and Dexamethasone. She  Repeat B/P 129/87

## 2024-11-07 NOTE — PLAN OF CARE
Problem: Pain  Goal: Verbalizes/displays adequate comfort level or baseline comfort level  Outcome: Progressing  Flowsheets (Taken 11/7/2024 1014)  Verbalizes/displays adequate comfort level or baseline comfort level:   Encourage patient to monitor pain and request assistance   Assess pain using appropriate pain scale   Administer analgesics based on type and severity of pain and evaluate response   Implement non-pharmacological measures as appropriate and evaluate response   Notify Licensed Independent Practitioner if interventions unsuccessful or patient reports new pain  Note: Patient not complaining of pain this shift     Problem: Safety - Adult  Goal: Free from fall injury  11/7/2024 1014 by Luz Marina Vargas, RN  Outcome: Progressing  Flowsheets (Taken 11/7/2024 1014)  Free From Fall Injury:   Instruct family/caregiver on patient safety   Based on caregiver fall risk screen, instruct family/caregiver to ask for assistance with transferring infant if caregiver noted to have fall risk factors  Note: Orthostatic vital signs obtained at start of shift - see flowsheet for details.  Pt does not meet criteria for orthostasis.  Pt is a Med fall risk. See [place] Fall Score and ABCDS Injury Risk assessments.   - Screening for Orthostasis AND not a [place] Risk per [place]/ABCDS: Pt bed is in low position, side rails up, call light and belongings are in reach.  Fall risk light is on outside pts room.  Pt encouraged to call for assistance as needed. Will continue with hourly rounds for PO intake, pain needs, toileting and repositioning as needed.

## 2024-11-08 ENCOUNTER — TELEPHONE (OUTPATIENT)
Dept: FAMILY MEDICINE CLINIC | Age: 49
End: 2024-11-08

## 2024-11-08 ENCOUNTER — CARE COORDINATION (OUTPATIENT)
Dept: CASE MANAGEMENT | Age: 49
End: 2024-11-08

## 2024-11-08 DIAGNOSIS — D61.818 PANCYTOPENIA (HCC): Primary | ICD-10-CM

## 2024-11-08 NOTE — TELEPHONE ENCOUNTER
[facility] Transitions Initial Follow Up Call  Outreach made within 2 business days of discharge: Yes  Patient: Pam Guido Patient : 1975   MRN: [id number]  Reason for Admission: There are no discharge diagnoses documented for the most recent discharge.  Discharge Date: 24    Spoke with: TOMMY FULL  Discharge department/facility: [facility]  Future Appointments   Date Time Provider Department Center   2024  3:00 PM Johanna Jensen, APRN - CNP [facility] DEP     MARLO LANDAVERDE RN

## 2024-11-08 NOTE — CARE COORDINATION
Care Transitions Note    Initial Call - Call within 2 business days of discharge: Yes    Patient Current Location:  Home: [address]  [address]  [place] [address]    Care Transition Nurse contacted the patient by telephone to perform post hospital discharge assessment, verified name and  as identifiers. Provided introduction to self, and explanation of the Care Transition Nurse role.     Patient: Pam Guido    Patient : 1975   MRN: [id number]     Reason for Admission: Pancytopenia due to chemotherapy, PICC Line with Klebsiella bacteremia   Discharge Date: 24    RURS: Readmission Risk Score: 23.7      Last Discharge Facility       Date Complaint Diagnosis Description Type Department Provider    10/31/24 Fever SIRS (systemic inflammatory response syndrome) (HCC) ... ED to Hosp-Admission (Discharged) (ADMITTED) [facility] Shaylee Mccormick DO; Fredrick Reynaga.            Was this an external facility discharge? No    Additional needs identified to be addressed with provider   No needs identified             Method of communication with provider: none.    Patients top risk factors for readmission: medical condition- Pancytopenia due to chemotherapy, PICC Line with Klebsiella bacteremia     Interventions to address risk factors:   Education: patient instructed to continue to monitor for any returning fevers or chills, any returning pain in right arm, worsening numbness and tingling in left foot, reporting to MD  immediately.  Review of patient management of conditions/medications: take all medications as prescribed, especially both PO ABX's, until completely gone.    Care Summary Note: CTN spoke with patient this am for initial 24 hour discharge follow up CTN call.  Patient states she is doing so much better.  Patient states she is not having any fever, chills, nausea, vomiting, chest pain, SOB or cough.   Patient with no congestion, difficulty emptying bladder, LE edema, feeling lightheaded,

## 2024-11-11 ENCOUNTER — TELEPHONE (OUTPATIENT)
Dept: FAMILY MEDICINE CLINIC | Age: 49
End: 2024-11-11

## 2024-11-11 LAB — FUNGUS BLD CULT: NORMAL

## 2024-11-11 NOTE — TELEPHONE ENCOUNTER
[facility] Transitions Initial Follow Up Call    Outreach made within 2 business days of discharge: Yes    Patient: Pam Guido Patient : 1975   MRN: [id number]  Reason for Admission: There are no discharge diagnoses documented for the most recent discharge.  Discharge Date: 24       Spoke with: Mariela    [facility]    Non-face-to-face services provided:  Scheduled appointment with PCP-   Obtained and reviewed discharge summary and/or continuity of care documents    TCM Interactive Patient Contact:  Was patient able to fill all prescriptions: Yes  Was patient instructed to bring all medications to the follow-up visit: Yes  Is patient taking all medications as directed in the discharge summary? Yes  Does patient understand their discharge instructions: Yes  Does patient have questions or concerns that need addressed prior to 7-14 day follow up office visit: no  Declined to be Scheduled appointment with PCP within 7-14 days  Follow Up  Future Appointments   Date Time Provider Department Center   2024  3:00 PM Johanna Jensen, APRN - CNP [facility] DEP     MARLO LANDAVERDE RN

## 2024-11-11 NOTE — TELEPHONE ENCOUNTER
RN spoke to patient and she is concerned that she may be getting a yeast infection.    She is having itching in her vaginal area. No known discharge  RN instructed patient to start on probiotic yogurt/  or yogurt with active yeast cultures.  Patient states she will get some and start as soon as possible.    She can do a Video visit with PCP.   Future Appointments   Date Time Provider Department Center   11/15/2024 11:20 AM Johanna Jensen APRN - CNP [facility] DEP   12/4/2024  3:00 PM Johanna Jensen APRN - CNP [facility] DEP

## 2024-11-13 NOTE — PROGRESS NOTES
Physician Progress Note      PATIENT:               LAZARO EARL  [facility] #:                  [id number]  :                       1975  ADMIT DATE:       10/31/2024 5:53 PM  DISCH DATE:        2024 11:38 AM  RESPONDING  PROVIDER #:        JOHNATHAN MATA APRN - CNP          QUERY TEXT:    Pt admitted with fever, found to have bacteremia. Pt noted to have PICC placed   on 10/9/24 . If possible, please document in progress notes and discharge   summary the relationship, if any, between bacteremia and the PICC line.    The medical record reflects the following:  Risk Factors:  Phili + B-ALL, port removal for sepsis, new PICC placed on   10/9/24  Clinical Indicators: Blood culture 10/31:\"POSITIVE for Klebsiella aerogenes   Isolated one of two sets\" Progress note :\"- PICC placed 10/9/2024, will   remove\"  Treatment: CBC, CMP, Blood cultures, IV Cefepime, IV Vancomycin, PICC line   removal  Options provided:  -- Bacteremia due to PICC line.  -- Bacteremia unrelated to PICC line  -- Other - I will add my own diagnosis  -- Disagree - Not applicable / Not valid  -- Disagree - Clinically unable to determine / Unknown  -- Refer to Clinical Documentation Reviewer    PROVIDER RESPONSE TEXT:    Bacteremia due to PICC line.    Query created by: Myriam Hobson on 2024 11:10 AM      QUERY TEXT:    Pt admitted with fever, tachycardia, elevated WBC.  If possible, please   document in the progress notes and discharge summary if you are evaluating and   / or treating any of the following:    The medical record reflects the following:  Risk Factors: Phili + B-ALL currently undergoing treatment, port removal for   sepsis, PICC placed on 10/9/24  Clinical Indicators: Vitals upon presentation in the ED: Temp 102.7 Pulse 133,   WBC 12.5, Blood culture 10/31:\"POSITIVE for Klebsiella aerogenes Isolated one   of two sets\"  Treatment: CBC, CMP, Blood cultures, IV Cefepime, IV Vancomycin, IV NS 2L   bolus, PICC line

## 2024-11-14 ENCOUNTER — E-VISIT (OUTPATIENT)
Dept: FAMILY MEDICINE CLINIC | Age: 49
End: 2024-11-14
Payer: COMMERCIAL

## 2024-11-14 ENCOUNTER — PATIENT MESSAGE (OUTPATIENT)
Dept: FAMILY MEDICINE CLINIC | Age: 49
End: 2024-11-14

## 2024-11-14 DIAGNOSIS — B37.31 VAGINAL YEAST INFECTION: Primary | ICD-10-CM

## 2024-11-14 DIAGNOSIS — E55.9 VITAMIN D DEFICIENCY: ICD-10-CM

## 2024-11-14 PROCEDURE — 99422 OL DIG E/M SVC 11-20 MIN: CPT | Performed by: NURSE PRACTITIONER

## 2024-11-14 RX ORDER — ACETAMINOPHEN 160 MG
2000 TABLET,DISINTEGRATING ORAL DAILY
Qty: 30 CAPSULE | Refills: 5 | Status: SHIPPED | OUTPATIENT
Start: 2024-11-14

## 2024-11-14 RX ORDER — TERCONAZOLE 0.4 %
1 CREAM WITH APPLICATOR VAGINAL NIGHTLY
Qty: 45 G | Refills: 0 | Status: SHIPPED | OUTPATIENT
Start: 2024-11-14 | End: 2024-11-21

## 2024-11-14 NOTE — PROGRESS NOTES
Pam Guido (1975) initiated an asynchronous digital communication through [employer].    HPI: per patient questionnaire     Exam: not applicable    Diagnoses and all orders for this visit:  Diagnoses and all orders for this visit:    Vaginal yeast infection  -     terconazole (TERAZOL 7) 0.4 % vaginal cream; Place 1 applicator vaginally nightly for 7 days Place vaginally nightly.    Vitamin D deficiency  -     vitamin D (VITAMIN D3) 50 MCG (2000 UT) CAPS capsule; Take 1 capsule by mouth daily          Time: EV2 - 11-20 minutes were spent on the digital evaluation and management of this patient.     Johanna Jensen, APRN - CNP

## 2024-11-14 NOTE — PROGRESS NOTES
Called patient about procedure. Told to be here at 1200 for procedure at 1330. Must be NPO after midnight but can take morning medication with sips of water. Patient stated they are on a low dose aspirin and will hold it for the day of the procedure. Told to have a responsible adult with them to take them home and stay with them afterwards, if they do not get admitted to hospital. Also, to bring a current list of medications. No other questions or concerns.

## 2024-11-15 ENCOUNTER — HOSPITAL ENCOUNTER (OUTPATIENT)
Dept: INTERVENTIONAL RADIOLOGY/VASCULAR | Age: 49
Discharge: HOME | End: 2024-11-17
Attending: STUDENT IN AN ORGANIZED HEALTH CARE EDUCATION/TRAINING PROGRAM
Payer: COMMERCIAL

## 2024-11-15 ENCOUNTER — HOSPITAL ENCOUNTER (OUTPATIENT)
Dept: INTERVENTIONAL RADIOLOGY/VASCULAR | Age: 49
Discharge: HOME | End: 2024-11-17
Attending: RADIOLOGY
Payer: COMMERCIAL

## 2024-11-15 ENCOUNTER — CARE COORDINATION (OUTPATIENT)
Dept: CASE MANAGEMENT | Age: 49
End: 2024-11-15

## 2024-11-15 VITALS
HEART RATE: 79 BPM | WEIGHT: 250 LBS | RESPIRATION RATE: 16 BRPM | TEMPERATURE: 97.9 F | BODY MASS INDEX: 37.89 KG/M2 | OXYGEN SATURATION: 99 % | HEIGHT: 68 IN

## 2024-11-15 VITALS
DIASTOLIC BLOOD PRESSURE: 97 MMHG | RESPIRATION RATE: 16 BRPM | HEART RATE: 81 BPM | SYSTOLIC BLOOD PRESSURE: 148 MMHG | OXYGEN SATURATION: 99 %

## 2024-11-15 DIAGNOSIS — C91.00: ICD-10-CM

## 2024-11-15 DIAGNOSIS — C91.00 ALL (ACUTE LYMPHOID LEUKEMIA) WITH FAILED REMISSION (HCC): ICD-10-CM

## 2024-11-15 LAB
ECHO BSA: 2.33 M2
INR PPP: 1.01 (ref 0.85–1.15)
PROTHROMBIN TIME: 13.5 SEC (ref 11.9–14.9)

## 2024-11-15 PROCEDURE — 85610 PROTHROMBIN TIME: CPT

## 2024-11-15 PROCEDURE — C1788 PORT, INDWELLING, IMP: HCPCS | Performed by: STUDENT IN AN ORGANIZED HEALTH CARE EDUCATION/TRAINING PROGRAM

## 2024-11-15 PROCEDURE — 99152 MOD SED SAME PHYS/QHP 5/>YRS: CPT | Performed by: STUDENT IN AN ORGANIZED HEALTH CARE EDUCATION/TRAINING PROGRAM

## 2024-11-15 PROCEDURE — 76937 US GUIDE VASCULAR ACCESS: CPT | Performed by: STUDENT IN AN ORGANIZED HEALTH CARE EDUCATION/TRAINING PROGRAM

## 2024-11-15 PROCEDURE — 77001 FLUOROGUIDE FOR VEIN DEVICE: CPT | Performed by: STUDENT IN AN ORGANIZED HEALTH CARE EDUCATION/TRAINING PROGRAM

## 2024-11-15 PROCEDURE — 7100000011 HC PHASE II RECOVERY - ADDTL 15 MIN: Performed by: STUDENT IN AN ORGANIZED HEALTH CARE EDUCATION/TRAINING PROGRAM

## 2024-11-15 PROCEDURE — 36561 INSERT TUNNELED CV CATH: CPT

## 2024-11-15 PROCEDURE — 6360000002 HC RX W HCPCS: Performed by: STUDENT IN AN ORGANIZED HEALTH CARE EDUCATION/TRAINING PROGRAM

## 2024-11-15 PROCEDURE — 7100000010 HC PHASE II RECOVERY - FIRST 15 MIN: Performed by: STUDENT IN AN ORGANIZED HEALTH CARE EDUCATION/TRAINING PROGRAM

## 2024-11-15 PROCEDURE — C1894 INTRO/SHEATH, NON-LASER: HCPCS | Performed by: STUDENT IN AN ORGANIZED HEALTH CARE EDUCATION/TRAINING PROGRAM

## 2024-11-15 PROCEDURE — 2500000003 HC RX 250 WO HCPCS: Performed by: STUDENT IN AN ORGANIZED HEALTH CARE EDUCATION/TRAINING PROGRAM

## 2024-11-15 RX ORDER — LIDOCAINE HYDROCHLORIDE 10 MG/ML
INJECTION, SOLUTION EPIDURAL; INFILTRATION; INTRACAUDAL; PERINEURAL PRN
Status: COMPLETED | OUTPATIENT
Start: 2024-11-15 | End: 2024-11-15

## 2024-11-15 RX ORDER — VANCOMYCIN 1 G/200ML
1000 INJECTION, SOLUTION INTRAVENOUS ONCE
Status: DISCONTINUED | OUTPATIENT
Start: 2024-11-15 | End: 2024-11-18 | Stop reason: HOSPADM

## 2024-11-15 RX ORDER — MIDAZOLAM HYDROCHLORIDE 5 MG/ML
INJECTION, SOLUTION INTRAMUSCULAR; INTRAVENOUS PRN
Status: COMPLETED | OUTPATIENT
Start: 2024-11-15 | End: 2024-11-15

## 2024-11-15 RX ORDER — FENTANYL CITRATE 50 UG/ML
INJECTION, SOLUTION INTRAMUSCULAR; INTRAVENOUS PRN
Status: COMPLETED | OUTPATIENT
Start: 2024-11-15 | End: 2024-11-15

## 2024-11-15 RX ADMIN — MIDAZOLAM HYDROCHLORIDE 2 MG: 5 INJECTION, SOLUTION INTRAMUSCULAR; INTRAVENOUS at 12:46

## 2024-11-15 RX ADMIN — FENTANYL CITRATE 100 MCG: 50 INJECTION, SOLUTION INTRAMUSCULAR; INTRAVENOUS at 12:46

## 2024-11-15 RX ADMIN — LIDOCAINE HYDROCHLORIDE 10 ML: 10 INJECTION, SOLUTION EPIDURAL; INFILTRATION; INTRACAUDAL; PERINEURAL at 12:46

## 2024-11-15 NOTE — H&P
Present Diagnosis and Illness: 49 y.o. female who presents with ALL and need for port placement.    1. ALL (acute lymphoid leukemia) with failed remission (HCC)        Allergies   Allergen Reactions    Penicillins Anaphylaxis, Hives and Other (See Comments)     Patient has tolerated cefepime as an inpatient in 2024 per chart review as of 9/26/24.     Dapsone Rash    Lisinopril Cough    Bactrim [Sulfamethoxazole-Trimethoprim] Palpitations    Biaxin [Clarithromycin] Rash       Current Outpatient Medications   Medication Sig Dispense Refill    terconazole (TERAZOL 7) 0.4 % vaginal cream Place 1 applicator vaginally nightly for 7 days Place vaginally nightly. (Patient not taking: Reported on 11/15/2024) 45 g 0    vitamin D (VITAMIN D3) 50 MCG (2000 UT) CAPS capsule Take 1 capsule by mouth daily (Patient not taking: Reported on 11/15/2024) 30 capsule 5    vancomycin (VANCOCIN) 125 MG capsule Take 1 capsule by mouth 4 times daily for 10 days 40 capsule 0    famotidine (PEPCID) 40 MG tablet Take 1 tablet by mouth every evening (Patient taking differently: Take 1 tablet by mouth daily) 30 tablet 3    carvedilol (COREG) 12.5 MG tablet Take 1 tablet by mouth 2 times daily 60 tablet 2    aspirin 81 MG chewable tablet Take 1 tablet by mouth daily      PONATinib HCl 15 MG TABS Take 15 mg by mouth daily (Patient taking differently: Take 15 mg by mouth nightly) 30 tablet 5    hydroCHLOROthiazide 12.5 MG capsule TAKE 1 CAPSULE BY MOUTH EVERY MORNING 90 capsule 1    valACYclovir (VALTREX) 500 MG tablet Take 1 tablet by mouth 2 times daily 60 tablet 4    losartan (COZAAR) 50 MG tablet Take 1 tablet by mouth every morning 90 tablet 1     No current facility-administered medications for this encounter.     Facility-Administered Medications Ordered in Other Encounters   Medication Dose Route Frequency Provider Last Rate Last Admin    vancomycin (VANCOCIN) 1000 mg in 200 mL IVPB  1,000 mg IntraVENous Once Sandoval Suresh MD             Past Medical History:   Diagnosis Date    Anemia     Back pain     Chest pain 6/2014    stress myoview normal    Chronic pain syndrome 2017    Dr. Garcia pain management    Fibroid uterus     fibroid uterus ad abnormal uterine bleeding with uterine fibroids    GERD (gastroesophageal reflux disease)     Hypertension     Menopausal symptoms     Overactive bladder     Pelvic pain     Right lateral epicondylitis     Rotator cuff syndrome of right shoulder     Stress incontinence     Tachycardia     on coreg       Family History   Problem Relation Age of Onset    Heart Disease Mother     Heart Disease Maternal Grandmother     Diabetes Maternal Grandmother     Ovarian Cancer Maternal Grandmother     Heart Failure Maternal Grandfather     High Blood Pressure Maternal Grandfather     High Blood Pressure Sister     Bipolar Disorder Sister     No Known Problems Maternal Aunt     High Blood Pressure Maternal Uncle     High Blood Pressure Maternal Uncle        Physical Examination:    Blood pressure (!) 142/97, pulse 79, last menstrual period 03/01/2019, SpO2 97%, not currently breastfeeding.    Head and neck: normal atraumatic, no neck masses, normal thyroid, no jvd  Chest: Normal  Heart: Regular rate and rhythm  Abdominal: soft, non-tender. Bowel sounds normal. No masses,  no organomegaly  Neurological: normal    Moderate Sedation Focused Evaluation:    NPO for 4 hours: yes    ASA: ASA 2 - Patient with mild systemic disease with no functional limitations    Mallampati: III (soft palate, base of uvula visible)    Sedation : Moderate sedation planned    Labs:  CBCP:  Lab Results   Component Value Date    WBC 11.6 (H) 11/07/2024    HGB 12.0 11/07/2024    HCT 35.3 (L) 11/07/2024    MCV 88.2 11/07/2024     11/07/2024      BASIC:  Lab Results   Component Value Date/Time     11/07/2024 05:29 AM    K 4.0 11/07/2024 05:29 AM    K 4.0 09/26/2024 05:10 PM     11/07/2024 05:29 AM    CO2 24 11/07/2024 05:29 AM

## 2024-11-15 NOTE — PROGRESS NOTES
Cath Lab Pre Procedure Flowsheet    Plan of Care:     Hemodynamics and cardiac rhythm will remain stable.   Comfort level will be maintained.   Respiratory function will remain adequate.   Pt/family will verbalize understanding of the procedure.   Procedure will be tolerated without complications.   Patient will recover from procedure without complications.   ID armband on patient and identification verified.   Informed consent obtained.   Non invasive blood pressure cuff applied, monitoring initiated.   EKG pads and pulse oximeter applied, monitoring initiated.   Instructions given. Patient and / or family verbalize understanding.   H&P will be documented by physician in [facility].     Pre-procedure:    NPO Status: Pt has been NPO since midnight. .    Contrast / IV Dye Allergy:     Pregnancy Test: N/A.    Prep Sites: Wrist(s)  Chest    Ruy's Test:    Pulses:     Anticoagulants: None.     Antiplatelets: None.     Chief Complaint:      Diabetic: No    Pre EKG Rhythm: nsr    Pre SBP:147/104    IV access: left hand    Pre-procedure blood work collected by: jolene    NIH Scale:

## 2024-11-15 NOTE — BRIEF OP NOTE
Interventional Radiology Post Procedure    Date: 11/15/2024    Physician: Sandoval Suresh MD    Pre-op Diagnosis: ALL    Post-op Diagnosis: same    Variation from Planned Procedure: None       Findings: Right IJ chest port placement without complication    Patient condition: Stable    Estimated Blood Loss: less than 50 ml    Specimens:  None

## 2024-11-15 NOTE — CARE COORDINATION
Care Transitions Note    Follow Up Call      Reason for Admission: Pancytopenia due to chemotherapy, PICC Line with Klebsiella bacteremia     Attempted to reach patient for transitions of care follow up.  Unable to reach patient.      Outreach Attempts:   Unable to leave message.     Care Summary Note: [facility] attempted outreach.Unable to leave message because voice mailbox is not set up.     Julienne De Jesus LPN  Care Coordinatior     Follow Up Appointment:   Future Appointments         Provider Specialty Dept Phone    12/4/2024 3:00 PM Johanna Jensen, APRN - CNP Family Medicine [phone]            Plan for follow-up call in 2-5 days based on severity of symptoms and risk factors. Plan for next call: symptom management-..  self management-.    Julienne De Jesus LPN

## 2024-11-15 NOTE — FLOWSHEET NOTE
1430- discharge     Discharge instructions reviewed with patient , patient ambulated, iv d./c'd , and patient discharged via wheelchair out the main entrance

## 2024-11-15 NOTE — DISCHARGE INSTRUCTIONS
[facility]  Cardiovascular Special Procedures  IMPLANTABLE VENOUS ACCESS DEVICE                                          Patient Information:   Your Port may be placed in the chest or peripherally in the arm. The following instructions are the same for both with only a few differences.    Maintain dressing after the procedure for 24 - 48 hours and then apply a new dressing supplied to you, do not use any ointment. Leave dressing on for 7 days. If the dressing becomes moist, you should change the dressing. If there is any leakage at the incision site, notify your doctor.    Keep site clean and dry for 7 days and observe for any signs of infection. Leave steri-strips on until edges become loose and fall off.  Do not pull them off.     Bruising and mild discomfort are expected.   You may apply an ice bag to the incision area to minimize bruising, discomfort, and swelling.    Contact your physician who placed the Port for any bleeding or extreme pain. Other symptoms to report are as follows:     Fever.     Skin warm to touch.     Numbness or weakness.     Swelling of neck or arm.     Redness or Tenderness along the portal site and/or the      catheter tract.     Drainage from the portal site, or if dressing is damp or      saturated.       Elevate arm above heart level as much as possible, at least 15 minutes every 2 hours for the remainder of the day and keep elevated during the night (arm port only).      Refrain from heavy lifting for greater than 10 pounds for 10-14 days.  This is important while the incision is healing.  Strenuous activities and exercise should be approached gradually.      You may shower as long as you keep the incision dry for the first 4 days.              The incision should not be immersed in water until it is completely healed.    Most of the sutures will be absorbed over the next few weeks.  If you have                     sutures that need to be removed, we will arrange this with

## 2024-11-18 LAB — FUNGUS BLD CULT: NORMAL

## 2024-11-22 ENCOUNTER — CARE COORDINATION (OUTPATIENT)
Dept: CASE MANAGEMENT | Age: 49
End: 2024-11-22

## 2024-11-22 NOTE — CARE COORDINATION
Care Transitions Note    Follow Up Call     Reason for Admission: Pancytopenia due to chemotherapy, PICC Line with Klebsiella bacteremia     Patient Current Location:  Home: [address]  [address]  [place] [address]    [facility] Care Coordinator contacted the patient by telephone. Verified name and  as identifiers.    Additional needs identified to be addressed with provider   No needs identified                 Method of communication with provider: none.    Care Summary Note: Spoke with Pam Guido who reported that she was doing ok. Patient stated that her port site area is a little sore and body is hurting a little. Patient stated that Temp this morning was 97. Patient stated that she did just take a couple Tylenol so she will see if it helps. Patient denied cp, sob, cough, dizziness, headache, n/v, diarrhea, abdominal pains, fever, or chills. Patient report that appetite and fluid intake is good and denied any problems with bowel or bladder. Patient reported that she is taking all medications as ordered. Patient denied any other needs at this time. Patient instructed to continue to monitor s/s, reporting any that may present to MD immediately for early intervention.  Patient is agreeable to f/u calls.     Plan of care updates since last contact:          Advance Care Planning   The patient has the following advanced directives on file:  Advance Directives       Power of  Living Will ACP-Advance Directive ACP-Power of     Not on File Not on File Not on File Not on File            The patient has appointed the following active healthcare agents:    Primary Decision Maker: Sunday Guido - Spouse - [phone]      Medication Review:  No changes since last call.     Remote Patient Monitoring:  Offered patient enrollment in the Remote Patient Monitoring (RPM) program for in-home monitoring: Patient is not eligible for RPM program because: patient does not have qualifying

## 2024-11-25 LAB — FUNGUS BLD CULT: NORMAL

## 2024-11-29 ENCOUNTER — CARE COORDINATION (OUTPATIENT)
Dept: CASE MANAGEMENT | Age: 49
End: 2024-11-29

## 2024-11-29 NOTE — CARE COORDINATION
Care Transitions Note    Final Call     Attempted to reach patient for transitions of care follow up.  Unable to reach patient.      Outreach Attempts:   1ST CTC attempt to reach Pt regarding recent hospital discharge.  CTC unable to leave voice recording with call back number requesting a call back, no mailbox option given. CTN will close out CTN episode at this time, as today was scheduled for final follow up CTN call.    Patient graduated from the Care Transitions program on 11/29/2024.  Patient/family has the ability to self manage at this time. progressing towards self management. .      Handoff:   Patient was not referred to the ACM team due to no additional needs identified.       Assessments:  Care Transitions Subsequent and Final Call    Subsequent and Final Calls  Care Transitions Interventions  Other Interventions:              Upcoming Appointments:    Future Appointments         Provider Specialty Dept Phone    12/4/2024 3:00 PM Johanna Jensen, APRN - CNP Family Medicine [phone]          Thank You,    Stephanie Hernandez RN  Care Transition Coordinator  Contact Number:[phone]

## 2024-12-01 LAB
FUNGUS SPEC CULT: ABNORMAL
LOEFFLER MB STN SPEC: ABNORMAL
ORGANISM: ABNORMAL

## 2024-12-02 LAB — FUNGUS BLD CULT: NORMAL

## 2024-12-04 ENCOUNTER — OFFICE VISIT (OUTPATIENT)
Dept: FAMILY MEDICINE CLINIC | Age: 49
End: 2024-12-04

## 2024-12-04 VITALS
DIASTOLIC BLOOD PRESSURE: 70 MMHG | BODY MASS INDEX: 40.01 KG/M2 | SYSTOLIC BLOOD PRESSURE: 114 MMHG | WEIGHT: 264 LBS | OXYGEN SATURATION: 98 % | HEIGHT: 68 IN | HEART RATE: 122 BPM

## 2024-12-04 DIAGNOSIS — E55.9 VITAMIN D DEFICIENCY: ICD-10-CM

## 2024-12-04 DIAGNOSIS — K21.9 GASTROESOPHAGEAL REFLUX DISEASE WITHOUT ESOPHAGITIS: ICD-10-CM

## 2024-12-04 DIAGNOSIS — I10 PRIMARY HYPERTENSION: Primary | ICD-10-CM

## 2024-12-04 NOTE — PROGRESS NOTES
Pepcid 40 mg daily, which has been effective.   Patient denies dysphagia, cough, hoarseness, chest pain, unintentional weight loss, N/V, bloating, early satiety, abdominal pain or melena, hematochezia, hematemesis, and coffee ground emesis.    Vitamin D Deficiency  Patient is here to follow up on vitamin D deficiency.    Patient is taking vitamin D supplement without any adverse effects as prescribed.    Lab Results   Component Value Date    VITD25 14.2 (L) 10/30/2024       Past Medical History:   Diagnosis Date    Anemia     Back pain     Chest pain 6/2014    stress myoview normal    Chronic pain syndrome 2017    Dr. Garcia pain management    Fibroid uterus     fibroid uterus ad abnormal uterine bleeding with uterine fibroids    GERD (gastroesophageal reflux disease)     Hypertension     Menopausal symptoms     Overactive bladder     Pelvic pain     Right lateral epicondylitis     Rotator cuff syndrome of right shoulder     Stress incontinence     Tachycardia     on coreg         Review of Systems   Constitutional:  Positive for fatigue. Negative for appetite change and unexpected weight change.   HENT: Negative.     Eyes: Negative.    Respiratory: Negative.  Negative for shortness of breath.    Cardiovascular: Negative.  Negative for chest pain, palpitations and leg swelling.   Gastrointestinal: Negative.  Negative for abdominal pain, anal bleeding, blood in stool and nausea.   Endocrine: Negative.    Genitourinary: Negative.  Negative for hematuria.   Musculoskeletal: Negative.    Skin: Negative.  Negative for rash.   Allergic/Immunologic: Negative.    Neurological: Negative.  Negative for dizziness, syncope, light-headedness and numbness.   Hematological: Negative.  Does not bruise/bleed easily.   Psychiatric/Behavioral: Negative.     All other systems reviewed and are negative.       Current Outpatient Medications   Medication Sig Dispense Refill    blinatumomab (BLINCYTO) SOLR chemo injection Infuse

## 2024-12-06 RX ORDER — FAMOTIDINE 40 MG/1
40 TABLET, FILM COATED ORAL EVERY EVENING
Qty: 90 TABLET | Refills: 3 | Status: SHIPPED | OUTPATIENT
Start: 2024-12-06

## 2024-12-06 ASSESSMENT — ENCOUNTER SYMPTOMS
ALLERGIC/IMMUNOLOGIC NEGATIVE: 1
RESPIRATORY NEGATIVE: 1
BLOOD IN STOOL: 0
ANAL BLEEDING: 0
SHORTNESS OF BREATH: 0
NAUSEA: 0
ABDOMINAL PAIN: 0
GASTROINTESTINAL NEGATIVE: 1
EYES NEGATIVE: 1

## 2024-12-09 ENCOUNTER — TELEPHONE (OUTPATIENT)
Dept: FAMILY MEDICINE CLINIC | Age: 49
End: 2024-12-09

## 2024-12-09 DIAGNOSIS — R00.0 TACHYCARDIA: ICD-10-CM

## 2024-12-09 DIAGNOSIS — I10 PRIMARY HYPERTENSION: ICD-10-CM

## 2024-12-09 RX ORDER — CARVEDILOL 6.25 MG/1
6.25 TABLET ORAL 2 TIMES DAILY WITH MEALS
Qty: 60 TABLET | Refills: 2 | Status: SHIPPED | OUTPATIENT
Start: 2024-12-09

## 2024-12-09 NOTE — TELEPHONE ENCOUNTER
Patient called on-call with concerns for low blood pressure. She is taking Coreg 12.5 mg.  She is noticing her blood pressure being on the lower side around 110/70 and feeling \"flushed.\"  She states she feels like she is having a \"hot flash.\"  She states she used to take Coreg 6.125 mg and felt much better. Rx sent.  Call with BP in one week.

## 2024-12-30 ENCOUNTER — HOSPITAL ENCOUNTER (OUTPATIENT)
Dept: GENERAL RADIOLOGY | Age: 49
Discharge: HOME | End: 2024-12-30
Attending: STUDENT IN AN ORGANIZED HEALTH CARE EDUCATION/TRAINING PROGRAM
Payer: COMMERCIAL

## 2024-12-30 DIAGNOSIS — C91.00: ICD-10-CM

## 2024-12-30 LAB
APPEARANCE CSF: CLEAR
APPEARANCE CSF: CLEAR
CLOT EVALUATION CSF: ABNORMAL
COLOR CSF: COLORLESS
GLUCOSE CSF-MCNC: 61 MG/DL (ref 40–80)
MANUAL DIF COMMENT CSF-IMP: ABNORMAL
NUC CELL # FLD MANUAL: 5 /CUMM (ref 0–5)
PROT CSF-MCNC: 32 MG/DL (ref 15–45)
RBC # FLD MANUAL: 419 /CUMM
TUBE # CSF: ABNORMAL
TUBE # CSF: NORMAL

## 2024-12-30 PROCEDURE — 6360000002 HC RX W HCPCS: Performed by: STUDENT IN AN ORGANIZED HEALTH CARE EDUCATION/TRAINING PROGRAM

## 2024-12-30 PROCEDURE — 82945 GLUCOSE OTHER FLUID: CPT

## 2024-12-30 PROCEDURE — 77002 NEEDLE LOCALIZATION BY XRAY: CPT

## 2024-12-30 PROCEDURE — 2580000003 HC RX 258: Performed by: STUDENT IN AN ORGANIZED HEALTH CARE EDUCATION/TRAINING PROGRAM

## 2024-12-30 PROCEDURE — 88185 FLOWCYTOMETRY/TC ADD-ON: CPT

## 2024-12-30 PROCEDURE — 62328 DX LMBR SPI PNXR W/FLUOR/CT: CPT

## 2024-12-30 PROCEDURE — 84157 ASSAY OF PROTEIN OTHER: CPT

## 2024-12-30 PROCEDURE — 88112 CYTOPATH CELL ENHANCE TECH: CPT

## 2024-12-30 PROCEDURE — 88184 FLOWCYTOMETRY/ TC 1 MARKER: CPT

## 2024-12-30 PROCEDURE — 89050 BODY FLUID CELL COUNT: CPT

## 2024-12-30 RX ORDER — LIDOCAINE HYDROCHLORIDE 10 MG/ML
5 INJECTION, SOLUTION EPIDURAL; INFILTRATION; INTRACAUDAL; PERINEURAL ONCE
Status: COMPLETED | OUTPATIENT
Start: 2024-12-30 | End: 2024-12-30

## 2024-12-30 RX ADMIN — LIDOCAINE HYDROCHLORIDE 5 ML: 10 INJECTION, SOLUTION EPIDURAL; INFILTRATION; INTRACAUDAL; PERINEURAL at 09:16

## 2024-12-30 RX ADMIN — SODIUM CHLORIDE 15 MG: 9 INJECTION INTRAMUSCULAR; INTRAVENOUS; SUBCUTANEOUS at 09:23

## 2024-12-30 NOTE — PROGRESS NOTES
Patient seen in IR for lumbar puncture with intrathecal administration of chemotherapy under fluoroscopy.  Original chemotherapy orders reviewed and acknowledged. Appropriateness of chemotherapy treatment and dosing of IT methotrexate for diagnosis of ALL was verified.  Patient educated on LP and IT chemo instillation by Yappe.  Consent for chemotherapy verified.    Chemotherapy drug methotrexate (preservative free) independently verified with Dr. Suresh, radiologist prior to administration.  Original order, appropriateness of drug & dosing, drug supplied, expiration dates/times, drug appearance, and two patient identifiers were verified by both RN & MD.   Allergies were reviewed.      CSF obtained without issues per Dr. Suresh and sent for cell count with diff, glucose, protein, and cytology.  Chemotherapy administration time 0923.  Pt tolerated LP with IT chemotherapy well and without incident.      Sowmya Chavez RN

## 2025-02-09 DIAGNOSIS — I10 PRIMARY HYPERTENSION: ICD-10-CM

## 2025-02-10 RX ORDER — LOSARTAN POTASSIUM 50 MG/1
50 TABLET ORAL EVERY MORNING
Qty: 90 TABLET | Refills: 1 | Status: SHIPPED | OUTPATIENT
Start: 2025-02-10

## 2025-02-10 NOTE — TELEPHONE ENCOUNTER
Refill Request     CONFIRM preferrred pharmacy with the patient.    If Mail Order Rx - Pend for 90 day refill.      Last Seen: Last Seen Department: 12/4/2024  Last Seen by PCP: 12/4/2024    Last Written: 5/23/24    If no future appointment scheduled, route STAFF MESSAGE with patient name to the  Pool for scheduling.      Next Appointment:   Future Appointments   Date Time Provider Department Center   6/4/2025  1:40 PM Johanna Jensen, APRN - CNP [facility]       Message sent to  to schedule appt with patient?  N/A      Requested Prescriptions     Pending Prescriptions Disp Refills    losartan (COZAAR) 50 MG tablet [Pharmacy Med Name: LOSARTAN POTASSIUM 50 MG TAB] 90 tablet 1     Sig: Take 1 tablet by mouth every morning

## 2025-02-22 DIAGNOSIS — I10 PRIMARY HYPERTENSION: ICD-10-CM

## 2025-02-24 RX ORDER — HYDROCHLOROTHIAZIDE 12.5 MG/1
12.5 CAPSULE ORAL EVERY MORNING
Qty: 90 CAPSULE | Refills: 0 | Status: SHIPPED | OUTPATIENT
Start: 2025-02-24

## 2025-02-24 NOTE — TELEPHONE ENCOUNTER
Refill Request     CONFIRM preferrred pharmacy with the patient.    If Mail Order Rx - Pend for 90 day refill.      Last Seen: Last Seen Department: 12/4/2024  Last Seen by PCP: 12/4/2024    Last Written: 8.26.24    If no future appointment scheduled, route STAFF MESSAGE with patient name to the  Pool for scheduling.      Next Appointment:   Future Appointments   Date Time Provider Department Center   6/4/2025  1:40 PM Johanna Jensen, [facility] - [facility]       Message sent to  to schedule appt with patient?  N/A      Requested Prescriptions     Pending Prescriptions Disp Refills    hydroCHLOROthiazide 12.5 MG capsule [Pharmacy Med Name: hydroCHLOROthiazide 12.5 MG CAPSULE] 90 capsule 1     Sig: TAKE 1 CAPSULE BY MOUTH EVERY MORNING

## 2025-02-27 DIAGNOSIS — I10 PRIMARY HYPERTENSION: ICD-10-CM

## 2025-02-27 DIAGNOSIS — R00.0 TACHYCARDIA: ICD-10-CM

## 2025-02-27 RX ORDER — CARVEDILOL 6.25 MG/1
6.25 TABLET ORAL 2 TIMES DAILY WITH MEALS
Qty: 60 TABLET | Refills: 2 | Status: SHIPPED | OUTPATIENT
Start: 2025-02-27

## 2025-02-27 NOTE — TELEPHONE ENCOUNTER
Refill Request     CONFIRM preferrred pharmacy with the patient.    If Mail Order Rx - Pend for 90 day refill.      Last Seen: Last Seen Department: 12/4/2024  Last Seen by PCP: Visit date not found    Last Written: 12/9/24    If no future appointment scheduled, route STAFF MESSAGE with patient name to the  Pool for scheduling.      Next Appointment:   Future Appointments   Date Time Provider Department Center   6/4/2025  1:40 PM Johanna Jensen, APR[facility] - [facility]NP [facility]       Message sent to  to schedule appt with patient?  NO      Requested Prescriptions     Pending Prescriptions Disp Refills    carvedilol (COREG) 6.25 MG tablet 60 tablet 2     Sig: Take 1 tablet by mouth 2 times daily (with meals)

## 2025-03-14 ENCOUNTER — HOSPITAL ENCOUNTER (OUTPATIENT)
Dept: MAMMOGRAPHY | Age: 50
Discharge: HOME | End: 2025-03-19

## 2025-03-14 DIAGNOSIS — Z12.31 VISIT FOR SCREENING MAMMOGRAM: ICD-10-CM

## 2025-03-25 ENCOUNTER — HOSPITAL ENCOUNTER (OUTPATIENT)
Dept: ONCOLOGY | Age: 50
Setting detail: INFUSION SERIES
Discharge: HOME | End: 2025-03-25
Payer: COMMERCIAL

## 2025-03-25 ENCOUNTER — HOSPITAL ENCOUNTER (OUTPATIENT)
Age: 50
Setting detail: SPECIMEN
Discharge: HOME | End: 2025-03-25
Payer: COMMERCIAL

## 2025-03-25 VITALS
HEART RATE: 82 BPM | TEMPERATURE: 97.5 F | RESPIRATION RATE: 16 BRPM | DIASTOLIC BLOOD PRESSURE: 78 MMHG | SYSTOLIC BLOOD PRESSURE: 132 MMHG | OXYGEN SATURATION: 97 %

## 2025-03-25 LAB
BASOPHILS # BLD: 0.1 K/UL (ref 0–0.2)
BASOPHILS NFR BLD: 1 %
DEPRECATED RDW RBC AUTO: 14.2 % (ref 12.4–15.4)
EOSINOPHIL # BLD: 1 K/UL (ref 0–0.6)
EOSINOPHIL NFR BLD: 7.5 %
HCT VFR BLD AUTO: 36.8 % (ref 36–48)
HGB BLD-MCNC: 12.2 G/DL (ref 12–16)
LYMPHOCYTES # BLD: 1.5 K/UL (ref 1–5.1)
LYMPHOCYTES NFR BLD: 11.7 %
MCH RBC QN AUTO: 27.8 PG (ref 26–34)
MCHC RBC AUTO-ENTMCNC: 33.3 G/DL (ref 31–36)
MCV RBC AUTO: 83.7 FL (ref 80–100)
MONOCYTES # BLD: 1.2 K/UL (ref 0–1.3)
MONOCYTES NFR BLD: 9.2 %
NEUTROPHILS # BLD: 9.3 K/UL (ref 1.7–7.7)
NEUTROPHILS NFR BLD: 70.6 %
PLATELET # BLD AUTO: 310 K/UL (ref 135–450)
PMV BLD AUTO: 8.8 FL (ref 5–10.5)
RBC # BLD AUTO: 4.4 M/UL (ref 4–5.2)
WBC # BLD AUTO: 13.1 K/UL (ref 4–11)

## 2025-03-25 PROCEDURE — 88311 DECALCIFY TISSUE: CPT

## 2025-03-25 PROCEDURE — 96374 THER/PROPH/DIAG INJ IV PUSH: CPT

## 2025-03-25 PROCEDURE — 2700000000 HC OXYGEN THERAPY PER DAY

## 2025-03-25 PROCEDURE — 88184 FLOWCYTOMETRY/ TC 1 MARKER: CPT

## 2025-03-25 PROCEDURE — 94761 N-INVAS EAR/PLS OXIMETRY MLT: CPT

## 2025-03-25 PROCEDURE — 96376 TX/PRO/DX INJ SAME DRUG ADON: CPT

## 2025-03-25 PROCEDURE — 88313 SPECIAL STAINS GROUP 2: CPT

## 2025-03-25 PROCEDURE — 96375 TX/PRO/DX INJ NEW DRUG ADDON: CPT

## 2025-03-25 PROCEDURE — 6360000002 HC RX W HCPCS: Performed by: STUDENT IN AN ORGANIZED HEALTH CARE EDUCATION/TRAINING PROGRAM

## 2025-03-25 PROCEDURE — 88305 TISSUE EXAM BY PATHOLOGIST: CPT

## 2025-03-25 PROCEDURE — 88185 FLOWCYTOMETRY/TC ADD-ON: CPT

## 2025-03-25 PROCEDURE — 85025 COMPLETE CBC W/AUTO DIFF WBC: CPT

## 2025-03-25 PROCEDURE — 36591 DRAW BLOOD OFF VENOUS DEVICE: CPT

## 2025-03-25 PROCEDURE — 38221 DX BONE MARROW BIOPSIES: CPT

## 2025-03-25 RX ORDER — NALOXONE HYDROCHLORIDE 0.4 MG/ML
0.4 INJECTION, SOLUTION INTRAMUSCULAR; INTRAVENOUS; SUBCUTANEOUS PRN
Status: DISCONTINUED | OUTPATIENT
Start: 2025-03-25 | End: 2025-03-26 | Stop reason: HOSPADM

## 2025-03-25 RX ORDER — FENTANYL CITRATE 50 UG/ML
25 INJECTION, SOLUTION INTRAMUSCULAR; INTRAVENOUS PRN
Status: DISCONTINUED | OUTPATIENT
Start: 2025-03-25 | End: 2025-03-26 | Stop reason: HOSPADM

## 2025-03-25 RX ORDER — FLUMAZENIL 0.1 MG/ML
0.5 INJECTION INTRAVENOUS PRN
Status: DISCONTINUED | OUTPATIENT
Start: 2025-03-25 | End: 2025-03-26 | Stop reason: HOSPADM

## 2025-03-25 RX ORDER — MIDAZOLAM HYDROCHLORIDE 1 MG/ML
1 INJECTION, SOLUTION INTRAMUSCULAR; INTRAVENOUS PRN
Status: DISCONTINUED | OUTPATIENT
Start: 2025-03-25 | End: 2025-03-26 | Stop reason: HOSPADM

## 2025-03-25 RX ADMIN — FENTANYL CITRATE 25 MCG: 50 INJECTION, SOLUTION INTRAMUSCULAR; INTRAVENOUS at 09:10

## 2025-03-25 RX ADMIN — FENTANYL CITRATE 50 MCG: 50 INJECTION, SOLUTION INTRAMUSCULAR; INTRAVENOUS at 09:04

## 2025-03-25 RX ADMIN — MIDAZOLAM HYDROCHLORIDE 1 MG: 1 INJECTION, SOLUTION INTRAMUSCULAR; INTRAVENOUS at 09:20

## 2025-03-25 RX ADMIN — MIDAZOLAM HYDROCHLORIDE 3 MG: 1 INJECTION, SOLUTION INTRAMUSCULAR; INTRAVENOUS at 09:03

## 2025-03-25 NOTE — PROGRESS NOTES
ETCO2  Monitoring done for conscious sedation.  Patient is on 2 liters/min via nasal cannula for procedure.    Baseline information:  HR: 86 BP: 147/92  RR: 19 LOC: alert  ETCO2: 36 SpO2: 100    5 minutes after sedation administered:  HR: 83 BP: 142/86  RR: 16 LOC: lethargic  ETCO2: 41 SpO2: 100    10 min after sedation administered:  HR: 92 BP: 141/108  RR: 14 LOC: lethargic  ETCO2: 41 SpO2: 99    15 minutes after sedation administered:  HR: 90 BP: 466228  RR: 15 LOC:lethargic  ETCO2: 40 SpO2: 99    20 minutes after sedation administered:  HR: 83 BP: 141/102  RR: 14 LOC: lethargic  ETCO2: 38 SpO2: 100    Post-Procedure:  HR: 1 BP: 81  RR: 15 LOC: lethargic  ETCO2: 39 SpO2: 100  well    Patient/caregiver was educated on the proper method of use:  Yes      Level of patient/caregiver understanding able to:   [x] Verbalize understanding   [] Demonstrate understanding       [] Teach back        [] Needs reinforcement       []  No available caregiver               []  Other:     Response to education:  Very Good

## 2025-03-25 NOTE — PROGRESS NOTES
Pt scheduled for bone marrow biopsy with conscious sedation for diagnosis of B Cell Acute Lymphocytic Leukemia.  Pt's history, allergies, and medication list reviewed by nursing and physician prior to start of procedure.  Pt assessed and deemed fit for procedure by physician.      Pre-Procedural Assessment:  Signed, Dated, and timed Informed Consent on the chart  VS's obtained and documented  Pt has a patent IV site (see flowsheets)  Pt has been NPO since 3/24/2025 @ 2100  Current Medications, Allergies, and recent H&P reviewed and on chart  Emergency medications and equipment available (crash cart, narcan, flumazenil, O2, suction, etc)  Time out performed prior to procedure (see sedation documentation)  Pre-Procedure Ada Score: 10    Procedure:  Nursing present throughout procedure to assess, assist, and monitor.  Vital Signs monitored throughout - see sedation documentation. Patient to be discharged from OPO once Ada Score of 8 or better is achieved or once pre-procedural Ada Score is obtained.    75 mcg fentanyl  4 mg of versed    Post-Procedure:  Pt vital signs stable.  Discharge teaching provided (see AVS).  Post procedure Ada Score 10.  Pt and family verbalized understanding of all instructions.  Pt discharged from Outpt Oncology with mother in law who will drive them home.

## 2025-03-30 ENCOUNTER — APPOINTMENT (OUTPATIENT)
Dept: GENERAL RADIOLOGY | Age: 50
End: 2025-03-30
Payer: COMMERCIAL

## 2025-03-30 ENCOUNTER — HOSPITAL ENCOUNTER (EMERGENCY)
Age: 50
Discharge: HOME | End: 2025-03-30
Payer: COMMERCIAL

## 2025-03-30 VITALS
DIASTOLIC BLOOD PRESSURE: 88 MMHG | TEMPERATURE: 98.1 F | HEART RATE: 90 BPM | HEIGHT: 68 IN | RESPIRATION RATE: 16 BRPM | SYSTOLIC BLOOD PRESSURE: 145 MMHG | WEIGHT: 278.8 LBS | OXYGEN SATURATION: 96 % | BODY MASS INDEX: 42.25 KG/M2

## 2025-03-30 DIAGNOSIS — M79.601 RIGHT ARM PAIN: Primary | ICD-10-CM

## 2025-03-30 LAB
ALBUMIN SERPL-MCNC: 4.1 G/DL (ref 3.4–5)
ALBUMIN/GLOB SERPL: 2.2 {RATIO} (ref 1.1–2.2)
ALP SERPL-CCNC: 74 U/L (ref 40–129)
ALT SERPL-CCNC: 37 U/L (ref 10–40)
ANION GAP SERPL CALCULATED.3IONS-SCNC: 10 MMOL/L (ref 3–16)
AST SERPL-CCNC: 20 U/L (ref 15–37)
BASOPHILS # BLD: 0.1 K/UL (ref 0–0.2)
BASOPHILS NFR BLD: 1 %
BILIRUB SERPL-MCNC: <0.2 MG/DL (ref 0–1)
BUN SERPL-MCNC: 11 MG/DL (ref 7–20)
CALCIUM SERPL-MCNC: 9 MG/DL (ref 8.3–10.6)
CHLORIDE SERPL-SCNC: 105 MMOL/L (ref 99–110)
CO2 SERPL-SCNC: 25 MMOL/L (ref 21–32)
CREAT SERPL-MCNC: <0.5 MG/DL (ref 0.6–1.1)
D-DIMER QUANTITATIVE: 0.42 UG/ML FEU (ref 0–0.6)
DEPRECATED RDW RBC AUTO: 14.4 % (ref 12.4–15.4)
EOSINOPHIL # BLD: 0.7 K/UL (ref 0–0.6)
EOSINOPHIL NFR BLD: 5.1 %
GFR SERPLBLD CREATININE-BSD FMLA CKD-EPI: >90 ML/MIN/{1.73_M2}
GLUCOSE SERPL-MCNC: 108 MG/DL (ref 70–99)
HCT VFR BLD AUTO: 39.1 % (ref 36–48)
HGB BLD-MCNC: 12.8 G/DL (ref 12–16)
LYMPHOCYTES # BLD: 1.7 K/UL (ref 1–5.1)
LYMPHOCYTES NFR BLD: 11.9 %
MCH RBC QN AUTO: 27.5 PG (ref 26–34)
MCHC RBC AUTO-ENTMCNC: 32.7 G/DL (ref 31–36)
MCV RBC AUTO: 84.1 FL (ref 80–100)
MONOCYTES # BLD: 1.3 K/UL (ref 0–1.3)
MONOCYTES NFR BLD: 8.8 %
NEUTROPHILS # BLD: 10.4 K/UL (ref 1.7–7.7)
NEUTROPHILS NFR BLD: 73.2 %
NT-PROBNP SERPL-MCNC: <36 PG/ML (ref 0–124)
PLATELET # BLD AUTO: 317 K/UL (ref 135–450)
PMV BLD AUTO: 9.1 FL (ref 5–10.5)
POTASSIUM SERPL-SCNC: 3.6 MMOL/L (ref 3.5–5.1)
PROT SERPL-MCNC: 6 G/DL (ref 6.4–8.2)
RBC # BLD AUTO: 4.65 M/UL (ref 4–5.2)
SODIUM SERPL-SCNC: 140 MMOL/L (ref 136–145)
TROPONIN, HIGH SENSITIVITY: 10 NG/L (ref 0–14)
TROPONIN, HIGH SENSITIVITY: 12 NG/L (ref 0–14)
WBC # BLD AUTO: 14.2 K/UL (ref 4–11)

## 2025-03-30 PROCEDURE — 71045 X-RAY EXAM CHEST 1 VIEW: CPT

## 2025-03-30 PROCEDURE — 85379 FIBRIN DEGRADATION QUANT: CPT

## 2025-03-30 PROCEDURE — 85025 COMPLETE CBC W/AUTO DIFF WBC: CPT

## 2025-03-30 PROCEDURE — 84484 ASSAY OF TROPONIN QUANT: CPT

## 2025-03-30 PROCEDURE — 93005 ELECTROCARDIOGRAM TRACING: CPT | Performed by: PHYSICIAN ASSISTANT

## 2025-03-30 PROCEDURE — 80053 COMPREHEN METABOLIC PANEL: CPT

## 2025-03-30 PROCEDURE — 36415 COLL VENOUS BLD VENIPUNCTURE: CPT

## 2025-03-30 PROCEDURE — 99285 EMERGENCY DEPT VISIT HI MDM: CPT

## 2025-03-30 PROCEDURE — 83880 ASSAY OF NATRIURETIC PEPTIDE: CPT

## 2025-03-30 ASSESSMENT — PAIN DESCRIPTION - FREQUENCY
FREQUENCY: CONTINUOUS
FREQUENCY: CONTINUOUS

## 2025-03-30 ASSESSMENT — PAIN - FUNCTIONAL ASSESSMENT
PAIN_FUNCTIONAL_ASSESSMENT: ACTIVITIES ARE NOT PREVENTED
PAIN_FUNCTIONAL_ASSESSMENT: 0-10
PAIN_FUNCTIONAL_ASSESSMENT: ACTIVITIES ARE NOT PREVENTED

## 2025-03-30 ASSESSMENT — PAIN DESCRIPTION - DESCRIPTORS
DESCRIPTORS: ACHING
DESCRIPTORS: ACHING

## 2025-03-30 ASSESSMENT — PAIN DESCRIPTION - LOCATION
LOCATION: ARM
LOCATION: ARM

## 2025-03-30 ASSESSMENT — PAIN DESCRIPTION - ORIENTATION
ORIENTATION: RIGHT
ORIENTATION: RIGHT

## 2025-03-30 ASSESSMENT — HEART SCORE: ECG: NORMAL

## 2025-03-30 ASSESSMENT — PAIN DESCRIPTION - PAIN TYPE
TYPE: ACUTE PAIN
TYPE: ACUTE PAIN

## 2025-03-30 ASSESSMENT — PAIN DESCRIPTION - ONSET
ONSET: ON-GOING
ONSET: GRADUAL

## 2025-03-30 ASSESSMENT — PAIN SCALES - GENERAL
PAINLEVEL_OUTOF10: 5
PAINLEVEL_OUTOF10: 4

## 2025-03-30 NOTE — ED PROVIDER NOTES
pulses.           Radial pulses are 2+ on the right side and 2+ on the left side.        Posterior tibial pulses are 2+ on the right side and 2+ on the left side.   Pulmonary:      Effort: Pulmonary effort is normal. No respiratory distress.      Breath sounds: No wheezing or rales.      Comments: Normal respirations   Musculoskeletal:      Comments: Tenderness to palpation right arm AC area is more firm than compared to left. No palpable cords. No color change. Intact sensation. Radial pulses 2+ symmetric.    Mild bilateral lower extremity swelling is symmetric. No redness or rashes. No calf tenderness. Intact distal pulses 2+ symmetric.   Skin:     General: Skin is warm and dry.   Neurological:      Mental Status: She is alert and oriented to person, place, and time.      GCS: GCS eye subscore is 4. GCS verbal subscore is 5. GCS motor subscore is 6.      Motor: No abnormal muscle tone.   Psychiatric:         Behavior: Behavior normal.           DIAGNOSTIC RESULTS   LABS:    Labs Reviewed   CBC WITH AUTO DIFFERENTIAL - Abnormal; Notable for the following components:       Result Value    WBC 14.2 (*)     Neutrophils Absolute 10.4 (*)     Eosinophils Absolute 0.7 (*)     All other components within normal limits   COMPREHENSIVE METABOLIC PANEL W/ REFLEX TO MG FOR LOW K - Abnormal; Notable for the following components:    Glucose 108 (*)     Creatinine <0.5 (*)     Total Protein 6.0 (*)     All other components within normal limits   TROPONIN   TROPONIN   BRAIN NATRIURETIC PEPTIDE   D-DIMER, QUANTITATIVE     Results for orders placed or performed during the hospital encounter of 03/30/25   CBC with Auto Differential   Result Value Ref Range    WBC 14.2 (H) 4.0 - 11.0 K/uL    RBC 4.65 4.00 - 5.20 M/uL    Hemoglobin 12.8 12.0 - 16.0 g/dL    Hematocrit 39.1 36.0 - 48.0 %    MCV 84.1 80.0 - 100.0 fL    MCH 27.5 26.0 - 34.0 pg    MCHC 32.7 31.0 - 36.0 g/dL    RDW 14.4 12.4 - 15.4 %    Platelets 317 135 - 450 K/uL    MPV

## 2025-03-30 NOTE — DISCHARGE INSTRUCTIONS
Testing today is overall unremarkable and is reassuring. I have wrote you for an outpatient vascular ultrasound of your arm you can have done tomorrow you will need to call central scheduling ([phone] [facility]) to schedule this test.  Rest and elevate your arm.  You may apply warm compresses.  Follow-up with your doctor tomorrow as planned.

## 2025-03-31 LAB
EKG ATRIAL RATE: 89 BPM
EKG DIAGNOSIS: NORMAL
EKG P AXIS: 68 DEGREES
EKG P-R INTERVAL: 178 MS
EKG Q-T INTERVAL: 354 MS
EKG QRS DURATION: 76 MS
EKG QTC CALCULATION (BAZETT): 430 MS
EKG R AXIS: -22 DEGREES
EKG T AXIS: 39 DEGREES
EKG VENTRICULAR RATE: 89 BPM

## 2025-03-31 PROCEDURE — 93010 ELECTROCARDIOGRAM REPORT: CPT | Performed by: INTERNAL MEDICINE

## 2025-04-01 ENCOUNTER — CARE COORDINATION (OUTPATIENT)
Dept: CARE COORDINATION | Age: 50
End: 2025-04-01

## 2025-04-02 LAB
Lab: NORMAL
Lab: NORMAL
REPORT: NORMAL
REPORT: NORMAL
THIS TEST SENT TO: NORMAL
THIS TEST SENT TO: NORMAL

## 2025-04-07 DIAGNOSIS — R00.0 TACHYCARDIA: ICD-10-CM

## 2025-04-07 DIAGNOSIS — I10 PRIMARY HYPERTENSION: ICD-10-CM

## 2025-04-07 RX ORDER — CARVEDILOL 6.25 MG/1
6.25 TABLET ORAL 2 TIMES DAILY WITH MEALS
Qty: 60 TABLET | Refills: 2 | Status: SHIPPED | OUTPATIENT
Start: 2025-04-07

## 2025-04-07 NOTE — TELEPHONE ENCOUNTER
Refill Request     CONFIRM preferrred pharmacy with the patient.    If Mail Order Rx - Pend for 90 day refill.      Last Seen: Last Seen Department: 12/4/2024  Last Seen by PCP: 12/4/2024    Last Written: 2.27.2025    If no future appointment scheduled, route STAFF MESSAGE with patient name to the  Pool for scheduling.      Next Appointment:   Future Appointments   Date Time Provider Department Center   5/23/2025 10:00 AM [facility] MOBILE MAMMO [place] [facility]   6/4/2025  1:40 PM Johanna Jensen, APRN - CNP [facility] DEP       Message sent to  to schedule appt with patient?  NO      Requested Prescriptions     Pending Prescriptions Disp Refills    carvedilol (COREG) 6.25 MG tablet 60 tablet 2     Sig: Take 1 tablet by mouth 2 times daily (with meals)

## 2025-04-18 ENCOUNTER — RESULTS FOLLOW-UP (OUTPATIENT)
Dept: FAMILY MEDICINE CLINIC | Age: 50
End: 2025-04-18

## 2025-04-18 ENCOUNTER — TELEMEDICINE (OUTPATIENT)
Dept: FAMILY MEDICINE CLINIC | Age: 50
End: 2025-04-18

## 2025-04-18 DIAGNOSIS — R09.81 HEAD CONGESTION: Primary | ICD-10-CM

## 2025-04-18 DIAGNOSIS — C91.00: ICD-10-CM

## 2025-04-18 DIAGNOSIS — J06.9 UPPER RESPIRATORY TRACT INFECTION, UNSPECIFIED TYPE: ICD-10-CM

## 2025-04-18 LAB
INFLUENZA A ANTIGEN, POC: NEGATIVE
INFLUENZA B ANTIGEN, POC: NEGATIVE
LOT EXPIRE DATE: 0
LOT KIT NUMBER: 0
RSV RNA: NORMAL
SARS-COV-2 RNA, POC: NEGATIVE
VALID INTERNAL CONTROL: PRESENT
VENDOR AND KIT NAME POC: NORMAL

## 2025-04-18 RX ORDER — CEFDINIR 300 MG/1
300 CAPSULE ORAL 2 TIMES DAILY
Qty: 14 CAPSULE | Refills: 0 | Status: SHIPPED | OUTPATIENT
Start: 2025-04-18 | End: 2025-04-25

## 2025-04-18 ASSESSMENT — ENCOUNTER SYMPTOMS
SHORTNESS OF BREATH: 0
GASTROINTESTINAL NEGATIVE: 1
EYES NEGATIVE: 1
SORE THROAT: 1
DIARRHEA: 0
SWOLLEN GLANDS: 0
BLOOD IN STOOL: 0
WHEEZING: 0
SINUS PAIN: 1
ABDOMINAL PAIN: 0
ANAL BLEEDING: 0
VOMITING: 0
ALLERGIC/IMMUNOLOGIC NEGATIVE: 1
COUGH: 1
RHINORRHEA: 1
NAUSEA: 0

## 2025-04-18 NOTE — PROGRESS NOTES
2025    TELEHEALTH EVALUATION -- Audio/Visual (During COVID-19 public health emergency)    HPI:    Pam Guido (:  1975) has requested an audio/video evaluation for the following concern(s):  Chief Complaint   Patient presents with    Congestion    Nasal Congestion    Headache    Fatigue     Patient has these symptoms for 3 days        Cold Symptoms   This is a new problem. The current episode started in the past 7 days (3 days). The problem has been unchanged. There has been no fever. Associated symptoms include congestion (nasal), coughing (dry), headaches, rhinorrhea, sinus pain, sneezing and a sore throat. Pertinent negatives include no abdominal pain, chest pain, diarrhea, dysuria, ear pain, joint pain, nausea, neck pain, plugged ear sensation, rash, swollen glands, vomiting or wheezing. She has tried increased fluids (flonase and claritin and mucinex) for the symptoms. The treatment provided mild relief.         Review of Systems   Constitutional: Negative.  Negative for appetite change, fatigue and unexpected weight change.   HENT:  Positive for congestion (nasal), rhinorrhea, sinus pain, sneezing and sore throat. Negative for ear pain.    Eyes: Negative.    Respiratory:  Positive for cough (dry). Negative for shortness of breath and wheezing.    Cardiovascular: Negative.  Negative for chest pain, palpitations and leg swelling.   Gastrointestinal: Negative.  Negative for abdominal pain, anal bleeding, blood in stool, diarrhea, nausea and vomiting.   Endocrine: Negative.    Genitourinary: Negative.  Negative for dysuria and hematuria.   Musculoskeletal: Negative.  Negative for joint pain and neck pain.   Skin: Negative.  Negative for rash.   Allergic/Immunologic: Negative.    Neurological:  Positive for headaches. Negative for dizziness, syncope, light-headedness and numbness.   Hematological: Negative.  Does not bruise/bleed easily.   Psychiatric/Behavioral: Negative.     All other systems

## 2025-05-23 ENCOUNTER — HOSPITAL ENCOUNTER (OUTPATIENT)
Dept: MAMMOGRAPHY | Age: 50
Discharge: HOME | End: 2025-05-28

## 2025-05-23 DIAGNOSIS — I10 PRIMARY HYPERTENSION: ICD-10-CM

## 2025-05-23 DIAGNOSIS — Z12.31 VISIT FOR SCREENING MAMMOGRAM: ICD-10-CM

## 2025-05-23 RX ORDER — HYDROCHLOROTHIAZIDE 12.5 MG/1
12.5 CAPSULE ORAL EVERY MORNING
Qty: 90 CAPSULE | Refills: 0 | Status: SHIPPED | OUTPATIENT
Start: 2025-05-23

## 2025-05-23 NOTE — TELEPHONE ENCOUNTER
Refill Request     CONFIRM preferrred pharmacy with the patient.    If Mail Order Rx - Pend for 90 day refill.      Last Seen: Last Seen Department: 4/18/2025  Last Seen by PCP: 4/18/2025    Last Written: 2/24/2025    If no future appointment scheduled, route STAFF MESSAGE with patient name to the  Pool for scheduling.      Next Appointment:   Future Appointments   Date Time Provider Department Center   5/23/2025 10:00 AM [facility] [place] [facility]LE [facility]MO [place] [facility]   6/4/2025  1:40 PM Johanna Jensen, APRN - CNP [facility]       Message sent to  to schedule appt with patient?  NO      Requested Prescriptions     Pending Prescriptions Disp Refills    hydroCHLOROthiazide 12.5 MG capsule [Pharmacy Med Name: hydroCHLOROthiazide 12.5 MG CAPSULE] 90 capsule 0     Sig: TAKE 1 CAPSULE BY MOUTH EVERY MORNING

## 2025-06-04 ENCOUNTER — OFFICE VISIT (OUTPATIENT)
Dept: FAMILY MEDICINE CLINIC | Age: 50
End: 2025-06-04

## 2025-06-04 VITALS
OXYGEN SATURATION: 99 % | HEART RATE: 77 BPM | SYSTOLIC BLOOD PRESSURE: 134 MMHG | BODY MASS INDEX: 42.13 KG/M2 | HEIGHT: 68 IN | WEIGHT: 278 LBS | DIASTOLIC BLOOD PRESSURE: 84 MMHG

## 2025-06-04 DIAGNOSIS — Z12.31 BREAST CANCER SCREENING BY MAMMOGRAM: ICD-10-CM

## 2025-06-04 DIAGNOSIS — K21.9 GASTROESOPHAGEAL REFLUX DISEASE WITHOUT ESOPHAGITIS: ICD-10-CM

## 2025-06-04 DIAGNOSIS — E55.9 VITAMIN D DEFICIENCY: ICD-10-CM

## 2025-06-04 DIAGNOSIS — I10 PRIMARY HYPERTENSION: Primary | ICD-10-CM

## 2025-06-04 DIAGNOSIS — Z13.220 SCREENING FOR CHOLESTEROL LEVEL: ICD-10-CM

## 2025-06-04 DIAGNOSIS — L02.213 CUTANEOUS ABSCESS OF CHEST WALL: ICD-10-CM

## 2025-06-04 RX ORDER — FUROSEMIDE 20 MG/1
1 TABLET ORAL
COMMUNITY
Start: 2025-03-03

## 2025-06-04 RX ORDER — CEPHALEXIN 500 MG/1
500 CAPSULE ORAL 3 TIMES DAILY
Qty: 30 CAPSULE | Refills: 0 | Status: SHIPPED | OUTPATIENT
Start: 2025-06-04 | End: 2025-06-14

## 2025-06-04 RX ORDER — TRAMADOL HYDROCHLORIDE 50 MG/1
1 TABLET ORAL
COMMUNITY
Start: 2025-05-29

## 2025-06-04 RX ORDER — FAMOTIDINE 40 MG/1
40 TABLET, FILM COATED ORAL 2 TIMES DAILY
Qty: 180 TABLET | Refills: 1 | Status: SHIPPED | OUTPATIENT
Start: 2025-06-04

## 2025-06-04 ASSESSMENT — ENCOUNTER SYMPTOMS
RESPIRATORY NEGATIVE: 1
EYES NEGATIVE: 1
ALLERGIC/IMMUNOLOGIC NEGATIVE: 1
ANAL BLEEDING: 0
GASTROINTESTINAL NEGATIVE: 1
NAUSEA: 0
BLOOD IN STOOL: 0
ABDOMINAL PAIN: 0
SHORTNESS OF BREATH: 0

## 2025-06-04 ASSESSMENT — PATIENT HEALTH QUESTIONNAIRE - PHQ9
8. MOVING OR SPEAKING SO SLOWLY THAT OTHER PEOPLE COULD HAVE NOTICED. OR THE OPPOSITE, BEING SO FIGETY OR RESTLESS THAT YOU HAVE BEEN MOVING AROUND A LOT MORE THAN USUAL: NOT AT ALL
1. LITTLE INTEREST OR PLEASURE IN DOING THINGS: NOT AT ALL
2. FEELING DOWN, DEPRESSED OR HOPELESS: NOT AT ALL
4. FEELING TIRED OR HAVING LITTLE ENERGY: NOT AT ALL
SUM OF ALL RESPONSES TO PHQ QUESTIONS 1-9: 0
3. TROUBLE FALLING OR STAYING ASLEEP: NOT AT ALL
SUM OF ALL RESPONSES TO PHQ QUESTIONS 1-9: 0
2. FEELING DOWN, DEPRESSED OR HOPELESS: NOT AT ALL
7. TROUBLE CONCENTRATING ON THINGS, SUCH AS READING THE NEWSPAPER OR WATCHING TELEVISION: NOT AT ALL
SUM OF ALL RESPONSES TO PHQ9 QUESTIONS 1 & 2: 0
SUM OF ALL RESPONSES TO PHQ QUESTIONS 1-9: 0
10. IF YOU CHECKED OFF ANY PROBLEMS, HOW DIFFICULT HAVE THESE PROBLEMS MADE IT FOR YOU TO DO YOUR WORK, TAKE CARE OF THINGS AT HOME, OR GET ALONG WITH OTHER PEOPLE: NOT DIFFICULT AT ALL
2. FEELING DOWN, DEPRESSED OR HOPELESS: NOT AT ALL
SUM OF ALL RESPONSES TO PHQ QUESTIONS 1-9: 0
SUM OF ALL RESPONSES TO PHQ QUESTIONS 1-9: 0
6. FEELING BAD ABOUT YOURSELF - OR THAT YOU ARE A FAILURE OR HAVE LET YOURSELF OR YOUR FAMILY DOWN: NOT AT ALL
9. THOUGHTS THAT YOU WOULD BE BETTER OFF DEAD, OR OF HURTING YOURSELF: NOT AT ALL
1. LITTLE INTEREST OR PLEASURE IN DOING THINGS: NOT AT ALL
5. POOR APPETITE OR OVEREATING: NOT AT ALL
SUM OF ALL RESPONSES TO PHQ QUESTIONS 1-9: 0
1. LITTLE INTEREST OR PLEASURE IN DOING THINGS: NOT AT ALL

## 2025-06-04 NOTE — PROGRESS NOTES
she gains more than 5 pounds in a week. She has a history of angiogram performed by Dr. Qureshi to assess for blockages. She is not under the care of a cardiologist for heart failure.    She experiences severe heartburn, which she manages with Pepcid 40 mg nightly. Despite this, she continues to experience symptoms during the day, particularly after consuming water or acidic foods. She reports no presence of blood in her bowel movements or abdominal pain.    She continues her daily intake of vitamin D3 2000 international units. Her blood work at [facility] includes CBC, sodium, potassium, and bilirubin levels, but not vitamin levels. She maintains a diet that includes dairy, meat, chicken, fish, and eggs.    She has developed a boil under her breast, which she noticed on 06/03/2025. She has been applying a drawing salve to it. She had a previous abscess under her arm that resolved without treatment. She has not had any abscesses tested for staph or drained. She has a history of RSV infection, for which she received IV fluids due to dehydration and a low-grade fever. She was prescribed clarithromycin, which caused diarrhea and a rash.    Review of Systems   Constitutional:  Positive for fatigue. Negative for appetite change and unexpected weight change.   HENT: Negative.     Eyes: Negative.    Respiratory: Negative.  Negative for shortness of breath.    Cardiovascular: Negative.  Negative for chest pain, palpitations and leg swelling.   Gastrointestinal: Negative.  Negative for abdominal pain, anal bleeding, blood in stool and nausea.   Endocrine: Negative.    Genitourinary: Negative.  Negative for hematuria.   Musculoskeletal: Negative.    Skin:  Positive for wound. Negative for rash.   Allergic/Immunologic: Negative.    Neurological: Negative.  Negative for dizziness, syncope, light-headedness and numbness.   Hematological: Negative.  Does not bruise/bleed easily.   Psychiatric/Behavioral: Negative.     All other systems

## 2025-06-17 ENCOUNTER — HOSPITAL ENCOUNTER (OUTPATIENT)
Dept: WOMENS IMAGING | Age: 50
Discharge: HOME | End: 2025-06-17
Payer: COMMERCIAL

## 2025-06-17 ENCOUNTER — RESULTS FOLLOW-UP (OUTPATIENT)
Dept: FAMILY MEDICINE CLINIC | Age: 50
End: 2025-06-17

## 2025-06-17 VITALS — HEIGHT: 68 IN | WEIGHT: 273 LBS | BODY MASS INDEX: 41.37 KG/M2

## 2025-06-17 DIAGNOSIS — N64.4 PAINFUL LUMPY LEFT BREAST: ICD-10-CM

## 2025-06-17 DIAGNOSIS — N63.10 MASS OF RIGHT BREAST, UNSPECIFIED QUADRANT: Primary | ICD-10-CM

## 2025-06-17 DIAGNOSIS — N63.20 MASS OF LEFT BREAST, UNSPECIFIED QUADRANT: ICD-10-CM

## 2025-06-17 DIAGNOSIS — N63.20 PAINFUL LUMPY LEFT BREAST: ICD-10-CM

## 2025-06-17 PROCEDURE — 76642 ULTRASOUND BREAST LIMITED: CPT

## 2025-06-17 PROCEDURE — G0279 TOMOSYNTHESIS, MAMMO: HCPCS

## 2025-07-13 DIAGNOSIS — I10 PRIMARY HYPERTENSION: ICD-10-CM

## 2025-07-13 DIAGNOSIS — R00.0 TACHYCARDIA: ICD-10-CM

## 2025-07-14 RX ORDER — CARVEDILOL 6.25 MG/1
TABLET ORAL
Qty: 180 TABLET | Refills: 0 | Status: SHIPPED | OUTPATIENT
Start: 2025-07-14

## 2025-07-14 NOTE — TELEPHONE ENCOUNTER
Refill Request     CONFIRM preferred pharmacy with the patient.    If Mail Order Rx - Pend for 90 day refill.      Last Seen: Last Seen Department: 6/4/2025  Last Seen by PCP: 6/4/2025    Last Written: 4/7/25 #60 - 2 refills     If no future appointment scheduled:  Review the last OV with PCP and review information for follow-up visit,  Route STAFF MESSAGE with patient name to the  Pool for scheduling with the following information:            -  Timing of next visit           -  Visit type ie Physical, OV, etc           -  Diagnoses/Reason ie. COPD, HTN - Do not use MEDICATION, Follow-up or CHECK UP - Give reason for visit      Next Appointment:   Future Appointments   Date Time Provider Department Center   12/10/2025  2:00 PM [facility] Jensen[facility]       Message sent to  to schedule appt with patient?  NO      Requested Prescriptions     Pending Prescriptions Disp Refills    carvedilol (COREG) 6.25 MG tablet [Pharmacy Med Name: CARVEDILOL 6.25 MG TABLET] 180 tablet      Sig: TAKE 1 TABLET BY MOUTH 2 TIMES A DAY WITH A MEAL

## 2025-08-06 DIAGNOSIS — I10 PRIMARY HYPERTENSION: ICD-10-CM

## 2025-08-06 RX ORDER — HYDROCHLOROTHIAZIDE 12.5 MG/1
12.5 CAPSULE ORAL EVERY MORNING
Qty: 90 CAPSULE | Refills: 0 | Status: SHIPPED | OUTPATIENT
Start: 2025-08-06

## 2025-08-07 DIAGNOSIS — I10 PRIMARY HYPERTENSION: ICD-10-CM

## 2025-08-07 RX ORDER — LOSARTAN POTASSIUM 50 MG/1
50 TABLET ORAL EVERY MORNING
Qty: 90 TABLET | Refills: 1 | Status: SHIPPED | OUTPATIENT
Start: 2025-08-07

## 2025-08-21 DIAGNOSIS — I10 PRIMARY HYPERTENSION: ICD-10-CM

## 2025-08-21 RX ORDER — HYDROCHLOROTHIAZIDE 12.5 MG/1
12.5 CAPSULE ORAL EVERY MORNING
Qty: 90 CAPSULE | Refills: 0 | Status: SHIPPED | OUTPATIENT
Start: 2025-08-21

## (undated) DEVICE — PACK PROCEDURE SURG GYN LAP CDS

## (undated) DEVICE — NEEDLE HYPO 22GA L1.5IN BLK POLYPR HUB S STL REG BVL STR

## (undated) DEVICE — DISSECTOR LAP DIA5MM BLNT TIP ENDOPATH

## (undated) DEVICE — TRAY PREP DRY W/ PREM GLV 2 APPL 6 SPNG 2 UNDPD 1 OVERWRAP

## (undated) DEVICE — TRAY CATH 16FR F INCLUDE LUB DRNGE BG STATLOK STBL DEV

## (undated) DEVICE — TROCAR: Brand: KII SLEEVE

## (undated) DEVICE — TROCAR: Brand: KII FIOS FIRST ENTRY

## (undated) DEVICE — SUTURE MCRYL SZ 3-0 L27IN ABSRB UD L26MM SH 1/2 CIR Y416H

## (undated) DEVICE — 3M™ TEGADERM™ TRANSPARENT FILM DRESSING FRAME STYLE, 1624W, 2-3/8 IN X 2-3/4 IN (6 CM X 7 CM), 100/CT 4CT/CASE: Brand: 3M™ TEGADERM™

## (undated) DEVICE — INTENDED FOR TISSUE SEPARATION, AND OTHER PROCEDURES THAT REQUIRE A SHARP SURGICAL BLADE TO PUNCTURE OR CUT.: Brand: BARD-PARKER ® STAINLESS STEEL BLADES

## (undated) DEVICE — SPONGE,LAP,4"X18",XR,ST,5/PK,40PK/CS: Brand: MEDLINE INDUSTRIES, INC.

## (undated) DEVICE — LEGGINGS, PAIR, 31X48, STERILE: Brand: MEDLINE

## (undated) DEVICE — GAUZE,SPONGE,2X2,4PLY,NS,NW,LF: Brand: MEDLINE

## (undated) DEVICE — PUMP SUC IRR TBNG L10FT W/ HNDPC ASSEMB STRYKEFLOW 2

## (undated) DEVICE — SPONGE LAP W18XL18IN WHT COT 4 PLY FLD STRUNG RADPQ DISP ST

## (undated) DEVICE — SUTURE VCRL SZ 0 L36IN ABSRB UD CT-1 L36MM 1/2 CIR TAPR PNT VCP946H

## (undated) DEVICE — SHEET,DRAPE,53X77,STERILE: Brand: MEDLINE

## (undated) DEVICE — SEALER LAP L37CM MARYLAND JAW OPN NANO COAT MULTIFUNCTIONAL

## (undated) DEVICE — Z DISCONTINUED USE 2270995 CATHETER URETH 16FR L16IN RED RUB INTMIT RND HLLW TIP 2 OPP

## (undated) DEVICE — DEVICE TISS REM DIA3MM L25.25IN ENDOSCP F/ IU POLYPS

## (undated) DEVICE — GLOVE SURG SZ 65 THK91MIL LTX FREE SYN POLYISOPRENE

## (undated) DEVICE — KIT THERMOABLATION 6MM ENDOMET DEV NOVASURE

## (undated) DEVICE — GOWN SIRUS NONREIN XL W/TWL: Brand: MEDLINE INDUSTRIES, INC.

## (undated) DEVICE — PENCIL ES CRD L10FT HND SWCHING ROCK SWCH W/ EDGE COAT BLDE

## (undated) DEVICE — DEVICE MANIP L330MM DIA4.5MM UTER DISP INJ ZINNANTI KRONNER

## (undated) DEVICE — SKIN AFFIX SURG ADHESIVE 72/CS 0.55ML: Brand: MEDLINE

## (undated) DEVICE — SHEARS ENDOSCP L36CM DIA5MM ULTRASONIC CRV TIP W/ ADV

## (undated) DEVICE — PAD TBL OP RM TRENDELENBURG STATIC TORSO W/STRAPS

## (undated) DEVICE — COVER LT HNDL PLAS RIG 2 PER PK

## (undated) DEVICE — TOTAL TRAY, DB, 100% SILI FOLEY, 16FR 10: Brand: MEDLINE

## (undated) DEVICE — Z CONVERTED USE 2271043 CONTAINER SPEC COLL 4OZ SCR ON LID PEEL PCH

## (undated) DEVICE — NEEDLE SPNL L3.5IN PNK HUB S STL REG WALL FIT STYL W/ QNCKE

## (undated) DEVICE — GLOVE ORANGE PI 7   MSG9070

## (undated) DEVICE — CORD ES L10FT MPLR LAP

## (undated) DEVICE — SYRINGE BLB 50CC IRRIG PLIABLE FNGR FLNG GRAD FLSK DISP

## (undated) DEVICE — SYRINGE MED 10ML LUERLOCK TIP W/O SFTY DISP

## (undated) DEVICE — GOWN AURORA NONREINF LG: Brand: MEDLINE INDUSTRIES, INC.

## (undated) DEVICE — SHEET,DRAPE,UNDERBUTTOCK,GRAD POUCH,PORT: Brand: MEDLINE

## (undated) DEVICE — YANKAUER,OPEN TIP,W/O VENT,STERILE: Brand: MEDLINE INDUSTRIES, INC.

## (undated) DEVICE — TUBING, SUCTION, 3/16" X 12', STRAIGHT: Brand: MEDLINE

## (undated) DEVICE — GAUZE,SPONGE,4"X4",16PLY,XRAY,STRL,LF: Brand: MEDLINE

## (undated) DEVICE — TROCAR ENDOSCP L100MM DIA5MM BLDELSS STBL SL OBT RADLUC

## (undated) DEVICE — SUTURE VCRL + SZ 4-0 L18IN ABSRB UD L19MM PS-2 3/8 CIR PRIM VCP496H